# Patient Record
Sex: FEMALE | Race: BLACK OR AFRICAN AMERICAN | Employment: FULL TIME
[De-identification: names, ages, dates, MRNs, and addresses within clinical notes are randomized per-mention and may not be internally consistent; named-entity substitution may affect disease eponyms.]

---

## 2017-01-13 ENCOUNTER — OFFICE VISIT (OUTPATIENT)
Dept: FAMILY MEDICINE CLINIC | Facility: CLINIC | Age: 47
End: 2017-01-13

## 2017-01-13 VITALS
SYSTOLIC BLOOD PRESSURE: 148 MMHG | HEART RATE: 92 BPM | WEIGHT: 281.6 LBS | HEIGHT: 67 IN | TEMPERATURE: 95.4 F | DIASTOLIC BLOOD PRESSURE: 75 MMHG | BODY MASS INDEX: 44.2 KG/M2

## 2017-01-13 DIAGNOSIS — M16.12 PRIMARY OSTEOARTHRITIS OF LEFT HIP: Primary | ICD-10-CM

## 2017-01-13 DIAGNOSIS — D50.8 OTHER IRON DEFICIENCY ANEMIA: ICD-10-CM

## 2017-01-13 DIAGNOSIS — E66.01 MORBID OBESITY DUE TO EXCESS CALORIES (HCC): ICD-10-CM

## 2017-01-13 PROCEDURE — 99213 OFFICE O/P EST LOW 20 MIN: CPT | Performed by: FAMILY MEDICINE

## 2017-01-13 PROCEDURE — 90732 PPSV23 VACC 2 YRS+ SUBQ/IM: CPT | Performed by: FAMILY MEDICINE

## 2017-01-13 PROCEDURE — 90472 IMMUNIZATION ADMIN EACH ADD: CPT | Performed by: FAMILY MEDICINE

## 2017-01-13 PROCEDURE — 90715 TDAP VACCINE 7 YRS/> IM: CPT | Performed by: FAMILY MEDICINE

## 2017-01-13 PROCEDURE — 90471 IMMUNIZATION ADMIN: CPT | Performed by: FAMILY MEDICINE

## 2017-01-13 RX ORDER — LANOLIN ALCOHOL/MO/W.PET/CERES
325 CREAM (GRAM) TOPICAL 2 TIMES DAILY
Qty: 60 TABLET | Refills: 5 | Status: SHIPPED | OUTPATIENT
Start: 2017-01-13 | End: 2017-06-30 | Stop reason: SDUPTHER

## 2017-01-13 RX ORDER — NAPROXEN 500 MG/1
500 TABLET ORAL 2 TIMES DAILY PRN
Qty: 60 TABLET | Refills: 2 | Status: SHIPPED | OUTPATIENT
Start: 2017-01-13 | End: 2018-03-29

## 2017-01-13 ASSESSMENT — ENCOUNTER SYMPTOMS
CONSTIPATION: 0
SHORTNESS OF BREATH: 0
ABDOMINAL PAIN: 0

## 2017-02-09 ENCOUNTER — OFFICE VISIT (OUTPATIENT)
Dept: ORTHOPEDIC SURGERY | Facility: CLINIC | Age: 47
End: 2017-02-09

## 2017-02-09 VITALS — HEIGHT: 67 IN | BODY MASS INDEX: 44.19 KG/M2 | WEIGHT: 281.53 LBS

## 2017-02-09 DIAGNOSIS — S73.192A ACETABULAR LABRUM TEAR, LEFT, INITIAL ENCOUNTER: ICD-10-CM

## 2017-02-09 DIAGNOSIS — M25.552 PAIN OF LEFT HIP JOINT: Primary | ICD-10-CM

## 2017-02-09 PROCEDURE — 99214 OFFICE O/P EST MOD 30 MIN: CPT | Performed by: ORTHOPAEDIC SURGERY

## 2017-03-17 ENCOUNTER — HOSPITAL ENCOUNTER (OUTPATIENT)
Dept: INTERVENTIONAL RADIOLOGY/VASCULAR | Age: 47
Discharge: HOME | End: 2017-03-17
Payer: COMMERCIAL

## 2017-03-17 ENCOUNTER — HOSPITAL ENCOUNTER (OUTPATIENT)
Dept: MRI IMAGING | Age: 47
Discharge: HOME | End: 2017-03-17
Payer: COMMERCIAL

## 2017-03-17 VITALS
HEART RATE: 78 BPM | SYSTOLIC BLOOD PRESSURE: 185 MMHG | OXYGEN SATURATION: 98 % | DIASTOLIC BLOOD PRESSURE: 94 MMHG | RESPIRATION RATE: 15 BRPM

## 2017-03-17 DIAGNOSIS — S73.192A ACETABULAR LABRUM TEAR, LEFT, INITIAL ENCOUNTER: ICD-10-CM

## 2017-03-17 PROCEDURE — 6360000004 HC RX CONTRAST MEDICATION: Performed by: RADIOLOGY

## 2017-03-17 PROCEDURE — 77002 NEEDLE LOCALIZATION BY XRAY: CPT

## 2017-03-17 PROCEDURE — A9579 GAD-BASE MR CONTRAST NOS,1ML: HCPCS | Performed by: RADIOLOGY

## 2017-03-17 PROCEDURE — 73722 MRI JOINT OF LWR EXTR W/DYE: CPT

## 2017-03-17 PROCEDURE — 2500000003 HC RX 250 WO HCPCS: Performed by: RADIOLOGY

## 2017-03-17 PROCEDURE — 6360000002 HC RX W HCPCS: Performed by: RADIOLOGY

## 2017-03-17 PROCEDURE — 20610 DRAIN/INJ JOINT/BURSA W/O US: CPT

## 2017-03-17 RX ORDER — LIDOCAINE HYDROCHLORIDE 10 MG/ML
INJECTION, SOLUTION INFILTRATION; PERINEURAL
Status: COMPLETED | OUTPATIENT
Start: 2017-03-17 | End: 2017-03-17

## 2017-03-17 RX ORDER — METHYLPREDNISOLONE ACETATE 40 MG/ML
40 INJECTION, SUSPENSION INTRA-ARTICULAR; INTRALESIONAL; INTRAMUSCULAR; SOFT TISSUE ONCE
Status: DISCONTINUED | OUTPATIENT
Start: 2017-03-17 | End: 2017-03-20 | Stop reason: HOSPADM

## 2017-03-17 RX ORDER — METHYLPREDNISOLONE ACETATE 40 MG/ML
INJECTION, SUSPENSION INTRA-ARTICULAR; INTRALESIONAL; INTRAMUSCULAR; SOFT TISSUE
Status: COMPLETED | OUTPATIENT
Start: 2017-03-17 | End: 2017-03-17

## 2017-03-17 RX ADMIN — METHYLPREDNISOLONE ACETATE 40 MG: 40 INJECTION, SUSPENSION INTRA-ARTICULAR; INTRALESIONAL; INTRAMUSCULAR; SOFT TISSUE at 09:38

## 2017-03-17 RX ADMIN — GADOPENTETATE DIMEGLUMINE 0.2 ML: 469.01 INJECTION INTRAVENOUS at 09:40

## 2017-03-17 RX ADMIN — LIDOCAINE HYDROCHLORIDE 7 ML: 10 INJECTION, SOLUTION INFILTRATION; PERINEURAL at 09:39

## 2017-03-17 RX ADMIN — IOTHALAMATE MEGLUMINE 5 ML: 430 INJECTION INTRAVASCULAR at 09:40

## 2017-04-05 ENCOUNTER — OFFICE VISIT (OUTPATIENT)
Dept: FAMILY MEDICINE CLINIC | Age: 47
End: 2017-04-05
Payer: COMMERCIAL

## 2017-04-05 VITALS
TEMPERATURE: 97.5 F | WEIGHT: 272.8 LBS | HEIGHT: 67 IN | SYSTOLIC BLOOD PRESSURE: 147 MMHG | DIASTOLIC BLOOD PRESSURE: 84 MMHG | BODY MASS INDEX: 42.82 KG/M2 | HEART RATE: 74 BPM

## 2017-04-05 DIAGNOSIS — E78.2 MIXED HYPERLIPIDEMIA: Primary | ICD-10-CM

## 2017-04-05 PROCEDURE — 99213 OFFICE O/P EST LOW 20 MIN: CPT | Performed by: FAMILY MEDICINE

## 2017-04-05 ASSESSMENT — ENCOUNTER SYMPTOMS
SORE THROAT: 0
COUGH: 0
NAUSEA: 0
TROUBLE SWALLOWING: 0
CONSTIPATION: 0
ABDOMINAL PAIN: 0
SHORTNESS OF BREATH: 0

## 2017-04-11 ENCOUNTER — OFFICE VISIT (OUTPATIENT)
Dept: ORTHOPEDIC SURGERY | Age: 47
End: 2017-04-11
Payer: COMMERCIAL

## 2017-04-11 VITALS — HEIGHT: 67 IN | BODY MASS INDEX: 44.42 KG/M2 | WEIGHT: 283 LBS

## 2017-04-11 DIAGNOSIS — E66.01 MORBID OBESITY DUE TO EXCESS CALORIES (HCC): Primary | ICD-10-CM

## 2017-04-11 PROCEDURE — 99212 OFFICE O/P EST SF 10 MIN: CPT | Performed by: ORTHOPAEDIC SURGERY

## 2017-04-25 DIAGNOSIS — J32.0 CHRONIC MAXILLARY SINUSITIS: ICD-10-CM

## 2017-04-25 RX ORDER — CETIRIZINE HYDROCHLORIDE 10 MG/1
TABLET ORAL
Qty: 30 TABLET | Refills: 0 | Status: SHIPPED | OUTPATIENT
Start: 2017-04-25 | End: 2017-06-30 | Stop reason: SDUPTHER

## 2017-04-25 RX ORDER — FLUTICASONE PROPIONATE 50 MCG
SPRAY, SUSPENSION (ML) NASAL
Qty: 1 BOTTLE | Refills: 2 | Status: SHIPPED | OUTPATIENT
Start: 2017-04-25 | End: 2017-06-30 | Stop reason: SDUPTHER

## 2017-06-03 ENCOUNTER — APPOINTMENT (OUTPATIENT)
Dept: GENERAL RADIOLOGY | Age: 47
End: 2017-06-03
Payer: COMMERCIAL

## 2017-06-03 ENCOUNTER — HOSPITAL ENCOUNTER (EMERGENCY)
Age: 47
Discharge: HOME | End: 2017-06-03
Attending: EMERGENCY MEDICINE
Payer: COMMERCIAL

## 2017-06-03 VITALS
WEIGHT: 274 LBS | BODY MASS INDEX: 43 KG/M2 | TEMPERATURE: 97.3 F | SYSTOLIC BLOOD PRESSURE: 160 MMHG | RESPIRATION RATE: 20 BRPM | DIASTOLIC BLOOD PRESSURE: 81 MMHG | HEART RATE: 93 BPM | HEIGHT: 67 IN | OXYGEN SATURATION: 98 %

## 2017-06-03 DIAGNOSIS — M54.50 ACUTE BILATERAL LOW BACK PAIN WITHOUT SCIATICA: Primary | ICD-10-CM

## 2017-06-03 DIAGNOSIS — R82.71 BACTERIA IN URINE: ICD-10-CM

## 2017-06-03 LAB
-: ABNORMAL
AMORPHOUS: ABNORMAL
BACTERIA: ABNORMAL
BILIRUBIN URINE: NEGATIVE
CASTS UA: ABNORMAL /LPF
COLOR: YELLOW
COMMENT UA: ABNORMAL
CRYSTALS, UA: ABNORMAL /HPF
EPITHELIAL CELLS UA: ABNORMAL /HPF
GLUCOSE URINE: NEGATIVE
KETONES, URINE: NEGATIVE
LEUKOCYTE ESTERASE, URINE: NEGATIVE
MUCUS: ABNORMAL
NITRITE, URINE: NEGATIVE
OTHER OBSERVATIONS UA: ABNORMAL
PH UA: 6 (ref 5–8)
PROTEIN UA: NEGATIVE
RBC UA: ABNORMAL /HPF (ref 0–2)
RENAL EPITHELIAL, UA: ABNORMAL /HPF
SPECIFIC GRAVITY UA: 1.01 (ref 1–1.03)
TRICHOMONAS: ABNORMAL
TURBIDITY: CLEAR
URINE HGB: ABNORMAL
UROBILINOGEN, URINE: NORMAL
WBC UA: ABNORMAL /HPF (ref 0–5)
YEAST: ABNORMAL

## 2017-06-03 PROCEDURE — 72100 X-RAY EXAM L-S SPINE 2/3 VWS: CPT

## 2017-06-03 PROCEDURE — 6370000000 HC RX 637 (ALT 250 FOR IP): Performed by: NURSE PRACTITIONER

## 2017-06-03 PROCEDURE — 81001 URINALYSIS AUTO W/SCOPE: CPT

## 2017-06-03 PROCEDURE — 84703 CHORIONIC GONADOTROPIN ASSAY: CPT

## 2017-06-03 PROCEDURE — 81003 URINALYSIS AUTO W/O SCOPE: CPT

## 2017-06-03 PROCEDURE — 99283 EMERGENCY DEPT VISIT LOW MDM: CPT

## 2017-06-03 PROCEDURE — 87086 URINE CULTURE/COLONY COUNT: CPT

## 2017-06-03 RX ORDER — NITROFURANTOIN 25; 75 MG/1; MG/1
100 CAPSULE ORAL 2 TIMES DAILY
Qty: 10 CAPSULE | Refills: 0 | Status: SHIPPED | OUTPATIENT
Start: 2017-06-03 | End: 2017-06-08

## 2017-06-03 RX ORDER — METHOCARBAMOL 500 MG/1
500 TABLET, FILM COATED ORAL 3 TIMES DAILY PRN
Qty: 20 TABLET | Refills: 0 | Status: SHIPPED | OUTPATIENT
Start: 2017-06-03 | End: 2017-06-13

## 2017-06-03 RX ORDER — HYDROCODONE BITARTRATE AND ACETAMINOPHEN 5; 325 MG/1; MG/1
1 TABLET ORAL ONCE
Status: COMPLETED | OUTPATIENT
Start: 2017-06-03 | End: 2017-06-03

## 2017-06-03 RX ORDER — HYDROCODONE BITARTRATE AND ACETAMINOPHEN 5; 325 MG/1; MG/1
1 TABLET ORAL EVERY 6 HOURS PRN
Qty: 15 TABLET | Refills: 0 | Status: SHIPPED | OUTPATIENT
Start: 2017-06-03 | End: 2017-06-10

## 2017-06-03 RX ADMIN — HYDROCODONE BITARTRATE AND ACETAMINOPHEN 1 TABLET: 5; 325 TABLET ORAL at 16:08

## 2017-06-03 ASSESSMENT — PAIN SCALES - GENERAL
PAINLEVEL_OUTOF10: 10
PAINLEVEL_OUTOF10: 10
PAINLEVEL_OUTOF10: 6
PAINLEVEL_OUTOF10: 6

## 2017-06-03 ASSESSMENT — PAIN DESCRIPTION - LOCATION: LOCATION: BACK

## 2017-06-03 ASSESSMENT — PAIN DESCRIPTION - ORIENTATION: ORIENTATION: MID;LOWER

## 2017-06-04 LAB
CULTURE: NORMAL
CULTURE: NORMAL
Lab: NORMAL
SPECIMEN DESCRIPTION: NORMAL
SPECIMEN DESCRIPTION: NORMAL
STATUS: NORMAL

## 2017-06-05 LAB — HCG, PREGNANCY URINE (POC): NEGATIVE

## 2017-06-09 ENCOUNTER — OFFICE VISIT (OUTPATIENT)
Dept: FAMILY MEDICINE CLINIC | Age: 47
End: 2017-06-09
Payer: COMMERCIAL

## 2017-06-09 VITALS
SYSTOLIC BLOOD PRESSURE: 140 MMHG | WEIGHT: 273 LBS | DIASTOLIC BLOOD PRESSURE: 90 MMHG | RESPIRATION RATE: 16 BRPM | HEIGHT: 67 IN | TEMPERATURE: 98 F | HEART RATE: 88 BPM | BODY MASS INDEX: 42.85 KG/M2

## 2017-06-09 DIAGNOSIS — I10 HYPERTENSION, GOAL BELOW 140/90: ICD-10-CM

## 2017-06-09 DIAGNOSIS — M54.59 MECHANICAL LOW BACK PAIN: Primary | ICD-10-CM

## 2017-06-09 PROCEDURE — 99213 OFFICE O/P EST LOW 20 MIN: CPT | Performed by: FAMILY MEDICINE

## 2017-06-09 RX ORDER — TRIAMTERENE AND HYDROCHLOROTHIAZIDE 37.5; 25 MG/1; MG/1
1 CAPSULE ORAL DAILY
Qty: 30 CAPSULE | Refills: 3 | Status: SHIPPED | OUTPATIENT
Start: 2017-06-09 | End: 2017-06-30 | Stop reason: SDUPTHER

## 2017-06-09 RX ORDER — AMLODIPINE BESYLATE 10 MG/1
10 TABLET ORAL DAILY
Qty: 30 TABLET | Refills: 3 | Status: SHIPPED | OUTPATIENT
Start: 2017-06-09 | End: 2017-06-30 | Stop reason: SDUPTHER

## 2017-06-09 ASSESSMENT — ENCOUNTER SYMPTOMS
CONSTIPATION: 0
ABDOMINAL PAIN: 0
SHORTNESS OF BREATH: 0
COUGH: 0
NAUSEA: 0
BACK PAIN: 1
TROUBLE SWALLOWING: 0
SORE THROAT: 0

## 2017-06-09 ASSESSMENT — PATIENT HEALTH QUESTIONNAIRE - PHQ9
1. LITTLE INTEREST OR PLEASURE IN DOING THINGS: 0
SUM OF ALL RESPONSES TO PHQ QUESTIONS 1-9: 0
SUM OF ALL RESPONSES TO PHQ9 QUESTIONS 1 & 2: 0
2. FEELING DOWN, DEPRESSED OR HOPELESS: 0

## 2017-06-30 ENCOUNTER — OFFICE VISIT (OUTPATIENT)
Dept: FAMILY MEDICINE CLINIC | Age: 47
End: 2017-06-30
Payer: COMMERCIAL

## 2017-06-30 ENCOUNTER — HOSPITAL ENCOUNTER (OUTPATIENT)
Age: 47
Setting detail: SPECIMEN
Discharge: HOME | End: 2017-06-30
Payer: COMMERCIAL

## 2017-06-30 VITALS
TEMPERATURE: 96.5 F | WEIGHT: 269 LBS | HEIGHT: 67 IN | SYSTOLIC BLOOD PRESSURE: 146 MMHG | BODY MASS INDEX: 42.22 KG/M2 | HEART RATE: 86 BPM | DIASTOLIC BLOOD PRESSURE: 83 MMHG

## 2017-06-30 DIAGNOSIS — F17.200 SMOKER: ICD-10-CM

## 2017-06-30 DIAGNOSIS — D50.8 OTHER IRON DEFICIENCY ANEMIA: ICD-10-CM

## 2017-06-30 DIAGNOSIS — E78.5 DYSLIPIDEMIA: ICD-10-CM

## 2017-06-30 DIAGNOSIS — I10 HYPERTENSION, GOAL BELOW 140/90: ICD-10-CM

## 2017-06-30 LAB
ANION GAP SERPL CALCULATED.3IONS-SCNC: 17 MMOL/L (ref 9–17)
BUN BLDV-MCNC: 6 MG/DL (ref 6–20)
BUN/CREAT BLD: ABNORMAL (ref 9–20)
CALCIUM SERPL-MCNC: 9.2 MG/DL (ref 8.6–10.4)
CHLORIDE BLD-SCNC: 95 MMOL/L (ref 98–107)
CO2: 22 MMOL/L (ref 20–31)
CREAT SERPL-MCNC: 0.74 MG/DL (ref 0.5–0.9)
GFR AFRICAN AMERICAN: >60 ML/MIN
GFR NON-AFRICAN AMERICAN: >60 ML/MIN
GFR SERPL CREATININE-BSD FRML MDRD: ABNORMAL ML/MIN/{1.73_M2}
GFR SERPL CREATININE-BSD FRML MDRD: ABNORMAL ML/MIN/{1.73_M2}
GLUCOSE BLD-MCNC: 97 MG/DL (ref 70–99)
HCT VFR BLD CALC: 33.3 % (ref 36–46)
HEMOGLOBIN: 10.2 G/DL (ref 12–16)
MCH RBC QN AUTO: 20.9 PG (ref 26–34)
MCHC RBC AUTO-ENTMCNC: 30.6 G/DL (ref 31–37)
MCV RBC AUTO: 68.4 FL (ref 80–100)
PDW BLD-RTO: 20.1 % (ref 12.5–15.4)
PLATELET # BLD: 453 K/UL (ref 140–450)
PMV BLD AUTO: 8.2 FL (ref 6–12)
POTASSIUM SERPL-SCNC: 3.5 MMOL/L (ref 3.7–5.3)
RBC # BLD: 4.88 M/UL (ref 4–5.2)
SODIUM BLD-SCNC: 134 MMOL/L (ref 135–144)
VITAMIN B-12: 214 PG/ML (ref 211–946)
VITAMIN D 25-HYDROXY: 13.5 NG/ML (ref 30–100)
WBC # BLD: 12.8 K/UL (ref 3.5–11)

## 2017-06-30 PROCEDURE — 99213 OFFICE O/P EST LOW 20 MIN: CPT | Performed by: FAMILY MEDICINE

## 2017-06-30 RX ORDER — TRIAMTERENE AND HYDROCHLOROTHIAZIDE 37.5; 25 MG/1; MG/1
1 CAPSULE ORAL DAILY
Qty: 30 CAPSULE | Refills: 3 | Status: SHIPPED | OUTPATIENT
Start: 2017-06-30 | End: 2018-02-26 | Stop reason: DRUGHIGH

## 2017-06-30 RX ORDER — CETIRIZINE HYDROCHLORIDE 10 MG/1
TABLET ORAL
Qty: 30 TABLET | Refills: 0 | Status: SHIPPED | OUTPATIENT
Start: 2017-06-30 | End: 2020-01-09

## 2017-06-30 RX ORDER — FLUTICASONE PROPIONATE 50 MCG
SPRAY, SUSPENSION (ML) NASAL
Qty: 1 BOTTLE | Refills: 2 | Status: SHIPPED | OUTPATIENT
Start: 2017-06-30 | End: 2020-01-09

## 2017-06-30 RX ORDER — SENNOSIDES 8.6 MG
650 CAPSULE ORAL EVERY 8 HOURS PRN
Qty: 60 TABLET | Refills: 0 | Status: CANCELLED | OUTPATIENT
Start: 2017-06-30

## 2017-06-30 RX ORDER — HYDROCHLOROTHIAZIDE 25 MG/1
25 TABLET ORAL DAILY
Qty: 30 TABLET | Refills: 3 | Status: SHIPPED | OUTPATIENT
Start: 2017-06-30 | End: 2018-02-26 | Stop reason: ALTCHOICE

## 2017-06-30 RX ORDER — METHOCARBAMOL 500 MG/1
500 TABLET, FILM COATED ORAL
Qty: 20 TABLET | Refills: 0 | Status: SHIPPED | OUTPATIENT
Start: 2017-06-30 | End: 2017-06-30

## 2017-06-30 RX ORDER — NAPROXEN 375 MG/1
375 TABLET ORAL 2 TIMES DAILY WITH MEALS
Qty: 60 TABLET | Refills: 0 | Status: SHIPPED | OUTPATIENT
Start: 2017-06-30 | End: 2018-02-05 | Stop reason: ALTCHOICE

## 2017-06-30 RX ORDER — CHOLECALCIFEROL (VITAMIN D3) 125 MCG
500 CAPSULE ORAL DAILY
Qty: 30 TABLET | Status: CANCELLED | OUTPATIENT
Start: 2017-06-30

## 2017-06-30 RX ORDER — LANOLIN ALCOHOL/MO/W.PET/CERES
325 CREAM (GRAM) TOPICAL 2 TIMES DAILY
Qty: 60 TABLET | Refills: 5 | Status: SHIPPED | OUTPATIENT
Start: 2017-06-30 | End: 2020-01-09

## 2017-06-30 RX ORDER — AMLODIPINE BESYLATE 10 MG/1
10 TABLET ORAL DAILY
Qty: 30 TABLET | Refills: 3 | Status: ON HOLD | OUTPATIENT
Start: 2017-06-30 | End: 2020-02-02 | Stop reason: HOSPADM

## 2017-06-30 RX ORDER — BUPROPION HYDROCHLORIDE 150 MG/1
150 TABLET ORAL EVERY MORNING
Qty: 30 TABLET | Refills: 3 | Status: SHIPPED | OUTPATIENT
Start: 2017-06-30 | End: 2020-01-09

## 2017-06-30 RX ORDER — ATORVASTATIN CALCIUM 40 MG/1
40 TABLET, FILM COATED ORAL DAILY
Qty: 30 TABLET | Refills: 4 | Status: ON HOLD | OUTPATIENT
Start: 2017-06-30 | End: 2020-04-20

## 2017-06-30 ASSESSMENT — ENCOUNTER SYMPTOMS
COUGH: 0
CHEST TIGHTNESS: 0
SHORTNESS OF BREATH: 0
BACK PAIN: 1

## 2017-09-05 ENCOUNTER — APPOINTMENT (OUTPATIENT)
Dept: CT IMAGING | Age: 47
End: 2017-09-05

## 2017-09-05 ENCOUNTER — HOSPITAL ENCOUNTER (EMERGENCY)
Age: 47
Discharge: HOME | End: 2017-09-06
Attending: EMERGENCY MEDICINE

## 2017-09-05 VITALS
HEIGHT: 67 IN | OXYGEN SATURATION: 100 % | DIASTOLIC BLOOD PRESSURE: 82 MMHG | BODY MASS INDEX: 42.83 KG/M2 | WEIGHT: 272.9 LBS | TEMPERATURE: 98.1 F | RESPIRATION RATE: 22 BRPM | SYSTOLIC BLOOD PRESSURE: 158 MMHG | HEART RATE: 95 BPM

## 2017-09-05 DIAGNOSIS — I10 ESSENTIAL HYPERTENSION: ICD-10-CM

## 2017-09-05 DIAGNOSIS — R51.9 NONINTRACTABLE HEADACHE, UNSPECIFIED CHRONICITY PATTERN, UNSPECIFIED HEADACHE TYPE: Primary | ICD-10-CM

## 2017-09-05 PROCEDURE — 96372 THER/PROPH/DIAG INJ SC/IM: CPT

## 2017-09-05 PROCEDURE — 99283 EMERGENCY DEPT VISIT LOW MDM: CPT

## 2017-09-05 PROCEDURE — 70450 CT HEAD/BRAIN W/O DYE: CPT

## 2017-09-05 PROCEDURE — 6360000002 HC RX W HCPCS: Performed by: EMERGENCY MEDICINE

## 2017-09-05 RX ORDER — PROMETHAZINE HYDROCHLORIDE 25 MG/ML
25 INJECTION, SOLUTION INTRAMUSCULAR; INTRAVENOUS ONCE
Status: COMPLETED | OUTPATIENT
Start: 2017-09-05 | End: 2017-09-05

## 2017-09-05 RX ORDER — BUTALBITAL, ACETAMINOPHEN AND CAFFEINE 50; 325; 40 MG/1; MG/1; MG/1
1 TABLET ORAL EVERY 6 HOURS PRN
Qty: 20 TABLET | Refills: 0 | Status: SHIPPED | OUTPATIENT
Start: 2017-09-05 | End: 2020-01-09

## 2017-09-05 RX ORDER — KETOROLAC TROMETHAMINE 30 MG/ML
60 INJECTION, SOLUTION INTRAMUSCULAR; INTRAVENOUS ONCE
Status: COMPLETED | OUTPATIENT
Start: 2017-09-05 | End: 2017-09-05

## 2017-09-05 RX ADMIN — PROMETHAZINE HYDROCHLORIDE 25 MG: 25 INJECTION INTRAMUSCULAR; INTRAVENOUS at 23:43

## 2017-09-05 RX ADMIN — KETOROLAC TROMETHAMINE 60 MG: 30 INJECTION, SOLUTION INTRAMUSCULAR at 23:43

## 2017-09-05 ASSESSMENT — PAIN DESCRIPTION - LOCATION: LOCATION: BACK;HEAD

## 2017-09-05 ASSESSMENT — ENCOUNTER SYMPTOMS
RESPIRATORY NEGATIVE: 1
EYES NEGATIVE: 1
ALLERGIC/IMMUNOLOGIC NEGATIVE: 1
GASTROINTESTINAL NEGATIVE: 1

## 2017-09-05 ASSESSMENT — PAIN DESCRIPTION - FREQUENCY: FREQUENCY: CONTINUOUS

## 2017-09-05 ASSESSMENT — PAIN DESCRIPTION - PAIN TYPE: TYPE: ACUTE PAIN

## 2017-09-05 ASSESSMENT — PAIN DESCRIPTION - DESCRIPTORS: DESCRIPTORS: SHARP

## 2017-09-05 ASSESSMENT — PAIN SCALES - GENERAL: PAINLEVEL_OUTOF10: 10

## 2017-11-26 ENCOUNTER — HOSPITAL ENCOUNTER (EMERGENCY)
Age: 47
Discharge: HOME | End: 2017-11-26
Attending: EMERGENCY MEDICINE

## 2017-11-26 ENCOUNTER — APPOINTMENT (OUTPATIENT)
Dept: GENERAL RADIOLOGY | Age: 47
End: 2017-11-26

## 2017-11-26 VITALS
TEMPERATURE: 97.2 F | HEIGHT: 67 IN | DIASTOLIC BLOOD PRESSURE: 102 MMHG | HEART RATE: 79 BPM | RESPIRATION RATE: 16 BRPM | BODY MASS INDEX: 41.59 KG/M2 | WEIGHT: 265 LBS | SYSTOLIC BLOOD PRESSURE: 187 MMHG | OXYGEN SATURATION: 99 %

## 2017-11-26 DIAGNOSIS — M25.512 ACUTE PAIN OF LEFT SHOULDER: ICD-10-CM
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
## 2017-11-26 DIAGNOSIS — M25.552 LEFT HIP PAIN: ICD-10-CM

## 2017-11-26 PROCEDURE — 73502 X-RAY EXAM HIP UNI 2-3 VIEWS: CPT

## 2017-11-26 PROCEDURE — 73130 X-RAY EXAM OF HAND: CPT

## 2017-11-26 PROCEDURE — 6370000000 HC RX 637 (ALT 250 FOR IP): Performed by: EMERGENCY MEDICINE

## 2017-11-26 PROCEDURE — 71010 XR CHEST PORTABLE: CPT

## 2017-11-26 PROCEDURE — 73030 X-RAY EXAM OF SHOULDER: CPT

## 2017-11-26 PROCEDURE — 99284 EMERGENCY DEPT VISIT MOD MDM: CPT

## 2017-11-26 RX ORDER — OXYCODONE HYDROCHLORIDE AND ACETAMINOPHEN 5; 325 MG/1; MG/1
1 TABLET ORAL EVERY 8 HOURS PRN
Qty: 6 TABLET | Refills: 0 | Status: ON HOLD | OUTPATIENT
Start: 2017-11-26 | End: 2018-04-03 | Stop reason: HOSPADM

## 2017-11-26 RX ORDER — CYCLOBENZAPRINE HCL 10 MG
10 TABLET ORAL 2 TIMES DAILY PRN
Qty: 10 TABLET | Refills: 0 | Status: SHIPPED | OUTPATIENT
Start: 2017-11-26 | End: 2018-02-05 | Stop reason: ALTCHOICE

## 2017-11-26 RX ORDER — OXYCODONE HYDROCHLORIDE AND ACETAMINOPHEN 5; 325 MG/1; MG/1
1 TABLET ORAL ONCE
Status: COMPLETED | OUTPATIENT
Start: 2017-11-26 | End: 2017-11-26

## 2017-11-26 RX ORDER — CYCLOBENZAPRINE HCL 10 MG
10 TABLET ORAL ONCE
Status: COMPLETED | OUTPATIENT
Start: 2017-11-26 | End: 2017-11-26

## 2017-11-26 RX ADMIN — CYCLOBENZAPRINE HYDROCHLORIDE 10 MG: 10 TABLET, FILM COATED ORAL at 15:45

## 2017-11-26 RX ADMIN — OXYCODONE HYDROCHLORIDE AND ACETAMINOPHEN 1 TABLET: 5; 325 TABLET ORAL at 15:45

## 2017-11-26 ASSESSMENT — PAIN DESCRIPTION - PAIN TYPE: TYPE: ACUTE PAIN

## 2017-11-26 ASSESSMENT — ENCOUNTER SYMPTOMS
SHORTNESS OF BREATH: 0
BACK PAIN: 0
COUGH: 0
VOMITING: 0
NAUSEA: 0
ABDOMINAL PAIN: 0
SORE THROAT: 0

## 2017-11-26 ASSESSMENT — PAIN DESCRIPTION - LOCATION: LOCATION: HIP

## 2017-11-26 ASSESSMENT — PAIN DESCRIPTION - ORIENTATION: ORIENTATION: LEFT

## 2017-11-26 ASSESSMENT — PAIN SCALES - GENERAL: PAINLEVEL_OUTOF10: 8
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
## 2018-01-22 ENCOUNTER — HOSPITAL ENCOUNTER (EMERGENCY)
Age: 48
Discharge: HOME | End: 2018-01-22
Attending: EMERGENCY MEDICINE

## 2018-01-22 VITALS
BODY MASS INDEX: 43.94 KG/M2 | TEMPERATURE: 98.7 F | OXYGEN SATURATION: 100 % | HEART RATE: 92 BPM | SYSTOLIC BLOOD PRESSURE: 166 MMHG | DIASTOLIC BLOOD PRESSURE: 71 MMHG | HEIGHT: 67 IN | WEIGHT: 279.98 LBS | RESPIRATION RATE: 16 BRPM

## 2018-01-22 DIAGNOSIS — R04.0 EPISTAXIS: Primary | ICD-10-CM

## 2018-01-22 PROCEDURE — 6370000000 HC RX 637 (ALT 250 FOR IP): Performed by: NURSE PRACTITIONER

## 2018-01-22 PROCEDURE — 99282 EMERGENCY DEPT VISIT SF MDM: CPT

## 2018-01-22 RX ORDER — AMLODIPINE BESYLATE 5 MG/1
10 TABLET ORAL ONCE
Status: COMPLETED | OUTPATIENT
Start: 2018-01-22 | End: 2018-01-22

## 2018-01-22 RX ADMIN — AMLODIPINE BESYLATE 10 MG: 5 TABLET ORAL at 19:11

## 2018-01-22 ASSESSMENT — ENCOUNTER SYMPTOMS
COLOR CHANGE: 0
RHINORRHEA: 0
SINUS PAIN: 0
SHORTNESS OF BREATH: 0
BACK PAIN: 0
SORE THROAT: 0
COUGH: 0
FACIAL SWELLING: 0

## 2018-01-29 ENCOUNTER — HOSPITAL ENCOUNTER (EMERGENCY)
Age: 48
Discharge: HOME | End: 2018-01-29
Attending: EMERGENCY MEDICINE

## 2018-01-29 VITALS
TEMPERATURE: 97.8 F | RESPIRATION RATE: 20 BRPM | SYSTOLIC BLOOD PRESSURE: 170 MMHG | OXYGEN SATURATION: 99 % | DIASTOLIC BLOOD PRESSURE: 77 MMHG | BODY MASS INDEX: 43.13 KG/M2 | HEART RATE: 80 BPM | WEIGHT: 274.8 LBS | HEIGHT: 67 IN

## 2018-01-29 DIAGNOSIS — M54.41 ACUTE RIGHT-SIDED LOW BACK PAIN WITH RIGHT-SIDED SCIATICA: Primary | ICD-10-CM

## 2018-01-29 LAB
APPEARANCE: CLEAR
BILIRUBIN, POC: NEGATIVE
BLOOD URINE, POC: NORMAL
CHP ED QC CHECK: YES
CHP ED QC CHECK: YES
CLARITY, POC: CLEAR
COLOR, POC: NORMAL
GLUCOSE URINE, POC: NEGATIVE
KETONES, POC: NEGATIVE
LEUKOCYTE EST, POC: NEGATIVE
NITRITE, POC: NEGATIVE
PH, POC: 6.5
PREGNANCY TEST URINE, POC: NEGATIVE
PROTEIN, POC: NEGATIVE
SPECIFIC GRAVITY, POC: <1.005
UROBILINOGEN, POC: 0.2

## 2018-01-29 PROCEDURE — 6360000002 HC RX W HCPCS: Performed by: NURSE PRACTITIONER

## 2018-01-29 PROCEDURE — 99283 EMERGENCY DEPT VISIT LOW MDM: CPT

## 2018-01-29 PROCEDURE — 96372 THER/PROPH/DIAG INJ SC/IM: CPT

## 2018-01-29 PROCEDURE — 84703 CHORIONIC GONADOTROPIN ASSAY: CPT

## 2018-01-29 PROCEDURE — 81003 URINALYSIS AUTO W/O SCOPE: CPT

## 2018-01-29 RX ORDER — METHOCARBAMOL 750 MG/1
750 TABLET, FILM COATED ORAL 4 TIMES DAILY
Qty: 30 TABLET | Refills: 0 | Status: SHIPPED | OUTPATIENT
Start: 2018-01-29 | End: 2018-02-05 | Stop reason: ALTCHOICE

## 2018-01-29 RX ORDER — KETOROLAC TROMETHAMINE 30 MG/ML
60 INJECTION, SOLUTION INTRAMUSCULAR; INTRAVENOUS ONCE
Status: COMPLETED | OUTPATIENT
Start: 2018-01-29 | End: 2018-01-29

## 2018-01-29 RX ORDER — TRAMADOL HYDROCHLORIDE 50 MG/1
50 TABLET ORAL EVERY 8 HOURS PRN
Qty: 12 TABLET | Refills: 0 | Status: SHIPPED | OUTPATIENT
Start: 2018-01-29 | End: 2018-02-02

## 2018-01-29 RX ADMIN — KETOROLAC TROMETHAMINE 60 MG: 30 INJECTION, SOLUTION INTRAMUSCULAR at 16:54

## 2018-01-29 ASSESSMENT — PAIN DESCRIPTION - ORIENTATION: ORIENTATION: RIGHT;LOWER

## 2018-01-29 ASSESSMENT — PAIN DESCRIPTION - FREQUENCY: FREQUENCY: CONTINUOUS

## 2018-01-29 ASSESSMENT — PAIN SCALES - GENERAL: PAINLEVEL_OUTOF10: 10

## 2018-01-29 ASSESSMENT — PAIN DESCRIPTION - PROGRESSION: CLINICAL_PROGRESSION: NOT CHANGED

## 2018-01-29 ASSESSMENT — PAIN DESCRIPTION - ONSET: ONSET: ON-GOING

## 2018-01-29 ASSESSMENT — ENCOUNTER SYMPTOMS: BACK PAIN: 1

## 2018-01-29 ASSESSMENT — PAIN DESCRIPTION - DESCRIPTORS: DESCRIPTORS: PRESSURE

## 2018-01-29 ASSESSMENT — PAIN DESCRIPTION - LOCATION: LOCATION: BACK

## 2018-01-29 ASSESSMENT — PAIN DESCRIPTION - DIRECTION: RADIATING_TOWARDS: DOWN RIGHT LEG

## 2018-01-29 ASSESSMENT — PAIN DESCRIPTION - PAIN TYPE: TYPE: ACUTE PAIN
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
## 2018-01-30 LAB — HCG, PREGNANCY URINE (POC): NEGATIVE

## 2018-02-05 ENCOUNTER — OFFICE VISIT (OUTPATIENT)
Dept: FAMILY MEDICINE CLINIC | Age: 48
End: 2018-02-05

## 2018-02-05 VITALS
TEMPERATURE: 98.4 F | BODY MASS INDEX: 43.38 KG/M2 | WEIGHT: 276.4 LBS | DIASTOLIC BLOOD PRESSURE: 82 MMHG | HEART RATE: 91 BPM | HEIGHT: 67 IN | SYSTOLIC BLOOD PRESSURE: 166 MMHG

## 2018-02-05 DIAGNOSIS — D50.8 OTHER IRON DEFICIENCY ANEMIA: ICD-10-CM

## 2018-02-05 DIAGNOSIS — M54.50 ACUTE LOW BACK PAIN DUE TO TRAUMA: Primary | ICD-10-CM

## 2018-02-05 DIAGNOSIS — Z00.00 HEALTHCARE MAINTENANCE: ICD-10-CM

## 2018-02-05 DIAGNOSIS — G89.11 ACUTE LOW BACK PAIN DUE TO TRAUMA: Primary | ICD-10-CM

## 2018-02-05 PROCEDURE — 99213 OFFICE O/P EST LOW 20 MIN: CPT

## 2018-02-05 PROCEDURE — 99213 OFFICE O/P EST LOW 20 MIN: CPT | Performed by: FAMILY MEDICINE

## 2018-02-05 RX ORDER — NAPROXEN 500 MG/1
500 TABLET ORAL 2 TIMES DAILY WITH MEALS
Qty: 60 TABLET | Refills: 0 | Status: SHIPPED | OUTPATIENT
Start: 2018-02-05 | End: 2018-03-29

## 2018-02-05 RX ORDER — CYCLOBENZAPRINE HCL 10 MG
10 TABLET ORAL 3 TIMES DAILY PRN
Qty: 20 TABLET | Refills: 0 | Status: SHIPPED | OUTPATIENT
Start: 2018-02-05 | End: 2018-02-15

## 2018-02-05 RX ORDER — PREDNISONE 20 MG/1
20 TABLET ORAL DAILY
Qty: 55 TABLET | Refills: 0 | Status: SHIPPED | OUTPATIENT
Start: 2018-02-05 | End: 2018-02-10

## 2018-02-05 ASSESSMENT — ENCOUNTER SYMPTOMS
COUGH: 0
SHORTNESS OF BREATH: 0
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
## 2018-02-10 ENCOUNTER — HOSPITAL ENCOUNTER (EMERGENCY)
Age: 48
Discharge: HOME | End: 2018-02-10
Attending: EMERGENCY MEDICINE

## 2018-02-10 ENCOUNTER — APPOINTMENT (OUTPATIENT)
Dept: GENERAL RADIOLOGY | Age: 48
End: 2018-02-10

## 2018-02-10 VITALS
SYSTOLIC BLOOD PRESSURE: 157 MMHG | HEIGHT: 67 IN | DIASTOLIC BLOOD PRESSURE: 71 MMHG | RESPIRATION RATE: 16 BRPM | WEIGHT: 280 LBS | TEMPERATURE: 98 F | OXYGEN SATURATION: 100 % | HEART RATE: 86 BPM | BODY MASS INDEX: 43.95 KG/M2

## 2018-02-10 DIAGNOSIS — M54.41 ACUTE RIGHT-SIDED LOW BACK PAIN WITH RIGHT-SIDED SCIATICA: Primary | ICD-10-CM

## 2018-02-10 PROCEDURE — 6360000002 HC RX W HCPCS: Performed by: PHYSICIAN ASSISTANT

## 2018-02-10 PROCEDURE — 72100 X-RAY EXAM L-S SPINE 2/3 VWS: CPT

## 2018-02-10 PROCEDURE — 96372 THER/PROPH/DIAG INJ SC/IM: CPT

## 2018-02-10 PROCEDURE — 96374 THER/PROPH/DIAG INJ IV PUSH: CPT

## 2018-02-10 PROCEDURE — 99283 EMERGENCY DEPT VISIT LOW MDM: CPT

## 2018-02-10 RX ORDER — DEXAMETHASONE SODIUM PHOSPHATE 4 MG/ML
8 INJECTION, SOLUTION INTRA-ARTICULAR; INTRALESIONAL; INTRAMUSCULAR; INTRAVENOUS; SOFT TISSUE ONCE
Status: COMPLETED | OUTPATIENT
Start: 2018-02-10 | End: 2018-02-10

## 2018-02-10 RX ORDER — KETOROLAC TROMETHAMINE 30 MG/ML
60 INJECTION, SOLUTION INTRAMUSCULAR; INTRAVENOUS ONCE
Status: COMPLETED | OUTPATIENT
Start: 2018-02-10 | End: 2018-02-10

## 2018-02-10 RX ORDER — HYDROCODONE BITARTRATE AND ACETAMINOPHEN 5; 325 MG/1; MG/1
1 TABLET ORAL EVERY 6 HOURS PRN
Qty: 10 TABLET | Refills: 0 | Status: SHIPPED | OUTPATIENT
Start: 2018-02-10 | End: 2018-02-13

## 2018-02-10 RX ADMIN — KETOROLAC TROMETHAMINE 60 MG: 30 INJECTION, SOLUTION INTRAMUSCULAR at 12:30

## 2018-02-10 RX ADMIN — DEXAMETHASONE SODIUM PHOSPHATE 8 MG: 4 INJECTION, SOLUTION INTRAMUSCULAR; INTRAVENOUS at 12:30

## 2018-02-10 ASSESSMENT — PAIN DESCRIPTION - DESCRIPTORS: DESCRIPTORS: SHARP

## 2018-02-10 ASSESSMENT — PAIN DESCRIPTION - PAIN TYPE: TYPE: ACUTE PAIN

## 2018-02-10 ASSESSMENT — PAIN DESCRIPTION - PROGRESSION: CLINICAL_PROGRESSION: NOT CHANGED

## 2018-02-10 ASSESSMENT — PAIN SCALES - GENERAL
PAINLEVEL_OUTOF10: 10
PAINLEVEL_OUTOF10: 10

## 2018-02-10 ASSESSMENT — PAIN DESCRIPTION - LOCATION: LOCATION: LEG

## 2018-02-10 ASSESSMENT — PAIN DESCRIPTION - FREQUENCY: FREQUENCY: CONTINUOUS
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
## 2018-02-23 ENCOUNTER — HOSPITAL ENCOUNTER (OUTPATIENT)
Age: 48
Setting detail: SPECIMEN
Discharge: HOME | End: 2018-02-23

## 2018-02-23 DIAGNOSIS — D50.8 OTHER IRON DEFICIENCY ANEMIA: ICD-10-CM

## 2018-02-23 DIAGNOSIS — G89.11 ACUTE LOW BACK PAIN DUE TO TRAUMA: ICD-10-CM

## 2018-02-23 DIAGNOSIS — M54.50 ACUTE LOW BACK PAIN DUE TO TRAUMA: ICD-10-CM

## 2018-02-23 LAB
HCT VFR BLD CALC: 33 % (ref 36.3–47.1)
HEMOGLOBIN: 9.4 G/DL (ref 11.9–15.1)
MCH RBC QN AUTO: 21.5 PG (ref 25.2–33.5)
MCHC RBC AUTO-ENTMCNC: 28.5 G/DL (ref 28.4–34.8)
MCV RBC AUTO: 75.3 FL (ref 82.6–102.9)
NRBC AUTOMATED: 0 PER 100 WBC
PDW BLD-RTO: 18.9 % (ref 11.8–14.4)
PLATELET # BLD: 361 K/UL (ref 138–453)
PMV BLD AUTO: 11 FL (ref 8.1–13.5)
RBC # BLD: 4.38 M/UL (ref 3.95–5.11)
VITAMIN D 25-HYDROXY: 10.5 NG/ML (ref 30–100)
WBC # BLD: 10.3 K/UL (ref 3.5–11.3)

## 2018-02-26 ENCOUNTER — HOSPITAL ENCOUNTER (OUTPATIENT)
Dept: VASCULAR LAB | Age: 48
Discharge: HOME | End: 2018-02-26

## 2018-02-26 ENCOUNTER — TELEPHONE (OUTPATIENT)
Dept: ADMINISTRATIVE | Age: 48
End: 2018-02-26

## 2018-02-26 ENCOUNTER — OFFICE VISIT (OUTPATIENT)
Dept: FAMILY MEDICINE CLINIC | Age: 48
End: 2018-02-26

## 2018-02-26 VITALS
WEIGHT: 273 LBS | DIASTOLIC BLOOD PRESSURE: 100 MMHG | SYSTOLIC BLOOD PRESSURE: 174 MMHG | HEIGHT: 67 IN | HEART RATE: 90 BPM | TEMPERATURE: 97.8 F | BODY MASS INDEX: 42.85 KG/M2

## 2018-02-26 DIAGNOSIS — Z00.00 HEALTHCARE MAINTENANCE: ICD-10-CM

## 2018-02-26 DIAGNOSIS — I10 ESSENTIAL HYPERTENSION: ICD-10-CM

## 2018-02-26 DIAGNOSIS — M79.89 LEG SWELLING: ICD-10-CM

## 2018-02-26 DIAGNOSIS — D53.8 OTHER SPECIFIED NUTRITIONAL ANEMIAS: Primary | ICD-10-CM

## 2018-02-26 DIAGNOSIS — M54.40 LOW BACK PAIN WITH SCIATICA, SCIATICA LATERALITY UNSPECIFIED, UNSPECIFIED BACK PAIN LATERALITY, UNSPECIFIED CHRONICITY: Primary | ICD-10-CM

## 2018-02-26 LAB — HBA1C MFR BLD: 6.1 %

## 2018-02-26 PROCEDURE — 99213 OFFICE O/P EST LOW 20 MIN: CPT

## 2018-02-26 PROCEDURE — 93970 EXTREMITY STUDY: CPT

## 2018-02-26 PROCEDURE — 99214 OFFICE O/P EST MOD 30 MIN: CPT | Performed by: FAMILY MEDICINE

## 2018-02-26 PROCEDURE — 83036 HEMOGLOBIN GLYCOSYLATED A1C: CPT | Performed by: FAMILY MEDICINE

## 2018-02-26 RX ORDER — LIDOCAINE 50 MG/G
OINTMENT TOPICAL
Qty: 1 TUBE | Refills: 1 | Status: SHIPPED | OUTPATIENT
Start: 2018-02-26 | End: 2018-03-29

## 2018-02-26 RX ORDER — GABAPENTIN 100 MG/1
100 CAPSULE ORAL 3 TIMES DAILY
Qty: 90 CAPSULE | Refills: 3 | Status: SHIPPED | OUTPATIENT
Start: 2018-02-26 | End: 2018-03-29

## 2018-02-26 ASSESSMENT — ENCOUNTER SYMPTOMS
SHORTNESS OF BREATH: 0
BACK PAIN: 1
COUGH: 0

## 2018-02-26 NOTE — PATIENT INSTRUCTIONS
Thank you for letting us take care of you today. We hope all your questions were addressed. If a question was overlooked or something else comes to mind after you return home, please contact a member of your Care Team listed below. Please make sure you have a routine office visit set up to follow-up on [address]. Your Care Team at [place] [address] is Team #1  Shagufta Iraheta MD (Faculty)  Yuri Gilbert MD (Faculty  Ercarley Odom MD (Resident)  Vincenzo Ortega MD (Resident)  Sana Villalobos MD (Resident)  Nick Colorado MD (Resident)  Nicolle Jo MD (Resident)  Nusrat Mcclellan [facility]  Nila Linton CHANDLER KINNEY, [address] [facility], ([address] [facility])  ADRIAN Cha, ([address] [facility])  Russ Haskins, Ph.D., ([address] [facility])  Santy Steward, [address] [facility] (Clinical Pharmacist)     Office phone number: [phone]    If you need to get in right away due to illness, please be advised we have \"Same Day\" appointments available Monday-Friday. Please call us at [phone] option #1 to schedule your \"Same Day\" appointment.
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
## 2018-03-02 ENCOUNTER — TELEPHONE (OUTPATIENT)
Dept: ONCOLOGY | Age: 48
End: 2018-03-02

## 2018-03-02 DIAGNOSIS — D50.8 OTHER IRON DEFICIENCY ANEMIA: Primary | ICD-10-CM

## 2018-03-09 ENCOUNTER — TELEPHONE (OUTPATIENT)
Dept: INFUSION THERAPY | Age: 48
End: 2018-03-09

## 2018-03-13 ENCOUNTER — HOSPITAL ENCOUNTER (OUTPATIENT)
Dept: MRI IMAGING | Age: 48
Discharge: HOME | End: 2018-03-15

## 2018-03-13 DIAGNOSIS — M54.40 LOW BACK PAIN WITH SCIATICA, SCIATICA LATERALITY UNSPECIFIED, UNSPECIFIED BACK PAIN LATERALITY, UNSPECIFIED CHRONICITY: ICD-10-CM

## 2018-03-13 PROCEDURE — A9579 GAD-BASE MR CONTRAST NOS,1ML: HCPCS | Performed by: FAMILY MEDICINE

## 2018-03-13 PROCEDURE — 6360000004 HC RX CONTRAST MEDICATION: Performed by: FAMILY MEDICINE

## 2018-03-13 PROCEDURE — 72158 MRI LUMBAR SPINE W/O & W/DYE: CPT

## 2018-03-13 RX ADMIN — GADOTERIDOL 20 ML: 279.3 INJECTION, SOLUTION INTRAVENOUS at 13:17

## 2018-03-14 DIAGNOSIS — T14.8XXA NERVE COMPRESSION: Primary | ICD-10-CM

## 2018-03-15 ENCOUNTER — TELEPHONE (OUTPATIENT)
Dept: FAMILY MEDICINE CLINIC | Age: 48
End: 2018-03-15

## 2018-03-15 NOTE — TELEPHONE ENCOUNTER
----- Message from Hannah Clayton sent at 3/15/2018 11:54 AM EDT -----  Contact: pt  [phone]. Pt would like a copy of her MRI report for her job. Please call patient and advise.

## 2018-03-19 ENCOUNTER — INITIAL CONSULT (OUTPATIENT)
Dept: NEUROSURGERY | Age: 48
End: 2018-03-19
Payer: OTHER MISCELLANEOUS

## 2018-03-19 VITALS
HEIGHT: 67 IN | BODY MASS INDEX: 42.22 KG/M2 | SYSTOLIC BLOOD PRESSURE: 205 MMHG | DIASTOLIC BLOOD PRESSURE: 115 MMHG | HEART RATE: 98 BPM | WEIGHT: 269 LBS

## 2018-03-19 DIAGNOSIS — M51.16 LUMBAR DISC HERNIATION WITH RADICULOPATHY: Primary | ICD-10-CM

## 2018-03-19 PROCEDURE — 99203 OFFICE O/P NEW LOW 30 MIN: CPT | Performed by: NEUROLOGICAL SURGERY

## 2018-03-19 RX ORDER — PREDNISONE 20 MG/1
60 TABLET ORAL DAILY
Qty: 15 TABLET | Refills: 0 | Status: SHIPPED | OUTPATIENT
Start: 2018-03-19 | End: 2018-03-24
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
## 2018-03-21 ENCOUNTER — HOSPITAL ENCOUNTER (OUTPATIENT)
Facility: MEDICAL CENTER | Age: 48
End: 2018-03-21

## 2018-03-21 ENCOUNTER — OFFICE VISIT (OUTPATIENT)
Dept: FAMILY MEDICINE CLINIC | Age: 48
End: 2018-03-21

## 2018-03-21 ENCOUNTER — TELEPHONE (OUTPATIENT)
Dept: SURGERY | Age: 48
End: 2018-03-21

## 2018-03-21 VITALS
WEIGHT: 270.2 LBS | HEIGHT: 67 IN | HEART RATE: 106 BPM | SYSTOLIC BLOOD PRESSURE: 210 MMHG | TEMPERATURE: 97.8 F | BODY MASS INDEX: 42.41 KG/M2 | DIASTOLIC BLOOD PRESSURE: 92 MMHG

## 2018-03-21 DIAGNOSIS — I10 ESSENTIAL HYPERTENSION: Primary | ICD-10-CM

## 2018-03-21 PROCEDURE — 99213 OFFICE O/P EST LOW 20 MIN: CPT | Performed by: FAMILY MEDICINE

## 2018-03-21 ASSESSMENT — ENCOUNTER SYMPTOMS
BACK PAIN: 1
SHORTNESS OF BREATH: 0
CHEST TIGHTNESS: 0

## 2018-04-02 ENCOUNTER — APPOINTMENT (OUTPATIENT)
Dept: GENERAL RADIOLOGY | Age: 48
DRG: 520 | End: 2018-04-02
Attending: NEUROLOGICAL SURGERY

## 2018-04-02 ENCOUNTER — ANESTHESIA (OUTPATIENT)
Dept: OPERATING ROOM | Age: 48
DRG: 520 | End: 2018-04-02

## 2018-04-02 ENCOUNTER — ANESTHESIA EVENT (OUTPATIENT)
Dept: OPERATING ROOM | Age: 48
DRG: 520 | End: 2018-04-02

## 2018-04-02 ENCOUNTER — HOSPITAL ENCOUNTER (INPATIENT)
Age: 48
LOS: 1 days | Discharge: HOME | DRG: 520 | End: 2018-04-03
Attending: NEUROLOGICAL SURGERY | Admitting: NEUROLOGICAL SURGERY

## 2018-04-02 VITALS — DIASTOLIC BLOOD PRESSURE: 97 MMHG | SYSTOLIC BLOOD PRESSURE: 157 MMHG | TEMPERATURE: 98.3 F | OXYGEN SATURATION: 100 %

## 2018-04-02 DIAGNOSIS — M51.26 LUMBAR DISC HERNIATION: Primary | ICD-10-CM

## 2018-04-02 LAB
-: ABNORMAL
ABSOLUTE EOS #: 0.1 K/UL (ref 0–0.44)
ABSOLUTE IMMATURE GRANULOCYTE: 0.04 K/UL (ref 0–0.3)
ABSOLUTE LYMPH #: 1.9 K/UL (ref 1.1–3.7)
ABSOLUTE MONO #: 0.44 K/UL (ref 0.1–1.2)
AMORPHOUS: ABNORMAL
ANION GAP SERPL CALCULATED.3IONS-SCNC: 13 MMOL/L (ref 9–17)
BACTERIA: ABNORMAL
BASOPHILS # BLD: 0 % (ref 0–2)
BASOPHILS ABSOLUTE: 0.03 K/UL (ref 0–0.2)
BILIRUBIN URINE: NEGATIVE
BUN BLDV-MCNC: 9 MG/DL (ref 6–20)
BUN/CREAT BLD: ABNORMAL (ref 9–20)
CALCIUM SERPL-MCNC: 8.7 MG/DL (ref 8.6–10.4)
CASTS UA: ABNORMAL /LPF (ref 0–2)
CHLORIDE BLD-SCNC: 98 MMOL/L (ref 98–107)
CO2: 25 MMOL/L (ref 20–31)
COLOR: YELLOW
COMMENT UA: ABNORMAL
CREAT SERPL-MCNC: 0.79 MG/DL (ref 0.5–0.9)
CRYSTALS, UA: ABNORMAL /HPF
DIFFERENTIAL TYPE: ABNORMAL
EKG ATRIAL RATE: 70 BPM
EKG P AXIS: 62 DEGREES
EKG P-R INTERVAL: 150 MS
EKG Q-T INTERVAL: 414 MS
EKG QRS DURATION: 100 MS
EKG QTC CALCULATION (BAZETT): 447 MS
EKG R AXIS: 12 DEGREES
EKG T AXIS: -6 DEGREES
EKG VENTRICULAR RATE: 70 BPM
EOSINOPHILS RELATIVE PERCENT: 1 % (ref 1–4)
EPITHELIAL CELLS UA: ABNORMAL /HPF (ref 0–5)
GFR AFRICAN AMERICAN: >60 ML/MIN
GFR NON-AFRICAN AMERICAN: >60 ML/MIN
GFR SERPL CREATININE-BSD FRML MDRD: ABNORMAL ML/MIN/{1.73_M2}
GFR SERPL CREATININE-BSD FRML MDRD: ABNORMAL ML/MIN/{1.73_M2}
GLUCOSE BLD-MCNC: 103 MG/DL (ref 70–99)
GLUCOSE URINE: NEGATIVE
HCG, PREGNANCY URINE (POC): NEGATIVE
HCT VFR BLD CALC: 37 % (ref 36.3–47.1)
HEMOGLOBIN: 10.6 G/DL (ref 11.9–15.1)
IMMATURE GRANULOCYTES: 1 %
KETONES, URINE: NEGATIVE
LEUKOCYTE ESTERASE, URINE: ABNORMAL
LYMPHOCYTES # BLD: 22 % (ref 24–43)
MCH RBC QN AUTO: 21.1 PG (ref 25.2–33.5)
MCHC RBC AUTO-ENTMCNC: 28.6 G/DL (ref 28.4–34.8)
MCV RBC AUTO: 73.6 FL (ref 82.6–102.9)
MONOCYTES # BLD: 5 % (ref 3–12)
MUCUS: ABNORMAL
NITRITE, URINE: NEGATIVE
NRBC AUTOMATED: 0 PER 100 WBC
OTHER OBSERVATIONS UA: ABNORMAL
PARTIAL THROMBOPLASTIN TIME: 22.6 SEC (ref 20.5–30.5)
PDW BLD-RTO: 18.6 % (ref 11.8–14.4)
PH UA: 6 (ref 5–8)
PLATELET # BLD: 390 K/UL (ref 138–453)
PLATELET ESTIMATE: ABNORMAL
PMV BLD AUTO: 9.8 FL (ref 8.1–13.5)
POTASSIUM SERPL-SCNC: 4.1 MMOL/L (ref 3.7–5.3)
PROTEIN UA: NEGATIVE
RBC # BLD: 5.03 M/UL (ref 3.95–5.11)
RBC # BLD: ABNORMAL 10*6/UL
RBC UA: ABNORMAL /HPF (ref 0–2)
RENAL EPITHELIAL, UA: ABNORMAL /HPF
SEG NEUTROPHILS: 71 % (ref 36–65)
SEGMENTED NEUTROPHILS ABSOLUTE COUNT: 6.13 K/UL (ref 1.5–8.1)
SODIUM BLD-SCNC: 136 MMOL/L (ref 135–144)
SPECIFIC GRAVITY UA: 1.01 (ref 1–1.03)
TRICHOMONAS: ABNORMAL
TURBIDITY: CLEAR
URINE HGB: NEGATIVE
UROBILINOGEN, URINE: NORMAL
WBC # BLD: 8.6 K/UL (ref 3.5–11.3)
WBC # BLD: ABNORMAL 10*3/UL
WBC UA: ABNORMAL /HPF (ref 0–5)
YEAST: ABNORMAL

## 2018-04-02 PROCEDURE — 81001 URINALYSIS AUTO W/SCOPE: CPT

## 2018-04-02 PROCEDURE — 72100 X-RAY EXAM L-S SPINE 2/3 VWS: CPT

## 2018-04-02 PROCEDURE — 7100000000 HC PACU RECOVERY - FIRST 15 MIN: Performed by: NEUROLOGICAL SURGERY

## 2018-04-02 PROCEDURE — 7100000001 HC PACU RECOVERY - ADDTL 15 MIN: Performed by: NEUROLOGICAL SURGERY

## 2018-04-02 PROCEDURE — 85025 COMPLETE CBC W/AUTO DIFF WBC: CPT

## 2018-04-02 PROCEDURE — 1200000000 HC SEMI PRIVATE

## 2018-04-02 PROCEDURE — 6370000000 HC RX 637 (ALT 250 FOR IP): Performed by: EMERGENCY MEDICINE

## 2018-04-02 PROCEDURE — 85730 THROMBOPLASTIN TIME PARTIAL: CPT

## 2018-04-02 PROCEDURE — 6370000000 HC RX 637 (ALT 250 FOR IP): Performed by: NEUROLOGICAL SURGERY

## 2018-04-02 PROCEDURE — 6360000002 HC RX W HCPCS: Performed by: ANESTHESIOLOGY

## 2018-04-02 PROCEDURE — 2720000010 HC SURG SUPPLY STERILE: Performed by: NEUROLOGICAL SURGERY

## 2018-04-02 PROCEDURE — 2580000003 HC RX 258: Performed by: ANESTHESIOLOGY

## 2018-04-02 PROCEDURE — 6360000002 HC RX W HCPCS: Performed by: NEUROLOGICAL SURGERY

## 2018-04-02 PROCEDURE — 3700000001 HC ADD 15 MINUTES (ANESTHESIA): Performed by: NEUROLOGICAL SURGERY

## 2018-04-02 PROCEDURE — 6360000002 HC RX W HCPCS: Performed by: EMERGENCY MEDICINE

## 2018-04-02 PROCEDURE — 2500000003 HC RX 250 WO HCPCS: Performed by: NEUROLOGICAL SURGERY

## 2018-04-02 PROCEDURE — 2580000003 HC RX 258: Performed by: NEUROLOGICAL SURGERY

## 2018-04-02 PROCEDURE — 3700000000 HC ANESTHESIA ATTENDED CARE: Performed by: NEUROLOGICAL SURGERY

## 2018-04-02 PROCEDURE — 63057 DECOMPRESS SPINE CORD ADD-ON: CPT | Performed by: NEUROLOGICAL SURGERY

## 2018-04-02 PROCEDURE — 3600000004 HC SURGERY LEVEL 4 BASE: Performed by: NEUROLOGICAL SURGERY

## 2018-04-02 PROCEDURE — 93005 ELECTROCARDIOGRAM TRACING: CPT

## 2018-04-02 PROCEDURE — 63056 DECOMPRESS SPINAL CORD LMBR: CPT | Performed by: NEUROLOGICAL SURGERY

## 2018-04-02 PROCEDURE — 6360000002 HC RX W HCPCS: Performed by: NURSE ANESTHETIST, CERTIFIED REGISTERED

## 2018-04-02 PROCEDURE — 8E0WXBF COMPUTER ASSISTED PROCEDURE OF TRUNK REGION, WITH FLUOROSCOPY: ICD-10-PCS | Performed by: NEUROLOGICAL SURGERY

## 2018-04-02 PROCEDURE — 3600000014 HC SURGERY LEVEL 4 ADDTL 15MIN: Performed by: NEUROLOGICAL SURGERY

## 2018-04-02 PROCEDURE — 2580000003 HC RX 258: Performed by: NURSE ANESTHETIST, CERTIFIED REGISTERED

## 2018-04-02 PROCEDURE — 84703 CHORIONIC GONADOTROPIN ASSAY: CPT

## 2018-04-02 PROCEDURE — 0SB20ZZ EXCISION OF LUMBAR VERTEBRAL DISC, OPEN APPROACH: ICD-10-PCS | Performed by: NEUROLOGICAL SURGERY

## 2018-04-02 PROCEDURE — 2500000003 HC RX 250 WO HCPCS: Performed by: NURSE ANESTHETIST, CERTIFIED REGISTERED

## 2018-04-02 PROCEDURE — 80048 BASIC METABOLIC PNL TOTAL CA: CPT

## 2018-04-02 PROCEDURE — 6360000002 HC RX W HCPCS

## 2018-04-02 RX ORDER — OXYCODONE HYDROCHLORIDE AND ACETAMINOPHEN 5; 325 MG/1; MG/1
1 TABLET ORAL EVERY 6 HOURS PRN
Status: DISCONTINUED | OUTPATIENT
Start: 2018-04-02 | End: 2018-04-03 | Stop reason: HOSPADM

## 2018-04-02 RX ORDER — LABETALOL HYDROCHLORIDE 5 MG/ML
5 INJECTION, SOLUTION INTRAVENOUS EVERY 10 MIN PRN
Status: DISCONTINUED | OUTPATIENT
Start: 2018-04-02 | End: 2018-04-02

## 2018-04-02 RX ORDER — BUPIVACAINE HYDROCHLORIDE AND EPINEPHRINE 5; 5 MG/ML; UG/ML
INJECTION, SOLUTION EPIDURAL; INTRACAUDAL; PERINEURAL PRN
Status: DISCONTINUED | OUTPATIENT
Start: 2018-04-02 | End: 2018-04-02

## 2018-04-02 RX ORDER — PROPOFOL 10 MG/ML
INJECTION, EMULSION INTRAVENOUS PRN
Status: DISCONTINUED | OUTPATIENT
Start: 2018-04-02 | End: 2018-04-02 | Stop reason: SDUPTHER

## 2018-04-02 RX ORDER — MIDAZOLAM HYDROCHLORIDE 1 MG/ML
1 INJECTION INTRAMUSCULAR; INTRAVENOUS
Status: DISCONTINUED | OUTPATIENT
Start: 2018-04-02 | End: 2018-04-02

## 2018-04-02 RX ORDER — FENTANYL CITRATE 50 UG/ML
INJECTION, SOLUTION INTRAMUSCULAR; INTRAVENOUS PRN
Status: DISCONTINUED | OUTPATIENT
Start: 2018-04-02 | End: 2018-04-02 | Stop reason: SDUPTHER

## 2018-04-02 RX ORDER — DEXAMETHASONE 4 MG/1
4 TABLET ORAL EVERY 6 HOURS SCHEDULED
Status: DISCONTINUED | OUTPATIENT
Start: 2018-04-02 | End: 2018-04-03 | Stop reason: HOSPADM

## 2018-04-02 RX ORDER — ROCURONIUM BROMIDE 10 MG/ML
INJECTION, SOLUTION INTRAVENOUS PRN
Status: DISCONTINUED | OUTPATIENT
Start: 2018-04-02 | End: 2018-04-02 | Stop reason: SDUPTHER

## 2018-04-02 RX ORDER — SODIUM CHLORIDE, SODIUM LACTATE, POTASSIUM CHLORIDE, CALCIUM CHLORIDE 600; 310; 30; 20 MG/100ML; MG/100ML; MG/100ML; MG/100ML
INJECTION, SOLUTION INTRAVENOUS CONTINUOUS
Status: DISCONTINUED | OUTPATIENT
Start: 2018-04-02 | End: 2018-04-02

## 2018-04-02 RX ORDER — ONDANSETRON 2 MG/ML
4 INJECTION INTRAMUSCULAR; INTRAVENOUS
Status: DISCONTINUED | OUTPATIENT
Start: 2018-04-02 | End: 2018-04-02

## 2018-04-02 RX ORDER — ONDANSETRON 2 MG/ML
INJECTION INTRAMUSCULAR; INTRAVENOUS PRN
Status: DISCONTINUED | OUTPATIENT
Start: 2018-04-02 | End: 2018-04-02 | Stop reason: SDUPTHER

## 2018-04-02 RX ORDER — DEXAMETHASONE SODIUM PHOSPHATE 10 MG/ML
INJECTION INTRAMUSCULAR; INTRAVENOUS PRN
Status: DISCONTINUED | OUTPATIENT
Start: 2018-04-02 | End: 2018-04-02 | Stop reason: SDUPTHER

## 2018-04-02 RX ORDER — OXYCODONE HYDROCHLORIDE AND ACETAMINOPHEN 5; 325 MG/1; MG/1
2 TABLET ORAL EVERY 6 HOURS PRN
Status: DISCONTINUED | OUTPATIENT
Start: 2018-04-02 | End: 2018-04-03 | Stop reason: HOSPADM

## 2018-04-02 RX ORDER — FENTANYL CITRATE 50 UG/ML
50 INJECTION, SOLUTION INTRAMUSCULAR; INTRAVENOUS EVERY 5 MIN PRN
Status: COMPLETED | OUTPATIENT
Start: 2018-04-02 | End: 2018-04-02

## 2018-04-02 RX ORDER — CEFAZOLIN SODIUM 1 G/3ML
INJECTION, POWDER, FOR SOLUTION INTRAMUSCULAR; INTRAVENOUS PRN
Status: DISCONTINUED | OUTPATIENT
Start: 2018-04-02 | End: 2018-04-02 | Stop reason: SDUPTHER

## 2018-04-02 RX ORDER — LIDOCAINE HYDROCHLORIDE 10 MG/ML
INJECTION, SOLUTION INFILTRATION; PERINEURAL PRN
Status: DISCONTINUED | OUTPATIENT
Start: 2018-04-02 | End: 2018-04-02 | Stop reason: SDUPTHER

## 2018-04-02 RX ORDER — SODIUM CHLORIDE, SODIUM LACTATE, POTASSIUM CHLORIDE, CALCIUM CHLORIDE 600; 310; 30; 20 MG/100ML; MG/100ML; MG/100ML; MG/100ML
INJECTION, SOLUTION INTRAVENOUS CONTINUOUS PRN
Status: DISCONTINUED | OUTPATIENT
Start: 2018-04-02 | End: 2018-04-02 | Stop reason: SDUPTHER

## 2018-04-02 RX ORDER — GLYCOPYRROLATE 1 MG/5 ML
SYRINGE (ML) INTRAVENOUS PRN
Status: DISCONTINUED | OUTPATIENT
Start: 2018-04-02 | End: 2018-04-02 | Stop reason: SDUPTHER

## 2018-04-02 RX ORDER — METHYLPREDNISOLONE ACETATE 40 MG/ML
INJECTION, SUSPENSION INTRA-ARTICULAR; INTRALESIONAL; INTRAMUSCULAR; SOFT TISSUE PRN
Status: DISCONTINUED | OUTPATIENT
Start: 2018-04-02 | End: 2018-04-02

## 2018-04-02 RX ORDER — ECHINACEA PURPUREA EXTRACT 125 MG
1 TABLET ORAL PRN
Status: DISCONTINUED | OUTPATIENT
Start: 2018-04-02 | End: 2018-04-03 | Stop reason: HOSPADM

## 2018-04-02 RX ADMIN — HYDROMORPHONE HYDROCHLORIDE 0.5 MG: 1 INJECTION, SOLUTION INTRAMUSCULAR; INTRAVENOUS; SUBCUTANEOUS at 16:27

## 2018-04-02 RX ADMIN — DEXAMETHASONE 4 MG: 4 TABLET ORAL at 19:41

## 2018-04-02 RX ADMIN — OXYCODONE HYDROCHLORIDE AND ACETAMINOPHEN 2 TABLET: 5; 325 TABLET ORAL at 20:32

## 2018-04-02 RX ADMIN — FENTANYL CITRATE 50 MCG: 50 INJECTION INTRAMUSCULAR; INTRAVENOUS at 16:15

## 2018-04-02 RX ADMIN — SODIUM CHLORIDE, POTASSIUM CHLORIDE, SODIUM LACTATE AND CALCIUM CHLORIDE: 600; 310; 30; 20 INJECTION, SOLUTION INTRAVENOUS at 10:40

## 2018-04-02 RX ADMIN — PROPOFOL 150 MG: 10 INJECTION, EMULSION INTRAVENOUS at 11:56

## 2018-04-02 RX ADMIN — DEXAMETHASONE SODIUM PHOSPHATE 10 MG: 10 INJECTION INTRAMUSCULAR; INTRAVENOUS at 12:20

## 2018-04-02 RX ADMIN — ROCURONIUM BROMIDE 40 MG: 10 INJECTION INTRAVENOUS at 11:56

## 2018-04-02 RX ADMIN — ROCURONIUM BROMIDE 10 MG: 10 INJECTION INTRAVENOUS at 13:15

## 2018-04-02 RX ADMIN — CEFAZOLIN 2000 MG: 1 INJECTION, POWDER, FOR SOLUTION INTRAMUSCULAR; INTRAVENOUS at 15:05

## 2018-04-02 RX ADMIN — FENTANYL CITRATE 50 MCG: 50 INJECTION INTRAMUSCULAR; INTRAVENOUS at 13:30

## 2018-04-02 RX ADMIN — FENTANYL CITRATE 50 MCG: 50 INJECTION INTRAMUSCULAR; INTRAVENOUS at 14:50

## 2018-04-02 RX ADMIN — Medication 0.4 MG: at 15:51

## 2018-04-02 RX ADMIN — FENTANYL CITRATE 50 MCG: 50 INJECTION INTRAMUSCULAR; INTRAVENOUS at 16:22

## 2018-04-02 RX ADMIN — Medication 0.5 MG: at 16:46

## 2018-04-02 RX ADMIN — ONDANSETRON 4 MG: 2 INJECTION INTRAMUSCULAR; INTRAVENOUS at 14:36

## 2018-04-02 RX ADMIN — ROCURONIUM BROMIDE 10 MG: 10 INJECTION INTRAVENOUS at 12:20

## 2018-04-02 RX ADMIN — LIDOCAINE HYDROCHLORIDE 40 MG: 10 INJECTION, SOLUTION INFILTRATION; PERINEURAL at 11:56

## 2018-04-02 RX ADMIN — CEFAZOLIN 2000 MG: 1 INJECTION, POWDER, FOR SOLUTION INTRAMUSCULAR; INTRAVENOUS at 12:05

## 2018-04-02 RX ADMIN — SODIUM CHLORIDE, POTASSIUM CHLORIDE, SODIUM LACTATE AND CALCIUM CHLORIDE: 600; 310; 30; 20 INJECTION, SOLUTION INTRAVENOUS at 11:48

## 2018-04-02 RX ADMIN — HYDROMORPHONE HYDROCHLORIDE 0.5 MG: 1 INJECTION, SOLUTION INTRAMUSCULAR; INTRAVENOUS; SUBCUTANEOUS at 16:46

## 2018-04-02 RX ADMIN — NEOSTIGMINE METHYLSULFATE 3 MG: 1 INJECTION, SOLUTION INTRAMUSCULAR; INTRAVENOUS; SUBCUTANEOUS at 15:51

## 2018-04-02 RX ADMIN — FENTANYL CITRATE 50 MCG: 50 INJECTION INTRAMUSCULAR; INTRAVENOUS at 14:15

## 2018-04-02 RX ADMIN — ROCURONIUM BROMIDE 10 MG: 10 INJECTION INTRAVENOUS at 12:04

## 2018-04-02 RX ADMIN — Medication 0.5 MG: at 16:27

## 2018-04-02 RX ADMIN — ROCURONIUM BROMIDE 20 MG: 10 INJECTION INTRAVENOUS at 14:45

## 2018-04-02 RX ADMIN — FENTANYL CITRATE 100 MCG: 50 INJECTION INTRAMUSCULAR; INTRAVENOUS at 11:53

## 2018-04-02 ASSESSMENT — PULMONARY FUNCTION TESTS
PIF_VALUE: 28
PIF_VALUE: 28
PIF_VALUE: 27
PIF_VALUE: 29
PIF_VALUE: 27
PIF_VALUE: 0
PIF_VALUE: 0
PIF_VALUE: 28
PIF_VALUE: 28
PIF_VALUE: 29
PIF_VALUE: 29
PIF_VALUE: 28
PIF_VALUE: 28
PIF_VALUE: 35
PIF_VALUE: 28
PIF_VALUE: 27
PIF_VALUE: 29
PIF_VALUE: 28
PIF_VALUE: 28
PIF_VALUE: 29
PIF_VALUE: 29
PIF_VALUE: 28
PIF_VALUE: 28
PIF_VALUE: 30
PIF_VALUE: 27
PIF_VALUE: 28
PIF_VALUE: 29
PIF_VALUE: 28
PIF_VALUE: 33
PIF_VALUE: 28
PIF_VALUE: 29
PIF_VALUE: 28
PIF_VALUE: 28
PIF_VALUE: 27
PIF_VALUE: 29
PIF_VALUE: 30
PIF_VALUE: 29
PIF_VALUE: 29
PIF_VALUE: 28
PIF_VALUE: 30
PIF_VALUE: 28
PIF_VALUE: 29
PIF_VALUE: 30
PIF_VALUE: 25
PIF_VALUE: 28
PIF_VALUE: 26
PIF_VALUE: 28
PIF_VALUE: 28
PIF_VALUE: 29
PIF_VALUE: 27
PIF_VALUE: 28
PIF_VALUE: 29
PIF_VALUE: 28
PIF_VALUE: 29
PIF_VALUE: 35
PIF_VALUE: 28
PIF_VALUE: 25
PIF_VALUE: 29
PIF_VALUE: 30
PIF_VALUE: 29
PIF_VALUE: 28
PIF_VALUE: 29
PIF_VALUE: 28
PIF_VALUE: 29
PIF_VALUE: 28
PIF_VALUE: 29
PIF_VALUE: 28
PIF_VALUE: 29
PIF_VALUE: 28
PIF_VALUE: 27
PIF_VALUE: 30
PIF_VALUE: 28
PIF_VALUE: 29
PIF_VALUE: 30
PIF_VALUE: 29
PIF_VALUE: 29
PIF_VALUE: 28
PIF_VALUE: 27
PIF_VALUE: 29
PIF_VALUE: 28
PIF_VALUE: 29
PIF_VALUE: 29
PIF_VALUE: 28
PIF_VALUE: 28
PIF_VALUE: 29
PIF_VALUE: 27
PIF_VALUE: 29
PIF_VALUE: 28
PIF_VALUE: 29
PIF_VALUE: 28
PIF_VALUE: 29
PIF_VALUE: 28
PIF_VALUE: 1
PIF_VALUE: 0
PIF_VALUE: 28
PIF_VALUE: 27
PIF_VALUE: 30
PIF_VALUE: 14
PIF_VALUE: 29
PIF_VALUE: 28
PIF_VALUE: 28
PIF_VALUE: 0
PIF_VALUE: 29
PIF_VALUE: 28
PIF_VALUE: 29
PIF_VALUE: 28
PIF_VALUE: 29
PIF_VALUE: 28
PIF_VALUE: 28
PIF_VALUE: 29
PIF_VALUE: 28
PIF_VALUE: 29
PIF_VALUE: 29
PIF_VALUE: 27
PIF_VALUE: 29
PIF_VALUE: 0
PIF_VALUE: 27
PIF_VALUE: 28
PIF_VALUE: 4
PIF_VALUE: 29
PIF_VALUE: 18
PIF_VALUE: 30
PIF_VALUE: 29
PIF_VALUE: 27
PIF_VALUE: 27
PIF_VALUE: 28
PIF_VALUE: 29
PIF_VALUE: 29
PIF_VALUE: 28
PIF_VALUE: 29
PIF_VALUE: 28
PIF_VALUE: 28
PIF_VALUE: 1
PIF_VALUE: 29
PIF_VALUE: 27
PIF_VALUE: 31
PIF_VALUE: 27
PIF_VALUE: 28
PIF_VALUE: 27
PIF_VALUE: 28
PIF_VALUE: 30
PIF_VALUE: 28
PIF_VALUE: 28
PIF_VALUE: 26
PIF_VALUE: 28
PIF_VALUE: 27
PIF_VALUE: 28
PIF_VALUE: 29
PIF_VALUE: 28
PIF_VALUE: 27
PIF_VALUE: 28
PIF_VALUE: 28
PIF_VALUE: 29
PIF_VALUE: 28
PIF_VALUE: 27
PIF_VALUE: 28
PIF_VALUE: 29
PIF_VALUE: 28
PIF_VALUE: 30
PIF_VALUE: 28
PIF_VALUE: 29
PIF_VALUE: 30
PIF_VALUE: 28
PIF_VALUE: 28
PIF_VALUE: 29
PIF_VALUE: 28
PIF_VALUE: 4
PIF_VALUE: 28
PIF_VALUE: 29
PIF_VALUE: 29
PIF_VALUE: 13
PIF_VALUE: 29
PIF_VALUE: 28
PIF_VALUE: 29
PIF_VALUE: 28
PIF_VALUE: 29
PIF_VALUE: 28
PIF_VALUE: 29
PIF_VALUE: 29
PIF_VALUE: 28
PIF_VALUE: 29
PIF_VALUE: 28
PIF_VALUE: 28
PIF_VALUE: 29
PIF_VALUE: 28
PIF_VALUE: 29
PIF_VALUE: 29
PIF_VALUE: 28
PIF_VALUE: 29
PIF_VALUE: 29
PIF_VALUE: 28
PIF_VALUE: 29
PIF_VALUE: 27
PIF_VALUE: 30
PIF_VALUE: 28
PIF_VALUE: 28
PIF_VALUE: 3
PIF_VALUE: 24
PIF_VALUE: 33
PIF_VALUE: 27
PIF_VALUE: 27
PIF_VALUE: 28
PIF_VALUE: 28
PIF_VALUE: 29
PIF_VALUE: 28
PIF_VALUE: 29

## 2018-04-02 ASSESSMENT — PAIN SCALES - GENERAL
PAINLEVEL_OUTOF10: 8
PAINLEVEL_OUTOF10: 6
PAINLEVEL_OUTOF10: 5
PAINLEVEL_OUTOF10: 9
PAINLEVEL_OUTOF10: 7
PAINLEVEL_OUTOF10: 8
PAINLEVEL_OUTOF10: 6
PAINLEVEL_OUTOF10: 8
PAINLEVEL_OUTOF10: 7
PAINLEVEL_OUTOF10: 3

## 2018-04-02 ASSESSMENT — PAIN DESCRIPTION - FREQUENCY: FREQUENCY: CONTINUOUS

## 2018-04-02 ASSESSMENT — PAIN DESCRIPTION - DESCRIPTORS
DESCRIPTORS: RADIATING;SHARP
DESCRIPTORS: BURNING;ACHING

## 2018-04-02 ASSESSMENT — PAIN DESCRIPTION - PAIN TYPE
TYPE: SURGICAL PAIN

## 2018-04-02 ASSESSMENT — PAIN DESCRIPTION - LOCATION
LOCATION: BACK

## 2018-04-02 ASSESSMENT — PAIN DESCRIPTION - ORIENTATION
ORIENTATION: LOWER;MID
ORIENTATION: MID;LOWER

## 2018-04-02 ASSESSMENT — PAIN DESCRIPTION - ONSET: ONSET: AWAKENED FROM SLEEP

## 2018-04-02 ASSESSMENT — PAIN DESCRIPTION - PROGRESSION: CLINICAL_PROGRESSION: GRADUALLY WORSENING

## 2018-04-02 ASSESSMENT — PAIN - FUNCTIONAL ASSESSMENT: PAIN_FUNCTIONAL_ASSESSMENT: 0-10

## 2018-04-03 VITALS
HEART RATE: 90 BPM | WEIGHT: 270 LBS | RESPIRATION RATE: 17 BRPM | SYSTOLIC BLOOD PRESSURE: 145 MMHG | DIASTOLIC BLOOD PRESSURE: 63 MMHG | TEMPERATURE: 97.9 F | HEIGHT: 67 IN | BODY MASS INDEX: 42.38 KG/M2 | OXYGEN SATURATION: 98 %

## 2018-04-03 PROCEDURE — 6360000002 HC RX W HCPCS: Performed by: EMERGENCY MEDICINE

## 2018-04-03 PROCEDURE — 6370000000 HC RX 637 (ALT 250 FOR IP): Performed by: EMERGENCY MEDICINE

## 2018-04-03 PROCEDURE — 97535 SELF CARE MNGMENT TRAINING: CPT

## 2018-04-03 PROCEDURE — 6370000000 HC RX 637 (ALT 250 FOR IP): Performed by: INTERNAL MEDICINE

## 2018-04-03 PROCEDURE — 97165 OT EVAL LOW COMPLEX 30 MIN: CPT

## 2018-04-03 RX ORDER — HYDROCHLOROTHIAZIDE 25 MG/1
25 TABLET ORAL EVERY MORNING
Status: DISCONTINUED | OUTPATIENT
Start: 2018-04-03 | End: 2018-04-03 | Stop reason: HOSPADM

## 2018-04-03 RX ORDER — CYCLOBENZAPRINE HCL 10 MG
10 TABLET ORAL 3 TIMES DAILY PRN
Status: DISCONTINUED | OUTPATIENT
Start: 2018-04-03 | End: 2018-04-03

## 2018-04-03 RX ORDER — AMLODIPINE BESYLATE 10 MG/1
10 TABLET ORAL DAILY
Status: DISCONTINUED | OUTPATIENT
Start: 2018-04-03 | End: 2018-04-03 | Stop reason: HOSPADM

## 2018-04-03 RX ORDER — OXYCODONE HYDROCHLORIDE AND ACETAMINOPHEN 5; 325 MG/1; MG/1
1 TABLET ORAL EVERY 6 HOURS PRN
Qty: 30 TABLET | Refills: 0 | Status: SHIPPED | OUTPATIENT
Start: 2018-04-03 | End: 2018-04-10

## 2018-04-03 RX ORDER — ATORVASTATIN CALCIUM 40 MG/1
40 TABLET, FILM COATED ORAL DAILY
Status: DISCONTINUED | OUTPATIENT
Start: 2018-04-03 | End: 2018-04-03 | Stop reason: HOSPADM

## 2018-04-03 RX ORDER — BUPROPION HYDROCHLORIDE 150 MG/1
150 TABLET ORAL EVERY MORNING
Status: DISCONTINUED | OUTPATIENT
Start: 2018-04-03 | End: 2018-04-03 | Stop reason: HOSPADM

## 2018-04-03 RX ORDER — TRIAMTERENE CAPSULES 50 MG/1
50 CAPSULE ORAL EVERY MORNING
Status: DISCONTINUED | OUTPATIENT
Start: 2018-04-03 | End: 2018-04-03 | Stop reason: HOSPADM

## 2018-04-03 RX ADMIN — ENOXAPARIN SODIUM 40 MG: 40 INJECTION SUBCUTANEOUS at 08:14

## 2018-04-03 RX ADMIN — DEXAMETHASONE 4 MG: 4 TABLET ORAL at 00:35

## 2018-04-03 RX ADMIN — AMLODIPINE BESYLATE 10 MG: 10 TABLET ORAL at 10:43

## 2018-04-03 RX ADMIN — Medication 2 G: at 08:14

## 2018-04-03 RX ADMIN — OXYCODONE HYDROCHLORIDE AND ACETAMINOPHEN 2 TABLET: 5; 325 TABLET ORAL at 11:00

## 2018-04-03 RX ADMIN — Medication 2 G: at 00:35

## 2018-04-03 RX ADMIN — DESMOPRESSIN ACETATE 40 MG: 0.2 TABLET ORAL at 10:43

## 2018-04-03 RX ADMIN — OXYCODONE HYDROCHLORIDE AND ACETAMINOPHEN 2 TABLET: 5; 325 TABLET ORAL at 03:30

## 2018-04-03 RX ADMIN — DEXAMETHASONE 4 MG: 4 TABLET ORAL at 08:14

## 2018-04-03 RX ADMIN — BUPROPION HYDROCHLORIDE 150 MG: 150 TABLET, EXTENDED RELEASE ORAL at 10:43

## 2018-04-03 ASSESSMENT — PAIN SCALES - GENERAL: PAINLEVEL_OUTOF10: 8

## 2018-04-04 ENCOUNTER — TELEPHONE (OUTPATIENT)
Dept: NEUROSURGERY | Age: 48
End: 2018-04-04

## 2018-04-05 ENCOUNTER — TELEPHONE (OUTPATIENT)
Dept: ONCOLOGY | Age: 48
End: 2018-04-05

## 2018-04-09 ENCOUNTER — OFFICE VISIT (OUTPATIENT)
Dept: FAMILY MEDICINE CLINIC | Age: 48
End: 2018-04-09

## 2018-04-09 VITALS
SYSTOLIC BLOOD PRESSURE: 148 MMHG | WEIGHT: 272 LBS | HEIGHT: 67 IN | BODY MASS INDEX: 42.69 KG/M2 | DIASTOLIC BLOOD PRESSURE: 89 MMHG | TEMPERATURE: 98.3 F | HEART RATE: 94 BPM

## 2018-04-09 DIAGNOSIS — G89.29 CHRONIC MIDLINE LOW BACK PAIN, WITH SCIATICA PRESENCE UNSPECIFIED: ICD-10-CM

## 2018-04-09 DIAGNOSIS — M54.5 CHRONIC MIDLINE LOW BACK PAIN, WITH SCIATICA PRESENCE UNSPECIFIED: ICD-10-CM

## 2018-04-09 DIAGNOSIS — M62.838 MUSCLE SPASM: Primary | ICD-10-CM

## 2018-04-09 PROCEDURE — 99213 OFFICE O/P EST LOW 20 MIN: CPT | Performed by: FAMILY MEDICINE

## 2018-04-09 RX ORDER — METAXALONE 800 MG/1
800 TABLET ORAL 3 TIMES DAILY PRN
Qty: 21 TABLET | Refills: 0 | Status: SHIPPED | OUTPATIENT
Start: 2018-04-09 | End: 2018-04-09 | Stop reason: SDUPTHER

## 2018-04-09 RX ORDER — METAXALONE 800 MG/1
800 TABLET ORAL 3 TIMES DAILY
Qty: 30 TABLET | Refills: 0 | Status: SHIPPED | OUTPATIENT
Start: 2018-04-09 | End: 2018-04-19

## 2018-04-16 ENCOUNTER — TELEPHONE (OUTPATIENT)
Dept: NEUROSURGERY | Age: 48
End: 2018-04-16

## 2018-04-16 ENCOUNTER — NURSE ONLY (OUTPATIENT)
Dept: NEUROSURGERY | Age: 48
End: 2018-04-16

## 2018-04-16 VITALS
BODY MASS INDEX: 42.9 KG/M2 | SYSTOLIC BLOOD PRESSURE: 150 MMHG | TEMPERATURE: 98 F | HEART RATE: 86 BPM | WEIGHT: 274 LBS | DIASTOLIC BLOOD PRESSURE: 80 MMHG

## 2018-04-16 DIAGNOSIS — Z98.1 STATUS POST LUMBAR SPINAL FUSION: Primary | ICD-10-CM

## 2018-04-16 DIAGNOSIS — M54.5 CHRONIC LOW BACK PAIN, UNSPECIFIED BACK PAIN LATERALITY, WITH SCIATICA PRESENCE UNSPECIFIED: ICD-10-CM

## 2018-04-16 DIAGNOSIS — Z98.1 S/P CERVICAL SPINAL FUSION: Primary | ICD-10-CM

## 2018-04-16 DIAGNOSIS — Z98.1 S/P CERVICAL SPINAL FUSION: ICD-10-CM

## 2018-04-16 DIAGNOSIS — G89.29 CHRONIC LOW BACK PAIN, UNSPECIFIED BACK PAIN LATERALITY, WITH SCIATICA PRESENCE UNSPECIFIED: ICD-10-CM

## 2018-04-16 DIAGNOSIS — M54.5 CHRONIC LOW BACK PAIN, UNSPECIFIED BACK PAIN LATERALITY, WITH SCIATICA PRESENCE UNSPECIFIED: Primary | ICD-10-CM

## 2018-04-16 DIAGNOSIS — G89.29 CHRONIC LOW BACK PAIN, UNSPECIFIED BACK PAIN LATERALITY, WITH SCIATICA PRESENCE UNSPECIFIED: Primary | ICD-10-CM

## 2018-04-16 RX ORDER — PREDNISONE 20 MG/1
60 TABLET ORAL DAILY
Qty: 15 TABLET | Refills: 0 | Status: SHIPPED | OUTPATIENT
Start: 2018-04-16 | End: 2018-04-16 | Stop reason: CLARIF

## 2018-04-16 RX ORDER — OXYCODONE HYDROCHLORIDE AND ACETAMINOPHEN 5; 325 MG/1; MG/1
1 TABLET ORAL EVERY 6 HOURS PRN
Qty: 28 TABLET | Refills: 0 | Status: SHIPPED | OUTPATIENT
Start: 2018-04-16 | End: 2018-04-16 | Stop reason: CLARIF

## 2018-04-16 RX ORDER — OXYCODONE HYDROCHLORIDE AND ACETAMINOPHEN 5; 325 MG/1; MG/1
1 TABLET ORAL EVERY 6 HOURS PRN
Qty: 28 TABLET | Refills: 0 | Status: SHIPPED | OUTPATIENT
Start: 2018-04-16 | End: 2018-04-23

## 2018-04-16 RX ORDER — PREDNISONE 20 MG/1
60 TABLET ORAL DAILY
Qty: 15 TABLET | Refills: 0 | Status: SHIPPED | OUTPATIENT
Start: 2018-04-16 | End: 2018-04-21

## 2018-04-16 RX ORDER — OXYCODONE HYDROCHLORIDE AND ACETAMINOPHEN 5; 325 MG/1; MG/1
1 TABLET ORAL EVERY 6 HOURS PRN
COMMUNITY
End: 2018-04-16 | Stop reason: SDUPTHER

## 2018-04-27 ENCOUNTER — APPOINTMENT (OUTPATIENT)
Dept: CT IMAGING | Age: 48
End: 2018-04-27

## 2018-04-27 ENCOUNTER — HOSPITAL ENCOUNTER (EMERGENCY)
Age: 48
Discharge: HOME | End: 2018-04-27
Attending: EMERGENCY MEDICINE

## 2018-04-27 VITALS
TEMPERATURE: 99 F | HEIGHT: 67 IN | WEIGHT: 269 LBS | BODY MASS INDEX: 42.22 KG/M2 | DIASTOLIC BLOOD PRESSURE: 82 MMHG | SYSTOLIC BLOOD PRESSURE: 167 MMHG | RESPIRATION RATE: 16 BRPM | HEART RATE: 88 BPM | OXYGEN SATURATION: 100 %

## 2018-04-27 DIAGNOSIS — R20.2 PARESTHESIAS: Primary | ICD-10-CM

## 2018-04-27 LAB
ABSOLUTE EOS #: 0.15 K/UL (ref 0–0.4)
ABSOLUTE IMMATURE GRANULOCYTE: ABNORMAL K/UL (ref 0–0.3)
ABSOLUTE LYMPH #: 1.82 K/UL (ref 1–4.8)
ABSOLUTE MONO #: 0.3 K/UL (ref 0.2–0.8)
ANION GAP SERPL CALCULATED.3IONS-SCNC: 18 MMOL/L (ref 9–17)
BASOPHILS # BLD: 0 %
BASOPHILS ABSOLUTE: 0 K/UL (ref 0–0.2)
BUN BLDV-MCNC: 8 MG/DL (ref 6–20)
BUN/CREAT BLD: 10 (ref 9–20)
CALCIUM SERPL-MCNC: 9.6 MG/DL (ref 8.6–10.4)
CHLORIDE BLD-SCNC: 95 MMOL/L (ref 98–107)
CO2: 24 MMOL/L (ref 20–31)
CREAT SERPL-MCNC: 0.79 MG/DL (ref 0.5–0.9)
DIFFERENTIAL TYPE: ABNORMAL
EKG ATRIAL RATE: 79 BPM
EKG P AXIS: 57 DEGREES
EKG P-R INTERVAL: 146 MS
EKG Q-T INTERVAL: 386 MS
EKG QRS DURATION: 92 MS
EKG QTC CALCULATION (BAZETT): 442 MS
EKG R AXIS: 10 DEGREES
EKG T AXIS: 7 DEGREES
EKG VENTRICULAR RATE: 79 BPM
EOSINOPHILS RELATIVE PERCENT: 1 % (ref 1–4)
GFR AFRICAN AMERICAN: >60 ML/MIN
GFR NON-AFRICAN AMERICAN: >60 ML/MIN
GFR SERPL CREATININE-BSD FRML MDRD: ABNORMAL ML/MIN/{1.73_M2}
GFR SERPL CREATININE-BSD FRML MDRD: ABNORMAL ML/MIN/{1.73_M2}
GLUCOSE BLD-MCNC: 108 MG/DL (ref 70–99)
HCT VFR BLD CALC: 38.8 % (ref 36–46)
HEMOGLOBIN: 11.8 G/DL (ref 12–16)
IMMATURE GRANULOCYTES: ABNORMAL %
INR BLD: 1
LYMPHOCYTES # BLD: 12 % (ref 24–44)
MCH RBC QN AUTO: 22.1 PG (ref 26–34)
MCHC RBC AUTO-ENTMCNC: 30.4 G/DL (ref 31–37)
MCV RBC AUTO: 72.8 FL (ref 80–100)
MONOCYTES # BLD: 2 % (ref 1–7)
MORPHOLOGY: ABNORMAL
NRBC AUTOMATED: ABNORMAL PER 100 WBC
PARTIAL THROMBOPLASTIN TIME: 24.7 SEC (ref 23–31)
PDW BLD-RTO: 19.4 % (ref 11.5–14.5)
PLATELET # BLD: 435 K/UL (ref 130–400)
PLATELET ESTIMATE: ABNORMAL
PMV BLD AUTO: ABNORMAL FL (ref 6–12)
POTASSIUM SERPL-SCNC: 3.9 MMOL/L (ref 3.7–5.3)
PROTHROMBIN TIME: 10 SEC (ref 9.7–11.6)
RBC # BLD: 5.33 M/UL (ref 4–5.2)
RBC # BLD: ABNORMAL 10*6/UL
SEG NEUTROPHILS: 85 % (ref 36–66)
SEGMENTED NEUTROPHILS ABSOLUTE COUNT: 12.93 K/UL (ref 1.8–7.7)
SODIUM BLD-SCNC: 137 MMOL/L (ref 135–144)
WBC # BLD: 15.2 K/UL (ref 3.5–11)
WBC # BLD: ABNORMAL 10*3/UL

## 2018-04-27 PROCEDURE — 93005 ELECTROCARDIOGRAM TRACING: CPT

## 2018-04-27 PROCEDURE — 85610 PROTHROMBIN TIME: CPT

## 2018-04-27 PROCEDURE — 99284 EMERGENCY DEPT VISIT MOD MDM: CPT

## 2018-04-27 PROCEDURE — 70496 CT ANGIOGRAPHY HEAD: CPT

## 2018-04-27 PROCEDURE — 85730 THROMBOPLASTIN TIME PARTIAL: CPT

## 2018-04-27 PROCEDURE — 6360000004 HC RX CONTRAST MEDICATION: Performed by: EMERGENCY MEDICINE

## 2018-04-27 PROCEDURE — 80048 BASIC METABOLIC PNL TOTAL CA: CPT

## 2018-04-27 PROCEDURE — 85025 COMPLETE CBC W/AUTO DIFF WBC: CPT

## 2018-04-27 PROCEDURE — 2580000003 HC RX 258: Performed by: EMERGENCY MEDICINE

## 2018-04-27 PROCEDURE — 70498 CT ANGIOGRAPHY NECK: CPT

## 2018-04-27 RX ORDER — 0.9 % SODIUM CHLORIDE 0.9 %
50 INTRAVENOUS SOLUTION INTRAVENOUS ONCE
Status: COMPLETED | OUTPATIENT
Start: 2018-04-27 | End: 2018-04-27

## 2018-04-27 RX ORDER — SODIUM CHLORIDE 0.9 % (FLUSH) 0.9 %
10 SYRINGE (ML) INJECTION
Status: COMPLETED | OUTPATIENT
Start: 2018-04-27 | End: 2018-04-27

## 2018-04-27 RX ADMIN — SODIUM CHLORIDE 50 ML: 9 INJECTION, SOLUTION INTRAVENOUS at 09:35

## 2018-04-27 RX ADMIN — IOPAMIDOL 80 ML: 755 INJECTION, SOLUTION INTRAVENOUS at 09:34

## 2018-04-27 RX ADMIN — Medication 10 ML: at 09:35

## 2018-04-27 ASSESSMENT — ENCOUNTER SYMPTOMS: BACK PAIN: 1

## 2018-05-02 ENCOUNTER — TELEPHONE (OUTPATIENT)
Dept: NEUROSURGERY | Age: 48
End: 2018-05-02

## 2018-05-02 DIAGNOSIS — M51.16 LUMBAR DISC HERNIATION WITH RADICULOPATHY: Primary | ICD-10-CM

## 2018-05-03 DIAGNOSIS — M51.16 LUMBAR DISC HERNIATION WITH RADICULOPATHY: Primary | ICD-10-CM

## 2018-05-03 RX ORDER — OXYCODONE HYDROCHLORIDE AND ACETAMINOPHEN 5; 325 MG/1; MG/1
1 TABLET ORAL EVERY 12 HOURS PRN
Qty: 15 TABLET | Refills: 0 | Status: SHIPPED | OUTPATIENT
Start: 2018-05-03 | End: 2018-05-10

## 2018-05-03 RX ORDER — OXYCODONE HYDROCHLORIDE AND ACETAMINOPHEN 5; 325 MG/1; MG/1
1 TABLET ORAL EVERY 12 HOURS PRN
Qty: 15 TABLET | Refills: 0 | Status: SHIPPED | OUTPATIENT
Start: 2018-05-03 | End: 2018-05-03

## 2018-05-24 ENCOUNTER — TELEPHONE (OUTPATIENT)
Dept: ONCOLOGY | Age: 48
End: 2018-05-24

## 2018-05-29 ENCOUNTER — HOSPITAL ENCOUNTER (OUTPATIENT)
Dept: GENERAL RADIOLOGY | Age: 48
Discharge: HOME | End: 2018-05-31

## 2018-05-29 ENCOUNTER — HOSPITAL ENCOUNTER (OUTPATIENT)
Age: 48
Discharge: HOME | End: 2018-05-31

## 2018-05-29 DIAGNOSIS — Z98.890 S/P LUMBAR MICRODISCECTOMY: ICD-10-CM

## 2018-05-29 DIAGNOSIS — Z98.890 S/P LUMBAR MICRODISCECTOMY: Primary | ICD-10-CM

## 2018-05-29 PROCEDURE — 72100 X-RAY EXAM L-S SPINE 2/3 VWS: CPT

## 2018-05-30 ENCOUNTER — OFFICE VISIT (OUTPATIENT)
Dept: NEUROSURGERY | Age: 48
End: 2018-05-30

## 2018-05-30 ENCOUNTER — TELEPHONE (OUTPATIENT)
Dept: NEUROSURGERY | Age: 48
End: 2018-05-30

## 2018-05-30 VITALS
SYSTOLIC BLOOD PRESSURE: 170 MMHG | WEIGHT: 276 LBS | BODY MASS INDEX: 43.32 KG/M2 | HEIGHT: 67 IN | DIASTOLIC BLOOD PRESSURE: 100 MMHG | HEART RATE: 90 BPM

## 2018-05-30 DIAGNOSIS — M51.16 LUMBAR DISC HERNIATION WITH RADICULOPATHY: Primary | ICD-10-CM

## 2018-05-30 PROCEDURE — 99024 POSTOP FOLLOW-UP VISIT: CPT | Performed by: NEUROLOGICAL SURGERY

## 2018-05-30 RX ORDER — OXYCODONE HYDROCHLORIDE AND ACETAMINOPHEN 5; 325 MG/1; MG/1
1 TABLET ORAL EVERY 12 HOURS PRN
Qty: 28 TABLET | Refills: 0 | Status: SHIPPED | OUTPATIENT
Start: 2018-05-30 | End: 2018-06-13

## 2018-08-24 ENCOUNTER — TELEPHONE (OUTPATIENT)
Dept: ONCOLOGY | Age: 48
End: 2018-08-24

## 2019-02-09 ENCOUNTER — HOSPITAL ENCOUNTER (EMERGENCY)
Age: 49
Discharge: HOME | End: 2019-02-09
Attending: EMERGENCY MEDICINE

## 2019-02-09 ENCOUNTER — APPOINTMENT (OUTPATIENT)
Dept: GENERAL RADIOLOGY | Age: 49
End: 2019-02-09

## 2019-02-09 VITALS
TEMPERATURE: 98.5 F | BODY MASS INDEX: 42.56 KG/M2 | HEIGHT: 67 IN | WEIGHT: 271.19 LBS | SYSTOLIC BLOOD PRESSURE: 180 MMHG | OXYGEN SATURATION: 99 % | HEART RATE: 89 BPM | DIASTOLIC BLOOD PRESSURE: 84 MMHG | RESPIRATION RATE: 18 BRPM

## 2019-02-09 DIAGNOSIS — I50.9 CONGESTIVE HEART FAILURE, UNSPECIFIED HF CHRONICITY, UNSPECIFIED HEART FAILURE TYPE (HCC): Primary | ICD-10-CM

## 2019-02-09 LAB
% CKMB: 1.8 % (ref 0–3)
ABSOLUTE EOS #: 0.22 K/UL (ref 0–0.4)
ABSOLUTE IMMATURE GRANULOCYTE: ABNORMAL K/UL (ref 0–0.3)
ABSOLUTE LYMPH #: 2.05 K/UL (ref 1–4.8)
ABSOLUTE MONO #: 0.43 K/UL (ref 0.2–0.8)
ANION GAP SERPL CALCULATED.3IONS-SCNC: 14 MMOL/L (ref 9–17)
BASOPHILS # BLD: 1 %
BASOPHILS ABSOLUTE: 0.11 K/UL (ref 0–0.2)
BNP INTERPRETATION: ABNORMAL
BUN BLDV-MCNC: 8 MG/DL (ref 6–20)
BUN/CREAT BLD: 13 (ref 9–20)
CALCIUM SERPL-MCNC: 8.9 MG/DL (ref 8.6–10.4)
CHLORIDE BLD-SCNC: 102 MMOL/L (ref 98–107)
CK MB: 3.6 NG/ML
CKMB INTERPRETATION: ABNORMAL
CO2: 22 MMOL/L (ref 20–31)
CREAT SERPL-MCNC: 0.64 MG/DL (ref 0.5–0.9)
DIFFERENTIAL TYPE: ABNORMAL
EKG ATRIAL RATE: 83 BPM
EKG P AXIS: 58 DEGREES
EKG P-R INTERVAL: 150 MS
EKG Q-T INTERVAL: 408 MS
EKG QRS DURATION: 92 MS
EKG QTC CALCULATION (BAZETT): 479 MS
EKG R AXIS: 20 DEGREES
EKG T AXIS: 28 DEGREES
EKG VENTRICULAR RATE: 83 BPM
EOSINOPHILS RELATIVE PERCENT: 2 % (ref 1–4)
GFR AFRICAN AMERICAN: >60 ML/MIN
GFR NON-AFRICAN AMERICAN: >60 ML/MIN
GFR SERPL CREATININE-BSD FRML MDRD: NORMAL ML/MIN/{1.73_M2}
GFR SERPL CREATININE-BSD FRML MDRD: NORMAL ML/MIN/{1.73_M2}
GLUCOSE BLD-MCNC: 92 MG/DL (ref 70–99)
HCT VFR BLD CALC: 30.4 % (ref 36–46)
HEMOGLOBIN: 9.3 G/DL (ref 12–16)
IMMATURE GRANULOCYTES: ABNORMAL %
LYMPHOCYTES # BLD: 19 % (ref 24–44)
MCH RBC QN AUTO: 20.6 PG (ref 26–34)
MCHC RBC AUTO-ENTMCNC: 30.6 G/DL (ref 31–37)
MCV RBC AUTO: 67.4 FL (ref 80–100)
MONOCYTES # BLD: 4 % (ref 1–7)
MORPHOLOGY: ABNORMAL
MYOGLOBIN: 26 NG/ML (ref 25–58)
NRBC AUTOMATED: ABNORMAL PER 100 WBC
PDW BLD-RTO: 19.7 % (ref 11.5–14.5)
PLATELET # BLD: 391 K/UL (ref 130–400)
PLATELET ESTIMATE: ABNORMAL
PMV BLD AUTO: 7.7 FL (ref 6–12)
POTASSIUM SERPL-SCNC: 3.8 MMOL/L (ref 3.7–5.3)
PRO-BNP: 797 PG/ML
RBC # BLD: 4.52 M/UL (ref 4–5.2)
RBC # BLD: ABNORMAL 10*6/UL
SEG NEUTROPHILS: 74 % (ref 36–66)
SEGMENTED NEUTROPHILS ABSOLUTE COUNT: 7.99 K/UL (ref 1.8–7.7)
SODIUM BLD-SCNC: 138 MMOL/L (ref 135–144)
TOTAL CK: 203 U/L (ref 26–192)
TROPONIN INTERP: NORMAL
TROPONIN T: NORMAL NG/ML
TROPONIN, HIGH SENSITIVITY: 12 NG/L (ref 0–14)
WBC # BLD: 10.8 K/UL (ref 3.5–11)
WBC # BLD: ABNORMAL 10*3/UL

## 2019-02-09 PROCEDURE — 36415 COLL VENOUS BLD VENIPUNCTURE: CPT

## 2019-02-09 PROCEDURE — 83874 ASSAY OF MYOGLOBIN: CPT

## 2019-02-09 PROCEDURE — 71046 X-RAY EXAM CHEST 2 VIEWS: CPT

## 2019-02-09 PROCEDURE — 82553 CREATINE MB FRACTION: CPT

## 2019-02-09 PROCEDURE — 80048 BASIC METABOLIC PNL TOTAL CA: CPT

## 2019-02-09 PROCEDURE — 93005 ELECTROCARDIOGRAM TRACING: CPT

## 2019-02-09 PROCEDURE — 83880 ASSAY OF NATRIURETIC PEPTIDE: CPT

## 2019-02-09 PROCEDURE — 84484 ASSAY OF TROPONIN QUANT: CPT

## 2019-02-09 PROCEDURE — 6370000000 HC RX 637 (ALT 250 FOR IP): Performed by: EMERGENCY MEDICINE

## 2019-02-09 PROCEDURE — 85025 COMPLETE CBC W/AUTO DIFF WBC: CPT

## 2019-02-09 PROCEDURE — 82550 ASSAY OF CK (CPK): CPT

## 2019-02-09 PROCEDURE — 99285 EMERGENCY DEPT VISIT HI MDM: CPT

## 2019-02-09 RX ORDER — FUROSEMIDE 40 MG/1
40 TABLET ORAL ONCE
Status: COMPLETED | OUTPATIENT
Start: 2019-02-09 | End: 2019-02-09

## 2019-02-09 RX ORDER — FUROSEMIDE 20 MG/1
20 TABLET ORAL DAILY
Qty: 30 TABLET | Refills: 0 | Status: SHIPPED | OUTPATIENT
Start: 2019-02-09 | End: 2020-01-17

## 2019-02-09 RX ADMIN — FUROSEMIDE 40 MG: 40 TABLET ORAL at 16:21

## 2019-02-09 ASSESSMENT — PAIN DESCRIPTION - DESCRIPTORS: DESCRIPTORS: SHARP;ACHING

## 2019-02-09 ASSESSMENT — PAIN SCALES - GENERAL: PAINLEVEL_OUTOF10: 7

## 2019-02-09 ASSESSMENT — PAIN DESCRIPTION - PAIN TYPE: TYPE: CHRONIC PAIN

## 2019-08-15 ENCOUNTER — OFFICE VISIT (OUTPATIENT)
Dept: NEUROSURGERY | Age: 49
End: 2019-08-15

## 2019-08-15 VITALS
WEIGHT: 274.4 LBS | SYSTOLIC BLOOD PRESSURE: 151 MMHG | HEIGHT: 67 IN | BODY MASS INDEX: 43.07 KG/M2 | DIASTOLIC BLOOD PRESSURE: 93 MMHG | TEMPERATURE: 89 F | OXYGEN SATURATION: 98 %

## 2019-08-15 DIAGNOSIS — S39.012A LUMBAR STRAIN, INITIAL ENCOUNTER: Primary | ICD-10-CM

## 2019-08-15 PROCEDURE — 99212 OFFICE O/P EST SF 10 MIN: CPT | Performed by: NEUROLOGICAL SURGERY

## 2019-08-15 RX ORDER — DIAZEPAM 5 MG/1
5 TABLET ORAL 2 TIMES DAILY PRN
Qty: 14 TABLET | Refills: 0 | Status: SHIPPED | OUTPATIENT
Start: 2019-08-15 | End: 2019-08-22

## 2019-08-15 NOTE — PROGRESS NOTES
cold intolerance and heat intolerance. Genitourinary: Negative for frequency and flank pain. Musculoskeletal: Negative for myalgias and joint swelling. Skin: Negative for rash and wound. Allergic/Immunologic: Negative for environmental allergies and food allergies. Hematological: Negative for adenopathy. Does not bruise/bleed easily. Psychiatric/Behavioral: Negative for self-injury. The patient is not nervous/anxious. Physical Exam:      BP (!) 151/93 (Site: Left Upper Arm, Position: Sitting, Cuff Size: Large Adult)   Temp (!) 89 °F (31.7 °C)   Ht 5' 7\" (1.702 m)   Wt 274 lb 6.4 oz (124.5 kg)   LMP 07/29/2019 (Approximate)   SpO2 98%   BMI 42.98 kg/m²   Estimated body mass index is 42.98 kg/m² as calculated from the following:    Height as of this encounter: 5' 7\" (1.702 m). Weight as of this encounter: 274 lb 6.4 oz (124.5 kg). General:  Jitendra Valdez is a 50y.o. year old female who appears her stated age. HEENT: Normocephalic atraumatic. Neck supple. Chest: regular rate; pulses equal  Abdomen: Soft nontender nondistended. Normoactive bowel sounds.   Ext: DP and PT pulses 2+, good cap refill  Neuro    Mentation  Appropriate affect  Registration intact  Orientation intact  3 item recall intact  Judgement intact to situation    Cranial Nerves:   Pupils equal and reactive to light  Extraocular motion intact  Face and shrug symmetric  Tongue midline  No dysarthria  v1-3 sensation symmetric, masseter tone symmetric  Hearing symmetric and intact to finger rub    Sensation:   intact    Motor  L deltoid 5/5; R deltoid 5/5  L biceps 5/5; R biceps 5/5  L triceps 5/5; R triceps 5/5  L wrist extension 5/5; R wrist extension 5/5  L intrinsics 5/5; R intrinsics 5/5     L iliopsoas 5/5 , R iliopsoas 5/5  L quadriceps 5/5; R quadriceps 5/5  L Dorsiflexion 5/5; R dorsiflexion 5/5  L Plantarflexion 5/5; R plantarflexion 5/5  L EHL 5/5; R EHL 5/5    Reflexes  L Brachioradialis 2+/4; R brachioradialis 2+/4  L Biceps 2+/4; R Biceps 2+/4  L Triceps 2+/4; R Triceps 2+/4  L Patellar 2+/4: R Patellar 2+/4  L Achilles 2+/4; R Achilles 2+/4    hoffmans L: neg  hoffmans R: neg  Clonus L: neg  Clonus R: neg  Babinski L: up  Babinski R; up    Studies Review:     none    Assessment and Plan:      1. Lumbar strain, initial encounter          Plan:   Lumbar flexion ext xrays  Valium to be used prn  PMR referral    Followup: No follow-ups on file. Prescriptions Ordered:  No orders of the defined types were placed in this encounter. Orders Placed:  No orders of the defined types were placed in this encounter. Electronically signed by Vale Calhoun DO on 8/15/2019 at 11:45 AM    Please note that this chart was generated using voice recognition Dragon dictation software. Although every effort was made to ensure the accuracy of this automated transcription, some errors in transcription may have occurred.

## 2019-08-22 ENCOUNTER — HOSPITAL ENCOUNTER (OUTPATIENT)
Age: 49
Discharge: HOME | End: 2019-08-24

## 2019-08-22 ENCOUNTER — HOSPITAL ENCOUNTER (OUTPATIENT)
Dept: GENERAL RADIOLOGY | Age: 49
Discharge: HOME | End: 2019-08-24

## 2019-08-22 DIAGNOSIS — S39.012A LUMBAR STRAIN, INITIAL ENCOUNTER: ICD-10-CM

## 2019-08-22 PROCEDURE — 72110 X-RAY EXAM L-2 SPINE 4/>VWS: CPT

## 2019-11-29 ENCOUNTER — APPOINTMENT (OUTPATIENT)
Dept: GENERAL RADIOLOGY | Age: 49
End: 2019-11-29

## 2019-11-29 ENCOUNTER — HOSPITAL ENCOUNTER (EMERGENCY)
Age: 49
Discharge: HOME | End: 2019-11-30
Attending: EMERGENCY MEDICINE

## 2019-11-29 VITALS
TEMPERATURE: 97.5 F | SYSTOLIC BLOOD PRESSURE: 175 MMHG | RESPIRATION RATE: 17 BRPM | DIASTOLIC BLOOD PRESSURE: 98 MMHG | HEART RATE: 97 BPM | OXYGEN SATURATION: 98 %

## 2019-11-29 DIAGNOSIS — K59.00 CONSTIPATION, UNSPECIFIED CONSTIPATION TYPE: Primary | ICD-10-CM

## 2019-11-29 PROCEDURE — 99283 EMERGENCY DEPT VISIT LOW MDM: CPT

## 2019-11-29 PROCEDURE — 74022 RADEX COMPL AQT ABD SERIES: CPT

## 2019-11-29 ASSESSMENT — ENCOUNTER SYMPTOMS
NAUSEA: 1
VOMITING: 0
ABDOMINAL PAIN: 1
ABDOMINAL DISTENTION: 1
CONSTIPATION: 1

## 2019-11-30 PROCEDURE — 6370000000 HC RX 637 (ALT 250 FOR IP): Performed by: NURSE PRACTITIONER

## 2019-11-30 RX ORDER — POLYETHYLENE GLYCOL 3350 17 G/17G
17 POWDER, FOR SOLUTION ORAL DAILY
Qty: 5 EACH | Refills: 0 | Status: SHIPPED | OUTPATIENT
Start: 2019-11-30 | End: 2019-12-05

## 2019-11-30 RX ADMIN — MAGNESIUM CITRATE 296 ML: 1.75 LIQUID ORAL at 00:52

## 2020-01-09 ENCOUNTER — APPOINTMENT (OUTPATIENT)
Dept: GENERAL RADIOLOGY | Age: 50
End: 2020-01-09

## 2020-01-09 ENCOUNTER — HOSPITAL ENCOUNTER (EMERGENCY)
Age: 50
Discharge: HOME | End: 2020-01-09
Attending: EMERGENCY MEDICINE

## 2020-01-09 VITALS
RESPIRATION RATE: 16 BRPM | TEMPERATURE: 97.3 F | OXYGEN SATURATION: 98 % | SYSTOLIC BLOOD PRESSURE: 155 MMHG | HEART RATE: 105 BPM | HEIGHT: 67 IN | DIASTOLIC BLOOD PRESSURE: 94 MMHG | BODY MASS INDEX: 45.99 KG/M2 | WEIGHT: 293 LBS

## 2020-01-09 LAB
ABSOLUTE EOS #: 0.17 K/UL (ref 0–0.4)
ABSOLUTE IMMATURE GRANULOCYTE: 0 K/UL (ref 0–0.3)
ABSOLUTE LYMPH #: 1.38 K/UL (ref 1–4.8)
ABSOLUTE MONO #: 0.52 K/UL (ref 0.2–0.8)
ANION GAP SERPL CALCULATED.3IONS-SCNC: 11 MMOL/L (ref 9–17)
BASOPHILS # BLD: 1 %
BASOPHILS ABSOLUTE: 0.09 K/UL (ref 0–0.2)
BNP INTERPRETATION: ABNORMAL
BUN BLDV-MCNC: 9 MG/DL (ref 6–20)
BUN/CREAT BLD: 12 (ref 9–20)
CALCIUM SERPL-MCNC: 8.9 MG/DL (ref 8.6–10.4)
CHLORIDE BLD-SCNC: 102 MMOL/L (ref 98–107)
CO2: 24 MMOL/L (ref 20–31)
CREAT SERPL-MCNC: 0.73 MG/DL (ref 0.5–0.9)
DIFFERENTIAL TYPE: ABNORMAL
DIRECT EXAM: NORMAL
EKG ATRIAL RATE: 92 BPM
EKG P AXIS: 60 DEGREES
EKG P-R INTERVAL: 148 MS
EKG Q-T INTERVAL: 392 MS
EKG QRS DURATION: 92 MS
EKG QTC CALCULATION (BAZETT): 484 MS
EKG R AXIS: 12 DEGREES
EKG T AXIS: 28 DEGREES
EKG VENTRICULAR RATE: 92 BPM
EOSINOPHILS RELATIVE PERCENT: 2 % (ref 1–4)
GFR AFRICAN AMERICAN: >60 ML/MIN
GFR NON-AFRICAN AMERICAN: >60 ML/MIN
GFR SERPL CREATININE-BSD FRML MDRD: ABNORMAL ML/MIN/{1.73_M2}
GFR SERPL CREATININE-BSD FRML MDRD: ABNORMAL ML/MIN/{1.73_M2}
GLUCOSE BLD-MCNC: 116 MG/DL (ref 70–99)
HCT VFR BLD CALC: 34.2 % (ref 36.3–47.1)
HEMOGLOBIN: 9.2 G/DL (ref 11.9–15.1)
IMMATURE GRANULOCYTES: 0 %
LYMPHOCYTES # BLD: 16 % (ref 24–44)
Lab: NORMAL
MCH RBC QN AUTO: 19.2 PG (ref 25.2–33.5)
MCHC RBC AUTO-ENTMCNC: 26.9 G/DL (ref 28.4–34.8)
MCV RBC AUTO: 71.5 FL (ref 82.6–102.9)
MONOCYTES # BLD: 6 % (ref 1–7)
MORPHOLOGY: ABNORMAL
NRBC AUTOMATED: 1.2 PER 100 WBC
PDW BLD-RTO: 19.2 % (ref 11.8–14.4)
PLATELET # BLD: 281 K/UL (ref 138–453)
PLATELET ESTIMATE: ABNORMAL
PMV BLD AUTO: 9.9 FL (ref 8.1–13.5)
POTASSIUM SERPL-SCNC: 3.8 MMOL/L (ref 3.7–5.3)
PRO-BNP: 1978 PG/ML
RBC # BLD: 4.78 M/UL (ref 3.95–5.11)
RBC # BLD: ABNORMAL 10*6/UL
SEG NEUTROPHILS: 75 % (ref 36–66)
SEGMENTED NEUTROPHILS ABSOLUTE COUNT: 6.44 K/UL (ref 1.8–7.7)
SODIUM BLD-SCNC: 137 MMOL/L (ref 135–144)
SPECIMEN DESCRIPTION: NORMAL
TROPONIN INTERP: ABNORMAL
TROPONIN INTERP: ABNORMAL
TROPONIN T: ABNORMAL NG/ML
TROPONIN T: ABNORMAL NG/ML
TROPONIN, HIGH SENSITIVITY: 25 NG/L (ref 0–14)
TROPONIN, HIGH SENSITIVITY: 25 NG/L (ref 0–14)
WBC # BLD: 8.6 K/UL (ref 3.5–11.3)
WBC # BLD: ABNORMAL 10*3/UL

## 2020-01-09 PROCEDURE — 83880 ASSAY OF NATRIURETIC PEPTIDE: CPT

## 2020-01-09 PROCEDURE — 80048 BASIC METABOLIC PNL TOTAL CA: CPT

## 2020-01-09 PROCEDURE — 36415 COLL VENOUS BLD VENIPUNCTURE: CPT

## 2020-01-09 PROCEDURE — 85025 COMPLETE CBC W/AUTO DIFF WBC: CPT

## 2020-01-09 PROCEDURE — 99284 EMERGENCY DEPT VISIT MOD MDM: CPT

## 2020-01-09 PROCEDURE — 71046 X-RAY EXAM CHEST 2 VIEWS: CPT

## 2020-01-09 PROCEDURE — 96374 THER/PROPH/DIAG INJ IV PUSH: CPT

## 2020-01-09 PROCEDURE — 93005 ELECTROCARDIOGRAM TRACING: CPT | Performed by: EMERGENCY MEDICINE

## 2020-01-09 PROCEDURE — 87804 INFLUENZA ASSAY W/OPTIC: CPT

## 2020-01-09 PROCEDURE — 6360000002 HC RX W HCPCS: Performed by: EMERGENCY MEDICINE

## 2020-01-09 PROCEDURE — 84484 ASSAY OF TROPONIN QUANT: CPT

## 2020-01-09 RX ORDER — FUROSEMIDE 10 MG/ML
20 INJECTION INTRAMUSCULAR; INTRAVENOUS ONCE
Status: COMPLETED | OUTPATIENT
Start: 2020-01-09 | End: 2020-01-09

## 2020-01-09 RX ADMIN — FUROSEMIDE 20 MG: 10 INJECTION, SOLUTION INTRAMUSCULAR; INTRAVENOUS at 08:42

## 2020-01-09 ASSESSMENT — PAIN SCALES - GENERAL: PAINLEVEL_OUTOF10: 7

## 2020-01-09 NOTE — ED NOTES
Pt c/o swelling to abd and legs for past week. sts at night when trying to sleep has trouble sts gets mucus in throat  and trouble breathing. pt walks back to room and became sob. color fair skin warm et dry.      Pa Guzmán RN  01/09/20 [id number]

## 2020-01-09 NOTE — ED PROVIDER NOTES
MG TABLET    Take 1 tablet by mouth daily    FUROSEMIDE (LASIX) 20 MG TABLET    Take 1 tablet by mouth daily     ALLERGIES     has No Known Allergies. FAMILY HISTORY     She indicated that the status of her mother is unknown. She indicated that the status of her father is unknown. She indicated that two of her three brothers are alive. SOCIAL HISTORY       Social History     Tobacco Use    Smoking status: Current Every Day Smoker     Packs/day: 0.25     Years: 3.00     Pack years: 0.75     Types: Cigarettes    Smokeless tobacco: Never Used   Substance Use Topics    Alcohol use: Yes     Comment: socially    Drug use: No     PHYSICAL EXAM     INITIAL VITALS:   Vitals:    20 0459 20 0500   BP: (!) 155/94    Pulse: 105    Resp: 16    Temp: 97.3 °F (36.3 °C)    TempSrc: Oral    SpO2: 98%    Weight:  297 lb 3.2 oz (134.8 kg)   Height:  5' 7\" (1.702 m)       Physical Exam  Constitutional:       Appearance: She is well-developed. HENT:      Head: Normocephalic and atraumatic. Eyes:      Conjunctiva/sclera: Conjunctivae normal.   Neck:      Musculoskeletal: Normal range of motion and neck supple. Cardiovascular:      Rate and Rhythm: Normal rate and regular rhythm. Pulmonary:      Effort: Pulmonary effort is normal.      Breath sounds: Rales present. Abdominal:      Palpations: Abdomen is soft. There is no mass. Tenderness: There is no tenderness. Comments: Lower pannus edema   Musculoskeletal: Normal range of motion. General: No tenderness or deformity. Right lower le+ Pitting Edema present. Left lower le+ Pitting Edema present. Skin:     General: Skin is warm and dry. Neurological:      Mental Status: She is alert and oriented to person, place, and time.          MEDICAL DECISION MAKING:          CRITICAL CARE:       PROCEDURES:    Procedures    DIAGNOSTIC RESULTS   EKG:All EKG's are interpreted by the Emergency Department Physician who either signs or Co-signs this chart in the absence of a cardiologist.    Normal sinus rhythm ventricular rate of 92 normal axis unremarkable ST segment    RADIOLOGY:All plain film, CT, MRI, and formal ultrasound images (except ED bedside ultrasound) are read by the radiologist, see reports below, unless otherwisenoted in MDM or here. XR CHEST STANDARD (2 VW)    (Results Pending)     LABS: All lab results were reviewed by myself, and all abnormals are listed below. Labs Reviewed   RAPID INFLUENZA A/B ANTIGENS   CBC WITH AUTO DIFFERENTIAL   BASIC METABOLIC PANEL W/ REFLEX TO MG FOR LOW K   BRAIN NATRIURETIC PEPTIDE   TROPONIN       EMERGENCY DEPARTMENTCOURSE:         Vitals:    Vitals:    01/09/20 0459 01/09/20 0500   BP: (!) 155/94    Pulse: 105    Resp: 16    Temp: 97.3 °F (36.3 °C)    TempSrc: Oral    SpO2: 98%    Weight:  297 lb 3.2 oz (134.8 kg)   Height:  5' 7\" (1.702 m)       The patient was given the following medications while in the emergency department:  No orders of the defined types were placed in this encounter. CONSULTS:  None    FINAL IMPRESSION      1. Peripheral edema          DISPOSITION/PLAN   DISPOSITION      Patient signed out to oncoming provider    PATIENT REFERRED TO:  No follow-up provider specified.   DISCHARGE MEDICATIONS:  New Prescriptions    No medications on file     Sujatha Watt MD  Attending Emergency Physician                    Nathan Pederson MD  01/09/20 [id number]

## 2020-01-17 ENCOUNTER — OFFICE VISIT (OUTPATIENT)
Dept: FAMILY MEDICINE CLINIC | Age: 50
End: 2020-01-17

## 2020-01-17 VITALS
BODY MASS INDEX: 45.71 KG/M2 | DIASTOLIC BLOOD PRESSURE: 73 MMHG | OXYGEN SATURATION: 100 % | HEIGHT: 67 IN | SYSTOLIC BLOOD PRESSURE: 134 MMHG | WEIGHT: 291.2 LBS | TEMPERATURE: 96.1 F | HEART RATE: 88 BPM

## 2020-01-17 PROBLEM — M79.89 LEG SWELLING: Status: ACTIVE | Noted: 2020-01-17

## 2020-01-17 PROBLEM — Z13.220 LIPID SCREENING: Status: ACTIVE | Noted: 2020-01-17

## 2020-01-17 LAB — HBA1C MFR BLD: 6.3 %

## 2020-01-17 PROCEDURE — 99214 OFFICE O/P EST MOD 30 MIN: CPT | Performed by: STUDENT IN AN ORGANIZED HEALTH CARE EDUCATION/TRAINING PROGRAM

## 2020-01-17 PROCEDURE — 99211 OFF/OP EST MAY X REQ PHY/QHP: CPT | Performed by: FAMILY MEDICINE

## 2020-01-17 PROCEDURE — 83036 HEMOGLOBIN GLYCOSYLATED A1C: CPT | Performed by: STUDENT IN AN ORGANIZED HEALTH CARE EDUCATION/TRAINING PROGRAM

## 2020-01-17 RX ORDER — LISINOPRIL 20 MG/1
20 TABLET ORAL DAILY
Qty: 30 TABLET | Refills: 2 | Status: ON HOLD | OUTPATIENT
Start: 2020-01-17 | End: 2020-02-02 | Stop reason: HOSPADM

## 2020-01-17 RX ORDER — POTASSIUM CHLORIDE 750 MG/1
10 TABLET, EXTENDED RELEASE ORAL 2 TIMES DAILY
Qty: 60 TABLET | Refills: 0 | Status: SHIPPED | OUTPATIENT
Start: 2020-01-17 | End: 2021-12-22 | Stop reason: SDUPTHER

## 2020-01-17 RX ORDER — DOCUSATE SODIUM 100 MG/1
100 CAPSULE, LIQUID FILLED ORAL 2 TIMES DAILY
Qty: 60 CAPSULE | Refills: 0 | Status: SHIPPED | OUTPATIENT
Start: 2020-01-17 | End: 2020-02-07

## 2020-01-17 RX ORDER — FUROSEMIDE 40 MG/1
40 TABLET ORAL DAILY
Qty: 30 TABLET | Refills: 1 | Status: ON HOLD | OUTPATIENT
Start: 2020-01-17 | End: 2020-01-31 | Stop reason: SDUPTHER

## 2020-01-17 RX ORDER — SENNA PLUS 8.6 MG/1
1 TABLET ORAL 2 TIMES DAILY
Qty: 60 TABLET | Refills: 11 | Status: SHIPPED | OUTPATIENT
Start: 2020-01-17 | End: 2020-01-28

## 2020-01-17 RX ORDER — LOSARTAN POTASSIUM 50 MG/1
50 TABLET ORAL DAILY
COMMUNITY
End: 2020-01-28

## 2020-01-17 ASSESSMENT — PATIENT HEALTH QUESTIONNAIRE - PHQ9
1. LITTLE INTEREST OR PLEASURE IN DOING THINGS: 0
SUM OF ALL RESPONSES TO PHQ9 QUESTIONS 1 & 2: 1
2. FEELING DOWN, DEPRESSED OR HOPELESS: 1
SUM OF ALL RESPONSES TO PHQ QUESTIONS 1-9: 1
SUM OF ALL RESPONSES TO PHQ QUESTIONS 1-9: 1
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
## 2020-01-17 NOTE — PATIENT INSTRUCTIONS
Thank you for letting us take care of you today. We hope all your questions were addressed. If a question was overlooked or something else comes to mind after you return home, please contact a member of your Care Team listed below. Please make sure you have a routine office visit set up to follow-up on [address]. Your Care Team at [place] [address] is Team #1  Mackenzie Tavarez MD (Faculty)  Tawana Byrne MD (Faculty)  Kayleigh Cintron MD (Resident)  Virgil Morales MD (Resident)  Zbigniew Peng MD (Resident)  Lashon Sutton MD (Resident)  Amaya Downey., [facility]  Luis Enrique Ramirez., A  Jeff Nicolas., [facility] office)  [facility] office)  [facility] office)  ADRIAN Westbrook CHANDLER ([address] [facility])  Juarez Rai, Ph.D., (Behavioral Services)  Manuel Hartley, [facility] (Clinical Pharmacist)     Office phone number: [phone]    If you need to get in right away due to illness, please be advised we have \"Same Day\" appointments available Monday-Friday. Please call us at [phone] option #3 to schedule your \"Same Day\" appointment.

## 2020-01-27 ENCOUNTER — HOSPITAL ENCOUNTER (INPATIENT)
Age: 50
LOS: 3 days | Discharge: ANOTHER HOSPITAL | DRG: 291 | End: 2020-01-31
Attending: EMERGENCY MEDICINE | Admitting: INTERNAL MEDICINE

## 2020-01-27 PROCEDURE — 99285 EMERGENCY DEPT VISIT HI MDM: CPT

## 2020-01-27 ASSESSMENT — PAIN SCALES - GENERAL: PAINLEVEL_OUTOF10: 8

## 2020-01-28 ENCOUNTER — APPOINTMENT (OUTPATIENT)
Dept: CT IMAGING | Age: 50
DRG: 291 | End: 2020-01-28

## 2020-01-28 ENCOUNTER — APPOINTMENT (OUTPATIENT)
Dept: ULTRASOUND IMAGING | Age: 50
DRG: 291 | End: 2020-01-28

## 2020-01-28 PROBLEM — E83.51 HYPOCALCEMIA: Status: ACTIVE | Noted: 2020-01-28

## 2020-01-28 PROBLEM — R19.00 PELVIC MASS IN FEMALE: Status: ACTIVE | Noted: 2020-01-28

## 2020-01-28 PROBLEM — I31.39 PERICARDIAL EFFUSION: Status: ACTIVE | Noted: 2020-01-28

## 2020-01-28 LAB
-: ABNORMAL
ABSOLUTE EOS #: 0.14 K/UL (ref 0–0.4)
ABSOLUTE IMMATURE GRANULOCYTE: ABNORMAL K/UL (ref 0–0.3)
ABSOLUTE LYMPH #: 1.21 K/UL (ref 1–4.8)
ABSOLUTE MONO #: 0.36 K/UL (ref 0.1–1.3)
ALBUMIN SERPL-MCNC: 3.7 G/DL (ref 3.5–5.2)
ALBUMIN/GLOBULIN RATIO: ABNORMAL (ref 1–2.5)
ALP BLD-CCNC: 73 U/L (ref 35–104)
ALT SERPL-CCNC: 10 U/L (ref 5–33)
AMORPHOUS: ABNORMAL
ANION GAP SERPL CALCULATED.3IONS-SCNC: 10 MMOL/L (ref 9–17)
ANION GAP SERPL CALCULATED.3IONS-SCNC: 12 MMOL/L (ref 9–17)
AST SERPL-CCNC: 18 U/L
BACTERIA: ABNORMAL
BASOPHILS # BLD: 1 % (ref 0–2)
BASOPHILS ABSOLUTE: 0.07 K/UL (ref 0–0.2)
BILIRUB SERPL-MCNC: 0.44 MG/DL (ref 0.3–1.2)
BILIRUBIN URINE: NEGATIVE
BNP INTERPRETATION: ABNORMAL
BUN BLDV-MCNC: 7 MG/DL (ref 6–20)
BUN BLDV-MCNC: 8 MG/DL (ref 6–20)
BUN/CREAT BLD: ABNORMAL (ref 9–20)
BUN/CREAT BLD: ABNORMAL (ref 9–20)
CALCIUM IONIZED: 1.09 MMOL/L (ref 1.13–1.33)
CALCIUM SERPL-MCNC: 7.7 MG/DL (ref 8.6–10.4)
CALCIUM SERPL-MCNC: 8.1 MG/DL (ref 8.6–10.4)
CASTS UA: ABNORMAL /LPF
CHLORIDE BLD-SCNC: 100 MMOL/L (ref 98–107)
CHLORIDE BLD-SCNC: 102 MMOL/L (ref 98–107)
CO2: 23 MMOL/L (ref 20–31)
CO2: 24 MMOL/L (ref 20–31)
COLOR: YELLOW
COMMENT UA: ABNORMAL
CREAT SERPL-MCNC: 0.72 MG/DL (ref 0.5–0.9)
CREAT SERPL-MCNC: 0.84 MG/DL (ref 0.5–0.9)
CRYSTALS, UA: ABNORMAL /HPF
DIFFERENTIAL TYPE: ABNORMAL
DIRECT EXAM: ABNORMAL
EOSINOPHILS RELATIVE PERCENT: 2 % (ref 0–4)
EPITHELIAL CELLS UA: ABNORMAL /HPF
GFR AFRICAN AMERICAN: >60 ML/MIN
GFR AFRICAN AMERICAN: >60 ML/MIN
GFR NON-AFRICAN AMERICAN: >60 ML/MIN
GFR NON-AFRICAN AMERICAN: >60 ML/MIN
GFR SERPL CREATININE-BSD FRML MDRD: ABNORMAL ML/MIN/{1.73_M2}
GLUCOSE BLD-MCNC: 141 MG/DL (ref 70–99)
GLUCOSE BLD-MCNC: 151 MG/DL (ref 70–99)
GLUCOSE URINE: NEGATIVE
HCG QUALITATIVE: NEGATIVE
HCT VFR BLD CALC: 30.2 % (ref 36–46)
HCT VFR BLD CALC: 31.1 % (ref 36–46)
HEMOGLOBIN: 8.7 G/DL (ref 12–16)
HEMOGLOBIN: 8.9 G/DL (ref 12–16)
IMMATURE GRANULOCYTES: ABNORMAL %
KETONES, URINE: NEGATIVE
LEUKOCYTE ESTERASE, URINE: NEGATIVE
LIPASE: 106 U/L (ref 13–60)
LV EF: 18 %
LVEF MODALITY: NORMAL
LYMPHOCYTES # BLD: 17 % (ref 24–44)
Lab: ABNORMAL
MAGNESIUM: 2.1 MG/DL (ref 1.6–2.6)
MCH RBC QN AUTO: 19.2 PG (ref 26–34)
MCH RBC QN AUTO: 19.6 PG (ref 26–34)
MCHC RBC AUTO-ENTMCNC: 28.7 G/DL (ref 31–37)
MCHC RBC AUTO-ENTMCNC: 28.8 G/DL (ref 31–37)
MCV RBC AUTO: 66.8 FL (ref 80–100)
MCV RBC AUTO: 68.2 FL (ref 80–100)
MONOCYTES # BLD: 5 % (ref 1–7)
MORPHOLOGY: ABNORMAL
MUCUS: ABNORMAL
NITRITE, URINE: NEGATIVE
NRBC AUTOMATED: ABNORMAL PER 100 WBC
NRBC AUTOMATED: ABNORMAL PER 100 WBC
OTHER OBSERVATIONS UA: ABNORMAL
PDW BLD-RTO: 19.8 % (ref 11.5–14.9)
PDW BLD-RTO: 20.6 % (ref 11.5–14.9)
PH UA: 5.5 (ref 5–8)
PHOSPHORUS: 3.7 MG/DL (ref 2.6–4.5)
PLATELET # BLD: 216 K/UL (ref 150–450)
PLATELET # BLD: 225 K/UL (ref 150–450)
PLATELET ESTIMATE: ABNORMAL
PMV BLD AUTO: 8.5 FL (ref 6–12)
PMV BLD AUTO: 8.9 FL (ref 6–12)
POTASSIUM SERPL-SCNC: 3.9 MMOL/L (ref 3.7–5.3)
POTASSIUM SERPL-SCNC: 4 MMOL/L (ref 3.7–5.3)
PRO-BNP: 1354 PG/ML
PROTEIN UA: NEGATIVE
RBC # BLD: 4.44 M/UL (ref 4–5.2)
RBC # BLD: 4.65 M/UL (ref 4–5.2)
RBC # BLD: ABNORMAL 10*6/UL
RBC UA: ABNORMAL /HPF
RENAL EPITHELIAL, UA: ABNORMAL /HPF
SEG NEUTROPHILS: 75 % (ref 36–66)
SEGMENTED NEUTROPHILS ABSOLUTE COUNT: 5.32 K/UL (ref 1.3–9.1)
SODIUM BLD-SCNC: 135 MMOL/L (ref 135–144)
SODIUM BLD-SCNC: 136 MMOL/L (ref 135–144)
SPECIFIC GRAVITY UA: 1.02 (ref 1–1.03)
SPECIMEN DESCRIPTION: ABNORMAL
TOTAL PROTEIN: 6.6 G/DL (ref 6.4–8.3)
TRICHOMONAS: ABNORMAL
TROPONIN INTERP: ABNORMAL
TROPONIN INTERP: ABNORMAL
TROPONIN T: ABNORMAL NG/ML
TROPONIN T: ABNORMAL NG/ML
TROPONIN, HIGH SENSITIVITY: 22 NG/L (ref 0–14)
TROPONIN, HIGH SENSITIVITY: 24 NG/L (ref 0–14)
TURBIDITY: ABNORMAL
URINE HGB: NEGATIVE
UROBILINOGEN, URINE: NORMAL
WBC # BLD: 7.1 K/UL (ref 3.5–11)
WBC # BLD: 7.9 K/UL (ref 3.5–11)
WBC # BLD: ABNORMAL 10*3/UL
WBC UA: ABNORMAL /HPF
YEAST: ABNORMAL

## 2020-01-28 PROCEDURE — 87660 TRICHOMONAS VAGIN DIR PROBE: CPT

## 2020-01-28 PROCEDURE — 6360000002 HC RX W HCPCS: Performed by: NURSE PRACTITIONER

## 2020-01-28 PROCEDURE — 6360000002 HC RX W HCPCS: Performed by: EMERGENCY MEDICINE

## 2020-01-28 PROCEDURE — 87491 CHLMYD TRACH DNA AMP PROBE: CPT

## 2020-01-28 PROCEDURE — 2580000003 HC RX 258: Performed by: INTERNAL MEDICINE

## 2020-01-28 PROCEDURE — 99223 1ST HOSP IP/OBS HIGH 75: CPT | Performed by: INTERNAL MEDICINE

## 2020-01-28 PROCEDURE — 36415 COLL VENOUS BLD VENIPUNCTURE: CPT

## 2020-01-28 PROCEDURE — 6360000004 HC RX CONTRAST MEDICATION: Performed by: INTERNAL MEDICINE

## 2020-01-28 PROCEDURE — 87480 CANDIDA DNA DIR PROBE: CPT

## 2020-01-28 PROCEDURE — 83880 ASSAY OF NATRIURETIC PEPTIDE: CPT

## 2020-01-28 PROCEDURE — 84484 ASSAY OF TROPONIN QUANT: CPT

## 2020-01-28 PROCEDURE — 6370000000 HC RX 637 (ALT 250 FOR IP): Performed by: STUDENT IN AN ORGANIZED HEALTH CARE EDUCATION/TRAINING PROGRAM

## 2020-01-28 PROCEDURE — 6370000000 HC RX 637 (ALT 250 FOR IP): Performed by: NURSE PRACTITIONER

## 2020-01-28 PROCEDURE — 74177 CT ABD & PELVIS W/CONTRAST: CPT

## 2020-01-28 PROCEDURE — 80048 BASIC METABOLIC PNL TOTAL CA: CPT

## 2020-01-28 PROCEDURE — 76830 TRANSVAGINAL US NON-OB: CPT

## 2020-01-28 PROCEDURE — 87591 N.GONORRHOEAE DNA AMP PROB: CPT

## 2020-01-28 PROCEDURE — 83690 ASSAY OF LIPASE: CPT

## 2020-01-28 PROCEDURE — 6360000002 HC RX W HCPCS: Performed by: INTERNAL MEDICINE

## 2020-01-28 PROCEDURE — 80053 COMPREHEN METABOLIC PANEL: CPT

## 2020-01-28 PROCEDURE — 6360000004 HC RX CONTRAST MEDICATION: Performed by: EMERGENCY MEDICINE

## 2020-01-28 PROCEDURE — 93306 TTE W/DOPPLER COMPLETE: CPT

## 2020-01-28 PROCEDURE — 2500000003 HC RX 250 WO HCPCS: Performed by: EMERGENCY MEDICINE

## 2020-01-28 PROCEDURE — 71260 CT THORAX DX C+: CPT

## 2020-01-28 PROCEDURE — 2060000000 HC ICU INTERMEDIATE R&B

## 2020-01-28 PROCEDURE — 84100 ASSAY OF PHOSPHORUS: CPT

## 2020-01-28 PROCEDURE — 81001 URINALYSIS AUTO W/SCOPE: CPT

## 2020-01-28 PROCEDURE — 85027 COMPLETE CBC AUTOMATED: CPT

## 2020-01-28 PROCEDURE — 87510 GARDNER VAG DNA DIR PROBE: CPT

## 2020-01-28 PROCEDURE — 6370000000 HC RX 637 (ALT 250 FOR IP): Performed by: INTERNAL MEDICINE

## 2020-01-28 PROCEDURE — 83735 ASSAY OF MAGNESIUM: CPT

## 2020-01-28 PROCEDURE — 2580000003 HC RX 258: Performed by: NURSE PRACTITIONER

## 2020-01-28 PROCEDURE — 84703 CHORIONIC GONADOTROPIN ASSAY: CPT

## 2020-01-28 PROCEDURE — 82330 ASSAY OF CALCIUM: CPT

## 2020-01-28 PROCEDURE — 2580000003 HC RX 258: Performed by: EMERGENCY MEDICINE

## 2020-01-28 PROCEDURE — 85025 COMPLETE CBC W/AUTO DIFF WBC: CPT

## 2020-01-28 RX ORDER — MORPHINE SULFATE 2 MG/ML
2 INJECTION, SOLUTION INTRAMUSCULAR; INTRAVENOUS EVERY 4 HOURS PRN
Status: DISCONTINUED | OUTPATIENT
Start: 2020-01-28 | End: 2020-01-31 | Stop reason: HOSPADM

## 2020-01-28 RX ORDER — 0.9 % SODIUM CHLORIDE 0.9 %
80 INTRAVENOUS SOLUTION INTRAVENOUS ONCE
Status: COMPLETED | OUTPATIENT
Start: 2020-01-28 | End: 2020-01-28

## 2020-01-28 RX ORDER — DOCUSATE SODIUM 100 MG/1
100 CAPSULE, LIQUID FILLED ORAL 2 TIMES DAILY
Status: DISCONTINUED | OUTPATIENT
Start: 2020-01-28 | End: 2020-01-31 | Stop reason: HOSPADM

## 2020-01-28 RX ORDER — METRONIDAZOLE 500 MG/1
500 TABLET ORAL EVERY 12 HOURS
Status: DISCONTINUED | OUTPATIENT
Start: 2020-01-28 | End: 2020-01-31 | Stop reason: HOSPADM

## 2020-01-28 RX ORDER — LISINOPRIL 20 MG/1
20 TABLET ORAL DAILY
Status: DISCONTINUED | OUTPATIENT
Start: 2020-01-28 | End: 2020-01-31 | Stop reason: HOSPADM

## 2020-01-28 RX ORDER — MAGNESIUM SULFATE 1 G/100ML
1 INJECTION INTRAVENOUS PRN
Status: DISCONTINUED | OUTPATIENT
Start: 2020-01-28 | End: 2020-01-31 | Stop reason: HOSPADM

## 2020-01-28 RX ORDER — ACETAMINOPHEN 325 MG/1
650 TABLET ORAL EVERY 4 HOURS PRN
Status: DISCONTINUED | OUTPATIENT
Start: 2020-01-28 | End: 2020-01-31 | Stop reason: HOSPADM

## 2020-01-28 RX ORDER — SODIUM CHLORIDE 0.9 % (FLUSH) 0.9 %
10 SYRINGE (ML) INJECTION PRN
Status: DISCONTINUED | OUTPATIENT
Start: 2020-01-28 | End: 2020-01-31 | Stop reason: HOSPADM

## 2020-01-28 RX ORDER — SODIUM CHLORIDE 0.9 % (FLUSH) 0.9 %
10 SYRINGE (ML) INJECTION EVERY 12 HOURS SCHEDULED
Status: DISCONTINUED | OUTPATIENT
Start: 2020-01-28 | End: 2020-01-31 | Stop reason: HOSPADM

## 2020-01-28 RX ORDER — POTASSIUM CHLORIDE 750 MG/1
10 CAPSULE, EXTENDED RELEASE ORAL 2 TIMES DAILY
Status: DISCONTINUED | OUTPATIENT
Start: 2020-01-28 | End: 2020-01-31 | Stop reason: HOSPADM

## 2020-01-28 RX ORDER — ONDANSETRON 2 MG/ML
4 INJECTION INTRAMUSCULAR; INTRAVENOUS EVERY 6 HOURS PRN
Status: DISCONTINUED | OUTPATIENT
Start: 2020-01-28 | End: 2020-01-31 | Stop reason: HOSPADM

## 2020-01-28 RX ORDER — CARVEDILOL 6.25 MG/1
6.25 TABLET ORAL 2 TIMES DAILY WITH MEALS
Status: DISCONTINUED | OUTPATIENT
Start: 2020-01-28 | End: 2020-01-31 | Stop reason: HOSPADM

## 2020-01-28 RX ORDER — MORPHINE SULFATE 4 MG/ML
4 INJECTION, SOLUTION INTRAMUSCULAR; INTRAVENOUS ONCE
Status: COMPLETED | OUTPATIENT
Start: 2020-01-28 | End: 2020-01-28

## 2020-01-28 RX ORDER — FUROSEMIDE 40 MG/1
40 TABLET ORAL DAILY
Status: DISCONTINUED | OUTPATIENT
Start: 2020-01-28 | End: 2020-01-29

## 2020-01-28 RX ORDER — SODIUM CHLORIDE 0.9 % (FLUSH) 0.9 %
10 SYRINGE (ML) INJECTION PRN
Status: DISCONTINUED | OUTPATIENT
Start: 2020-01-28 | End: 2020-01-30

## 2020-01-28 RX ORDER — AMLODIPINE BESYLATE 10 MG/1
10 TABLET ORAL DAILY
Status: DISCONTINUED | OUTPATIENT
Start: 2020-01-28 | End: 2020-01-31 | Stop reason: HOSPADM

## 2020-01-28 RX ORDER — MORPHINE SULFATE 4 MG/ML
4 INJECTION, SOLUTION INTRAMUSCULAR; INTRAVENOUS EVERY 4 HOURS PRN
Status: DISCONTINUED | OUTPATIENT
Start: 2020-01-28 | End: 2020-01-31 | Stop reason: HOSPADM

## 2020-01-28 RX ORDER — POTASSIUM CHLORIDE 7.45 MG/ML
10 INJECTION INTRAVENOUS PRN
Status: DISCONTINUED | OUTPATIENT
Start: 2020-01-28 | End: 2020-01-31 | Stop reason: HOSPADM

## 2020-01-28 RX ORDER — SPIRONOLACTONE 25 MG/1
25 TABLET ORAL DAILY
Status: DISCONTINUED | OUTPATIENT
Start: 2020-01-28 | End: 2020-01-31 | Stop reason: HOSPADM

## 2020-01-28 RX ORDER — ATORVASTATIN CALCIUM 40 MG/1
40 TABLET, FILM COATED ORAL DAILY
Status: DISCONTINUED | OUTPATIENT
Start: 2020-01-28 | End: 2020-01-31 | Stop reason: HOSPADM

## 2020-01-28 RX ORDER — 0.9 % SODIUM CHLORIDE 0.9 %
1000 INTRAVENOUS SOLUTION INTRAVENOUS ONCE
Status: COMPLETED | OUTPATIENT
Start: 2020-01-28 | End: 2020-01-28

## 2020-01-28 RX ORDER — POTASSIUM CHLORIDE 20 MEQ/1
40 TABLET, EXTENDED RELEASE ORAL PRN
Status: DISCONTINUED | OUTPATIENT
Start: 2020-01-28 | End: 2020-01-31 | Stop reason: HOSPADM

## 2020-01-28 RX ADMIN — IOPAMIDOL 75 ML: 755 INJECTION, SOLUTION INTRAVENOUS at 11:44

## 2020-01-28 RX ADMIN — DOCUSATE SODIUM 100 MG: 100 CAPSULE, LIQUID FILLED ORAL at 20:51

## 2020-01-28 RX ADMIN — SODIUM CHLORIDE 80 ML: 9 INJECTION, SOLUTION INTRAVENOUS at 11:44

## 2020-01-28 RX ADMIN — AMLODIPINE BESYLATE 10 MG: 10 TABLET ORAL at 08:58

## 2020-01-28 RX ADMIN — Medication 10 ML: at 20:54

## 2020-01-28 RX ADMIN — POTASSIUM CHLORIDE 10 MEQ: 10 CAPSULE, COATED, EXTENDED RELEASE ORAL at 20:51

## 2020-01-28 RX ADMIN — MORPHINE SULFATE 2 MG: 2 INJECTION, SOLUTION INTRAMUSCULAR; INTRAVENOUS at 14:36

## 2020-01-28 RX ADMIN — MORPHINE SULFATE 2 MG: 2 INJECTION, SOLUTION INTRAMUSCULAR; INTRAVENOUS at 20:51

## 2020-01-28 RX ADMIN — MORPHINE SULFATE 4 MG: 4 INJECTION, SOLUTION INTRAMUSCULAR; INTRAVENOUS at 00:53

## 2020-01-28 RX ADMIN — DOCUSATE SODIUM 100 MG: 100 CAPSULE, LIQUID FILLED ORAL at 08:58

## 2020-01-28 RX ADMIN — ENOXAPARIN SODIUM 30 MG: 30 INJECTION SUBCUTANEOUS at 08:58

## 2020-01-28 RX ADMIN — Medication 10 ML: at 02:21

## 2020-01-28 RX ADMIN — IOVERSOL 75 ML: 741 INJECTION INTRA-ARTERIAL; INTRAVENOUS at 02:21

## 2020-01-28 RX ADMIN — ATORVASTATIN CALCIUM 40 MG: 40 TABLET, FILM COATED ORAL at 08:58

## 2020-01-28 RX ADMIN — POTASSIUM CHLORIDE 10 MEQ: 10 CAPSULE, COATED, EXTENDED RELEASE ORAL at 08:58

## 2020-01-28 RX ADMIN — CARVEDILOL 6.25 MG: 6.25 TABLET, FILM COATED ORAL at 21:27

## 2020-01-28 RX ADMIN — FUROSEMIDE 40 MG: 40 TABLET ORAL at 08:58

## 2020-01-28 RX ADMIN — SPIRONOLACTONE 25 MG: 25 TABLET, FILM COATED ORAL at 21:27

## 2020-01-28 RX ADMIN — METRONIDAZOLE 500 MG: 500 TABLET ORAL at 14:36

## 2020-01-28 RX ADMIN — SODIUM CHLORIDE 1000 ML: 9 INJECTION, SOLUTION INTRAVENOUS at 00:52

## 2020-01-28 RX ADMIN — CALCIUM GLUCONATE 1 G: 98 INJECTION, SOLUTION INTRAVENOUS at 06:16

## 2020-01-28 RX ADMIN — FAMOTIDINE 20 MG: 10 INJECTION, SOLUTION INTRAVENOUS at 00:53

## 2020-01-28 RX ADMIN — ENOXAPARIN SODIUM 30 MG: 30 INJECTION SUBCUTANEOUS at 20:52

## 2020-01-28 RX ADMIN — SODIUM CHLORIDE 80 ML: 9 INJECTION, SOLUTION INTRAVENOUS at 02:21

## 2020-01-28 RX ADMIN — LISINOPRIL 20 MG: 20 TABLET ORAL at 08:58

## 2020-01-28 RX ADMIN — Medication 10 ML: at 11:44

## 2020-01-28 RX ADMIN — IRON SUCROSE 200 MG: 20 INJECTION, SOLUTION INTRAVENOUS at 12:24

## 2020-01-28 ASSESSMENT — PAIN DESCRIPTION - LOCATION
LOCATION: ABDOMEN
LOCATION: ABDOMEN

## 2020-01-28 ASSESSMENT — ENCOUNTER SYMPTOMS
SHORTNESS OF BREATH: 1
VOMITING: 0
SORE THROAT: 0
SHORTNESS OF BREATH: 0
COUGH: 0
ABDOMINAL PAIN: 1
WHEEZING: 0
CONSTIPATION: 0
BACK PAIN: 0
NAUSEA: 0
DIARRHEA: 0

## 2020-01-28 ASSESSMENT — PAIN DESCRIPTION - DESCRIPTORS
DESCRIPTORS: CRAMPING;ACHING
DESCRIPTORS: ACHING

## 2020-01-28 ASSESSMENT — PAIN DESCRIPTION - PAIN TYPE: TYPE: ACUTE PAIN

## 2020-01-28 ASSESSMENT — PAIN SCALES - GENERAL
PAINLEVEL_OUTOF10: 0
PAINLEVEL_OUTOF10: 8
PAINLEVEL_OUTOF10: 6
PAINLEVEL_OUTOF10: 8
PAINLEVEL_OUTOF10: 6
PAINLEVEL_OUTOF10: 8

## 2020-01-28 ASSESSMENT — PAIN DESCRIPTION - ORIENTATION
ORIENTATION: LOWER
ORIENTATION: LOWER

## 2020-01-28 ASSESSMENT — PAIN SCALES - WONG BAKER: WONGBAKER_NUMERICALRESPONSE: 0

## 2020-01-28 NOTE — PROGRESS NOTES
OB/GYN Resident Interval Note    Patient's vaginitis probe positive for bacterial vaginosis. Will order Flagyl 500 mg BID.        Hayder Callejas DO   OB/GYN Resident  Pager [phone]  [address]  1/28/2020, 1:05 PM

## 2020-01-28 NOTE — ED PROVIDER NOTES
Surgical History:   Procedure Laterality Date    COLONOSCOPY  04/11/2014    normal     ENDOSCOPY, COLON, DIAGNOSTIC      KNEE ARTHROSCOPY Right 07/27/2016    LUMBAR DISC SURGERY  04/02/2018    RIGHT MICRODISCECTOMY L3-4, L4-5    AK OFFICE/OUTPT VISIT,PROCEDURE ONLY Right 4/2/2018    RIGHT MICRODISCECTOMY L3-4, L4-5, GABRIEL TABLE, PRONE, MICROSCOPE, METRX TUBE, C-ARM performed by Stacy Yu DO at [address] [place] [address]       Previous Medications    AMLODIPINE (NORVASC) 10 MG TABLET    Take 1 tablet by mouth daily    ATORVASTATIN (LIPITOR) 40 MG TABLET    Take 1 tablet by mouth daily    DOCUSATE SODIUM (COLACE) 100 MG CAPSULE    Take 1 capsule by mouth 2 times daily    FUROSEMIDE (LASIX) 40 MG TABLET    Take 1 tablet by mouth daily    LISINOPRIL (PRINIVIL;ZESTRIL) 20 MG TABLET    Take 1 tablet by mouth daily    LOSARTAN (COZAAR) 50 MG TABLET    Take 50 mg by mouth daily    POTASSIUM CHLORIDE (KLOR-CON M) 10 MEQ EXTENDED RELEASE TABLET    Take 1 tablet by mouth 2 times daily    SENNA (SENOKOT) 8.6 MG TABLET    Take 1 tablet by mouth 2 times daily     ALLERGIES     has No Known Allergies. FAMILY HISTORY     She indicated that the status of her mother is unknown. She indicated that the status of her father is unknown. She indicated that two of her three brothers are alive. SOCIAL HISTORY       Social History     Tobacco Use    Smoking status: Current Every Day Smoker     Packs/day: 0.25     Years: 3.00     Pack years: 0.75     Types: Cigarettes    Smokeless tobacco: Never Used   Substance Use Topics    Alcohol use: Yes     Comment: socially    Drug use: No     PHYSICAL EXAM     INITIAL VITALS: BP (!) 140/74   Pulse 74   Temp 97.9 °F (36.6 °C) (Oral)   Resp 18   Ht 5' 7\" (1.702 m)   Wt 280 lb (127 kg)   LMP 12/27/2019   SpO2 100%   BMI 43.85 kg/m²    Physical Exam  Vitals signs and nursing note reviewed. Constitutional:       General: She is not in acute distress.      Appearance: She

## 2020-01-28 NOTE — PLAN OF CARE
Problem: Pain:  Description  Pain management should include both nonpharmacologic and pharmacologic interventions. Goal: Patient's pain/discomfort is manageable  Description  Patient's pain/discomfort is manageable  Outcome: Ongoing  Note:   Pt c/o abdominal pain this shift. Pt receives morphine as needed. Problem: Cardiac Output - Decreased:  Goal: Hemodynamic stability will improve  Description  Hemodynamic stability will improve  Outcome: Ongoing  Note:   Pt had echo today and EF is 15-20%. Cardiology consulted for further management.

## 2020-01-28 NOTE — H&P
Physical Exam  Constitutional:       General: She is not in acute distress. Appearance: She is well-developed. She is obese. She is not diaphoretic. HENT:      Head: Normocephalic and atraumatic. Eyes:      Conjunctiva/sclera: Conjunctivae normal.      Pupils: Pupils are equal, round, and reactive to light. Neck:      Musculoskeletal: Normal range of motion and neck supple. Trachea: No tracheal deviation. Cardiovascular:      Rate and Rhythm: Normal rate and regular rhythm. Heart sounds: Normal heart sounds. No murmur. No friction rub. No gallop. Pulmonary:      Effort: Pulmonary effort is normal. No respiratory distress. Breath sounds: Normal breath sounds. No wheezing or rales. Chest:      Chest wall: No tenderness. Abdominal:      General: Bowel sounds are normal. There is no distension. Palpations: Abdomen is soft. Tenderness: There is abdominal tenderness (suprapubic tenderness with palpation. Bowel Sounds active x 4 quads. No guarding or rebound. ). There is no guarding. Musculoskeletal: Normal range of motion. General: No tenderness. Lymphadenopathy:      Cervical: No cervical adenopathy. Skin:     General: Skin is warm and dry. Coloration: Skin is not pale. Findings: No erythema or rash. Neurological:      Mental Status: She is alert and oriented to person, place, and time. Motor: No seizure activity. Coordination: Coordination normal.   Psychiatric:         Behavior: Behavior normal.         Thought Content:  Thought content normal.       DIAGNOSTICS      EKG: (as documented in ED note):    Heart rate of 77 bpm T wave inversion in V5 V6 prolonged QTC of 461 ms normal axis no acute ST or T wave changes    Labs:  CBC:   Recent Labs     01/28/20  0040   WBC 7.1   HGB 8.9*        BMP:    Recent Labs     01/28/20  0040      K 3.9      CO2 24   BUN 7   CREATININE 0.72   GLUCOSE 141*     S. Calcium:  Recent Labs with the assessment, plan and orders as documented by the resident.   49-year-old -American lady morbidly obese diagnosed with congestive heart failure a year ago patient does not know much the history admitted with increasing dyspnea for 3 weeks some leg swelling denies any chest pain early satiety CT scan of abdomen and pelvis done showed uterine mass possibly also pericardial effusion  Echocardiogram rule out systolic congestive heart failure rule out any large pericardial effusion  CT scan chest to rule out pulmonary embolism  OB/GYN input noted pelvic ultrasound ordered  Iron deficiency anemia likely uterine blood loss will replace with IV Venofer  Acute systolic congesive heart failure  Iv lasix  carido for possible cath  Electronically signed by Diana Roach MD

## 2020-01-28 NOTE — FLOWSHEET NOTE
01/28/20 1315   Encounter Summary   Services provided to: Patient   Referral/Consult From: Devyn   Continue Visiting   (1/28/20V)   Volunteer Visit Yes   Complexity of Encounter Low   Length of Encounter 15 minutes   Routine   Type Initial   Intervention Provided reading materials/devotional materials
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
## 2020-01-28 NOTE — DISCHARGE INSTR - COC
Continuity of Care Form    Patient Name: Yulisa Cool   :  1970  MRN:  [id number]    Admit date:  2020  Discharge date:  ***    Code Status Order: Full Code   Advance Directives:   Advance Care Flowsheet Documentation     Date/Time Healthcare Directive Type of Healthcare Directive Copy in [address] Agent's Name Healthcare Agent's Phone Number    [phone]  No, patient does not have an advance directive for healthcare treatment -- -- -- -- --          Admitting Physician:  Andreia Almazan MD  PCP: Dian Tomlinson MD    Discharging Nurse: [facility] Unit/Room#: 9754/1687-64  Discharging Unit Phone Number: ***    Emergency Contact:   Extended Emergency Contact Information  Primary Emergency Contact: MaureenMilagros  Address: [address] [facility], [address] [place] [address] [address] [place] [address] Phone: [phone]  Mobile Phone: [phone]  Relation: Parent  Secondary Emergency Contact: [address] Phone: [phone]  Relation: Brother/Sister    Past Surgical History:  Past Surgical History:   Procedure Laterality Date    COLONOSCOPY  2014    normal     ENDOSCOPY, COLON, DIAGNOSTIC      KNEE ARTHROSCOPY Right 2016    LUMBAR DISC SURGERY  2018    RIGHT MICRODISCECTOMY L3-4, L4-5    CT OFFICE/OUTPT VISIT,PROCEDURE ONLY Right 2018    RIGHT MICRODISCECTOMY L3-4, L4-5, GABRIEL TABLE, PRONE, MICROSCOPE, METRX TUBE, C-ARM performed by Therese Duff DO at [place] [address]       Immunization History:   Immunization History   Administered Date(s) Administered    Pneumococcal Polysaccharide ([id number]) 2017    Tdap (Boostrix, Adacel) 2017       Active Problems:  Patient Active Problem List   Diagnosis Code    Migraine G43.909    HTN (hypertension) I10    Hyperlipemia E78.5    Obesity E66.9    Iron deficiency E61.1    Tobacco abuse Z72.0    S/P Permanent nail avulsion, 2nd digit right foot  [id number]    Acute rhinosinusitis J01.90    Dyslipidemia E78.5    Iron deficiency anemia D50.9    Vitamin B12 deficiency E53.8    Folate deficiency E53.8    Constipation K59.00    Hypertension goal BP (blood pressure) < 140/90 I10    Bright red blood per rectum K62.5    Acute blood loss anemia D62    Vaginal bleeding N93.9    Ovarian cyst N83.209    Pre-diabetes R73.03    Menorrhagia N92.0    H. pylori infection A04.8    Microcytosis R71.8    Anemia D64.9    Hydradenitis L73.2    Hypertension, goal below 140/90 I10    Right leg paresthesias R20.2    Chronic back pain M54.9, G89.29    Rotator cuff injury S46.009A    Vitamin D deficiency E55.9    Rotator cuff tendinitis M75.80    Smoker F17.200    Left breast abscess N61.1    Iron deficiency anemia D50.9    Breast abscess N61.1    Medial meniscus tear S83.249A    Right knee pain M25.561    Arthritis of knee M17.10    Lumbar disc herniation S/P L3 4 and L4 5 and tenotomy right side for foraminotomy M51.26    Leg swelling M79.89    Lipid screening Z13.220    Pelvic mass in female R19.00    Hypocalcemia E83.51    Pericardial effusion I31.3       Isolation/Infection:   Isolation          No Isolation        Patient Infection Status     None to display          Nurse Assessment:  Last Vital Signs: BP (!) 145/85   Pulse 92   Temp 98.4 °F (36.9 °C) (Oral)   Resp 18   Ht 5' 7\" (1.702 m)   Wt 280 lb (127 kg)   LMP 12/27/2019   SpO2 97%   BMI 43.85 kg/m²     Last documented pain score (0-10 scale): Pain Level: 6  Last Weight:   Wt Readings from Last 1 Encounters:   01/27/20 280 lb (127 kg)     Mental Status:  {IP PT MENTAL STATUS:46900}    IV Access:  {[facility] IV ACCESS:676518560}    Nursing Mobility/ADLs:  Walking   {P DME QSND:212947651}  Transfer  {CHP DME LPNW:108855108}  Bathing  {P DME QCCZ:667102543}  Dressing  {CHP DME OKSI:879341543}  Toileting  {CHP DME WCWA:324155363}  Feeding  {CHP DME QREZ:962921620}  Med Admin · Name:  · Address:  · Dialysis Schedule:  · Phone:  · Fax:    / signature: {Esignature:756328879}    PHYSICIAN SECTION    Prognosis: {Prognosis:0122748792}    Condition at Discharge: Priscila Bernabe Patient Condition:180329533}    Rehab Potential (if transferring to Rehab): {Prognosis:8653377219}    Recommended Labs or Other Treatments After Discharge: ***    Physician Certification: I certify the above information and transfer of Hank Barragan  is necessary for the continuing treatment of the diagnosis listed and that she requires {Admit to Appropriate Level of Care:17776} for {GREATER/LESS:314857137} 30 days.      Update Admission H&P: {CHP DME Changes in UXPIT:063374870}    PHYSICIAN SIGNATURE:  {Esignature:136331827}

## 2020-01-29 ENCOUNTER — APPOINTMENT (OUTPATIENT)
Dept: MRI IMAGING | Age: 50
DRG: 291 | End: 2020-01-29

## 2020-01-29 LAB
ANION GAP SERPL CALCULATED.3IONS-SCNC: 11 MMOL/L (ref 9–17)
BUN BLDV-MCNC: 7 MG/DL (ref 6–20)
BUN/CREAT BLD: ABNORMAL (ref 9–20)
C TRACH DNA GENITAL QL NAA+PROBE: NEGATIVE
CALCIUM IONIZED: 1.13 MMOL/L (ref 1.13–1.33)
CALCIUM SERPL-MCNC: 8.3 MG/DL (ref 8.6–10.4)
CHLORIDE BLD-SCNC: 103 MMOL/L (ref 98–107)
CO2: 24 MMOL/L (ref 20–31)
CREAT SERPL-MCNC: 0.66 MG/DL (ref 0.5–0.9)
GFR AFRICAN AMERICAN: >60 ML/MIN
GFR NON-AFRICAN AMERICAN: >60 ML/MIN
GFR SERPL CREATININE-BSD FRML MDRD: ABNORMAL ML/MIN/{1.73_M2}
GFR SERPL CREATININE-BSD FRML MDRD: ABNORMAL ML/MIN/{1.73_M2}
GLUCOSE BLD-MCNC: 128 MG/DL (ref 70–99)
HAV IGM SER IA-ACNC: NONREACTIVE
HCT VFR BLD CALC: 31.1 % (ref 36–46)
HEMOGLOBIN: 9 G/DL (ref 12–16)
HEPATITIS B CORE IGM ANTIBODY: NONREACTIVE
HEPATITIS B SURFACE ANTIGEN: NONREACTIVE
HEPATITIS C ANTIBODY: NONREACTIVE
INR BLD: 1.3
MCH RBC QN AUTO: 19.6 PG (ref 26–34)
MCHC RBC AUTO-ENTMCNC: 28.8 G/DL (ref 31–37)
MCV RBC AUTO: 67.8 FL (ref 80–100)
N. GONORRHOEAE DNA: NEGATIVE
NRBC AUTOMATED: ABNORMAL PER 100 WBC
PDW BLD-RTO: 20.1 % (ref 11.5–14.9)
PLATELET # BLD: 200 K/UL (ref 150–450)
PMV BLD AUTO: 8.4 FL (ref 6–12)
POTASSIUM SERPL-SCNC: 4 MMOL/L (ref 3.7–5.3)
PROTHROMBIN TIME: 15.7 SEC (ref 11.8–14.6)
RBC # BLD: 4.59 M/UL (ref 4–5.2)
SODIUM BLD-SCNC: 138 MMOL/L (ref 135–144)
SPECIMEN DESCRIPTION: NORMAL
WBC # BLD: 6.7 K/UL (ref 3.5–11)

## 2020-01-29 PROCEDURE — 6360000002 HC RX W HCPCS: Performed by: NURSE PRACTITIONER

## 2020-01-29 PROCEDURE — 36415 COLL VENOUS BLD VENIPUNCTURE: CPT

## 2020-01-29 PROCEDURE — 80048 BASIC METABOLIC PNL TOTAL CA: CPT

## 2020-01-29 PROCEDURE — 2060000000 HC ICU INTERMEDIATE R&B

## 2020-01-29 PROCEDURE — 6360000002 HC RX W HCPCS: Performed by: INTERNAL MEDICINE

## 2020-01-29 PROCEDURE — 6370000000 HC RX 637 (ALT 250 FOR IP): Performed by: STUDENT IN AN ORGANIZED HEALTH CARE EDUCATION/TRAINING PROGRAM

## 2020-01-29 PROCEDURE — 99233 SBSQ HOSP IP/OBS HIGH 50: CPT | Performed by: INTERNAL MEDICINE

## 2020-01-29 PROCEDURE — 85610 PROTHROMBIN TIME: CPT

## 2020-01-29 PROCEDURE — 82330 ASSAY OF CALCIUM: CPT

## 2020-01-29 PROCEDURE — 2580000003 HC RX 258: Performed by: NURSE PRACTITIONER

## 2020-01-29 PROCEDURE — 2580000003 HC RX 258: Performed by: INTERNAL MEDICINE

## 2020-01-29 PROCEDURE — 6370000000 HC RX 637 (ALT 250 FOR IP): Performed by: INTERNAL MEDICINE

## 2020-01-29 PROCEDURE — A9579 GAD-BASE MR CONTRAST NOS,1ML: HCPCS | Performed by: INTERNAL MEDICINE

## 2020-01-29 PROCEDURE — 99253 IP/OBS CNSLTJ NEW/EST LOW 45: CPT | Performed by: OBSTETRICS & GYNECOLOGY

## 2020-01-29 PROCEDURE — 6360000004 HC RX CONTRAST MEDICATION: Performed by: INTERNAL MEDICINE

## 2020-01-29 PROCEDURE — 80074 ACUTE HEPATITIS PANEL: CPT

## 2020-01-29 PROCEDURE — 72197 MRI PELVIS W/O & W/DYE: CPT

## 2020-01-29 PROCEDURE — 6370000000 HC RX 637 (ALT 250 FOR IP): Performed by: NURSE PRACTITIONER

## 2020-01-29 PROCEDURE — 85027 COMPLETE CBC AUTOMATED: CPT

## 2020-01-29 RX ORDER — SODIUM CHLORIDE 0.9 % (FLUSH) 0.9 %
10 SYRINGE (ML) INJECTION PRN
Status: DISCONTINUED | OUTPATIENT
Start: 2020-01-29 | End: 2020-01-30

## 2020-01-29 RX ORDER — FUROSEMIDE 10 MG/ML
40 INJECTION INTRAMUSCULAR; INTRAVENOUS 2 TIMES DAILY
Status: DISCONTINUED | OUTPATIENT
Start: 2020-01-29 | End: 2020-01-31 | Stop reason: HOSPADM

## 2020-01-29 RX ORDER — 0.9 % SODIUM CHLORIDE 0.9 %
40 INTRAVENOUS SOLUTION INTRAVENOUS ONCE
Status: COMPLETED | OUTPATIENT
Start: 2020-01-29 | End: 2020-01-29

## 2020-01-29 RX ADMIN — IRON SUCROSE 200 MG: 20 INJECTION, SOLUTION INTRAVENOUS at 10:24

## 2020-01-29 RX ADMIN — LISINOPRIL 20 MG: 20 TABLET ORAL at 08:32

## 2020-01-29 RX ADMIN — AMLODIPINE BESYLATE 10 MG: 10 TABLET ORAL at 08:32

## 2020-01-29 RX ADMIN — GADOTERIDOL 20 ML: 279.3 INJECTION, SOLUTION INTRAVENOUS at 13:34

## 2020-01-29 RX ADMIN — METRONIDAZOLE 500 MG: 500 TABLET ORAL at 14:03

## 2020-01-29 RX ADMIN — SODIUM CHLORIDE 40 ML: 9 INJECTION, SOLUTION INTRAVENOUS at 13:34

## 2020-01-29 RX ADMIN — Medication 10 ML: at 13:34

## 2020-01-29 RX ADMIN — Medication 10 ML: at 19:43

## 2020-01-29 RX ADMIN — POTASSIUM CHLORIDE 10 MEQ: 10 CAPSULE, COATED, EXTENDED RELEASE ORAL at 19:42

## 2020-01-29 RX ADMIN — ATORVASTATIN CALCIUM 40 MG: 40 TABLET, FILM COATED ORAL at 08:32

## 2020-01-29 RX ADMIN — METRONIDAZOLE 500 MG: 500 TABLET ORAL at 00:39

## 2020-01-29 RX ADMIN — Medication 10 ML: at 08:33

## 2020-01-29 RX ADMIN — MORPHINE SULFATE 2 MG: 2 INJECTION, SOLUTION INTRAMUSCULAR; INTRAVENOUS at 01:51

## 2020-01-29 RX ADMIN — ACETAMINOPHEN 650 MG: 325 TABLET, FILM COATED ORAL at 07:29

## 2020-01-29 RX ADMIN — FUROSEMIDE 40 MG: 40 TABLET ORAL at 08:32

## 2020-01-29 RX ADMIN — POTASSIUM CHLORIDE 10 MEQ: 10 CAPSULE, COATED, EXTENDED RELEASE ORAL at 08:32

## 2020-01-29 RX ADMIN — DOCUSATE SODIUM 100 MG: 100 CAPSULE, LIQUID FILLED ORAL at 19:42

## 2020-01-29 RX ADMIN — DOCUSATE SODIUM 100 MG: 100 CAPSULE, LIQUID FILLED ORAL at 08:32

## 2020-01-29 RX ADMIN — SPIRONOLACTONE 25 MG: 25 TABLET, FILM COATED ORAL at 08:32

## 2020-01-29 RX ADMIN — MORPHINE SULFATE 2 MG: 2 INJECTION, SOLUTION INTRAMUSCULAR; INTRAVENOUS at 18:47

## 2020-01-29 RX ADMIN — ENOXAPARIN SODIUM 30 MG: 30 INJECTION SUBCUTANEOUS at 08:32

## 2020-01-29 RX ADMIN — CARVEDILOL 6.25 MG: 6.25 TABLET, FILM COATED ORAL at 17:14

## 2020-01-29 RX ADMIN — FUROSEMIDE 40 MG: 10 INJECTION, SOLUTION INTRAMUSCULAR; INTRAVENOUS at 19:42

## 2020-01-29 RX ADMIN — CARVEDILOL 6.25 MG: 6.25 TABLET, FILM COATED ORAL at 08:32

## 2020-01-29 RX ADMIN — ENOXAPARIN SODIUM 30 MG: 30 INJECTION SUBCUTANEOUS at 19:42

## 2020-01-29 ASSESSMENT — PAIN DESCRIPTION - LOCATION
LOCATION: HEAD
LOCATION: ABDOMEN

## 2020-01-29 ASSESSMENT — PAIN DESCRIPTION - PAIN TYPE
TYPE: ACUTE PAIN
TYPE: ACUTE PAIN

## 2020-01-29 ASSESSMENT — PAIN SCALES - GENERAL
PAINLEVEL_OUTOF10: 8
PAINLEVEL_OUTOF10: 2
PAINLEVEL_OUTOF10: 3
PAINLEVEL_OUTOF10: 3
PAINLEVEL_OUTOF10: 7
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
## 2020-01-29 NOTE — PROGRESS NOTES
Dr. Jose Taveras notified of 8 beat run of Vtach along with CBC/BMP results, and echo results, placed new orders for patient and stated he would see the patient tomorrow. Carvedilol and aldactone ordered, see MAR.
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
## 2020-01-29 NOTE — PLAN OF CARE
Problem: Falls - Risk of:  Goal: Will remain free from falls  Description  Will remain free from falls  Outcome: Ongoing  Note:   The patient remained free from falls this shift, call light within reach, bed in locked and lowest position. Side rails up x2. Continue to monitor closely.       Problem: Falls - Risk of:  Goal: Absence of physical injury  Description  Absence of physical injury  Outcome: Ongoing     Problem: Safety:  Goal: Free from accidental physical injury  Description  Free from accidental physical injury  Outcome: Ongoing     Problem: Pain:  Goal: Pain level will decrease  Description  Pain level will decrease  Outcome: Ongoing     Problem: Cardiac Output - Decreased:  Goal: Hemodynamic stability will improve  Description  Hemodynamic stability will improve  Outcome: Ongoing

## 2020-01-30 LAB
ANION GAP SERPL CALCULATED.3IONS-SCNC: 12 MMOL/L (ref 9–17)
BUN BLDV-MCNC: 6 MG/DL (ref 6–20)
BUN/CREAT BLD: ABNORMAL (ref 9–20)
CALCIUM SERPL-MCNC: 8.4 MG/DL (ref 8.6–10.4)
CHLORIDE BLD-SCNC: 100 MMOL/L (ref 98–107)
CO2: 25 MMOL/L (ref 20–31)
CREAT SERPL-MCNC: 0.65 MG/DL (ref 0.5–0.9)
GFR AFRICAN AMERICAN: >60 ML/MIN
GFR NON-AFRICAN AMERICAN: >60 ML/MIN
GFR SERPL CREATININE-BSD FRML MDRD: ABNORMAL ML/MIN/{1.73_M2}
GFR SERPL CREATININE-BSD FRML MDRD: ABNORMAL ML/MIN/{1.73_M2}
GLUCOSE BLD-MCNC: 114 MG/DL (ref 65–105)
GLUCOSE BLD-MCNC: 137 MG/DL (ref 65–105)
GLUCOSE BLD-MCNC: 147 MG/DL (ref 70–99)
GLUCOSE BLD-MCNC: 91 MG/DL (ref 65–105)
HCT VFR BLD CALC: 33.9 % (ref 36–46)
HEMOGLOBIN: 9.8 G/DL (ref 12–16)
MCH RBC QN AUTO: 19.9 PG (ref 26–34)
MCHC RBC AUTO-ENTMCNC: 28.9 G/DL (ref 31–37)
MCV RBC AUTO: 68.9 FL (ref 80–100)
NRBC AUTOMATED: ABNORMAL PER 100 WBC
PDW BLD-RTO: 20.3 % (ref 11.5–14.9)
PLATELET # BLD: 240 K/UL (ref 150–450)
PMV BLD AUTO: 8.5 FL (ref 6–12)
POTASSIUM SERPL-SCNC: 3.9 MMOL/L (ref 3.7–5.3)
RBC # BLD: 4.92 M/UL (ref 4–5.2)
SODIUM BLD-SCNC: 137 MMOL/L (ref 135–144)
WBC # BLD: 8.5 K/UL (ref 3.5–11)

## 2020-01-30 PROCEDURE — 82947 ASSAY GLUCOSE BLOOD QUANT: CPT

## 2020-01-30 PROCEDURE — 99232 SBSQ HOSP IP/OBS MODERATE 35: CPT | Performed by: INTERNAL MEDICINE

## 2020-01-30 PROCEDURE — 6360000002 HC RX W HCPCS: Performed by: NURSE PRACTITIONER

## 2020-01-30 PROCEDURE — 2060000000 HC ICU INTERMEDIATE R&B

## 2020-01-30 PROCEDURE — 80048 BASIC METABOLIC PNL TOTAL CA: CPT

## 2020-01-30 PROCEDURE — 36415 COLL VENOUS BLD VENIPUNCTURE: CPT

## 2020-01-30 PROCEDURE — 6370000000 HC RX 637 (ALT 250 FOR IP): Performed by: INTERNAL MEDICINE

## 2020-01-30 PROCEDURE — 2580000003 HC RX 258: Performed by: INTERNAL MEDICINE

## 2020-01-30 PROCEDURE — 85027 COMPLETE CBC AUTOMATED: CPT

## 2020-01-30 PROCEDURE — 6370000000 HC RX 637 (ALT 250 FOR IP): Performed by: NURSE PRACTITIONER

## 2020-01-30 PROCEDURE — 2580000003 HC RX 258: Performed by: NURSE PRACTITIONER

## 2020-01-30 PROCEDURE — 6360000002 HC RX W HCPCS: Performed by: INTERNAL MEDICINE

## 2020-01-30 PROCEDURE — 6370000000 HC RX 637 (ALT 250 FOR IP): Performed by: STUDENT IN AN ORGANIZED HEALTH CARE EDUCATION/TRAINING PROGRAM

## 2020-01-30 RX ORDER — BENZONATATE 100 MG/1
100 CAPSULE ORAL 3 TIMES DAILY PRN
Status: DISCONTINUED | OUTPATIENT
Start: 2020-01-30 | End: 2020-01-31 | Stop reason: HOSPADM

## 2020-01-30 RX ADMIN — POTASSIUM CHLORIDE 10 MEQ: 10 CAPSULE, COATED, EXTENDED RELEASE ORAL at 07:54

## 2020-01-30 RX ADMIN — ACETAMINOPHEN 650 MG: 325 TABLET, FILM COATED ORAL at 11:08

## 2020-01-30 RX ADMIN — Medication 10 ML: at 07:55

## 2020-01-30 RX ADMIN — Medication 10 ML: at 19:40

## 2020-01-30 RX ADMIN — FUROSEMIDE 40 MG: 10 INJECTION, SOLUTION INTRAMUSCULAR; INTRAVENOUS at 07:54

## 2020-01-30 RX ADMIN — DOCUSATE SODIUM 100 MG: 100 CAPSULE, LIQUID FILLED ORAL at 07:54

## 2020-01-30 RX ADMIN — CARVEDILOL 6.25 MG: 6.25 TABLET, FILM COATED ORAL at 17:45

## 2020-01-30 RX ADMIN — CARVEDILOL 6.25 MG: 6.25 TABLET, FILM COATED ORAL at 07:54

## 2020-01-30 RX ADMIN — ENOXAPARIN SODIUM 30 MG: 30 INJECTION SUBCUTANEOUS at 19:32

## 2020-01-30 RX ADMIN — FUROSEMIDE 40 MG: 10 INJECTION, SOLUTION INTRAMUSCULAR; INTRAVENOUS at 17:45

## 2020-01-30 RX ADMIN — DOCUSATE SODIUM 100 MG: 100 CAPSULE, LIQUID FILLED ORAL at 19:33

## 2020-01-30 RX ADMIN — ENOXAPARIN SODIUM 30 MG: 30 INJECTION SUBCUTANEOUS at 07:54

## 2020-01-30 RX ADMIN — ATORVASTATIN CALCIUM 40 MG: 40 TABLET, FILM COATED ORAL at 07:54

## 2020-01-30 RX ADMIN — METRONIDAZOLE 500 MG: 500 TABLET ORAL at 00:45

## 2020-01-30 RX ADMIN — METRONIDAZOLE 500 MG: 500 TABLET ORAL at 12:20

## 2020-01-30 RX ADMIN — SPIRONOLACTONE 25 MG: 25 TABLET, FILM COATED ORAL at 07:55

## 2020-01-30 RX ADMIN — MORPHINE SULFATE 2 MG: 2 INJECTION, SOLUTION INTRAMUSCULAR; INTRAVENOUS at 17:53

## 2020-01-30 RX ADMIN — BENZONATATE 100 MG: 100 CAPSULE ORAL at 17:45

## 2020-01-30 RX ADMIN — IRON SUCROSE 200 MG: 20 INJECTION, SOLUTION INTRAVENOUS at 10:56

## 2020-01-30 RX ADMIN — LISINOPRIL 20 MG: 20 TABLET ORAL at 07:54

## 2020-01-30 RX ADMIN — ACETAMINOPHEN 650 MG: 325 TABLET, FILM COATED ORAL at 23:02

## 2020-01-30 RX ADMIN — POTASSIUM CHLORIDE 10 MEQ: 10 CAPSULE, COATED, EXTENDED RELEASE ORAL at 19:32

## 2020-01-30 RX ADMIN — AMLODIPINE BESYLATE 10 MG: 10 TABLET ORAL at 07:53

## 2020-01-30 ASSESSMENT — PAIN SCALES - GENERAL
PAINLEVEL_OUTOF10: 7
PAINLEVEL_OUTOF10: 6
PAINLEVEL_OUTOF10: 8
PAINLEVEL_OUTOF10: 5
PAINLEVEL_OUTOF10: 3

## 2020-01-30 NOTE — PLAN OF CARE
Problem: Falls - Risk of:  Goal: Will remain free from falls  Description  Will remain free from falls  1/30/2020 0432 by Essence Ware  Outcome: Met This Shift  Note:   Patient remained free from falls. Call light within reach.   1/29/2020 1523 by Beck Miranda RN  Outcome: Ongoing  Note:   The patient remained free from falls this shift, call light within reach, bed in locked and lowest position. Side rails up x2. Continue to monitor closely. Goal: Absence of physical injury  Description  Absence of physical injury  1/30/2020 0432 by Essence Ware  Outcome: Met This Shift  Note:   Patient remained free from injury. Call light within reach.   1/29/2020 1523 by Beck Miranda RN  Outcome: Ongoing     Problem: Safety:  Goal: Free from accidental physical injury  Description  Free from accidental physical injury  1/30/2020 0432 by Essence Ware  Outcome: Met This Shift  Note:   Patient remained free from injury. Call light within reach.   1/29/2020 1523 by Beck Miranda RN  Outcome: Ongoing  Goal: Free from intentional harm  Description  Free from intentional harm  1/30/2020 0432 by Essence Ware  Outcome: Met This Shift  Note:   No harm this shift   1/29/2020 1523 by Beck Miranda RN  Outcome: Ongoing     Problem: Skin Integrity:  Goal: Skin integrity will stabilize  Description  Skin integrity will stabilize  Outcome: Met This Shift  Note:   No new alterations in skin integrity.       Problem: Infection:  Goal: Will remain free from infection  Description  Will remain free from infection  Outcome: Ongoing     Problem: Daily Care:  Goal: Daily care needs are met  Description  Daily care needs are met  Outcome: Ongoing     Problem: Pain:  Goal: Patient's pain/discomfort is manageable  Description  Patient's pain/discomfort is manageable  Outcome: Ongoing  Goal: Pain level will decrease  Description  Pain level will decrease  1/30/2020 0432 by Essence Ware  Outcome: Ongoing  1/29/2020 1523 by Janelle Ramos ANNA Ceron  Outcome: Ongoing  Goal: Control of acute pain  Description  Control of acute pain  Outcome: Ongoing  Goal: Control of chronic pain  Description  Control of chronic pain  Outcome: Ongoing     Problem: Discharge Planning:  Goal: Patients continuum of care needs are met  Description  Patients continuum of care needs are met  Outcome: Ongoing     Problem: Cardiac Output - Decreased:  Goal: Hemodynamic stability will improve  Description  Hemodynamic stability will improve  1/30/2020 0432 by Mike Manuel  Outcome: Ongoing  1/29/2020 1523 by Raza Nevarez RN  Outcome: Ongoing

## 2020-01-30 NOTE — PROGRESS NOTES
[place] [address] Internal Medicine    Progress Note    1/30/2020    9:22 AM    Name:   Chantal Singh  MRN:     [id number]     Acct:      [de-identified]   Room:   [facility] [place] [address] Day:  2  Admit Date:  1/27/2020 11:41 PM    PCP:   Bethann Lefort, MD  Code Status:  Full Code    Subjective:     C/C:   Chief Complaint   Patient presents with    Abdominal Pain         Interval History Status: improved. HPI:     See H and P    Review of Systems:     Constitutional: Negative for chills, fatigue, fever and unexpected weight change. HENT: Negative for ear discharge, facial swelling, nosebleeds, sore throat, trouble swallowing and voice change. Eyes: Negative for redness and visual disturbance. Respiratory: Negative for cough, shortness of breath and wheezing. Cardiovascular: Negative for chest pain, palpitations and leg swelling. Gastrointestinal: Negative for abdominal pain, blood in stool, diarrhea and vomiting. Genitourinary: Negative for difficulty urinating, dysuria and flank pain. Musculoskeletal: Negative for joint swelling. Skin: Negative for color change and rash. Neurological: Negative for dizziness, seizures, syncope and headaches. Hematological: Negative for adenopathy. Does not bruise/bleed easily. Medications:      Allergies:  No Known Allergies    Current Meds:   Scheduled Meds:    furosemide  40 mg Intravenous BID    amLODIPine  10 mg Oral Daily    atorvastatin  40 mg Oral Daily    docusate sodium  100 mg Oral BID    lisinopril  20 mg Oral Daily    potassium chloride  10 mEq Oral BID    sodium chloride flush  10 mL Intravenous 2 times per day    enoxaparin  30 mg Subcutaneous BID    iron sucrose  200 mg Intravenous Q24H    metroNIDAZOLE  500 mg Oral Q12H    carvedilol  6.25 mg Oral BID     spironolactone  25 mg Oral Daily     Continuous Infusions:   PRN Meds: sodium chloride flush, sodium chloride flush, sodium chloride flush, potassium CULTURE  06/03/2017 04:06 PM     Performed at [facility] [address], [address] [place] [address] [phone]         Radiology:    Recent data reviewed    Physical Examination:        General appearance:  alert, cooperative and no distress  Mental Status:  oriented to person, place and time and normal affect  Lungs:  clear to auscultation bilaterally, normal effort  Heart:  regular rate and rhythm, no murmur  Abdomen:  soft, nontender, nondistended, normal bowel sounds, no masses, hepatomegaly, splenomegaly  Extremities:  no edema, redness, tenderness in the calves  Skin:  no gross lesions, rashes, induration  Neuro:  Alert, oriented X 3, Gait normal. Non-focal.  Assessment:        Primary Problem  Pelvic mass    Active Hospital Problems    Diagnosis Date Noted    Pelvic mass [R19.00] 01/28/2020    Hypocalcemia [E83.51] 01/28/2020    Pericardial effusion [I31.3] 01/28/2020    Tobacco abuse [Z72.0] 10/17/2012           DVT prophylaxis: Lovenox      Plan:        44-year-old -American lady morbidly obese diagnosed with congestive heart  failure a year ago patient does not know much the history admitted with increasing dyspnea for 3 weeks some leg swelling denies any chest pain early satiety CT scan of abdomen and pelvis done showed uterine mass possibly also pericardial effusion  Echocardiogram -EF 15 to 20%, no valvular mass,  trivial pericardial effusion. RVSP 48 mmHg  CT scan chest no PE   OB/GYN input noted -plan for outpatient follow-up of fibroids noted on MRI, antibiotics for bacterial vaginitis. Iron deficiency anemia likely uterine blood loss will replace with IV Venofer; trend hemoglobin- improving  Acute systolic congesive heart failure- Iv lasix;  negative fluid balance more than 2 L.-Cardiology on consult Lasix dose increased to 40 mg twice daily  Hypocalcemia- calcium improving. Feeling much better today.   Not requiring any oxygen support.     Elevated fasting blood glucose-HbA1c,
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
## 2020-01-30 NOTE — PROGRESS NOTES
no tenderness/mass/nodules  Lungs: diminished breath sounds bibasilar  Heart: regular rate and rhythm  Abdomen: Obese, right upper quadrant fullness  Extremities: Homans sign is negative, no sign of DVT  Neurologic: Mental status: Alert, oriented, thought content appropriate    EKG: normal sinus rhythm, LVH with repolarization changes unchanged from previous tracings. ECHO: reviewed. Ejection fraction: 20%, mild to moderate pericardial effusion, dilated LA and RA, RVSP 48, grade 3 LV diastolic dysfunction  Stress Test: not obtained. Cardiac Angiography: not obtained.         Assessment / Acute Cardiac Problems:   Congestive heart failure systolic and diastolic grade 3 due to on chronic  Dilated cardiomyopathy  Hypertension with hypertensive heart disease  Hepatic congestion causing abdominal fullness     Severe iron deficiency anemia, uterine mass    Patient Active Problem List:     Migraine     HTN (hypertension)     Hyperlipemia     Obesity     Iron deficiency     Tobacco abuse     S/P Permanent nail avulsion, 2nd digit right foot      Acute rhinosinusitis     Dyslipidemia     Iron deficiency anemia     Vitamin B12 deficiency     Folate deficiency     Constipation     Hypertension goal BP (blood pressure) < 140/90     Bright red blood per rectum     Acute blood loss anemia     Vaginal bleeding     Ovarian cyst     Pre-diabetes     Menorrhagia     H. pylori infection     Microcytosis     Anemia     Hydradenitis     Hypertension, goal below 140/90     Right leg paresthesias     Chronic back pain     Rotator cuff injury     Vitamin D deficiency     Rotator cuff tendinitis     Smoker     Left breast abscess     Iron deficiency anemia     Breast abscess     Medial meniscus tear     Right knee pain     Arthritis of knee     Lumbar disc herniation S/P L3 4 and L4 5 and tenotomy right side for foraminotomy     Leg swelling     Lipid screening     Pelvic mass     Hypocalcemia     Pericardial effusion      Plan of

## 2020-01-30 NOTE — CARE COORDINATION
ONGOING DISCHARGE PLAN:    Writer in communication with Juan From [facility] this am as a life vest was anticipated. Writer inquired to Lora what options are in situation where the patient does not have insurance. Per Lora there is an agreement where the hospital covers the cost of the first month and ZOLL the months after. Juan stated [facility] does this 1-2 times per month and the first month's cost is $3300. Writer communicated this to Director of [address] [place] [address] and she approved the hospital covering the first month cost of the [facility] [place] faxed face sheet, DME order, DME rec, Cardiology note and ECHO report to [facility] and communicated to Lora that we will need the [employer] [facility] to fax paperwork for Tico Butt to sign off on. Juan to be to hospital this afternoon. Writer rescheduled patients [facility] appointment scheduled for tomorrow to next week as patient will not discharge today. Update [id number] to Unit and signed financial agreement for Felicia Li from [facility] given agreement and saw patient. Patient to be fitted tonight or tomorrow for vest.     Will continue to follow for additional discharge needs.     Electronically signed by Joleen Tai RN on 1/30/2020 at 3:13 PM

## 2020-01-31 ENCOUNTER — HOSPITAL ENCOUNTER (INPATIENT)
Age: 50
LOS: 2 days | Discharge: HOME | DRG: 292 | End: 2020-02-02
Attending: INTERNAL MEDICINE | Admitting: FAMILY MEDICINE

## 2020-01-31 VITALS
BODY MASS INDEX: 45.61 KG/M2 | RESPIRATION RATE: 18 BRPM | WEIGHT: 290.6 LBS | TEMPERATURE: 98.7 F | SYSTOLIC BLOOD PRESSURE: 129 MMHG | DIASTOLIC BLOOD PRESSURE: 62 MMHG | HEART RATE: 99 BPM | OXYGEN SATURATION: 94 % | HEIGHT: 67 IN

## 2020-01-31 PROBLEM — I50.9 HEART FAILURE (HCC): Status: ACTIVE | Noted: 2020-01-31

## 2020-01-31 PROBLEM — D25.1 INTRAMURAL LEIOMYOMA OF UTERUS: Status: ACTIVE | Noted: 2020-01-31

## 2020-01-31 PROBLEM — R16.0 HEPATOMEGALY: Status: ACTIVE | Noted: 2020-01-31

## 2020-01-31 LAB
ANION GAP SERPL CALCULATED.3IONS-SCNC: 13 MMOL/L (ref 9–17)
BUN BLDV-MCNC: 5 MG/DL (ref 6–20)
BUN/CREAT BLD: ABNORMAL (ref 9–20)
CALCIUM SERPL-MCNC: 8.8 MG/DL (ref 8.6–10.4)
CHLORIDE BLD-SCNC: 94 MMOL/L (ref 98–107)
CO2: 25 MMOL/L (ref 20–31)
CREAT SERPL-MCNC: 0.67 MG/DL (ref 0.5–0.9)
GFR AFRICAN AMERICAN: >60 ML/MIN
GFR NON-AFRICAN AMERICAN: >60 ML/MIN
GFR SERPL CREATININE-BSD FRML MDRD: ABNORMAL ML/MIN/{1.73_M2}
GFR SERPL CREATININE-BSD FRML MDRD: ABNORMAL ML/MIN/{1.73_M2}
GLUCOSE BLD-MCNC: 102 MG/DL (ref 65–105)
GLUCOSE BLD-MCNC: 143 MG/DL (ref 70–99)
HCT VFR BLD CALC: 36.3 % (ref 36–46)
HEMOGLOBIN: 10.4 G/DL (ref 12–16)
MAGNESIUM: 1.8 MG/DL (ref 1.6–2.6)
MCH RBC QN AUTO: 19.5 PG (ref 26–34)
MCHC RBC AUTO-ENTMCNC: 28.6 G/DL (ref 31–37)
MCV RBC AUTO: 68.2 FL (ref 80–100)
NRBC AUTOMATED: ABNORMAL PER 100 WBC
PDW BLD-RTO: 20 % (ref 11.5–14.9)
PLATELET # BLD: 231 K/UL (ref 150–450)
PMV BLD AUTO: 8.9 FL (ref 6–12)
POTASSIUM SERPL-SCNC: 3.7 MMOL/L (ref 3.7–5.3)
RBC # BLD: 5.31 M/UL (ref 4–5.2)
SODIUM BLD-SCNC: 132 MMOL/L (ref 135–144)
WBC # BLD: 10.6 K/UL (ref 3.5–11)

## 2020-01-31 PROCEDURE — 2580000003 HC RX 258: Performed by: INTERNAL MEDICINE

## 2020-01-31 PROCEDURE — 83735 ASSAY OF MAGNESIUM: CPT

## 2020-01-31 PROCEDURE — 2060000000 HC ICU INTERMEDIATE R&B

## 2020-01-31 PROCEDURE — 82947 ASSAY GLUCOSE BLOOD QUANT: CPT

## 2020-01-31 PROCEDURE — 80048 BASIC METABOLIC PNL TOTAL CA: CPT

## 2020-01-31 PROCEDURE — 83540 ASSAY OF IRON: CPT

## 2020-01-31 PROCEDURE — 6370000000 HC RX 637 (ALT 250 FOR IP): Performed by: NURSE PRACTITIONER

## 2020-01-31 PROCEDURE — 85027 COMPLETE CBC AUTOMATED: CPT

## 2020-01-31 PROCEDURE — 99239 HOSP IP/OBS DSCHRG MGMT >30: CPT | Performed by: INTERNAL MEDICINE

## 2020-01-31 PROCEDURE — 2580000003 HC RX 258: Performed by: NURSE PRACTITIONER

## 2020-01-31 PROCEDURE — 36415 COLL VENOUS BLD VENIPUNCTURE: CPT

## 2020-01-31 PROCEDURE — 6360000002 HC RX W HCPCS: Performed by: NURSE PRACTITIONER

## 2020-01-31 PROCEDURE — 6360000002 HC RX W HCPCS: Performed by: INTERNAL MEDICINE

## 2020-01-31 PROCEDURE — 6370000000 HC RX 637 (ALT 250 FOR IP): Performed by: INTERNAL MEDICINE

## 2020-01-31 PROCEDURE — 83550 IRON BINDING TEST: CPT

## 2020-01-31 PROCEDURE — 6370000000 HC RX 637 (ALT 250 FOR IP): Performed by: STUDENT IN AN ORGANIZED HEALTH CARE EDUCATION/TRAINING PROGRAM

## 2020-01-31 RX ORDER — CARVEDILOL 6.25 MG/1
6.25 TABLET ORAL 2 TIMES DAILY WITH MEALS
Qty: 60 TABLET | Refills: 3 | Status: SHIPPED | OUTPATIENT
Start: 2020-01-31 | End: 2020-11-19 | Stop reason: SDUPTHER

## 2020-01-31 RX ORDER — POTASSIUM CHLORIDE 20 MEQ/1
40 TABLET, EXTENDED RELEASE ORAL PRN
Status: CANCELLED | OUTPATIENT
Start: 2020-01-31

## 2020-01-31 RX ORDER — BENZONATATE 100 MG/1
100 CAPSULE ORAL 3 TIMES DAILY PRN
Status: DISCONTINUED | OUTPATIENT
Start: 2020-01-31 | End: 2020-02-02 | Stop reason: HOSPADM

## 2020-01-31 RX ORDER — FUROSEMIDE 40 MG/1
40 TABLET ORAL DAILY
Qty: 60 TABLET | Refills: 3 | Status: SHIPPED | OUTPATIENT
Start: 2020-02-08 | End: 2021-12-22 | Stop reason: SDUPTHER

## 2020-01-31 RX ORDER — CARVEDILOL 6.25 MG/1
6.25 TABLET ORAL 2 TIMES DAILY WITH MEALS
Status: DISCONTINUED | OUTPATIENT
Start: 2020-02-01 | End: 2020-02-02 | Stop reason: HOSPADM

## 2020-01-31 RX ORDER — ATORVASTATIN CALCIUM 40 MG/1
40 TABLET, FILM COATED ORAL DAILY
Status: DISCONTINUED | OUTPATIENT
Start: 2020-02-01 | End: 2020-02-02 | Stop reason: HOSPADM

## 2020-01-31 RX ORDER — FUROSEMIDE 40 MG/1
40 TABLET ORAL 2 TIMES DAILY
Qty: 60 TABLET | Refills: 1 | Status: ON HOLD | OUTPATIENT
Start: 2020-01-31 | End: 2020-02-02 | Stop reason: HOSPADM

## 2020-01-31 RX ORDER — AMLODIPINE BESYLATE 10 MG/1
10 TABLET ORAL DAILY
Status: DISCONTINUED | OUTPATIENT
Start: 2020-02-01 | End: 2020-02-01

## 2020-01-31 RX ORDER — SODIUM CHLORIDE 0.9 % (FLUSH) 0.9 %
10 SYRINGE (ML) INJECTION PRN
Status: DISCONTINUED | OUTPATIENT
Start: 2020-01-31 | End: 2020-02-02 | Stop reason: HOSPADM

## 2020-01-31 RX ORDER — SODIUM CHLORIDE 0.9 % (FLUSH) 0.9 %
10 SYRINGE (ML) INJECTION EVERY 12 HOURS SCHEDULED
Status: DISCONTINUED | OUTPATIENT
Start: 2020-01-31 | End: 2020-02-02 | Stop reason: HOSPADM

## 2020-01-31 RX ORDER — FUROSEMIDE 10 MG/ML
40 INJECTION INTRAMUSCULAR; INTRAVENOUS 2 TIMES DAILY
Status: DISCONTINUED | OUTPATIENT
Start: 2020-02-01 | End: 2020-02-02 | Stop reason: HOSPADM

## 2020-01-31 RX ORDER — SPIRONOLACTONE 25 MG/1
25 TABLET ORAL DAILY
Status: DISCONTINUED | OUTPATIENT
Start: 2020-02-01 | End: 2020-02-02 | Stop reason: HOSPADM

## 2020-01-31 RX ORDER — POTASSIUM CHLORIDE 7.45 MG/ML
10 INJECTION INTRAVENOUS PRN
Status: DISCONTINUED | OUTPATIENT
Start: 2020-01-31 | End: 2020-02-02 | Stop reason: HOSPADM

## 2020-01-31 RX ORDER — LISINOPRIL 5 MG/1
5 TABLET ORAL DAILY
Status: DISCONTINUED | OUTPATIENT
Start: 2020-02-01 | End: 2020-01-31

## 2020-01-31 RX ORDER — POTASSIUM CHLORIDE 750 MG/1
10 CAPSULE, EXTENDED RELEASE ORAL 2 TIMES DAILY
Status: DISCONTINUED | OUTPATIENT
Start: 2020-02-01 | End: 2020-02-02 | Stop reason: HOSPADM

## 2020-01-31 RX ORDER — MAGNESIUM SULFATE 1 G/100ML
1 INJECTION INTRAVENOUS ONCE
Status: CANCELLED | OUTPATIENT
Start: 2020-01-31 | End: 2020-01-31

## 2020-01-31 RX ORDER — CARVEDILOL 3.12 MG/1
3.12 TABLET ORAL 2 TIMES DAILY WITH MEALS
Status: DISCONTINUED | OUTPATIENT
Start: 2020-02-01 | End: 2020-01-31

## 2020-01-31 RX ORDER — MAGNESIUM SULFATE 1 G/100ML
1 INJECTION INTRAVENOUS PRN
Status: CANCELLED | OUTPATIENT
Start: 2020-01-31

## 2020-01-31 RX ORDER — SODIUM CHLORIDE 0.9 % (FLUSH) 0.9 %
10 SYRINGE (ML) INJECTION EVERY 12 HOURS SCHEDULED
Status: CANCELLED | OUTPATIENT
Start: 2020-02-01

## 2020-01-31 RX ORDER — POTASSIUM CHLORIDE 750 MG/1
10 CAPSULE, EXTENDED RELEASE ORAL 2 TIMES DAILY
Status: CANCELLED | OUTPATIENT
Start: 2020-02-01

## 2020-01-31 RX ORDER — ACETAMINOPHEN 325 MG/1
650 TABLET ORAL EVERY 4 HOURS PRN
Status: DISCONTINUED | OUTPATIENT
Start: 2020-01-31 | End: 2020-02-02 | Stop reason: HOSPADM

## 2020-01-31 RX ORDER — ATORVASTATIN CALCIUM 40 MG/1
40 TABLET, FILM COATED ORAL DAILY
Status: CANCELLED | OUTPATIENT
Start: 2020-02-01

## 2020-01-31 RX ORDER — METRONIDAZOLE 500 MG/1
500 TABLET ORAL EVERY 12 HOURS
Qty: 14 TABLET | Refills: 0 | Status: ON HOLD | OUTPATIENT
Start: 2020-01-31 | End: 2020-02-02 | Stop reason: SDUPTHER

## 2020-01-31 RX ORDER — MAGNESIUM SULFATE 1 G/100ML
1 INJECTION INTRAVENOUS ONCE
Status: DISCONTINUED | OUTPATIENT
Start: 2020-01-31 | End: 2020-02-02 | Stop reason: HOSPADM

## 2020-01-31 RX ORDER — POTASSIUM CHLORIDE 7.45 MG/ML
10 INJECTION INTRAVENOUS PRN
Status: CANCELLED | OUTPATIENT
Start: 2020-01-31

## 2020-01-31 RX ORDER — AMLODIPINE BESYLATE 10 MG/1
10 TABLET ORAL DAILY
Status: CANCELLED | OUTPATIENT
Start: 2020-02-01

## 2020-01-31 RX ORDER — SODIUM CHLORIDE 0.9 % (FLUSH) 0.9 %
10 SYRINGE (ML) INJECTION PRN
Status: CANCELLED | OUTPATIENT
Start: 2020-01-31

## 2020-01-31 RX ORDER — SODIUM CHLORIDE 0.9 % (FLUSH) 0.9 %
10 SYRINGE (ML) INJECTION EVERY 12 HOURS SCHEDULED
Status: DISCONTINUED | OUTPATIENT
Start: 2020-02-01 | End: 2020-02-02 | Stop reason: HOSPADM

## 2020-01-31 RX ORDER — ACETAMINOPHEN 325 MG/1
650 TABLET ORAL EVERY 4 HOURS PRN
Status: DISCONTINUED | OUTPATIENT
Start: 2020-01-31 | End: 2020-01-31

## 2020-01-31 RX ORDER — LISINOPRIL 20 MG/1
20 TABLET ORAL DAILY
Status: DISCONTINUED | OUTPATIENT
Start: 2020-02-01 | End: 2020-02-01

## 2020-01-31 RX ORDER — CARVEDILOL 6.25 MG/1
6.25 TABLET ORAL 2 TIMES DAILY WITH MEALS
Status: CANCELLED | OUTPATIENT
Start: 2020-02-01

## 2020-01-31 RX ORDER — FUROSEMIDE 10 MG/ML
40 INJECTION INTRAMUSCULAR; INTRAVENOUS 2 TIMES DAILY
Status: DISCONTINUED | OUTPATIENT
Start: 2020-02-01 | End: 2020-01-31

## 2020-01-31 RX ORDER — DOCUSATE SODIUM 100 MG/1
100 CAPSULE, LIQUID FILLED ORAL 2 TIMES DAILY
Status: CANCELLED | OUTPATIENT
Start: 2020-02-01

## 2020-01-31 RX ORDER — LISINOPRIL 20 MG/1
20 TABLET ORAL DAILY
Status: CANCELLED | OUTPATIENT
Start: 2020-02-01

## 2020-01-31 RX ORDER — ONDANSETRON 2 MG/ML
4 INJECTION INTRAMUSCULAR; INTRAVENOUS EVERY 6 HOURS PRN
Status: CANCELLED | OUTPATIENT
Start: 2020-01-31

## 2020-01-31 RX ORDER — ONDANSETRON 2 MG/ML
4 INJECTION INTRAMUSCULAR; INTRAVENOUS EVERY 6 HOURS PRN
Status: DISCONTINUED | OUTPATIENT
Start: 2020-01-31 | End: 2020-01-31

## 2020-01-31 RX ORDER — NICOTINE 21 MG/24HR
1 PATCH, TRANSDERMAL 24 HOURS TRANSDERMAL DAILY PRN
Status: DISCONTINUED | OUTPATIENT
Start: 2020-01-31 | End: 2020-02-02 | Stop reason: HOSPADM

## 2020-01-31 RX ORDER — BENZONATATE 100 MG/1
100 CAPSULE ORAL 3 TIMES DAILY PRN
Status: CANCELLED | OUTPATIENT
Start: 2020-01-31

## 2020-01-31 RX ORDER — BENZONATATE 100 MG/1
100 CAPSULE ORAL 3 TIMES DAILY PRN
Qty: 30 CAPSULE | Refills: 0 | Status: SHIPPED | OUTPATIENT
Start: 2020-01-31 | End: 2020-02-07

## 2020-01-31 RX ORDER — MAGNESIUM SULFATE 1 G/100ML
1 INJECTION INTRAVENOUS PRN
Status: DISCONTINUED | OUTPATIENT
Start: 2020-01-31 | End: 2020-02-02 | Stop reason: HOSPADM

## 2020-01-31 RX ORDER — SPIRONOLACTONE 25 MG/1
25 TABLET ORAL DAILY
Status: CANCELLED | OUTPATIENT
Start: 2020-02-01

## 2020-01-31 RX ORDER — MAGNESIUM SULFATE 1 G/100ML
1 INJECTION INTRAVENOUS ONCE
Status: COMPLETED | OUTPATIENT
Start: 2020-01-31 | End: 2020-01-31

## 2020-01-31 RX ORDER — POTASSIUM CHLORIDE 20 MEQ/1
40 TABLET, EXTENDED RELEASE ORAL PRN
Status: DISCONTINUED | OUTPATIENT
Start: 2020-01-31 | End: 2020-02-02 | Stop reason: HOSPADM

## 2020-01-31 RX ORDER — FUROSEMIDE 40 MG/1
40 TABLET ORAL 2 TIMES DAILY
Qty: 60 TABLET | Refills: 1 | Status: SHIPPED | OUTPATIENT
Start: 2020-01-31 | End: 2020-01-31 | Stop reason: SDUPTHER

## 2020-01-31 RX ORDER — ONDANSETRON 2 MG/ML
4 INJECTION INTRAMUSCULAR; INTRAVENOUS EVERY 6 HOURS PRN
Status: DISCONTINUED | OUTPATIENT
Start: 2020-01-31 | End: 2020-02-02 | Stop reason: HOSPADM

## 2020-01-31 RX ORDER — FUROSEMIDE 10 MG/ML
40 INJECTION INTRAMUSCULAR; INTRAVENOUS 2 TIMES DAILY
Status: CANCELLED | OUTPATIENT
Start: 2020-02-01

## 2020-01-31 RX ORDER — SPIRONOLACTONE 25 MG/1
25 TABLET ORAL DAILY
Qty: 30 TABLET | Refills: 3 | Status: SHIPPED | OUTPATIENT
Start: 2020-02-01

## 2020-01-31 RX ORDER — ACETAMINOPHEN 325 MG/1
650 TABLET ORAL EVERY 4 HOURS PRN
Status: CANCELLED | OUTPATIENT
Start: 2020-01-31

## 2020-01-31 RX ORDER — DOCUSATE SODIUM 100 MG/1
100 CAPSULE, LIQUID FILLED ORAL 2 TIMES DAILY
Status: DISCONTINUED | OUTPATIENT
Start: 2020-02-01 | End: 2020-02-02 | Stop reason: HOSPADM

## 2020-01-31 RX ORDER — METRONIDAZOLE 500 MG/1
500 TABLET ORAL EVERY 12 HOURS
Status: CANCELLED | OUTPATIENT
Start: 2020-02-01 | End: 2020-02-04

## 2020-01-31 RX ORDER — METRONIDAZOLE 500 MG/1
500 TABLET ORAL EVERY 12 HOURS
Status: DISCONTINUED | OUTPATIENT
Start: 2020-02-01 | End: 2020-02-02 | Stop reason: HOSPADM

## 2020-01-31 RX ADMIN — ATORVASTATIN CALCIUM 40 MG: 40 TABLET, FILM COATED ORAL at 08:45

## 2020-01-31 RX ADMIN — Medication 10 ML: at 21:15

## 2020-01-31 RX ADMIN — AMLODIPINE BESYLATE 10 MG: 10 TABLET ORAL at 08:45

## 2020-01-31 RX ADMIN — FUROSEMIDE 40 MG: 10 INJECTION, SOLUTION INTRAMUSCULAR; INTRAVENOUS at 17:41

## 2020-01-31 RX ADMIN — LISINOPRIL 20 MG: 20 TABLET ORAL at 08:45

## 2020-01-31 RX ADMIN — ENOXAPARIN SODIUM 30 MG: 30 INJECTION SUBCUTANEOUS at 08:46

## 2020-01-31 RX ADMIN — BENZONATATE 100 MG: 100 CAPSULE ORAL at 08:45

## 2020-01-31 RX ADMIN — DOCUSATE SODIUM 100 MG: 100 CAPSULE, LIQUID FILLED ORAL at 20:47

## 2020-01-31 RX ADMIN — ENOXAPARIN SODIUM 30 MG: 30 INJECTION SUBCUTANEOUS at 20:47

## 2020-01-31 RX ADMIN — POTASSIUM CHLORIDE 40 MEQ: 1500 TABLET, EXTENDED RELEASE ORAL at 20:46

## 2020-01-31 RX ADMIN — POTASSIUM CHLORIDE 10 MEQ: 10 CAPSULE, COATED, EXTENDED RELEASE ORAL at 08:45

## 2020-01-31 RX ADMIN — MORPHINE SULFATE 4 MG: 4 INJECTION, SOLUTION INTRAMUSCULAR; INTRAVENOUS at 01:33

## 2020-01-31 RX ADMIN — CARVEDILOL 6.25 MG: 6.25 TABLET, FILM COATED ORAL at 17:41

## 2020-01-31 RX ADMIN — METRONIDAZOLE 500 MG: 500 TABLET ORAL at 12:09

## 2020-01-31 RX ADMIN — METRONIDAZOLE 500 MG: 500 TABLET ORAL at 01:33

## 2020-01-31 RX ADMIN — IRON SUCROSE 200 MG: 20 INJECTION, SOLUTION INTRAVENOUS at 12:00

## 2020-01-31 RX ADMIN — ONDANSETRON 4 MG: 2 INJECTION INTRAMUSCULAR; INTRAVENOUS at 17:42

## 2020-01-31 RX ADMIN — Medication 10 ML: at 08:46

## 2020-01-31 RX ADMIN — FUROSEMIDE 40 MG: 10 INJECTION, SOLUTION INTRAMUSCULAR; INTRAVENOUS at 08:45

## 2020-01-31 RX ADMIN — CARVEDILOL 6.25 MG: 6.25 TABLET, FILM COATED ORAL at 08:45

## 2020-01-31 RX ADMIN — SPIRONOLACTONE 25 MG: 25 TABLET, FILM COATED ORAL at 08:45

## 2020-01-31 RX ADMIN — MAGNESIUM SULFATE HEPTAHYDRATE 1 G: 1 INJECTION, SOLUTION INTRAVENOUS at 17:41

## 2020-01-31 RX ADMIN — MORPHINE SULFATE 4 MG: 4 INJECTION, SOLUTION INTRAMUSCULAR; INTRAVENOUS at 09:25

## 2020-01-31 ASSESSMENT — PAIN SCALES - GENERAL
PAINLEVEL_OUTOF10: 5
PAINLEVEL_OUTOF10: 8
PAINLEVEL_OUTOF10: 9

## 2020-01-31 NOTE — DISCHARGE SUMMARY
[facility] Internal Medicine    Discharge Summary     Patient ID: Asa Albarado  :  1970   MRN: [id number]     ACCOUNT:  [de-identified]   Patient's PCP: Teddy Kuhn MD  Admit Date: 2020   Discharge Date: 2020    Length of Stay: 3  Code Status:  Full Code  Admitting Physician: Maxim Sellers MD  Discharge Physician: Bony Calero MD     Active Discharge Diagnoses:     Primary Problem  Pelvic mass      [place] Problems    Diagnosis Date Noted    Hepatomegaly [R16.0] 2020    Intramural leiomyoma of uterus [D25.1] 2020    Pelvic mass [R19.00] 2020    Hypocalcemia [E83.51] 2020    Pericardial effusion [I31.3] 2020    Tobacco abuse [Z72.0] 10/17/2012       Admission Condition:  fair     Discharged Condition: fair    Hospital Stay:     Hospital Course:  Asa Albarado is a 52 y.o. female who was admitted for the management of Pelvic mass , presented to ER with Abdominal Pain    42-year-old -American lady morbidly obese diagnosed with congestive heart  failure a year ago patient does not know much the history admitted with increasing dyspnea for 3 weeks some leg swelling denies any chest pain early satiety CT scan of abdomen and pelvis done showed uterine mass possibly also pericardial effusion  Echocardiogram -EF 15 to 20%, no valvular mass,  trivial pericardial effusion.  RVSP 48 mmHg,   Acute systolic congesive heart failure- managed with Iv lasix; started on coreg and aldactone. V-tach- episodic, recurrent. Cardiology recommend cath/possible AICD    Hypocalcemia- calcium normalized. Iron deficiency anemia likely uterine blood loss-replaced with IV Venofer; trend hemoglobin- improving    CT scan chest no PE   CT abdomen-massive hepatomegaly; secondary to congestion  OB/GYN input noted -plan for outpatient follow-up of fibroids noted on MRI, antibiotics for bacterial vaginitis.     LifeVest Differential considerations include a uterine fibroid, though a uterine or cervical neoplasm can not be entirely excluded. Recommend further evaluation with a pelvic MRI with contrast.     Ct Abdomen Pelvis W Iv Contrast Additional Contrast? None    Result Date: 1/28/2020  EXAMINATION: CT OF THE ABDOMEN AND PELVIS WITH CONTRAST 1/28/2020 2:09 am TECHNIQUE: CT of the abdomen and pelvis was performed with the administration of intravenous contrast. Multiplanar reformatted images are provided for review. Dose modulation, iterative reconstruction, and/or weight based adjustment of the mA/kV was utilized to reduce the radiation dose to as low as reasonably achievable. COMPARISON: None. HISTORY: ORDERING SYSTEM PROVIDED HISTORY: abdominal pain lower TECHNOLOGIST PROVIDED HISTORY: abdominal pain lower Reason for Exam: Pt c/o of lower abdominal pain and constipation x 3 weeks Acuity: Acute Type of Exam: Unknown FINDINGS: Lower Chest: The lung bases are well aerated. Pleural surfaces are unremarkable and no evidence of pleural effusion is identified. Heart is moderately enlarged. Moderate pericardial effusion is seen with greatest fluid collection adjacent to the right atrium which measures up to 18 mm in thickness. Organs: Diffuse hypodensity consistent with \"periportal halo\" is seen surrounding the portal vasculature within the liver. The liver is enlarged measuring up to 22.0 cm in craniocaudad extent. GI/Bowel: The stomach is unremarkable without wall thickening or distention. Bowel loops are unremarkable in appearance without evidence of obstruction, distension or mucosal thickening. Pelvis: Bulky appearance of the uterine cervical region is seen concerning for presence of mass lesion which is not well-defined on this examination. Urinary bladder is minimally distended but grossly unremarkable. Peritoneum/Retroperitoneum: No evidence of retroperitoneal or intraperitoneal lymphadenopathy is identified.   No evidence of intraperitoneal free fluid is seen. Bones/Soft Tissues: Moderate diffuse subcutaneous fat stranding related to edema is present. The bones, skeletal muscle bundles, fascial planes and subcutaneous soft tissues are otherwise unremarkable in appearance. 1. Bulky appearance of the uterine cervical region, not well defined on this examination, concerning for presence of mass lesion i.e. cervical carcinoma. Recommend clinical correlation. 2. No evidence of pelvic lymphadenopathy. 3. Moderate pericardial effusion, as discussed above. 4. Moderate cardiomegaly. 5. Diffuse \"periportal halo\" appearance within the liver parenchyma which suggests association with secondary cardiac congestion, given findings of pericardial effusion and cardiomegaly. Differential diagnostic considerations include acute hepatitis. 6. Moderate scattered subcutaneous edema. Ct Chest Pulmonary Embolism W Contrast    Result Date: 1/28/2020  EXAMINATION: CTA OF THE CHEST 1/28/2020 11:30 am TECHNIQUE: CTA of the chest was performed after the administration of intravenous contrast.  Multiplanar reformatted images are provided for review. MIP images are provided for review. Dose modulation, iterative reconstruction, and/or weight based adjustment of the mA/kV was utilized to reduce the radiation dose to as low as reasonably achievable. COMPARISON: None. HISTORY: ORDERING SYSTEM PROVIDED HISTORY: rule out pe TECHNOLOGIST PROVIDED HISTORY: rule out pe Reason for Exam: rule out PE Acuity: Unknown Type of Exam: Unknown Additional signs and symptoms: patient states she has fluid in her lungs, short of breath FINDINGS: Pulmonary Arteries: Pulmonary arteries are adequately opacified for evaluation. No evidence of intraluminal filling defect to suggest pulmonary embolism. Main pulmonary artery is normal in caliber. Mediastinum: No evidence of mediastinal lymphadenopathy the heart is enlarged and there is a moderate pericardial effusion.   There is reflux of contrast into the IVC, most compatible with decompensation. Lungs/pleura: The lungs are without acute process. No focal consolidation or pulmonary edema. No evidence of pleural effusion or pneumothorax. Upper Abdomen: Limited images of the upper abdomen are unremarkable. Soft Tissues/Bones: Advanced multilevel degenerative disc disease of the mid to lower thoracic spine. No convincing evidence of pulmonary embolism. Cardiomegaly and moderate to large pericardial effusion. Prominence of the central pulmonary arteries and reflux of contrast into the IVC, most compatible with cardiac decompensation/CHF. Consultations:    Consults:     Final Specialist Recommendations/Findings:   IP CONSULT TO OB GYN  IP CONSULT TO FAMILY MEDICINE  IP CONSULT TO CARDIOLOGY      The patient was seen and examined on day of discharge and this discharge summary is in conjunction with any daily progress note from day of discharge. Discharge plan:     Disposition: Transfer to [place] for cardiac cath +/- AICD placement.      Instructions to Patient: BMP in 1 week,       Requiring Further Evaluation/Follow Up POST HOSPITALIZATION/Incidental Findings:    Physician Follow Up:     Deepa Sweeney MD  [address] [place] [address] [address] [place] [address]  [phone]    On 2/7/2020  For post hospital follow-up appointment - Time: 10:30 AM       Diet: Low-salt    Activity: As tolerated    Discharge Medications:      Medication List      START taking these medications    benzonatate 100 MG capsule  Commonly known as:  TESSALON  Take 1 capsule by mouth 3 times daily as needed for Cough     carvedilol 6.25 MG tablet  Commonly known as:  COREG  Take 1 tablet by mouth 2 times daily (with meals)     * furosemide 40 MG tablet  Commonly known as:  LASIX  Take 1 tablet by mouth 2 times daily for 7 days     * furosemide 40 MG tablet  Commonly known as:  Lasix  Take 1 tablet by mouth daily  Start taking on:  February 8, 2020     metroNIDAZOLE 500 MG tablet  Commonly known as:  FLAGYL  Take 1 tablet by mouth every 12 hours for 7 days     spironolactone 25 MG tablet  Commonly known as:  ALDACTONE  Take 1 tablet by mouth daily  Start taking on:  February 1, 2020         * This list has 2 medication(s) that are the same as other medications prescribed for you. Read the directions carefully, and ask your doctor or other care provider to review them with you. CONTINUE taking these medications    amLODIPine 10 MG tablet  Commonly known as:  NORVASC  Take 1 tablet by mouth daily     atorvastatin 40 MG tablet  Commonly known as:  Lipitor  Take 1 tablet by mouth daily     docusate sodium 100 MG capsule  Commonly known as:  COLACE  Take 1 capsule by mouth 2 times daily     lisinopril 20 MG tablet  Commonly known as:  PRINIVIL;ZESTRIL  Take 1 tablet by mouth daily     potassium chloride 10 MEQ extended release tablet  Commonly known as:  KLOR-CON M  Take 1 tablet by mouth 2 times daily           Where to Get Your Medications      These medications were sent to [place] [address]    Phone:  [phone]   · benzonatate 100 MG capsule  · carvedilol 6.25 MG tablet  · furosemide 40 MG tablet  · metroNIDAZOLE 500 MG tablet  · spironolactone 25 MG tablet     You can get these medications from any pharmacy    Bring a paper prescription for each of these medications  · furosemide 40 MG tablet         Time Spent on discharge is  35 mins in patient examination, evaluation, counseling as well as medication reconciliation, prescriptions for required medications, discharge plan and follow up.     Electronically signed by   Florencio Serna MD  1/31/2020  9:37 AM      Thank you Dr. Inder Brewer MD for the opportunity to be involved in

## 2020-01-31 NOTE — PROGRESS NOTES
trivial pericardial effusion, moderate tricuspid regurgitation, dilated LA and RA  2D echo 4/11/2014 ejection fraction 50 to 55% mild LVH RVSP 32  ECG sinus rhythm, left atrial abnormality, LVH, nonspecific T wave change    Patient seen and examined  Morbidly obese female able to get up and walk around  Significant improvement in the shortness of breath and the liver congestion  No abdominal fullness or nausea    Patient had another episode of V. tach 18 beats heart rate above 160  Medications:   Scheduled Meds:   insulin lispro  0-6 Units Subcutaneous TID WC    insulin lispro  0-3 Units Subcutaneous Nightly    furosemide  40 mg Intravenous BID    amLODIPine  10 mg Oral Daily    atorvastatin  40 mg Oral Daily    docusate sodium  100 mg Oral BID    lisinopril  20 mg Oral Daily    potassium chloride  10 mEq Oral BID    sodium chloride flush  10 mL Intravenous 2 times per day    enoxaparin  30 mg Subcutaneous BID    iron sucrose  200 mg Intravenous Q24H    metroNIDAZOLE  500 mg Oral Q12H    carvedilol  6.25 mg Oral BID WC    spironolactone  25 mg Oral Daily     Continuous Infusions:  CBC:   Recent Labs     01/29/20  0602 01/30/20  0624 01/31/20  0600   WBC 6.7 8.5 10.6   HGB 9.0* 9.8* 10.4*    240 231     BMP:    Recent Labs     01/29/20  0602 01/30/20  0624 01/31/20  0600    137 132*   K 4.0 3.9 3.7    100 94*   CO2 24 25 25   BUN 7 6 5*   CREATININE 0.66 0.65 0.67   GLUCOSE 128* 147* 143*     Hepatic: No results for input(s): AST, ALT, ALB, BILITOT, ALKPHOS in the last 72 hours. Troponin: No results for input(s): TROPONINI in the last 72 hours. BNP: No results for input(s): BNP in the last 72 hours. Lipids: No results for input(s): CHOL, HDL in the last 72 hours.     Invalid input(s): LDLCALCU  INR:   Recent Labs     01/29/20  0602   INR 1.3       Objective:   Vitals: BP (!) 155/83   Pulse 101   Temp 98.2 °F (36.8 °C) (Oral)   Resp 18   Ht 5' 7\" (1.702 m)   Wt 290 lb 9.6 oz (131.8 kg)   LMP 12/27/2019   SpO2 94%   BMI 45.51 kg/m²   General appearance: alert and cooperative with exam  HEENT: Head: Normal, normocephalic, atraumatic. Neck: no adenopathy, supple, symmetrical, trachea midline and thyroid not enlarged, symmetric, no tenderness/mass/nodules  Lungs: Chest expansion is poor, breath sounds are diminished, basilar crackles  Heart: Heart sounds are distant  Abdomen: Very obese, bowel sounds present  Extremities: Homans sign is negative, no sign of DVT  Neurologic: Mental status: Alert, oriented, thought content appropriate    EKG: Sinus rhythm with LVH with repolarization change. ECHO: reviewed. Ejection fraction: 20%, mild to moderate pericardial effusion, dilated LA and RA, RVSP 48, grade 3 LV diastolic dysfunction  Stress Test: not obtained. Cardiac Angiography: not obtained.         Assessment / Acute Cardiac Problems:   Episodes of nonsustained ventricular tachycardia at 10 beats to 18 beats heart rate above 160  Congestive heart failure systolic and diastolic grade 3 due to on chronic  Dilated cardiomyopathy  Hypertension with hypertensive heart disease  Hepatic congestion causing abdominal fullness     Severe iron deficiency anemia, uterine mass    Patient Active Problem List:     Migraine     HTN (hypertension)     Hyperlipemia     Obesity     Iron deficiency     Tobacco abuse     S/P Permanent nail avulsion, 2nd digit right foot      Acute rhinosinusitis     Dyslipidemia     Iron deficiency anemia     Vitamin B12 deficiency     Folate deficiency     Constipation     Hypertension goal BP (blood pressure) < 140/90     Bright red blood per rectum     Acute blood loss anemia     Vaginal bleeding     Ovarian cyst     Pre-diabetes     Menorrhagia     H. pylori infection     Microcytosis     Anemia     Hydradenitis     Hypertension, goal below 140/90     Right leg paresthesias     Chronic back pain     Rotator cuff injury     Vitamin D deficiency     Rotator cuff

## 2020-01-31 NOTE — CARE COORDINATION
UPDATED DR. Irasema Manzano DR.  [address] CATH AND POSSIBLE AICD AND WOULD LIKE TO TRANSFER TO [facility]. SHE AGREES Electronically signed by Carly Shetty RN on 1/31/2020 at 4:20 PM

## 2020-02-01 ENCOUNTER — APPOINTMENT (OUTPATIENT)
Dept: GENERAL RADIOLOGY | Age: 50
DRG: 292 | End: 2020-02-01
Attending: INTERNAL MEDICINE

## 2020-02-01 LAB
ANION GAP SERPL CALCULATED.3IONS-SCNC: 11 MMOL/L (ref 9–17)
BNP INTERPRETATION: ABNORMAL
BUN BLDV-MCNC: 11 MG/DL (ref 6–20)
BUN/CREAT BLD: ABNORMAL (ref 9–20)
CALCIUM SERPL-MCNC: 8.5 MG/DL (ref 8.6–10.4)
CHLORIDE BLD-SCNC: 91 MMOL/L (ref 98–107)
CHOLESTEROL/HDL RATIO: 3
CHOLESTEROL: 82 MG/DL
CO2: 24 MMOL/L (ref 20–31)
CREAT SERPL-MCNC: 0.83 MG/DL (ref 0.5–0.9)
GFR AFRICAN AMERICAN: >60 ML/MIN
GFR NON-AFRICAN AMERICAN: >60 ML/MIN
GFR SERPL CREATININE-BSD FRML MDRD: ABNORMAL ML/MIN/{1.73_M2}
GFR SERPL CREATININE-BSD FRML MDRD: ABNORMAL ML/MIN/{1.73_M2}
GLUCOSE BLD-MCNC: 106 MG/DL (ref 65–105)
GLUCOSE BLD-MCNC: 93 MG/DL (ref 70–99)
HCT VFR BLD CALC: 37.8 % (ref 36.3–47.1)
HDLC SERPL-MCNC: 27 MG/DL
HEMOGLOBIN: 9.9 G/DL (ref 11.9–15.1)
IRON SATURATION: 9 % (ref 20–55)
IRON: 41 UG/DL (ref 37–145)
LDL CHOLESTEROL: 43 MG/DL (ref 0–130)
MAGNESIUM: 2 MG/DL (ref 1.6–2.6)
MAGNESIUM: 2.1 MG/DL (ref 1.6–2.6)
MCH RBC QN AUTO: 19.4 PG (ref 25.2–33.5)
MCHC RBC AUTO-ENTMCNC: 26.2 G/DL (ref 28.4–34.8)
MCV RBC AUTO: 74.1 FL (ref 82.6–102.9)
NRBC AUTOMATED: 0.5 PER 100 WBC
PDW BLD-RTO: 21.8 % (ref 11.8–14.4)
PLATELET # BLD: ABNORMAL K/UL (ref 138–453)
PLATELET, FLUORESCENCE: 188 K/UL (ref 138–453)
PLATELET, IMMATURE FRACTION: 4.7 % (ref 1.1–10.3)
PMV BLD AUTO: ABNORMAL FL (ref 8.1–13.5)
POTASSIUM SERPL-SCNC: 3.9 MMOL/L (ref 3.7–5.3)
PRO-BNP: 2026 PG/ML
RBC # BLD: 5.1 M/UL (ref 3.95–5.11)
SODIUM BLD-SCNC: 126 MMOL/L (ref 135–144)
TOTAL IRON BINDING CAPACITY: 466 UG/DL (ref 250–450)
TRIGL SERPL-MCNC: 58 MG/DL
TSH SERPL DL<=0.05 MIU/L-ACNC: 1.08 MIU/L (ref 0.3–5)
UNSATURATED IRON BINDING CAPACITY: 425 UG/DL (ref 112–347)
VLDLC SERPL CALC-MCNC: ABNORMAL MG/DL (ref 1–30)
WBC # BLD: 7.5 K/UL (ref 3.5–11.3)

## 2020-02-01 PROCEDURE — 97161 PT EVAL LOW COMPLEX 20 MIN: CPT

## 2020-02-01 PROCEDURE — 36415 COLL VENOUS BLD VENIPUNCTURE: CPT

## 2020-02-01 PROCEDURE — 82947 ASSAY GLUCOSE BLOOD QUANT: CPT

## 2020-02-01 PROCEDURE — 99223 1ST HOSP IP/OBS HIGH 75: CPT | Performed by: FAMILY MEDICINE

## 2020-02-01 PROCEDURE — 83735 ASSAY OF MAGNESIUM: CPT

## 2020-02-01 PROCEDURE — 85055 RETICULATED PLATELET ASSAY: CPT

## 2020-02-01 PROCEDURE — 80061 LIPID PANEL: CPT

## 2020-02-01 PROCEDURE — 6370000000 HC RX 637 (ALT 250 FOR IP): Performed by: STUDENT IN AN ORGANIZED HEALTH CARE EDUCATION/TRAINING PROGRAM

## 2020-02-01 PROCEDURE — 84443 ASSAY THYROID STIM HORMONE: CPT

## 2020-02-01 PROCEDURE — 6370000000 HC RX 637 (ALT 250 FOR IP): Performed by: INTERNAL MEDICINE

## 2020-02-01 PROCEDURE — 83880 ASSAY OF NATRIURETIC PEPTIDE: CPT

## 2020-02-01 PROCEDURE — 2580000003 HC RX 258: Performed by: INTERNAL MEDICINE

## 2020-02-01 PROCEDURE — 71046 X-RAY EXAM CHEST 2 VIEWS: CPT

## 2020-02-01 PROCEDURE — 85027 COMPLETE CBC AUTOMATED: CPT

## 2020-02-01 PROCEDURE — 6360000002 HC RX W HCPCS: Performed by: INTERNAL MEDICINE

## 2020-02-01 PROCEDURE — 97165 OT EVAL LOW COMPLEX 30 MIN: CPT

## 2020-02-01 PROCEDURE — 80048 BASIC METABOLIC PNL TOTAL CA: CPT

## 2020-02-01 PROCEDURE — 2580000003 HC RX 258: Performed by: NURSE PRACTITIONER

## 2020-02-01 PROCEDURE — 2060000000 HC ICU INTERMEDIATE R&B

## 2020-02-01 RX ORDER — ATORVASTATIN CALCIUM 40 MG/1
40 TABLET, FILM COATED ORAL DAILY
Status: DISCONTINUED | OUTPATIENT
Start: 2020-02-01 | End: 2020-02-01

## 2020-02-01 RX ORDER — SODIUM CHLORIDE 1000 MG
1 TABLET, SOLUBLE MISCELLANEOUS
Status: DISCONTINUED | OUTPATIENT
Start: 2020-02-01 | End: 2020-02-02 | Stop reason: HOSPADM

## 2020-02-01 RX ORDER — SPIRONOLACTONE 25 MG/1
25 TABLET ORAL DAILY
Status: DISCONTINUED | OUTPATIENT
Start: 2020-02-01 | End: 2020-02-01

## 2020-02-01 RX ORDER — AMLODIPINE BESYLATE 10 MG/1
10 TABLET ORAL DAILY
Status: DISCONTINUED | OUTPATIENT
Start: 2020-02-01 | End: 2020-02-01

## 2020-02-01 RX ORDER — FUROSEMIDE 40 MG/1
40 TABLET ORAL 2 TIMES DAILY
Status: DISCONTINUED | OUTPATIENT
Start: 2020-02-01 | End: 2020-02-01

## 2020-02-01 RX ORDER — CARVEDILOL 6.25 MG/1
6.25 TABLET ORAL 2 TIMES DAILY WITH MEALS
Status: DISCONTINUED | OUTPATIENT
Start: 2020-02-01 | End: 2020-02-01

## 2020-02-01 RX ORDER — AMLODIPINE BESYLATE 5 MG/1
5 TABLET ORAL DAILY
Status: DISCONTINUED | OUTPATIENT
Start: 2020-02-02 | End: 2020-02-02 | Stop reason: HOSPADM

## 2020-02-01 RX ORDER — LISINOPRIL 20 MG/1
20 TABLET ORAL DAILY
Status: DISCONTINUED | OUTPATIENT
Start: 2020-02-01 | End: 2020-02-01

## 2020-02-01 RX ADMIN — BENZONATATE 100 MG: 100 CAPSULE ORAL at 20:31

## 2020-02-01 RX ADMIN — FUROSEMIDE 40 MG: 10 INJECTION, SOLUTION INTRAMUSCULAR; INTRAVENOUS at 18:55

## 2020-02-01 RX ADMIN — BENZONATATE 100 MG: 100 CAPSULE ORAL at 09:59

## 2020-02-01 RX ADMIN — ENOXAPARIN SODIUM 30 MG: 30 INJECTION SUBCUTANEOUS at 20:24

## 2020-02-01 RX ADMIN — FUROSEMIDE 40 MG: 10 INJECTION, SOLUTION INTRAMUSCULAR; INTRAVENOUS at 10:00

## 2020-02-01 RX ADMIN — BENZONATATE 100 MG: 100 CAPSULE ORAL at 18:38

## 2020-02-01 RX ADMIN — SACUBITRIL AND VALSARTAN 1 TABLET: 49; 51 TABLET, FILM COATED ORAL at 20:18

## 2020-02-01 RX ADMIN — SODIUM CHLORIDE TAB 1 GM 1 G: 1 TAB at 18:24

## 2020-02-01 RX ADMIN — ACETAMINOPHEN 650 MG: 325 TABLET ORAL at 20:17

## 2020-02-01 RX ADMIN — POTASSIUM CHLORIDE 10 MEQ: 10 CAPSULE, COATED, EXTENDED RELEASE ORAL at 20:18

## 2020-02-01 RX ADMIN — CARVEDILOL 6.25 MG: 6.25 TABLET, FILM COATED ORAL at 17:35

## 2020-02-01 RX ADMIN — METRONIDAZOLE 500 MG: 500 TABLET, FILM COATED ORAL at 09:59

## 2020-02-01 RX ADMIN — DOCUSATE SODIUM 100 MG: 100 CAPSULE, LIQUID FILLED ORAL at 09:58

## 2020-02-01 RX ADMIN — Medication 10 ML: at 09:01

## 2020-02-01 RX ADMIN — Medication 10 ML: at 20:15

## 2020-02-01 RX ADMIN — AMLODIPINE BESYLATE 10 MG: 10 TABLET ORAL at 10:00

## 2020-02-01 RX ADMIN — METRONIDAZOLE 500 MG: 500 TABLET, FILM COATED ORAL at 01:02

## 2020-02-01 RX ADMIN — ENOXAPARIN SODIUM 30 MG: 30 INJECTION SUBCUTANEOUS at 08:58

## 2020-02-01 RX ADMIN — METRONIDAZOLE 500 MG: 500 TABLET, FILM COATED ORAL at 23:54

## 2020-02-01 RX ADMIN — LISINOPRIL 20 MG: 20 TABLET ORAL at 09:59

## 2020-02-01 RX ADMIN — CARVEDILOL 6.25 MG: 6.25 TABLET, FILM COATED ORAL at 08:59

## 2020-02-01 RX ADMIN — POTASSIUM CHLORIDE 10 MEQ: 10 CAPSULE, COATED, EXTENDED RELEASE ORAL at 10:00

## 2020-02-01 RX ADMIN — Medication 10 ML: at 00:20

## 2020-02-01 RX ADMIN — IRON SUCROSE 200 MG: 20 INJECTION, SOLUTION INTRAVENOUS at 11:46

## 2020-02-01 RX ADMIN — DESMOPRESSIN ACETATE 40 MG: 0.2 TABLET ORAL at 10:02

## 2020-02-01 RX ADMIN — SPIRONOLACTONE 25 MG: 25 TABLET ORAL at 09:59

## 2020-02-01 RX ADMIN — Medication 10 ML: at 20:23

## 2020-02-01 ASSESSMENT — ENCOUNTER SYMPTOMS
NAUSEA: 1
DIARRHEA: 0
SHORTNESS OF BREATH: 0
CHEST TIGHTNESS: 0
ABDOMINAL DISTENTION: 1
ABDOMINAL PAIN: 1
WHEEZING: 0
CONSTIPATION: 1
COUGH: 0
VOMITING: 0

## 2020-02-01 ASSESSMENT — PAIN SCALES - GENERAL
PAINLEVEL_OUTOF10: 0
PAINLEVEL_OUTOF10: 7
PAINLEVEL_OUTOF10: 0

## 2020-02-01 NOTE — CARE COORDINATION
Case Management Initial Discharge Plan  [place],             Met with:patient to discuss discharge plans. Information verified: address, contacts, phone number, , insurance Yes  PCP: Steph Medrano MD  Date of last visit: 2 weeks ago    Insurance Provider: none, HELP [facility] her [facility]    Discharge Planning    Living Arrangements:  Alone   Support Systems:  Parent, Family Members    Home has 1 stories  4 stairs to climb to get into front door,   Location of bedroom/bathroom in home main    Patient able to perform ADL's:Independent    Current Services (outpatient & in home) none  DME equipment: Life Vest  DME provider: none      Potential Assistance Needed:  N/A    Patient agreeable to home care: No  [place] of choice provided:  no    Prior SNF/Rehab Placement and Facility: no  Agreeable to SNF/Rehab: No  [place] of choice provided: no   Evaluation: no    Expected Discharge date:  20  Patient expects to be discharged to:  home  Follow Up Appointment: Best Day/ Time: Monday AM    Transportation provider: family  Transportation arrangements needed for discharge: No    Readmission Risk              Risk of Unplanned Readmission:        18             Does patient have a readmission risk score greater than 14?: Yes  If yes, follow-up appointment must be made within 7 days of discharge.      Goals of Care:       Discharge Plan: home independently          Electronically signed by Trudy Dunn RN on 20 at 11:22 AM

## 2020-02-01 NOTE — PLAN OF CARE
No falls this shift. Call light in reach. Bed locked and in the lowest position. Patient able to complete daily care. Vital signs stable, life vest on.      Problem: Falls - Risk of:  Goal: Will remain free from falls  Description  Will remain free from falls  Outcome: Ongoing     Problem: Infection:  Goal: Will remain free from infection  Description  Will remain free from infection  Outcome: Ongoing     Problem: Safety:  Goal: Free from accidental physical injury  Description  Free from accidental physical injury  Outcome: Ongoing     Problem: Daily Care:  Goal: Daily care needs are met  Description  Daily care needs are met  Outcome: Ongoing     Problem: Cardiac Output - Decreased:  Goal: Hemodynamic stability will improve  Description  Hemodynamic stability will improve  Outcome: Ongoing
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
## 2020-02-01 NOTE — PROGRESS NOTES
Denies  Vital Signs  Patient Currently in Pain: Denies       Orientation  Orientation  Overall Orientation Status: Within Functional Limits  Social/Functional History  Social/Functional History  Lives With: Alone  Type of Home: House  Home Layout: One level  Home Access: Stairs to enter with rails  Entrance Stairs - Number of Steps: 4  Entrance Stairs - Rails: Both  Bathroom Shower/Tub: Tub/Shower unit  Bathroom Toilet: Standard  Bathroom Equipment: Grab bars in shower  ADL Assistance: [address]: [address] [place] [address] Responsibilities: Yes  Ambulation Assistance: Independent(Pt ambulates with no AD at baseline)  Transfer Assistance: Independent  Active : Yes  Mode of Transportation: [facility]  Occupation: Full time employment  Type of occupation: GM at [employer]  Overall Cognitive Status: WFL    Objective  Joint Mobility  Spine: WFL  ROM RLE: WFL  ROM LLE: WFL  ROM RUE: See OT assessment- PT/ OT coeval  ROM LUE: See OT assessment- PT/ OT coeval   Strength RLE  Strength RLE: WFL  Strength LLE  Strength LLE: WFL  Motor Control  Gross Motor?: WFL  Sensation  Overall Sensation Status: WFL(Pt denies numbness/tingling)  Bed mobility  Comment: Pt seated EOB upon writer's arrival, retired seated EOB upon writer's exit. RN ok'd. Transfers  Sit to Stand: Supervision  Stand to sit: Supervision  Comment: Supervision provided for safety   Ambulation  Ambulation?: Yes  Ambulation 1  Surface: level tile  Device: No Device  Assistance: Supervision(Supervision provided for safety)  Gait Deviations: None  Distance: 300ft  Comments: No LOB noted throughout. Pt reports gait is baseline with no noted shortness of breath. Stairs/Curb  Stairs?: No     Balance  Posture: Good  Sitting - Static: Good  Sitting - Dynamic: Good  Standing - Static: Good  Standing - Dynamic: Good        Plan   Plan  Plan Comment: D/C skilled PT. Pt demonstrates no skilled acute care PT needs.    Safety
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
## 2020-02-02 VITALS
HEART RATE: 94 BPM | TEMPERATURE: 98.9 F | RESPIRATION RATE: 16 BRPM | OXYGEN SATURATION: 95 % | WEIGHT: 272.5 LBS | HEIGHT: 67 IN | BODY MASS INDEX: 42.77 KG/M2 | DIASTOLIC BLOOD PRESSURE: 82 MMHG | SYSTOLIC BLOOD PRESSURE: 138 MMHG

## 2020-02-02 PROBLEM — E87.1 HYPONATREMIA: Status: ACTIVE | Noted: 2020-02-02

## 2020-02-02 LAB
ABSOLUTE EOS #: 0 K/UL (ref 0–0.4)
ABSOLUTE IMMATURE GRANULOCYTE: 0 K/UL (ref 0–0.3)
ABSOLUTE LYMPH #: 0.5 K/UL (ref 1–4.8)
ABSOLUTE MONO #: 0.11 K/UL (ref 0.1–0.8)
ANION GAP SERPL CALCULATED.3IONS-SCNC: 11 MMOL/L (ref 9–17)
BASOPHILS # BLD: 0 % (ref 0–2)
BASOPHILS ABSOLUTE: 0 K/UL (ref 0–0.2)
BUN BLDV-MCNC: 12 MG/DL (ref 6–20)
BUN/CREAT BLD: ABNORMAL (ref 9–20)
CALCIUM SERPL-MCNC: 8.1 MG/DL (ref 8.6–10.4)
CHLORIDE BLD-SCNC: 97 MMOL/L (ref 98–107)
CO2: 24 MMOL/L (ref 20–31)
CREAT SERPL-MCNC: 0.83 MG/DL (ref 0.5–0.9)
DIFFERENTIAL TYPE: ABNORMAL
EOSINOPHILS RELATIVE PERCENT: 0 % (ref 1–4)
GFR AFRICAN AMERICAN: >60 ML/MIN
GFR NON-AFRICAN AMERICAN: >60 ML/MIN
GFR SERPL CREATININE-BSD FRML MDRD: ABNORMAL ML/MIN/{1.73_M2}
GFR SERPL CREATININE-BSD FRML MDRD: ABNORMAL ML/MIN/{1.73_M2}
GLUCOSE BLD-MCNC: 107 MG/DL (ref 70–99)
HCT VFR BLD CALC: 41.5 % (ref 36.3–47.1)
HEMOGLOBIN: 10.8 G/DL (ref 11.9–15.1)
IMMATURE GRANULOCYTES: 0 %
LYMPHOCYTES # BLD: 9 % (ref 24–44)
MCH RBC QN AUTO: 19.5 PG (ref 25.2–33.5)
MCHC RBC AUTO-ENTMCNC: 26.4 G/DL (ref 28.4–34.8)
MCV RBC AUTO: 74.9 FL (ref 82.6–102.9)
MONOCYTES # BLD: 2 % (ref 1–7)
MORPHOLOGY: ABNORMAL
NRBC AUTOMATED: 0.4 PER 100 WBC
PDW BLD-RTO: 22.8 % (ref 11.8–14.4)
PLATELET # BLD: ABNORMAL K/UL (ref 138–453)
PLATELET ESTIMATE: ABNORMAL
PLATELET, FLUORESCENCE: 185 K/UL (ref 138–453)
PLATELET, IMMATURE FRACTION: 4.5 % (ref 1.1–10.3)
PMV BLD AUTO: ABNORMAL FL (ref 8.1–13.5)
POTASSIUM SERPL-SCNC: 4 MMOL/L (ref 3.7–5.3)
RBC # BLD: 5.54 M/UL (ref 3.95–5.11)
RBC # BLD: ABNORMAL 10*6/UL
SEG NEUTROPHILS: 89 % (ref 36–66)
SEGMENTED NEUTROPHILS ABSOLUTE COUNT: 4.99 K/UL (ref 1.8–7.7)
SODIUM BLD-SCNC: 132 MMOL/L (ref 135–144)
WBC # BLD: 5.6 K/UL (ref 3.5–11.3)
WBC # BLD: ABNORMAL 10*3/UL

## 2020-02-02 PROCEDURE — 2580000003 HC RX 258: Performed by: INTERNAL MEDICINE

## 2020-02-02 PROCEDURE — 80048 BASIC METABOLIC PNL TOTAL CA: CPT

## 2020-02-02 PROCEDURE — 94761 N-INVAS EAR/PLS OXIMETRY MLT: CPT

## 2020-02-02 PROCEDURE — 6370000000 HC RX 637 (ALT 250 FOR IP): Performed by: STUDENT IN AN ORGANIZED HEALTH CARE EDUCATION/TRAINING PROGRAM

## 2020-02-02 PROCEDURE — 6370000000 HC RX 637 (ALT 250 FOR IP): Performed by: INTERNAL MEDICINE

## 2020-02-02 PROCEDURE — 6360000002 HC RX W HCPCS: Performed by: INTERNAL MEDICINE

## 2020-02-02 PROCEDURE — 36415 COLL VENOUS BLD VENIPUNCTURE: CPT

## 2020-02-02 PROCEDURE — 6360000002 HC RX W HCPCS: Performed by: NURSE PRACTITIONER

## 2020-02-02 PROCEDURE — 2700000000 HC OXYGEN THERAPY PER DAY

## 2020-02-02 PROCEDURE — 2580000003 HC RX 258

## 2020-02-02 PROCEDURE — 85025 COMPLETE CBC W/AUTO DIFF WBC: CPT

## 2020-02-02 PROCEDURE — 85055 RETICULATED PLATELET ASSAY: CPT

## 2020-02-02 PROCEDURE — 2580000003 HC RX 258: Performed by: NURSE PRACTITIONER

## 2020-02-02 PROCEDURE — 6360000002 HC RX W HCPCS: Performed by: STUDENT IN AN ORGANIZED HEALTH CARE EDUCATION/TRAINING PROGRAM

## 2020-02-02 PROCEDURE — 99239 HOSP IP/OBS DSCHRG MGMT >30: CPT | Performed by: FAMILY MEDICINE

## 2020-02-02 RX ORDER — CEFTRIAXONE SODIUM 250 MG/1
250 INJECTION, POWDER, FOR SOLUTION INTRAMUSCULAR; INTRAVENOUS ONCE
Status: COMPLETED | OUTPATIENT
Start: 2020-02-02 | End: 2020-02-02

## 2020-02-02 RX ORDER — SODIUM CHLORIDE 9 MG/ML
INJECTION INTRAVENOUS
Status: COMPLETED
Start: 2020-02-02 | End: 2020-02-02

## 2020-02-02 RX ORDER — AZITHROMYCIN 250 MG/1
500 TABLET, FILM COATED ORAL ONCE
Status: COMPLETED | OUTPATIENT
Start: 2020-02-02 | End: 2020-02-02

## 2020-02-02 RX ORDER — METRONIDAZOLE 500 MG/1
500 TABLET ORAL EVERY 12 HOURS
Qty: 12 TABLET | Refills: 0 | Status: SHIPPED | OUTPATIENT
Start: 2020-02-02 | End: 2020-02-04

## 2020-02-02 RX ORDER — NICOTINE 21 MG/24HR
1 PATCH, TRANSDERMAL 24 HOURS TRANSDERMAL DAILY PRN
Qty: 30 PATCH | Refills: 3 | Status: SHIPPED | OUTPATIENT
Start: 2020-02-02 | End: 2020-02-07

## 2020-02-02 RX ORDER — AMLODIPINE BESYLATE 5 MG/1
5 TABLET ORAL DAILY
Qty: 30 TABLET | Refills: 3 | Status: SHIPPED | OUTPATIENT
Start: 2020-02-03 | Stop reason: SDUPTHER

## 2020-02-02 RX ADMIN — CEFTRIAXONE SODIUM 250 MG: 250 INJECTION, POWDER, FOR SOLUTION INTRAMUSCULAR; INTRAVENOUS at 14:52

## 2020-02-02 RX ADMIN — FUROSEMIDE 40 MG: 10 INJECTION, SOLUTION INTRAMUSCULAR; INTRAVENOUS at 09:01

## 2020-02-02 RX ADMIN — POTASSIUM CHLORIDE 10 MEQ: 10 CAPSULE, COATED, EXTENDED RELEASE ORAL at 09:00

## 2020-02-02 RX ADMIN — SACUBITRIL AND VALSARTAN 1 TABLET: 49; 51 TABLET, FILM COATED ORAL at 09:01

## 2020-02-02 RX ADMIN — CARVEDILOL 6.25 MG: 6.25 TABLET, FILM COATED ORAL at 09:00

## 2020-02-02 RX ADMIN — AZITHROMYCIN 500 MG: 250 TABLET, FILM COATED ORAL at 14:52

## 2020-02-02 RX ADMIN — SODIUM CHLORIDE: 9 INJECTION, SOLUTION INTRAMUSCULAR; INTRAVENOUS; SUBCUTANEOUS at 15:45

## 2020-02-02 RX ADMIN — IRON SUCROSE 200 MG: 20 INJECTION, SOLUTION INTRAVENOUS at 09:02

## 2020-02-02 RX ADMIN — ENOXAPARIN SODIUM 30 MG: 30 INJECTION SUBCUTANEOUS at 09:01

## 2020-02-02 RX ADMIN — Medication 10 ML: at 09:02

## 2020-02-02 RX ADMIN — AMLODIPINE BESYLATE 5 MG: 5 TABLET ORAL at 09:00

## 2020-02-02 RX ADMIN — DESMOPRESSIN ACETATE 40 MG: 0.2 TABLET ORAL at 09:00

## 2020-02-02 RX ADMIN — ONDANSETRON 4 MG: 2 INJECTION INTRAMUSCULAR; INTRAVENOUS at 09:13

## 2020-02-02 RX ADMIN — SODIUM CHLORIDE TAB 1 GM 1 G: 1 TAB at 12:34

## 2020-02-02 RX ADMIN — SPIRONOLACTONE 25 MG: 25 TABLET ORAL at 09:00

## 2020-02-02 RX ADMIN — SODIUM CHLORIDE TAB 1 GM 1 G: 1 TAB at 09:00

## 2020-02-02 RX ADMIN — Medication 10 ML: at 09:03

## 2020-02-02 RX ADMIN — METRONIDAZOLE 500 MG: 500 TABLET, FILM COATED ORAL at 09:00

## 2020-02-02 ASSESSMENT — PAIN SCALES - GENERAL: PAINLEVEL_OUTOF10: 0

## 2020-02-02 NOTE — DISCHARGE INSTR - DIET

## 2020-02-02 NOTE — DISCHARGE SUMMARY
Department of [facility] [address]    Discharge Summary      NAME:  Rody Rasmussen  :  1970  MRN:  [id number]    Admit date:  2020  Discharge date:  2020    Admitting Physician:  Padmini Pineda MD    Primary Diagnosis on Admission:   Present on Admission:   Heart failure ([facility]   Tobacco abuse   Pre-diabetes   Morbid obesity ([facility]   Iron deficiency   HTN (hypertension)   Hyponatremia   STD (female)      Secondary Diagnoses:  has Acute blood loss anemia and Vaginal bleeding on their pertinent problem list.      Admission Condition:  poor     Discharged Condition: good    Hospital Course: Patient was transferred to [place] [address] from [facility] and admitted for new onset CHF and vaginitis. Echocardiogram was performed which revealed worsening ejection fraction 15 to 20% with trivial pericardial effusion and RVSP of 48.  proBNP was 1354. Patient was initially managed with IV Lasix and started on Coreg and Aldactone. It was recommended by cardiology at [facility] to transfer patient to [place] [address] for cath and possible AICD. OB/GYN was also consulted for concerns of pelvic mass, MRI indicated large intramural fibroid. Outpatient follow-up was recommended for fibroid uterus. At [facility] cardio was consulted who recommended left heart catheter to assess for evidence of coronary artery disease. ICD implantation was not indicated. Patient was started on Entresto and Aldactone 25 mg for diuresis. Lisinopril was discontinued. Cardiac LifeVest was placed on patient. [facility]. Patient was started on Flagyl for 7 days for Gardnerella vaginalis infection. Patient was also started on IV Venofer for history of longstanding anemia. Today on day of discharge pt feels better with no further complaints. It was decided by cardio to perform cardiac cath outpatient by Dr. Felicia Loera.  [place] and Labs are at pts baseline. All consultants involved during this admission are agreeable to d/c. STD- Chlamydia Positive- treated with 125 mg Rocephin and 1 gm Zithromax  Consults:  cardiology and OBGYN    Significant Diagnostic/theraputic interventions: N/A      Disposition:   home    Instructions to Patient:   - Take all medications as prescribed  - Follow up with PCP   - In case of any worsening condition please visit Emergency Room. Follow up with Kassie Amado MD in  2 weeks, Dr. Jamal Wong in 3 days for cardiac cath.     Discharge Medications:     Mannie Swain   Home Medication Instructions [facility]:[id number]    Printed on:02/02/20 9185   Medication Information                      amLODIPine (NORVASC) 5 MG tablet  Take 1 tablet by mouth daily             atorvastatin (LIPITOR) 40 MG tablet  Take 1 tablet by mouth daily             benzonatate (TESSALON) 100 MG capsule  Take 1 capsule by mouth 3 times daily as needed for Cough             carvedilol (COREG) 6.25 MG tablet  Take 1 tablet by mouth 2 times daily (with meals)             docusate sodium (COLACE) 100 MG capsule  Take 1 capsule by mouth 2 times daily             furosemide (LASIX) 40 MG tablet  Take 1 tablet by mouth daily             metroNIDAZOLE (FLAGYL) 500 MG tablet  Take 1 tablet by mouth every 12 hours for 6 days             nicotine (NICODERM CQ) 21 MG/24HR  Place 1 patch onto the skin daily as needed (as neeeded for nicotine withdrawl)             potassium chloride (KLOR-CON M) 10 MEQ extended release tablet  Take 1 tablet by mouth 2 times daily             sacubitril-valsartan (ENTRESTO) 49-51 MG per tablet  Take 1 tablet by mouth 2 times daily             spironolactone (ALDACTONE) 25 MG tablet  Take 1 tablet by mouth daily                 Send Copies to: Kassie Amado MD      Note that over 30 minutes was spent in preparing discharge papers, discussing discharge with patient and family, medication review,

## 2020-02-02 NOTE — PLAN OF CARE
Problem: Falls - Risk of:  Goal: Will remain free from falls  Description  Will remain free from falls  Outcome: Completed  Goal: Absence of physical injury  Description  Absence of physical injury  Outcome: Completed     Problem: Activity:  Goal: Ability to tolerate increased activity will improve  Description  Ability to tolerate increased activity will improve  Outcome: Completed     Problem: Pain:  Description  Pain management should include both nonpharmacologic and pharmacologic interventions. Goal: Pain level will decrease  Description  Pain level will decrease  Outcome: Completed  Goal: Control of acute pain  Description  Control of acute pain  Outcome: Completed  Goal: Control of chronic pain  Description  Control of chronic pain  Outcome: Completed   Pt.  Discharged, follow up with cardiology

## 2020-02-02 NOTE — CARE COORDINATION
Med assist voucher faxed to [facility] pharmacy.   Life Vest in place from [address]    Discharge [facility] Case Management Department  Written by: Abbie Bell RN    Patient Name: Pamela Olguin  Attending Provider: Lisandra Carlton MD  Admit Date: 2020 10:33 PM  MRN: [id number]  Account: [de-identified]                     : 1970  Discharge Date:  2020        Disposition: home    Abbie Bell RN

## 2020-02-02 NOTE — PROGRESS NOTES
CLINICAL PHARMACY NOTE: MEDS TO [address] [facility] Patient?: No  Total # of Prescriptions Filled: 3   The following medications were delivered to the patient:  · Amlodipine   · entresto   · Metronidazole   Total # of Interventions Completed: 1  Time Spent (min): 30    Additional Documentation:    Not enough in stock for entresto - gave 5 day supply. Delivering rest on Tuesday. Pt is aware and will be home that day for delivery.
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
## 2020-02-04 ENCOUNTER — TELEPHONE (OUTPATIENT)
Dept: FAMILY MEDICINE CLINIC | Age: 50
End: 2020-02-04

## 2020-02-04 ENCOUNTER — OFFICE VISIT (OUTPATIENT)
Dept: FAMILY MEDICINE CLINIC | Age: 50
End: 2020-02-04

## 2020-02-04 VITALS
SYSTOLIC BLOOD PRESSURE: 112 MMHG | DIASTOLIC BLOOD PRESSURE: 69 MMHG | HEART RATE: 76 BPM | WEIGHT: 271 LBS | BODY MASS INDEX: 42.44 KG/M2

## 2020-02-04 PROBLEM — B96.89 BACTERIAL VAGINOSIS: Status: ACTIVE | Noted: 2020-02-04

## 2020-02-04 PROBLEM — N76.0 BACTERIAL VAGINOSIS: Status: ACTIVE | Noted: 2020-02-04

## 2020-02-04 PROCEDURE — 99213 OFFICE O/P EST LOW 20 MIN: CPT | Performed by: STUDENT IN AN ORGANIZED HEALTH CARE EDUCATION/TRAINING PROGRAM

## 2020-02-04 PROCEDURE — 99211 OFF/OP EST MAY X REQ PHY/QHP: CPT | Performed by: FAMILY MEDICINE

## 2020-02-04 PROCEDURE — 1111F DSCHRG MED/CURRENT MED MERGE: CPT | Performed by: STUDENT IN AN ORGANIZED HEALTH CARE EDUCATION/TRAINING PROGRAM

## 2020-02-04 RX ORDER — CLINDAMYCIN PHOSPHATE 20 MG/G
1 CREAM VAGINAL NIGHTLY
Qty: 1 TUBE | Refills: 0 | Status: ON HOLD | OUTPATIENT
Start: 2020-02-04 | End: 2020-02-10 | Stop reason: HOSPADM

## 2020-02-04 ASSESSMENT — ENCOUNTER SYMPTOMS
EYE PAIN: 0
EYE REDNESS: 0
COUGH: 0
FACIAL SWELLING: 0
GASTROINTESTINAL NEGATIVE: 1
WHEEZING: 0
BACK PAIN: 0
PHOTOPHOBIA: 0
EYE ITCHING: 0
NAUSEA: 0
CONSTIPATION: 0
EYE DISCHARGE: 0
VOMITING: 0
SHORTNESS OF BREATH: 0
COLOR CHANGE: 0
DIARRHEA: 0

## 2020-02-04 ASSESSMENT — VISUAL ACUITY: OU: 1

## 2020-02-04 NOTE — PROGRESS NOTES
Post-Discharge Transitional Care Management Services or Hospital Follow Up      Roseline Baker   YOB: 1970    Date of Office Visit:  2/4/2020  Date of Hospital Admission: 1/31/20  Date of Hospital Discharge: 2/2/20  Readmission Risk Score(high >=14%.  Medium >=10%):Readmission Risk Score: 20       Care management risk score Rising risk (score 2-5) and Complex Care (Scores >=6): 5     Non face to face  following discharge, date last encounter closed (first attempt may have been earlier): 2/4/2020 12:29 PM 2/4/2020 12:29 PM    Call initiated 2 business days of discharge: Yes     Patient Active Problem List   Diagnosis    Migraine    HTN (hypertension)    Hyperlipemia    Morbid obesity ([address]    Iron deficiency    Tobacco abuse    S/P Permanent nail avulsion, 2nd digit right foot     Acute rhinosinusitis    Dyslipidemia    Iron deficiency anemia    Vitamin B12 deficiency    Folate deficiency    Constipation    Hypertension goal BP (blood pressure) < 140/90    Bright red blood per rectum    Acute blood loss anemia    Vaginal bleeding    Ovarian cyst    Pre-diabetes    Menorrhagia    H. pylori infection    Microcytosis    Anemia    Hydradenitis    Hypertension, goal below 140/90    Right leg paresthesias    Chronic back pain    Rotator cuff injury    Vitamin D deficiency    Rotator cuff tendinitis    Smoker    Left breast abscess    Iron deficiency anemia    Breast abscess    Medial meniscus tear    Right knee pain    Arthritis of knee    Lumbar disc herniation S/P L3 4 and L4 5 and tenotomy right side for foraminotomy    Leg swelling    Lipid screening    Pelvic mass in female    Hypocalcemia    Pericardial effusion    Hepatomegaly    Intramural leiomyoma of uterus    Heart failure (HCC)    Hyponatremia    STD (female)    Bacterial vaginosis       No Known Allergies    Medications listed as ordered at the time of discharge from hospital   Josesito Corral K   Home Medication Instructions TCT:[id number]    Printed on:02/04/20 4892   Medication Information                      amLODIPine (NORVASC) 5 MG tablet  Take 1 tablet by mouth daily             atorvastatin (LIPITOR) 40 MG tablet  Take 1 tablet by mouth daily             benzonatate (TESSALON) 100 MG capsule  Take 1 capsule by mouth 3 times daily as needed for Cough             carvedilol (COREG) 6.25 MG tablet  Take 1 tablet by mouth 2 times daily (with meals)             clindamycin (CLEOCIN) 2 % vaginal cream  Place 1 applicator vaginally nightly for 7 days Place vaginally nightly. docusate sodium (COLACE) 100 MG capsule  Take 1 capsule by mouth 2 times daily             furosemide (LASIX) 40 MG tablet  Take 1 tablet by mouth daily             metroNIDAZOLE (FLAGYL) 500 MG tablet  Take 1 tablet by mouth every 12 hours for 6 days             nicotine (NICODERM CQ) 21 MG/24HR  Place 1 patch onto the skin daily as needed (as neeeded for nicotine withdrawl)             potassium chloride (KLOR-CON M) 10 MEQ extended release tablet  Take 1 tablet by mouth 2 times daily             sacubitril-valsartan (ENTRESTO) 49-51 MG per tablet  Take 1 tablet by mouth 2 times daily             spironolactone (ALDACTONE) 25 MG tablet  Take 1 tablet by mouth daily                   Medications marked \"taking\" at this time  Outpatient Medications Marked as Taking for the 2/4/20 encounter (Office Visit) with Lisa Kumar MD   Medication Sig Dispense Refill    clindamycin (CLEOCIN) 2 % vaginal cream Place 1 applicator vaginally nightly for 7 days Place vaginally nightly.  1 Tube 0    amLODIPine (NORVASC) 5 MG tablet Take 1 tablet by mouth daily 30 tablet 3    sacubitril-valsartan (ENTRESTO) 49-51 MG per tablet Take 1 tablet by mouth 2 times daily 60 tablet 3    metroNIDAZOLE (FLAGYL) 500 MG tablet Take 1 tablet by mouth every 12 hours for 6 days 12 tablet 0    nicotine (NICODERM CQ) 21 MG/24HR Place 1 patch onto the skin daily as needed (as neeeded for nicotine withdrawl) 30 patch 3    carvedilol (COREG) 6.25 MG tablet Take 1 tablet by mouth 2 times daily (with meals) 60 tablet 3    benzonatate (TESSALON) 100 MG capsule Take 1 capsule by mouth 3 times daily as needed for Cough 30 capsule 0    spironolactone (ALDACTONE) 25 MG tablet Take 1 tablet by mouth daily 30 tablet 3    [START ON 2/8/2020] furosemide (LASIX) 40 MG tablet Take 1 tablet by mouth daily 60 tablet 3    docusate sodium (COLACE) 100 MG capsule Take 1 capsule by mouth 2 times daily 60 capsule 0    potassium chloride (KLOR-CON M) 10 MEQ extended release tablet Take 1 tablet by mouth 2 times daily 60 tablet 0    atorvastatin (LIPITOR) 40 MG tablet Take 1 tablet by mouth daily 30 tablet 4        Medications patient taking as of now reconciled against medications ordered at time of hospital discharge: Yes    Chief Complaint   Patient presents with    Medication Reaction     Patient states that since she has been on the flagyl she has been having double vision. She says she has been on medication for about a week, she took a nap and woke up seeing [facility]     refused       HPI    Inpatient course: Discharge summary reviewed- see chart. Interval history/Current status:   Patient was discharged 2 days ago from the hospital  Patient feels a lot better since discharge  Denies any abdominal pain, shortness of breath, leg swelling. She denies any fevers/chills. Patient is having regular bowel movements at this time, no longer having any constipation  Patient has a follow-up on Friday with OB-GYN, pap smear on 27th of Feb regarding the results of the mass, LMP -December (end of the month). Patient has a follow-up with a Cardiologist - and notes she will be booking a heart cath on an outpatient basis. Patient had been on Flagyl while in the hospital, for a total of 7 days.   The results on 1/28/2020 indicated they were positive for Gardnerella vaginalis. Patient has been having double vision for about 2 days after starting Flagyl  Patient denies any vaginal discharge at this time or any urinary symptoms. Right eye has blurry vision, double vision  No prior episodes of change in vision in the past.  Vision has been unchanged over the last 2 days    Review of Systems   Constitutional: Negative for chills, fatigue and fever. HENT: Negative. Negative for congestion, dental problem, drooling, ear discharge, ear pain, facial swelling and hearing loss. Eyes: Positive for visual disturbance (double vision). Negative for photophobia, pain, discharge, redness and itching. Respiratory: Negative for cough, shortness of breath and wheezing. Cardiovascular: Negative for chest pain, palpitations and leg swelling. Gastrointestinal: Negative. Negative for constipation, diarrhea, nausea and vomiting. Genitourinary: Negative. Negative for difficulty urinating, enuresis and flank pain. Musculoskeletal: Negative. Negative for arthralgias, back pain, gait problem, joint swelling, myalgias, neck pain and neck stiffness. Skin: Negative. Negative for color change, pallor, rash and wound. Allergic/Immunologic: Negative for environmental allergies, food allergies and immunocompromised state. Neurological: Negative for dizziness, tremors, seizures, syncope, facial asymmetry, speech difficulty, weakness, light-headedness, numbness and headaches. Psychiatric/Behavioral: Negative for confusion and decreased concentration. The patient is nervous/anxious. Vitals:    02/04/20 1436   BP: 112/69   Site: Left Upper Arm   Position: Sitting   Cuff Size: Large Adult   Pulse: 76   Weight: 271 lb (122.9 kg)     Body mass index is 42.44 kg/m².    Wt Readings from Last 3 Encounters:   02/04/20 271 lb (122.9 kg)   02/01/20 272 lb 8 oz (123.6 kg)   01/31/20 290 lb 9.6 oz (131.8 kg)     BP Readings from Last 3 Encounters:   02/04/20 112/69   02/02/20 Mental Status: She is alert and oriented to person, place, and time. Sensory: No sensory deficit. Motor: No weakness. Coordination: Coordination normal.      Gait: Gait normal.      Deep Tendon Reflexes: Reflexes normal.   Psychiatric:         Mood and Affect: Mood normal.       Assessment/Plan:  1. Acute on chronic systolic heart failure (HCC)  - UT DISCHARGE MEDS RECONCILED W/ CURRENT OUTPATIENT MED LIST  - Continue follow-up with Cardiology, plan to have an outpatient heart cath per discharge instructions. 2. Bacterial vaginosis  - Flagyl PO was switched to clindamycin (CLEOCIN) 2 % vaginal cream; Place 1 applicator vaginally nightly for 7 days Place vaginally nightly. Dispense: 1 Tube; Refill: 0    3. Double vision  4. CN IV nerve palsy, right eye  - CT HEAD WO CONTRAST; Future  - [facility], Neurology, [facility]  - It was discussed with the patient that she should obtain a STAT CT of the Head and have follow-up with Neurology regarding her right eye findings. Patient was instructed if there are any changes in her neurological status that the patient will contact EMS/911 and/or present to the ED immediately. The patient was also instructed that if she would like to present to the ER immediately following this appointment that she should do so, as opposed to a outpatient STAT CT of the head and neurology appointment. Risks and benefits of the options were discussed with the patient, including the different etiologies such as stroke which may result in the possibility of death. The patient would like to have outpatient work-up at this time for this issue unless there is any change in symptoms. The patient notes that her right eye blurry vision and change is all attributed to medication of Flagyl of which she had looked up the side effects for. I discontinued this medication today and started her on clindamycin gel for her BV.   Teach back method was used and the patient

## 2020-02-04 NOTE — TELEPHONE ENCOUNTER
[facility] Transitions Initial Follow Up Call    Call within 2 business days of discharge: Yes     Patient: Dominik Poster Patient : 1970 MRN: [id number]    [unfilled]    RARS: Readmission Risk Score: 20       Spoke with: First attempt to reach out to pt voicemail not set up will call again    Discharge department/facility:  [address] [place] [address] services provided:  Obtained and reviewed discharge summary and/or continuity of care documents    Follow Up  Future Appointments   Date Time Provider Nelsy Cuba   2020 10:30 AM Harjeet Christianson   2020 10:00 AM MD Katelynn Martines 157, LPN

## 2020-02-05 ENCOUNTER — HOSPITAL ENCOUNTER (OUTPATIENT)
Dept: CT IMAGING | Age: 50
Discharge: HOME | End: 2020-02-07

## 2020-02-05 PROCEDURE — 70450 CT HEAD/BRAIN W/O DYE: CPT

## 2020-02-07 ENCOUNTER — OFFICE VISIT (OUTPATIENT)
Dept: OBGYN CLINIC | Age: 50
End: 2020-02-07

## 2020-02-07 ENCOUNTER — HOSPITAL ENCOUNTER (OUTPATIENT)
Age: 50
Setting detail: SPECIMEN
Discharge: HOME | End: 2020-02-07

## 2020-02-07 VITALS
DIASTOLIC BLOOD PRESSURE: 76 MMHG | SYSTOLIC BLOOD PRESSURE: 118 MMHG | BODY MASS INDEX: 41.44 KG/M2 | WEIGHT: 264 LBS | HEART RATE: 90 BPM | HEIGHT: 67 IN

## 2020-02-07 PROCEDURE — 99204 OFFICE O/P NEW MOD 45 MIN: CPT | Performed by: OBSTETRICS & GYNECOLOGY

## 2020-02-07 PROCEDURE — G0145 SCR C/V CYTO,THINLAYER,RESCR: HCPCS

## 2020-02-07 PROCEDURE — 87624 HPV HI-RISK TYP POOLED RSLT: CPT

## 2020-02-07 ASSESSMENT — PATIENT HEALTH QUESTIONNAIRE - PHQ9
SUM OF ALL RESPONSES TO PHQ QUESTIONS 1-9: 0
SUM OF ALL RESPONSES TO PHQ9 QUESTIONS 1 & 2: 0
2. FEELING DOWN, DEPRESSED OR HOPELESS: 0
1. LITTLE INTEREST OR PLEASURE IN DOING THINGS: 0
SUM OF ALL RESPONSES TO PHQ QUESTIONS 1-9: 0
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
## 2020-02-10 ENCOUNTER — HOSPITAL ENCOUNTER (OUTPATIENT)
Dept: CARDIAC CATH/INVASIVE PROCEDURES | Age: 50
Discharge: HOME | End: 2020-02-10
Attending: INTERNAL MEDICINE | Admitting: INTERNAL MEDICINE

## 2020-02-10 VITALS
OXYGEN SATURATION: 96 % | DIASTOLIC BLOOD PRESSURE: 83 MMHG | SYSTOLIC BLOOD PRESSURE: 146 MMHG | RESPIRATION RATE: 16 BRPM | BODY MASS INDEX: 40.51 KG/M2 | HEIGHT: 67 IN | HEART RATE: 85 BPM | WEIGHT: 258.1 LBS | TEMPERATURE: 97.6 F

## 2020-02-10 PROBLEM — I42.9 CARDIOMYOPATHY (HCC): Status: ACTIVE | Noted: 2020-02-10

## 2020-02-10 LAB
-: NORMAL
ANION GAP SERPL CALCULATED.3IONS-SCNC: 10 MMOL/L (ref 9–17)
BUN BLDV-MCNC: 8 MG/DL (ref 6–20)
BUN/CREAT BLD: 10 (ref 9–20)
CALCIUM SERPL-MCNC: 8.8 MG/DL (ref 8.6–10.4)
CHLORIDE BLD-SCNC: 102 MMOL/L (ref 98–107)
CO2: 23 MMOL/L (ref 20–31)
CREAT SERPL-MCNC: 0.83 MG/DL (ref 0.5–0.9)
GFR AFRICAN AMERICAN: >60 ML/MIN
GFR NON-AFRICAN AMERICAN: >60 ML/MIN
GFR SERPL CREATININE-BSD FRML MDRD: ABNORMAL ML/MIN/{1.73_M2}
GFR SERPL CREATININE-BSD FRML MDRD: ABNORMAL ML/MIN/{1.73_M2}
GLUCOSE BLD-MCNC: 152 MG/DL (ref 70–99)
HCG, PREGNANCY URINE (POC): NEGATIVE
HCT VFR BLD CALC: 47.8 % (ref 36.3–47.1)
HEMOGLOBIN: 13.2 G/DL (ref 11.9–15.1)
MCH RBC QN AUTO: 20.6 PG (ref 25.2–33.5)
MCHC RBC AUTO-ENTMCNC: 27.6 G/DL (ref 28.4–34.8)
MCV RBC AUTO: 74.6 FL (ref 82.6–102.9)
NRBC AUTOMATED: 0 PER 100 WBC
PDW BLD-RTO: 26.9 % (ref 11.8–14.4)
PLATELET # BLD: 442 K/UL (ref 138–453)
PMV BLD AUTO: ABNORMAL FL (ref 8.1–13.5)
POTASSIUM SERPL-SCNC: 4.4 MMOL/L (ref 3.7–5.3)
RBC # BLD: 6.41 M/UL (ref 3.95–5.11)
REASON FOR REJECTION: NORMAL
SODIUM BLD-SCNC: 135 MMOL/L (ref 135–144)
WBC # BLD: 11.2 K/UL (ref 3.5–11.3)
ZZ NTE CLEAN UP: ORDERED TEST: NORMAL
ZZ NTE WITH NAME CLEAN UP: SPECIMEN SOURCE: NORMAL

## 2020-02-10 PROCEDURE — 2709999900 HC NON-CHARGEABLE SUPPLY

## 2020-02-10 PROCEDURE — 93458 L HRT ARTERY/VENTRICLE ANGIO: CPT | Performed by: INTERNAL MEDICINE

## 2020-02-10 PROCEDURE — 2500000003 HC RX 250 WO HCPCS

## 2020-02-10 PROCEDURE — 85027 COMPLETE CBC AUTOMATED: CPT

## 2020-02-10 PROCEDURE — C1760 CLOSURE DEV, VASC: HCPCS

## 2020-02-10 PROCEDURE — 6360000004 HC RX CONTRAST MEDICATION

## 2020-02-10 PROCEDURE — C1725 CATH, TRANSLUMIN NON-LASER: HCPCS

## 2020-02-10 PROCEDURE — 81025 URINE PREGNANCY TEST: CPT

## 2020-02-10 PROCEDURE — 80048 BASIC METABOLIC PNL TOTAL CA: CPT

## 2020-02-10 PROCEDURE — C1894 INTRO/SHEATH, NON-LASER: HCPCS

## 2020-02-10 PROCEDURE — 6360000002 HC RX W HCPCS

## 2020-02-10 PROCEDURE — 6370000000 HC RX 637 (ALT 250 FOR IP): Performed by: INTERNAL MEDICINE

## 2020-02-10 PROCEDURE — 2580000003 HC RX 258: Performed by: INTERNAL MEDICINE

## 2020-02-10 PROCEDURE — 36415 COLL VENOUS BLD VENIPUNCTURE: CPT

## 2020-02-10 RX ORDER — SODIUM CHLORIDE 9 MG/ML
INJECTION, SOLUTION INTRAVENOUS CONTINUOUS
Status: DISCONTINUED | OUTPATIENT
Start: 2020-02-10 | End: 2020-02-10 | Stop reason: DRUGHIGH

## 2020-02-10 RX ORDER — SODIUM CHLORIDE 9 MG/ML
INJECTION, SOLUTION INTRAVENOUS CONTINUOUS
Status: ACTIVE | OUTPATIENT
Start: 2020-02-10 | End: 2020-02-10

## 2020-02-10 RX ORDER — ASPIRIN 81 MG/1
81 TABLET ORAL DAILY
Qty: 90 TABLET | Refills: 1
Start: 2020-02-10 | End: 2022-06-02 | Stop reason: SDUPTHER

## 2020-02-10 RX ORDER — SODIUM CHLORIDE 0.9 % (FLUSH) 0.9 %
10 SYRINGE (ML) INJECTION EVERY 12 HOURS SCHEDULED
Status: DISCONTINUED | OUTPATIENT
Start: 2020-02-10 | End: 2020-02-10 | Stop reason: HOSPADM

## 2020-02-10 RX ORDER — ACETAMINOPHEN 325 MG/1
650 TABLET ORAL EVERY 4 HOURS PRN
Status: DISCONTINUED | OUTPATIENT
Start: 2020-02-10 | End: 2020-02-10 | Stop reason: HOSPADM

## 2020-02-10 RX ORDER — ASPIRIN 81 MG/1
81 TABLET, CHEWABLE ORAL ONCE
Status: COMPLETED | OUTPATIENT
Start: 2020-02-10 | End: 2020-02-10

## 2020-02-10 RX ORDER — SODIUM CHLORIDE 0.9 % (FLUSH) 0.9 %
10 SYRINGE (ML) INJECTION PRN
Status: DISCONTINUED | OUTPATIENT
Start: 2020-02-10 | End: 2020-02-10 | Stop reason: HOSPADM

## 2020-02-10 RX ADMIN — SODIUM CHLORIDE: 9 INJECTION, SOLUTION INTRAVENOUS at 08:24

## 2020-02-10 RX ADMIN — ASPIRIN 81 MG 81 MG: 81 TABLET ORAL at 08:38

## 2020-02-10 ASSESSMENT — PAIN - FUNCTIONAL ASSESSMENT: PAIN_FUNCTIONAL_ASSESSMENT: 0-10

## 2020-02-10 NOTE — DISCHARGE INSTR - DIET

## 2020-02-10 NOTE — PROGRESS NOTES
[facility]    CARDIAC CATHETERIZATION        Date:   2/10/2020  Patient name:  Cliff Love  Date of admission:  2/10/2020  7:24 AM  MRN:   [id number]  YOB: 1970    Pre Procedure Conscious Sedation Data:    H&P was reviewed and the patient was examined. No change has occurred in the patient's condition since the H&P was completed. ASA Class:    [] I [] II [x] III [] IV    Mallampati Class:  [] I [] II [x] III [] IV       Indication:  [] STEMI     [] + Stress test  [] ACS / NSTEMI    [] Cardiac arrest  [] Valvular heart disease   [] Preoperative evaluation  [] Recurrent/refractory Angina          [] Pericardial disease   [] Arrhythmia     [] Diabetes Mellitus   [x] Cardiomyopathy    [] Uncontrolled HTN. History and Risk Factors    [x] Hypertension    [] Diabetes Mellitus    [] Hyperlipidemia    [] Cerebrovascular Disease   [] Prior MI      [] Peripheral Vascular disease   [] Prior PCI             [] Currently smoker. [] Prior CABG. [] Family history of CAD     Pre-Procedure Information. Heart Failure       [x] Yes    [] No        Class  [] I      [] II  [x] III    [] IV. New Diagnosis    [x] Yes  [] No    HF Type      [x] Systolic   [x] Diastolic          [] Unknown. Stress Test Performed:   [] Yes    [x] No     Type:     [] Stress Echo   [] Exercise Stress Test (no imaging)      [] Stress Nuclear  []      Results   [] Negative   [] Positive        [] Indeterminate       If Positive/ Risk / Extent of Ischemia:       [] Low  [] Intermediate         [] High      Pre Procedure Medications: [x] Yes    [] No          [x] ASA   [x] Beta Blockers       [] Nitrate  [x] Ca Channel Blockers       [] Ranolazine  [x] Statin        [] Plavix/Effient/Brilinta    [facility] Clinical Frailty Scale (Assessment immediately prior to procedure)  []  Very Fit: People who are ROBUST, active, energetic, and motivated. These people   commonly EXERCISE REGULARLY.   They are among the fittest for their age  []  Well: People who have NO ACTIVE DISEASE SYMPTOMS but are less fit than   those in category 1. Often, they exercise or are very active occasionally,   e.g. Seasonally. []  Managing Well: People whose medical problems are well     controlled, but are not regularly active beyond routine walking. [x]  Vulnerable:  While not dependent on others for daily help, often symptoms limit   activities. A common complaint is being \"slowed up\", and/or being   tired during the day. []  Mildly Frail: These people often have more evident slowing, and need help   in high order IADLs (finances, transportation, heavy housework,    medications). Typically, mild frailty progressively impairs shopping and   walking outside alone, meal preparation and housework. []  Moderately Frail: People need help with all outside activities and with   keeping house. Inside, they often have problems with stairs and need   help with bathing and might need minimal assistance (cuing, standby)   with dressing. []  Severely Frail: Completely dependent for personal care, from whatever   cause (physical or cognitive). Even so, they seem stable and not    at high risk of dying (within ~ 6 months). []  Very Severely Frail: Completely dependent, approaching the end of life. Typically, they could not recover even from a minor illness. []  Terminally Ill: Approaching the end of life. This category applies to people with a   life expectancy <6 months, who are not otherwise evidently frail.          Electronically signed by Pham Olivares MD on 2/10/2020 at 8:47 AM

## 2020-02-10 NOTE — FLOWSHEET NOTE
Patient resting in bed following cardiac cath earlier today; family x1 at bedside. Patient and family express no spiritual/emotional needs at this time; thank writer for visiting. Writer provides presence and support. Spiritual Care will follow as needed.      02/10/20 1051   Encounter Summary   Services provided to: Patient and family together   Referral/Consult From: Rounding   Continue Visiting   (2/10/20)   Complexity of Encounter Moderate   Length of Encounter 15 minutes   Spiritual Assessment Completed Yes   Routine   Type Initial   Assessment Approachable;Passive   Intervention Active listening;Sustaining presence/ Ministry of presence   Outcome Expressed gratitude

## 2020-02-11 LAB
HPV SAMPLE: NORMAL
HPV, GENOTYPE 16: NOT DETECTED
HPV, GENOTYPE 18: NOT DETECTED
HPV, HIGH RISK OTHER: NOT DETECTED
HPV, INTERPRETATION: NORMAL
SPECIMEN DESCRIPTION: NORMAL

## 2020-02-16 PROBLEM — Z13.220 LIPID SCREENING: Status: RESOLVED | Noted: 2020-01-17 | Resolved: 2020-02-16

## 2020-02-17 ENCOUNTER — TELEPHONE (OUTPATIENT)
Dept: OTHER | Facility: CLINIC | Age: 50
End: 2020-02-17

## 2020-02-17 ENCOUNTER — HOSPITAL ENCOUNTER (EMERGENCY)
Age: 50
Discharge: HOME | End: 2020-02-17
Attending: EMERGENCY MEDICINE

## 2020-02-17 ENCOUNTER — TELEPHONE (OUTPATIENT)
Dept: FAMILY MEDICINE CLINIC | Age: 50
End: 2020-02-17

## 2020-02-17 VITALS
RESPIRATION RATE: 18 BRPM | DIASTOLIC BLOOD PRESSURE: 80 MMHG | BODY MASS INDEX: 40.18 KG/M2 | TEMPERATURE: 98 F | SYSTOLIC BLOOD PRESSURE: 118 MMHG | HEIGHT: 67 IN | OXYGEN SATURATION: 97 % | HEART RATE: 101 BPM | WEIGHT: 256 LBS

## 2020-02-17 PROCEDURE — 99282 EMERGENCY DEPT VISIT SF MDM: CPT

## 2020-02-17 PROCEDURE — 2500000003 HC RX 250 WO HCPCS: Performed by: EMERGENCY MEDICINE

## 2020-02-17 PROCEDURE — 10061 I&D ABSCESS COMP/MULTIPLE: CPT

## 2020-02-17 PROCEDURE — 6360000002 HC RX W HCPCS: Performed by: EMERGENCY MEDICINE

## 2020-02-17 PROCEDURE — 90471 IMMUNIZATION ADMIN: CPT | Performed by: EMERGENCY MEDICINE

## 2020-02-17 PROCEDURE — 6370000000 HC RX 637 (ALT 250 FOR IP): Performed by: EMERGENCY MEDICINE

## 2020-02-17 PROCEDURE — 90715 TDAP VACCINE 7 YRS/> IM: CPT | Performed by: EMERGENCY MEDICINE

## 2020-02-17 PROCEDURE — 96372 THER/PROPH/DIAG INJ SC/IM: CPT

## 2020-02-17 RX ORDER — CEPHALEXIN 250 MG/1
500 CAPSULE ORAL ONCE
Status: COMPLETED | OUTPATIENT
Start: 2020-02-17 | End: 2020-02-17

## 2020-02-17 RX ORDER — SULFAMETHOXAZOLE AND TRIMETHOPRIM 800; 160 MG/1; MG/1
1 TABLET ORAL 2 TIMES DAILY
Qty: 20 TABLET | Refills: 0 | Status: SHIPPED | OUTPATIENT
Start: 2020-02-17 | End: 2020-02-27

## 2020-02-17 RX ORDER — CEPHALEXIN 500 MG/1
500 CAPSULE ORAL 4 TIMES DAILY
Qty: 40 CAPSULE | Refills: 0 | Status: SHIPPED | OUTPATIENT
Start: 2020-02-17 | End: 2020-02-27

## 2020-02-17 RX ORDER — LIDOCAINE HYDROCHLORIDE 10 MG/ML
5 INJECTION, SOLUTION INFILTRATION; PERINEURAL ONCE
Status: COMPLETED | OUTPATIENT
Start: 2020-02-17 | End: 2020-02-17

## 2020-02-17 RX ORDER — SULFAMETHOXAZOLE AND TRIMETHOPRIM 800; 160 MG/1; MG/1
1 TABLET ORAL ONCE
Status: COMPLETED | OUTPATIENT
Start: 2020-02-17 | End: 2020-02-17

## 2020-02-17 RX ORDER — KETOROLAC TROMETHAMINE 30 MG/ML
30 INJECTION, SOLUTION INTRAMUSCULAR; INTRAVENOUS ONCE
Status: COMPLETED | OUTPATIENT
Start: 2020-02-17 | End: 2020-02-17

## 2020-02-17 RX ADMIN — TETANUS TOXOID, REDUCED DIPHTHERIA TOXOID AND ACELLULAR PERTUSSIS VACCINE, ADSORBED 0.5 ML: 5; 2.5; 8; 8; 2.5 SUSPENSION INTRAMUSCULAR at 07:54

## 2020-02-17 RX ADMIN — CEPHALEXIN 500 MG: 250 CAPSULE ORAL at 07:10

## 2020-02-17 RX ADMIN — KETOROLAC TROMETHAMINE 30 MG: 30 INJECTION, SOLUTION INTRAMUSCULAR at 07:10

## 2020-02-17 RX ADMIN — SULFAMETHOXAZOLE AND TRIMETHOPRIM 1 TABLET: 800; 160 TABLET ORAL at 07:10

## 2020-02-17 RX ADMIN — LIDOCAINE HYDROCHLORIDE 5 ML: 10 INJECTION, SOLUTION INFILTRATION; PERINEURAL at 07:14

## 2020-02-17 ASSESSMENT — ENCOUNTER SYMPTOMS
CONSTIPATION: 0
ABDOMINAL PAIN: 0
DIARRHEA: 0
NAUSEA: 0
COUGH: 0
VOMITING: 0
SHORTNESS OF BREATH: 0

## 2020-02-17 ASSESSMENT — PAIN DESCRIPTION - PAIN TYPE: TYPE: ACUTE PAIN

## 2020-02-17 ASSESSMENT — PAIN SCALES - GENERAL
PAINLEVEL_OUTOF10: 10
PAINLEVEL_OUTOF10: 10
PAINLEVEL_OUTOF10: 8
PAINLEVEL_OUTOF10: 10

## 2020-02-17 ASSESSMENT — PAIN DESCRIPTION - ORIENTATION: ORIENTATION: RIGHT
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
## 2020-02-19 LAB — CYTOLOGY REPORT: NORMAL

## 2020-02-20 ENCOUNTER — TELEPHONE (OUTPATIENT)
Dept: OBGYN CLINIC | Age: 50
End: 2020-02-20

## 2020-02-20 NOTE — TELEPHONE ENCOUNTER
----- Message from WENDY Cheng CNM sent at 2/20/2020  8:19 AM EST -----  Pt needs follow up appointment with physician  For possible endometrial bx  Please have Dr Vanessa Nunez review to be scheduled  PAP negative  HPV not detected  + endometrial cells  Age 52

## 2020-02-21 ENCOUNTER — TELEPHONE (OUTPATIENT)
Dept: OBGYN CLINIC | Age: 50
End: 2020-02-21

## 2020-02-21 NOTE — TELEPHONE ENCOUNTER
----- Message from Estefani Candelario DO sent at 2/20/2020  5:13 PM EST -----  HAVE PATIENT COMPLETE THE LABS ORDERED ON 2/7/2020, [facility] AND ESTRADIOL.      HAVE PT RTO TO DISCUSS PAP RESULTS ONLY AFTER THE LABS ARE COMPLETED

## 2020-02-25 ENCOUNTER — TELEPHONE (OUTPATIENT)
Dept: OBGYN CLINIC | Age: 50
End: 2020-02-25

## 2020-02-25 NOTE — TELEPHONE ENCOUNTER
Attempted to notify patient of recommendations in care. Patient did not answer, no voicemail on account. Letter sent to patient at listed home address, as previous phone calls were made with no response. See letters tab for further details.

## 2020-02-25 NOTE — LETTER
[facility] Gyne[facility]  [facility] [address] [address]  Phone: [phone]  Fax: [address], [place]        February 25, 2020    Becky Lopez [address] [place] [id number]      Dear Job Mims: The office has made several attempts to reach you via telephone, with no response. Once this letter is received, please call our office to discuss your women's healthcare recommendations and follow up care.      Sincerely,    [facility]

## 2020-02-27 ENCOUNTER — TELEPHONE (OUTPATIENT)
Dept: FAMILY MEDICINE CLINIC | Age: 50
End: 2020-02-27

## 2020-03-03 NOTE — RESULT ENCOUNTER NOTE
Two phone calls and letter sent to patient on 2/25/20 with no response. Awaiting response from recently mailed letter.

## 2020-03-12 ENCOUNTER — PROCEDURE VISIT (OUTPATIENT)
Dept: OBGYN CLINIC | Age: 50
End: 2020-03-12

## 2020-03-12 ENCOUNTER — HOSPITAL ENCOUNTER (OUTPATIENT)
Age: 50
Discharge: HOME | End: 2020-03-12

## 2020-03-12 VITALS
HEART RATE: 89 BPM | WEIGHT: 266 LBS | DIASTOLIC BLOOD PRESSURE: 80 MMHG | HEIGHT: 67 IN | SYSTOLIC BLOOD PRESSURE: 118 MMHG | BODY MASS INDEX: 41.75 KG/M2

## 2020-03-12 LAB
ABSOLUTE EOS #: 0.3 K/UL (ref 0–0.4)
ABSOLUTE IMMATURE GRANULOCYTE: ABNORMAL K/UL (ref 0–0.3)
ABSOLUTE LYMPH #: 1.52 K/UL (ref 1–4.8)
ABSOLUTE MONO #: 0.4 K/UL (ref 0.1–1.3)
BASOPHILS # BLD: 1 % (ref 0–2)
BASOPHILS ABSOLUTE: 0.1 K/UL (ref 0–0.2)
CONTROL: NORMAL
DIFFERENTIAL TYPE: ABNORMAL
EOSINOPHILS RELATIVE PERCENT: 3 % (ref 0–4)
ESTRADIOL LEVEL: 16 PG/ML (ref 27–314)
FOLLICLE STIMULATING HORMONE: 24.8 U/L (ref 1.7–21.5)
HCG QUANTITATIVE: <1 IU/L
HCT VFR BLD CALC: 41.6 % (ref 36–46)
HEMOGLOBIN: 12.7 G/DL (ref 12–16)
IMMATURE GRANULOCYTES: ABNORMAL %
LYMPHOCYTES # BLD: 15 % (ref 24–44)
MCH RBC QN AUTO: 23.6 PG (ref 26–34)
MCHC RBC AUTO-ENTMCNC: 30.5 G/DL (ref 31–37)
MCV RBC AUTO: 77.6 FL (ref 80–100)
MONOCYTES # BLD: 4 % (ref 1–7)
MORPHOLOGY: ABNORMAL
NRBC AUTOMATED: ABNORMAL PER 100 WBC
PDW BLD-RTO: 29.8 % (ref 11.5–14.9)
PLATELET # BLD: 297 K/UL (ref 150–450)
PLATELET ESTIMATE: ABNORMAL
PMV BLD AUTO: 8.6 FL (ref 6–12)
PREGNANCY TEST URINE, POC: NEGATIVE
RBC # BLD: 5.36 M/UL (ref 4–5.2)
RBC # BLD: ABNORMAL 10*6/UL
SEG NEUTROPHILS: 77 % (ref 36–66)
SEGMENTED NEUTROPHILS ABSOLUTE COUNT: 7.78 K/UL (ref 1.3–9.1)
TSH SERPL DL<=0.05 MIU/L-ACNC: 1.98 MIU/L (ref 0.3–5)
WBC # BLD: 10.1 K/UL (ref 3.5–11)
WBC # BLD: ABNORMAL 10*3/UL

## 2020-03-12 PROCEDURE — 82670 ASSAY OF TOTAL ESTRADIOL: CPT

## 2020-03-12 PROCEDURE — 84702 CHORIONIC GONADOTROPIN TEST: CPT

## 2020-03-12 PROCEDURE — 83001 ASSAY OF GONADOTROPIN (FSH): CPT

## 2020-03-12 PROCEDURE — 36415 COLL VENOUS BLD VENIPUNCTURE: CPT

## 2020-03-12 PROCEDURE — 58558 HYSTEROSCOPY BIOPSY: CPT | Performed by: OBSTETRICS & GYNECOLOGY

## 2020-03-12 PROCEDURE — 83036 HEMOGLOBIN GLYCOSYLATED A1C: CPT

## 2020-03-12 PROCEDURE — 81025 URINE PREGNANCY TEST: CPT | Performed by: OBSTETRICS & GYNECOLOGY

## 2020-03-12 PROCEDURE — 85025 COMPLETE CBC W/AUTO DIFF WBC: CPT

## 2020-03-12 PROCEDURE — 88305 TISSUE EXAM BY PATHOLOGIST: CPT

## 2020-03-12 PROCEDURE — 84443 ASSAY THYROID STIM HORMONE: CPT

## 2020-03-12 NOTE — PROGRESS NOTES
signal abnormality identified in the visualized osseous structures. Mild anasarca.      Large intramural fibroid in the posterior body of the uterus. No cervical mass identified.      Us Non Ob Transvaginal     Result Date: 1/28/2020  EXAMINATION: PELVIC ULTRASOUND 1/28/2020 TECHNIQUE: Transvaginal pelvic ultrasound was performed. COMPARISON: None HISTORY: ORDERING SYSTEM PROVIDED HISTORY: Possible cervical mass on CT scan TECHNOLOGIST PROVIDED HISTORY: Possible cervical mass on CT scan Acuity: Acute Type of Exam: Initial FINDINGS: Measurements: Uterus:  8.5 x 3.0 x 4.4 cm Endometrial stripe:  8.6 mm Right Ovary:  2.6 x 2.6 x 2.5 cm Left Ovary:  2.5 x 1.8 x 2.2 cm Ultrasound Findings: Uterus: The uterus is anteverted in position. Along the anterior aspect of the uterus is a mass measuring approximately 6.2 x 5.6 x 7.3 cm. Endometrial stripe: Endometrial stripe is within normal limits. Right Ovary: A cystic lesion is identified in the right ovary measuring 2.7 cm with good through transmission which appears to represent a simple cyst. Left Ovary:  Left ovary is within normal limits. Free Fluid: No evidence of free fluid.      As suggested on CT imaging, there is a focal masslike structure seen which appears off the posterior aspect of the uterine body, though difficult to determine if this is separate from the uterus or arises from the uterus sonographically. Differential considerations include a uterine fibroid, though a uterine or cervical neoplasm can not be entirely excluded. Recommend further evaluation with a pelvic MRI with contrast.      Ct Abdomen Pelvis W Iv Contrast Additional Contrast? None     Result Date: 1/28/2020  EXAMINATION: CT OF THE ABDOMEN AND PELVIS WITH CONTRAST 1/28/2020 2:09 am TECHNIQUE: CT of the abdomen and pelvis was performed with the administration of intravenous contrast. Multiplanar reformatted images are provided for review.  Dose modulation, iterative reconstruction, and/or weight cardiomegaly. 5. Diffuse \"periportal halo\" appearance within the liver parenchyma which suggests association with secondary cardiac congestion, given findings of pericardial effusion and cardiomegaly. Differential diagnostic considerations include acute hepatitis. 6. Moderate scattered subcutaneous edema. UPT: negative    Cytology Report 02/07/2020  8:36  Reyes Maldonado   [id number]   [address]   ANATOMIC PATHOLOGY   [address] [place] [address],  [address] [place] [address]   [phone]   Fax: [phone]   GYNECOLOGIC CYTOLOGY REPORT     Patient Name: Marcelle Cervantes   MR#: [id number]   Specimen #[id number]   Source:   1: Cervical material, (ThinPrep vial, Imaging-assisted review)     Clinical History   Z01.419 Routine gyn exam without abnormal findings   Z11.51 Encounter for screening for HPV   Co-Test:  ThinPrep Pap with high risk HPV testing   LMP:  12/27/2019   INTERPRETATION     Cervical material, (ThinPrep vial, Imaging-assisted review):   Specimen Adequacy:        Satisfactory for evaluation.        - Endocervical/transformation zone component present. Descriptive Diagnosis:        Negative for intraepithelial lesion or malignancy. Reactive cellular changes associated with inflammation. Endometrial cells are present in a woman 39years of age or older. Comments:        Endometrial cells in women 45 years or older may be associated   with benign endometrium, hormonal alterations, and, less commonly,   endometrial or uterine abnormalities.  Endometrial evaluation is   recommended in bleeding postmenopausal women. High Risk HPV testing was ordered. Cytotechnologist:   JING Buck M.D.   **Electronically Signed Out**         [facility]/2/19/2020          Diagnosis:    Diagnosis Orders   1. Pelvic mass in female  MO HYSTEROSCOPY,W/ENDO BX    Specimen to Pathology Outpatient   2. Intramural leiomyoma of uterus     3.  Negative pregnancy dorsal- lithotomy position. A bimanual examination was performed without pathological changes from her most recent history and physical examination. A bi-valve speculum was then placed into the vaginal vault allowing visualization of the cervix. The surgical field was then cleansed with betadine. Under direct visualization an allis was placed on the anterior cervical lip. The uterus was sounded to 7 cm. A 3 mm Flexible Hysteroscope  was then placed thru the endocervical canal and into the endometrial cavity without any complications. A total of 20 ml of normal saline was instilled to aid in visualization of the endometrial cavity anatomy, video was completed. The optics during the case  was limited and the entire cavity was not able to be visualized. During the hysteroscopic procedure an endometrial sampling was performed without incident. Pathology is pending. The patient tolerated the procedure well, the Allis was removed off the anterior cervical lip and there was noted to be adequate hemostasis. The patient was given restrictions and will follow-up in the office in 10-14 days. She will report any abdominal pain, heavy vaginal bleeding or a temperature of greater than 100.4. Hysteroscopic Findings:  No septums polyps  Or fibroids-Limited d/t pt cooperation    Assessment:   Diagnosis Orders   1. Pelvic mass in female  [place] HYSTEROSCOPY,W/ENDO BX    Specimen to Pathology Outpatient   2. Intramural leiomyoma of uterus     3.  Negative pregnancy test  POCT urine pregnancy     Patient Active Problem List    Diagnosis Date Noted    Vaginal bleeding 04/11/2014     Priority: High    Acute blood loss anemia 04/10/2014     Priority: High    Cardiomyopathy ([address] 02/10/2020    Bacterial vaginosis 02/04/2020    Hyponatremia 02/02/2020    STD (female)     Hepatomegaly 01/31/2020    Intramural leiomyoma of uterus 01/31/2020    Heart failure ([address] 01/31/2020    Pelvic mass in female 01/28/2020   

## 2020-03-13 ENCOUNTER — TELEPHONE (OUTPATIENT)
Dept: OBGYN CLINIC | Age: 50
End: 2020-03-13

## 2020-03-13 LAB
ESTIMATED AVERAGE GLUCOSE: 194 MG/DL
HBA1C MFR BLD: 8.4 % (ref 4–6)

## 2020-03-16 ENCOUNTER — TELEPHONE (OUTPATIENT)
Dept: OBGYN CLINIC | Age: 50
End: 2020-03-16

## 2020-03-16 LAB — SURGICAL PATHOLOGY REPORT: NORMAL

## 2020-03-16 NOTE — TELEPHONE ENCOUNTER
----- Message from Kalyan Barakat DO sent at 3/16/2020 10:04 AM EDT -----  Send result to pts PCP.     Notify pt of results and she is to fu with her PCP

## 2020-03-16 NOTE — TELEPHONE ENCOUNTER
Patient returned call to office. Patient made aware of recommendations to follow up with PCP, per Dr. Kenia Nolan, for elevated Hbg a1C. Patient voiced an understanding, stating she would schedule appointment as advised. Labs faxed accordingly.

## 2020-03-18 ENCOUNTER — TELEPHONE (OUTPATIENT)
Dept: FAMILY MEDICINE CLINIC | Age: 50
End: 2020-03-18

## 2020-03-27 ENCOUNTER — TELEMEDICINE (OUTPATIENT)
Dept: NEUROLOGY | Age: 50
End: 2020-03-27

## 2020-03-27 VITALS — BODY MASS INDEX: 43.32 KG/M2 | HEIGHT: 67 IN | WEIGHT: 276 LBS

## 2020-03-27 PROCEDURE — 99204 OFFICE O/P NEW MOD 45 MIN: CPT | Performed by: STUDENT IN AN ORGANIZED HEALTH CARE EDUCATION/TRAINING PROGRAM

## 2020-03-27 PROCEDURE — 3052F HG A1C>EQUAL 8.0%<EQUAL 9.0%: CPT | Performed by: STUDENT IN AN ORGANIZED HEALTH CARE EDUCATION/TRAINING PROGRAM

## 2020-03-27 RX ORDER — SACUBITRIL AND VALSARTAN 49; 51 MG/1; MG/1
1 TABLET, FILM COATED ORAL 2 TIMES DAILY
COMMUNITY
Start: 2020-03-02 | End: 2022-06-02 | Stop reason: SDUPTHER

## 2020-03-27 NOTE — PROGRESS NOTES
movements are intact. Face appears symmetric. Intact hearing   Tongue protrudes midline. Palate elevates symmetrically. Shoulder shrug is intact bilaterally. Motor exam:    Able to move both upper and lower extremities equally well against gravity. No pronator drift noted. Muscle bulk appears normal.    No atrophy noted. Deep tendon reflexes:  Unable to perform deep tendon reflexes. Sensory exam:    Unable to test    Cerebellar exam:    Reveals intact finger-nose-finger testing. No visible abnormal involuntary movements. Gait exam:   Unable to assess      ASSESSMENT AND PLAN:     Mariama Caceres 52 y.o. female with past medical history of CHF, hypertension, hyperlipidemia, morbid obesity, chronic anemia was seen on [employer] for double vision. Patient describes sudden onset double vision with right eye muscle weakness in February 2020 that lasted for around 2 to 3 weeks  CT head done at that time did not show any acute finding. On exam patient did not have any extraocular muscle weakness today, was not able to assess pupillary changes on the tele screen. Denies any double vision or blurry vision at present. Unclear etiology with possible differentials of cranial nerve palsy secondary to newly diagnosed diabetes versus less likely infarct as patient denies any other neurologic symptoms associated with double vision. HbA1c on 3/12/2020 was 8.4, patient was not aware of that, instructed patient to follow-up with primary care physician for further management of newly diagnosed diabetes. Recommend aspirin 81 mg and Lipitor 40 mg. LDL done in February 2020 was 37. Patient is a former smoker    MRI of the brain to rule out infarct  Further management of diabetes, hypertension, hyperlipidemia, CHF as per PCP  We will follow-up in the clinic  in 2 to 3 months    Personally reviewed imaging with patients and all questions answered. Pt voiced understanding.   Patient agreed

## 2020-04-09 ENCOUNTER — VIRTUAL VISIT (OUTPATIENT)
Dept: FAMILY MEDICINE CLINIC | Age: 50
End: 2020-04-09

## 2020-04-09 PROCEDURE — 99441 PR PHYS/QHP TELEPHONE EVALUATION 5-10 MIN: CPT | Performed by: STUDENT IN AN ORGANIZED HEALTH CARE EDUCATION/TRAINING PROGRAM

## 2020-04-09 NOTE — PATIENT INSTRUCTIONS
when cooking. · Don't skip meals. Your blood sugar may drop too low if you skip meals and take insulin or certain medicines for diabetes. · Check with your doctor before you drink alcohol. Alcohol can cause your blood sugar to drop too low. Alcohol can also cause a bad reaction if you take certain diabetes medicines. Follow-up care is a key part of your treatment and safety. Be sure to make and go to all appointments, and call your doctor if you are having problems. It's also a good idea to know your test results and keep a list of the medicines you take. Where can you learn more? Go to https://TopiVertpepiceweb.[employer]. org and sign in to your [employer] account. Enter C085 in the [employer] box to learn more about \"Learning About Diabetes Food Guidelines. \"     If you do not have an account, please click on the \"Sign Up Now\" link. Current as of: December 19, 2019Content Version: 12.4  © 5760-5892 [employer]wise, [employer]. Care instructions adapted under license by [facility] ([facility]). If you have questions about a medical condition or this instruction, always ask your healthcare professional. [place] [address] any warranty or liability for your use of this information.

## 2020-04-15 ENCOUNTER — OFFICE VISIT (OUTPATIENT)
Dept: PRIMARY CARE CLINIC | Age: 50
End: 2020-04-15

## 2020-04-15 VITALS
SYSTOLIC BLOOD PRESSURE: 105 MMHG | WEIGHT: 276 LBS | HEART RATE: 101 BPM | TEMPERATURE: 97.2 F | DIASTOLIC BLOOD PRESSURE: 64 MMHG | BODY MASS INDEX: 43.32 KG/M2 | HEIGHT: 67 IN | OXYGEN SATURATION: 97 %

## 2020-04-15 LAB — S PYO AG THROAT QL: NORMAL

## 2020-04-15 PROCEDURE — 99213 OFFICE O/P EST LOW 20 MIN: CPT | Performed by: INTERNAL MEDICINE

## 2020-04-15 PROCEDURE — 87880 STREP A ASSAY W/OPTIC: CPT | Performed by: INTERNAL MEDICINE

## 2020-04-15 ASSESSMENT — ENCOUNTER SYMPTOMS
ABDOMINAL PAIN: 0
VOMITING: 0
SINUS PRESSURE: 0
VISUAL CHANGE: 0
RHINORRHEA: 0
NAUSEA: 0
SINUS PAIN: 0
CHANGE IN BOWEL HABIT: 0
COUGH: 0
SWOLLEN GLANDS: 0
SORE THROAT: 1

## 2020-04-15 NOTE — PROGRESS NOTES
History:   Procedure Laterality Date    COLONOSCOPY  2014    normal     ENDOSCOPY, COLON, DIAGNOSTIC      KNEE ARTHROSCOPY Right 2016    LUMBAR DISC SURGERY  2018    RIGHT MICRODISCECTOMY L3-4, L4-5    NV OFFICE/OUTPT VISIT,PROCEDURE ONLY Right 2018    RIGHT MICRODISCECTOMY L3-4, L4-5, GABRIEL TABLE, PRONE, MICROSCOPE, METRX TUBE, C-ARM performed by Oscar Gerardo DO at [facility] History   Problem Relation Age of Onset    Other Brother     High Blood Pressure Mother     High Blood Pressure Father     Asthma Father        Social History     Tobacco Use    Smoking status: Former Smoker     Packs/day: 0.25     Years: 3.00     Pack years: 0.75     Types: Cigarettes     Last attempt to quit: 2020     Years since quittin.2    Smokeless tobacco: Never Used   Substance Use Topics    Alcohol use: Yes     Comment: socially      Current Outpatient Medications   Medication Sig Dispense Refill    metFORMIN (GLUCOPHAGE) 500 MG tablet Take 1 tablet by mouth 2 times daily (with meals) 60 tablet 5    sacubitril-valsartan (ENTRESTO) 49-51 MG per tablet Take 1 tablet by mouth 2 times daily      aspirin EC 81 MG EC tablet Take 1 tablet by mouth daily 90 tablet 1    amLODIPine (NORVASC) 5 MG tablet Take 1 tablet by mouth daily 30 tablet 3    carvedilol (COREG) 6.25 MG tablet Take 1 tablet by mouth 2 times daily (with meals) 60 tablet 3    spironolactone (ALDACTONE) 25 MG tablet Take 1 tablet by mouth daily 30 tablet 3    furosemide (LASIX) 40 MG tablet Take 1 tablet by mouth daily 60 tablet 3    potassium chloride (KLOR-CON M) 10 MEQ extended release tablet Take 1 tablet by mouth 2 times daily 60 tablet 0    atorvastatin (LIPITOR) 40 MG tablet Take 1 tablet by mouth daily (Patient not taking: Reported on 4/15/2020) 30 tablet 4     No current facility-administered medications for this visit.       No Known Allergies    Health Maintenance   Topic Date Due    Diabetic foot exam Uvula midline. Cardiovascular:      Rate and Rhythm: Normal rate and regular rhythm. Heart sounds: Normal heart sounds. No murmur. No friction rub. No gallop. Pulmonary:      Effort: Pulmonary effort is normal. No respiratory distress. Breath sounds: Normal breath sounds. No wheezing. Abdominal:      General: Bowel sounds are normal.      Palpations: Abdomen is soft. There is no mass. Tenderness: There is no abdominal tenderness. There is no guarding. Musculoskeletal: Normal range of motion. Lymphadenopathy:      Cervical: No cervical adenopathy. Neurological:      Mental Status: She is alert. Assessment/Plan:     1. Sore throat  Most likely due to post-nasal drip   Try claritin OTC  - POCT rapid strep A        No follow-ups on file. Patient given educational materials- see patient instructions. Discussed use, benefit, and side effects of prescribedmedications. All patient questions answered. Pt voiced understanding. Reviewedhealth maintenance. Instructed to continue current medications, diet and exercise. Patient agreed with treatment plan. Follow up as directed.      Electronically signedby Rylie Rhodes MD on 4/15/2020

## 2020-04-20 ENCOUNTER — HOSPITAL ENCOUNTER (INPATIENT)
Age: 50
LOS: 2 days | Discharge: HOME | DRG: 638 | End: 2020-04-22
Attending: EMERGENCY MEDICINE | Admitting: FAMILY MEDICINE

## 2020-04-20 PROBLEM — E11.65 HYPERGLYCEMIA DUE TO TYPE 2 DIABETES MELLITUS (HCC): Status: ACTIVE | Noted: 2020-04-20

## 2020-04-20 PROBLEM — N17.9 AKI (ACUTE KIDNEY INJURY) (HCC): Status: ACTIVE | Noted: 2020-04-20

## 2020-04-20 PROBLEM — E87.5 HYPERKALEMIA: Status: ACTIVE | Noted: 2020-04-20

## 2020-04-20 PROBLEM — R73.9 HYPERGLYCEMIA: Status: ACTIVE | Noted: 2020-04-20

## 2020-04-20 LAB
-: ABNORMAL
ABSOLUTE EOS #: 0 K/UL (ref 0–0.4)
ABSOLUTE IMMATURE GRANULOCYTE: 0 K/UL (ref 0–0.3)
ABSOLUTE LYMPH #: 1.22 K/UL (ref 1–4.8)
ABSOLUTE MONO #: 0.15 K/UL (ref 0.1–0.8)
ALLEN TEST: ABNORMAL
AMORPHOUS: ABNORMAL
ANION GAP SERPL CALCULATED.3IONS-SCNC: 18 MMOL/L (ref 9–17)
ANION GAP: 10 MMOL/L (ref 7–16)
BACTERIA: ABNORMAL
BASOPHILS # BLD: 0 % (ref 0–2)
BASOPHILS ABSOLUTE: 0 K/UL (ref 0–0.2)
BETA-HYDROXYBUTYRATE: 1.28 MMOL/L (ref 0.02–0.27)
BILIRUBIN URINE: NEGATIVE
BUN BLDV-MCNC: 24 MG/DL (ref 6–20)
BUN/CREAT BLD: ABNORMAL (ref 9–20)
CALCIUM SERPL-MCNC: 9.8 MG/DL (ref 8.6–10.4)
CASTS UA: ABNORMAL /LPF (ref 0–2)
CHLORIDE BLD-SCNC: 81 MMOL/L (ref 98–107)
CHP ED QC CHECK: YES
CO2: 25 MMOL/L (ref 20–31)
COLOR: YELLOW
CREAT SERPL-MCNC: 1.38 MG/DL (ref 0.5–0.9)
CRYSTALS, UA: ABNORMAL /HPF
DIFFERENTIAL TYPE: ABNORMAL
DIRECT EXAM: NORMAL
EOSINOPHILS RELATIVE PERCENT: 0 % (ref 1–4)
EPITHELIAL CELLS UA: ABNORMAL /HPF (ref 0–5)
FIO2: ABNORMAL
GFR AFRICAN AMERICAN: 49 ML/MIN
GFR NON-AFRICAN AMERICAN: 41 ML/MIN
GFR NON-AFRICAN AMERICAN: 54 ML/MIN
GFR SERPL CREATININE-BSD FRML MDRD: >60 ML/MIN
GFR SERPL CREATININE-BSD FRML MDRD: ABNORMAL ML/MIN/{1.73_M2}
GLUCOSE BLD-MCNC: 0 MG/DL
GLUCOSE BLD-MCNC: 1442 MG/DL (ref 70–99)
GLUCOSE BLD-MCNC: 568 MG/DL (ref 65–105)
GLUCOSE BLD-MCNC: 730 MG/DL (ref 70–99)
GLUCOSE BLD-MCNC: >700 MG/DL (ref 74–100)
GLUCOSE URINE: ABNORMAL
HCG(URINE) PREGNANCY TEST: NEGATIVE
HCO3 VENOUS: 31.7 MMOL/L (ref 22–29)
HCT VFR BLD CALC: 46.8 % (ref 36.3–47.1)
HEMOGLOBIN: 14.8 G/DL (ref 11.9–15.1)
IMMATURE GRANULOCYTES: 0 %
KETONES, URINE: NEGATIVE
LEUKOCYTE ESTERASE, URINE: NEGATIVE
LYMPHOCYTES # BLD: 8 % (ref 24–44)
Lab: NORMAL
MCH RBC QN AUTO: 26.9 PG (ref 25.2–33.5)
MCHC RBC AUTO-ENTMCNC: 31.6 G/DL (ref 28.4–34.8)
MCV RBC AUTO: 84.9 FL (ref 82.6–102.9)
MODE: ABNORMAL
MONOCYTES # BLD: 1 % (ref 1–7)
MORPHOLOGY: ABNORMAL
MUCUS: ABNORMAL
NEGATIVE BASE EXCESS, VEN: ABNORMAL (ref 0–2)
NITRITE, URINE: NEGATIVE
NRBC AUTOMATED: 0 PER 100 WBC
O2 DEVICE/FLOW/%: ABNORMAL
O2 SAT, VEN: 34 % (ref 60–85)
OTHER OBSERVATIONS UA: ABNORMAL
PATIENT TEMP: ABNORMAL
PCO2, VEN: 57.3 MM HG (ref 41–51)
PDW BLD-RTO: 22.2 % (ref 11.8–14.4)
PH UA: 5 (ref 5–8)
PH VENOUS: 7.35 (ref 7.32–7.43)
PLATELET # BLD: ABNORMAL K/UL (ref 138–453)
PLATELET ESTIMATE: ABNORMAL
PLATELET, FLUORESCENCE: 249 K/UL (ref 138–453)
PLATELET, IMMATURE FRACTION: 6.8 % (ref 1.1–10.3)
PMV BLD AUTO: ABNORMAL FL (ref 8.1–13.5)
PO2, VEN: 22.4 MM HG (ref 30–50)
POC CHLORIDE: 94 MMOL/L (ref 98–107)
POC CREATININE: 1.09 MG/DL (ref 0.51–1.19)
POC HEMATOCRIT: 50 % (ref 36–46)
POC HEMOGLOBIN: 17 G/DL (ref 12–16)
POC IONIZED CALCIUM: 1.19 MMOL/L (ref 1.15–1.33)
POC LACTIC ACID: 3.53 MMOL/L (ref 0.56–1.39)
POC PCO2 TEMP: ABNORMAL MM HG
POC PH TEMP: ABNORMAL
POC PO2 TEMP: ABNORMAL MM HG
POC POTASSIUM: 4.2 MMOL/L (ref 3.5–4.5)
POC SODIUM: 136 MMOL/L (ref 138–146)
POSITIVE BASE EXCESS, VEN: 4 (ref 0–3)
POTASSIUM SERPL-SCNC: 4.3 MMOL/L (ref 3.7–5.3)
POTASSIUM SERPL-SCNC: 5.9 MMOL/L (ref 3.7–5.3)
POTASSIUM SERPL-SCNC: 7 MMOL/L (ref 3.7–5.3)
PROTEIN UA: NEGATIVE
RBC # BLD: 5.51 M/UL (ref 3.95–5.11)
RBC # BLD: ABNORMAL 10*6/UL
RBC UA: ABNORMAL /HPF (ref 0–2)
RENAL EPITHELIAL, UA: ABNORMAL /HPF
SAMPLE SITE: ABNORMAL
SEG NEUTROPHILS: 91 % (ref 36–66)
SEGMENTED NEUTROPHILS ABSOLUTE COUNT: 13.93 K/UL (ref 1.8–7.7)
SERUM OSMOLALITY: 358 MOSM/KG (ref 275–295)
SODIUM BLD-SCNC: 124 MMOL/L (ref 135–144)
SPECIFIC GRAVITY UA: 1.03 (ref 1–1.03)
SPECIMEN DESCRIPTION: NORMAL
TOTAL CO2, VENOUS: 34 MMOL/L (ref 23–30)
TRICHOMONAS: ABNORMAL
TURBIDITY: CLEAR
URINE HGB: NEGATIVE
UROBILINOGEN, URINE: NORMAL
WBC # BLD: 15.3 K/UL (ref 3.5–11.3)
WBC # BLD: ABNORMAL 10*3/UL
WBC UA: ABNORMAL /HPF (ref 0–5)
YEAST: ABNORMAL

## 2020-04-20 PROCEDURE — 84295 ASSAY OF SERUM SODIUM: CPT

## 2020-04-20 PROCEDURE — 2580000003 HC RX 258: Performed by: STUDENT IN AN ORGANIZED HEALTH CARE EDUCATION/TRAINING PROGRAM

## 2020-04-20 PROCEDURE — 82947 ASSAY GLUCOSE BLOOD QUANT: CPT

## 2020-04-20 PROCEDURE — 99284 EMERGENCY DEPT VISIT MOD MDM: CPT

## 2020-04-20 PROCEDURE — 83930 ASSAY OF BLOOD OSMOLALITY: CPT

## 2020-04-20 PROCEDURE — 96374 THER/PROPH/DIAG INJ IV PUSH: CPT

## 2020-04-20 PROCEDURE — 6360000002 HC RX W HCPCS: Performed by: STUDENT IN AN ORGANIZED HEALTH CARE EDUCATION/TRAINING PROGRAM

## 2020-04-20 PROCEDURE — 2060000000 HC ICU INTERMEDIATE R&B

## 2020-04-20 PROCEDURE — 85014 HEMATOCRIT: CPT

## 2020-04-20 PROCEDURE — 81025 URINE PREGNANCY TEST: CPT

## 2020-04-20 PROCEDURE — 87510 GARDNER VAG DNA DIR PROBE: CPT

## 2020-04-20 PROCEDURE — 85025 COMPLETE CBC W/AUTO DIFF WBC: CPT

## 2020-04-20 PROCEDURE — 2580000003 HC RX 258: Performed by: EMERGENCY MEDICINE

## 2020-04-20 PROCEDURE — 87660 TRICHOMONAS VAGIN DIR PROBE: CPT

## 2020-04-20 PROCEDURE — 82330 ASSAY OF CALCIUM: CPT

## 2020-04-20 PROCEDURE — 82565 ASSAY OF CREATININE: CPT

## 2020-04-20 PROCEDURE — 84132 ASSAY OF SERUM POTASSIUM: CPT

## 2020-04-20 PROCEDURE — 80048 BASIC METABOLIC PNL TOTAL CA: CPT

## 2020-04-20 PROCEDURE — 93005 ELECTROCARDIOGRAM TRACING: CPT | Performed by: EMERGENCY MEDICINE

## 2020-04-20 PROCEDURE — 6370000000 HC RX 637 (ALT 250 FOR IP): Performed by: STUDENT IN AN ORGANIZED HEALTH CARE EDUCATION/TRAINING PROGRAM

## 2020-04-20 PROCEDURE — 82803 BLOOD GASES ANY COMBINATION: CPT

## 2020-04-20 PROCEDURE — 87591 N.GONORRHOEAE DNA AMP PROB: CPT

## 2020-04-20 PROCEDURE — 85055 RETICULATED PLATELET ASSAY: CPT

## 2020-04-20 PROCEDURE — 83605 ASSAY OF LACTIC ACID: CPT

## 2020-04-20 PROCEDURE — 81001 URINALYSIS AUTO W/SCOPE: CPT

## 2020-04-20 PROCEDURE — 87480 CANDIDA DNA DIR PROBE: CPT

## 2020-04-20 PROCEDURE — 6370000000 HC RX 637 (ALT 250 FOR IP): Performed by: EMERGENCY MEDICINE

## 2020-04-20 PROCEDURE — 82435 ASSAY OF BLOOD CHLORIDE: CPT

## 2020-04-20 PROCEDURE — 96372 THER/PROPH/DIAG INJ SC/IM: CPT

## 2020-04-20 PROCEDURE — 87491 CHLMYD TRACH DNA AMP PROBE: CPT

## 2020-04-20 PROCEDURE — 82010 KETONE BODYS QUAN: CPT

## 2020-04-20 RX ORDER — AMLODIPINE BESYLATE 5 MG/1
5 TABLET ORAL DAILY
Status: DISCONTINUED | OUTPATIENT
Start: 2020-04-21 | End: 2020-04-22 | Stop reason: HOSPADM

## 2020-04-20 RX ORDER — DEXTROSE AND SODIUM CHLORIDE 5; .45 G/100ML; G/100ML
INJECTION, SOLUTION INTRAVENOUS CONTINUOUS PRN
Status: DISCONTINUED | OUTPATIENT
Start: 2020-04-20 | End: 2020-04-22 | Stop reason: HOSPADM

## 2020-04-20 RX ORDER — DEXTROSE MONOHYDRATE 25 G/50ML
12.5 INJECTION, SOLUTION INTRAVENOUS PRN
Status: DISCONTINUED | OUTPATIENT
Start: 2020-04-20 | End: 2020-04-22 | Stop reason: HOSPADM

## 2020-04-20 RX ORDER — DEXTROSE MONOHYDRATE 50 MG/ML
100 INJECTION, SOLUTION INTRAVENOUS PRN
Status: DISCONTINUED | OUTPATIENT
Start: 2020-04-20 | End: 2020-04-22 | Stop reason: HOSPADM

## 2020-04-20 RX ORDER — SPIRONOLACTONE 25 MG/1
25 TABLET ORAL DAILY
Status: CANCELLED | OUTPATIENT
Start: 2020-04-20

## 2020-04-20 RX ORDER — ATORVASTATIN CALCIUM 40 MG/1
40 TABLET, FILM COATED ORAL DAILY
Status: DISCONTINUED | OUTPATIENT
Start: 2020-04-21 | End: 2020-04-22 | Stop reason: HOSPADM

## 2020-04-20 RX ORDER — HEPARIN SODIUM 5000 [USP'U]/ML
5000 INJECTION, SOLUTION INTRAVENOUS; SUBCUTANEOUS EVERY 8 HOURS SCHEDULED
Status: DISCONTINUED | OUTPATIENT
Start: 2020-04-21 | End: 2020-04-22 | Stop reason: HOSPADM

## 2020-04-20 RX ORDER — ACETAMINOPHEN 325 MG/1
650 TABLET ORAL EVERY 4 HOURS PRN
Status: DISCONTINUED | OUTPATIENT
Start: 2020-04-20 | End: 2020-04-22 | Stop reason: HOSPADM

## 2020-04-20 RX ORDER — 0.9 % SODIUM CHLORIDE 0.9 %
1000 INTRAVENOUS SOLUTION INTRAVENOUS ONCE
Status: COMPLETED | OUTPATIENT
Start: 2020-04-20 | End: 2020-04-20

## 2020-04-20 RX ORDER — CARVEDILOL 6.25 MG/1
6.25 TABLET ORAL 2 TIMES DAILY WITH MEALS
Status: DISCONTINUED | OUTPATIENT
Start: 2020-04-21 | End: 2020-04-22 | Stop reason: HOSPADM

## 2020-04-20 RX ORDER — ASPIRIN 81 MG/1
81 TABLET ORAL DAILY
Status: DISCONTINUED | OUTPATIENT
Start: 2020-04-21 | End: 2020-04-22 | Stop reason: HOSPADM

## 2020-04-20 RX ORDER — AZITHROMYCIN 250 MG/1
1000 TABLET, FILM COATED ORAL ONCE
Status: COMPLETED | OUTPATIENT
Start: 2020-04-20 | End: 2020-04-20

## 2020-04-20 RX ORDER — FLUCONAZOLE 50 MG/1
150 TABLET ORAL ONCE
Status: COMPLETED | OUTPATIENT
Start: 2020-04-20 | End: 2020-04-20

## 2020-04-20 RX ORDER — SODIUM CHLORIDE 0.9 % (FLUSH) 0.9 %
10 SYRINGE (ML) INJECTION PRN
Status: DISCONTINUED | OUTPATIENT
Start: 2020-04-20 | End: 2020-04-22 | Stop reason: HOSPADM

## 2020-04-20 RX ORDER — NICOTINE POLACRILEX 4 MG
15 LOZENGE BUCCAL PRN
Status: DISCONTINUED | OUTPATIENT
Start: 2020-04-20 | End: 2020-04-22 | Stop reason: HOSPADM

## 2020-04-20 RX ORDER — SODIUM CHLORIDE, SODIUM LACTATE, POTASSIUM CHLORIDE, AND CALCIUM CHLORIDE .6; .31; .03; .02 G/100ML; G/100ML; G/100ML; G/100ML
1000 INJECTION, SOLUTION INTRAVENOUS ONCE
Status: COMPLETED | OUTPATIENT
Start: 2020-04-20 | End: 2020-04-21

## 2020-04-20 RX ORDER — MAGNESIUM SULFATE 1 G/100ML
1 INJECTION INTRAVENOUS PRN
Status: DISCONTINUED | OUTPATIENT
Start: 2020-04-20 | End: 2020-04-22 | Stop reason: HOSPADM

## 2020-04-20 RX ORDER — CEFTRIAXONE SODIUM 250 MG/1
250 INJECTION, POWDER, FOR SOLUTION INTRAMUSCULAR; INTRAVENOUS ONCE
Status: COMPLETED | OUTPATIENT
Start: 2020-04-20 | End: 2020-04-20

## 2020-04-20 RX ORDER — SODIUM CHLORIDE 0.9 % (FLUSH) 0.9 %
10 SYRINGE (ML) INJECTION EVERY 12 HOURS SCHEDULED
Status: DISCONTINUED | OUTPATIENT
Start: 2020-04-21 | End: 2020-04-22 | Stop reason: HOSPADM

## 2020-04-20 RX ORDER — NYSTATIN 100000 U/G
CREAM TOPICAL 2 TIMES DAILY
Status: DISCONTINUED | OUTPATIENT
Start: 2020-04-20 | End: 2020-04-22 | Stop reason: HOSPADM

## 2020-04-20 RX ORDER — SODIUM CHLORIDE 9 MG/ML
INJECTION, SOLUTION INTRAVENOUS CONTINUOUS
Status: DISCONTINUED | OUTPATIENT
Start: 2020-04-21 | End: 2020-04-21

## 2020-04-20 RX ADMIN — FLUCONAZOLE 150 MG: 50 TABLET ORAL at 18:56

## 2020-04-20 RX ADMIN — INSULIN LISPRO 10 UNITS: 100 INJECTION, SOLUTION INTRAVENOUS; SUBCUTANEOUS at 17:52

## 2020-04-20 RX ADMIN — SODIUM CHLORIDE, POTASSIUM CHLORIDE, SODIUM LACTATE AND CALCIUM CHLORIDE 1000 ML: 600; 310; 30; 20 INJECTION, SOLUTION INTRAVENOUS at 21:10

## 2020-04-20 RX ADMIN — CEFTRIAXONE SODIUM 250 MG: 250 INJECTION, POWDER, FOR SOLUTION INTRAMUSCULAR; INTRAVENOUS at 17:24

## 2020-04-20 RX ADMIN — NYSTATIN: 100000 CREAM TOPICAL at 21:52

## 2020-04-20 RX ADMIN — INSULIN HUMAN 10 UNITS: 100 INJECTION, SOLUTION PARENTERAL at 20:38

## 2020-04-20 RX ADMIN — SODIUM CHLORIDE 0.1 UNITS/KG/HR: 9 INJECTION, SOLUTION INTRAVENOUS at 21:15

## 2020-04-20 RX ADMIN — SODIUM CHLORIDE 1000 ML: 9 INJECTION, SOLUTION INTRAVENOUS at 19:25

## 2020-04-20 RX ADMIN — AZITHROMYCIN 1000 MG: 250 TABLET, FILM COATED ORAL at 17:24

## 2020-04-20 ASSESSMENT — ENCOUNTER SYMPTOMS
SHORTNESS OF BREATH: 0
EYE PAIN: 0

## 2020-04-20 NOTE — ED PROVIDER NOTES
[facility]  ED  Emergency Department  Emergency Medicine Resident Sign-out     Care of Lewis Hirsch was assumed from Dr. Diaz Kellogg and is being seen for Vaginal Itching (pt states she has been having vaginal itching for a few days now. )  . The patient's initial evaluation and plan have been discussed with the prior provider who initially evaluated the patient. EMERGENCY DEPARTMENT COURSE / MEDICAL DECISION MAKING:       MEDICATIONS GIVEN:  Orders Placed This Encounter   Medications    cefTRIAXone (ROCEPHIN) injection 250 mg    azithromycin (ZITHROMAX) tablet 1,000 mg    0.9 % sodium chloride bolus    DISCONTD: insulin lispro (HUMALOG) injection vial 5 Units    insulin lispro (HUMALOG) injection vial 10 Units    fluconazole (DIFLUCAN) tablet 150 mg    nystatin (MYCOSTATIN) cream       LABS / RADIOLOGY:     Labs Reviewed   URINALYSIS WITH MICROSCOPIC - Abnormal; Notable for the following components:       Result Value    Glucose, Ur 3+ (*)     Specific Gravity, UA 1.035 (*)     All other components within normal limits   BASIC METABOLIC PANEL - Abnormal; Notable for the following components:    Glucose 1,442 (*)     BUN 24 (*)     CREATININE 1.38 (*)     Sodium 124 (*)     Potassium 5.9 (*)     Chloride 81 (*)     Anion Gap 18 (*)     GFR Non- 41 (*)     GFR  49 (*)     All other components within normal limits   CBC WITH AUTO DIFFERENTIAL - Abnormal; Notable for the following components:    WBC 15.3 (*)     RBC 5.51 (*)     RDW 22.2 (*)     All other components within normal limits   POCT GLUCOSE - Normal   VAGINITIS DNA PROBE   C.TRACHOMATIS N.GONORRHOEAE DNA   PREGNANCY, URINE   IMMATURE PLATELET FRACTION   PREVIOUS SPECIMEN   POTASSIUM   OSMOLALITY   BETA-HYDROXYBUTYRATE   POC BLOOD GAS AND CHEMISTRY       No results found. RECENT VITALS:     Temp: 96.6 °F (35.9 °C),  Pulse: 77, Resp: 17, BP: 104/67, SpO2: 97 %    This patient is a 52 y.o.  Female
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
## 2020-04-20 NOTE — ED PROVIDER NOTES
exam she is comfortable appearing, vital signs are normal.  Skin is warm and dry. Mouth is slightly dry. Impression is poorly controlled diabetes, suspect vaginitis likely monilial.  Plan is Accu-Chek blood sugar, pelvic exam, antimicrobials, early follow-up. Ade Bailey. Rohit Garsia MD, [facility]  Attending Emergency  Physician             CRITICAL CARE: There was a high probability of clinically significant/life threatening deterioration in this patient's condition which required my urgent intervention. Total critical care time was 20 minutes. This excludes any time for separately reportable procedures. EKG:   EKG Interpretation    Interpreted by me    Rhythm: normal sinus   Rate: normal  Axis: normal  Ectopy: none  Conduction: normal  ST Segments: no acute change  T Waves: Inversion laterally, flattening inferiorly  Q Waves: none  Poor wave progression anteriorly    Clinical Impression: Nonnspecific T wave abnormalities, consider ischemia though unlikely      Rishabh Garsia MD, [facility]  Attending Emergency  Physician                 Arlene Rodríguez MD  04/20/20 1681

## 2020-04-21 LAB
ABSOLUTE EOS #: 0.17 K/UL (ref 0–0.44)
ABSOLUTE IMMATURE GRANULOCYTE: 0.17 K/UL (ref 0–0.3)
ABSOLUTE LYMPH #: 1.74 K/UL (ref 1.1–3.7)
ABSOLUTE MONO #: 0.7 K/UL (ref 0.1–1.2)
ANION GAP SERPL CALCULATED.3IONS-SCNC: 14 MMOL/L (ref 9–17)
BASOPHILS # BLD: 0 % (ref 0–2)
BASOPHILS ABSOLUTE: 0 K/UL (ref 0–0.2)
BUN BLDV-MCNC: 27 MG/DL (ref 6–20)
BUN/CREAT BLD: ABNORMAL (ref 9–20)
C TRACH DNA GENITAL QL NAA+PROBE: NEGATIVE
CALCIUM IONIZED: 1.16 MMOL/L (ref 1.13–1.33)
CALCIUM SERPL-MCNC: 8.5 MG/DL (ref 8.6–10.4)
CHLORIDE BLD-SCNC: 90 MMOL/L (ref 98–107)
CO2: 21 MMOL/L (ref 20–31)
CREAT SERPL-MCNC: 1.46 MG/DL (ref 0.5–0.9)
DIFFERENTIAL TYPE: ABNORMAL
EKG ATRIAL RATE: 80 BPM
EKG ATRIAL RATE: 89 BPM
EKG P AXIS: -92 DEGREES
EKG P AXIS: 62 DEGREES
EKG P-R INTERVAL: 134 MS
EKG P-R INTERVAL: 172 MS
EKG Q-T INTERVAL: 390 MS
EKG Q-T INTERVAL: 402 MS
EKG QRS DURATION: 104 MS
EKG QRS DURATION: 90 MS
EKG QTC CALCULATION (BAZETT): 449 MS
EKG QTC CALCULATION (BAZETT): 489 MS
EKG R AXIS: 25 DEGREES
EKG R AXIS: 39 DEGREES
EKG T AXIS: -92 DEGREES
EKG T AXIS: 4 DEGREES
EKG VENTRICULAR RATE: 80 BPM
EKG VENTRICULAR RATE: 89 BPM
EOSINOPHILS RELATIVE PERCENT: 1 % (ref 1–4)
GFR AFRICAN AMERICAN: 46 ML/MIN
GFR NON-AFRICAN AMERICAN: 38 ML/MIN
GFR SERPL CREATININE-BSD FRML MDRD: ABNORMAL ML/MIN/{1.73_M2}
GFR SERPL CREATININE-BSD FRML MDRD: ABNORMAL ML/MIN/{1.73_M2}
GLUCOSE BLD-MCNC: 141 MG/DL (ref 65–105)
GLUCOSE BLD-MCNC: 286 MG/DL (ref 65–105)
GLUCOSE BLD-MCNC: 313 MG/DL (ref 65–105)
GLUCOSE BLD-MCNC: 396 MG/DL (ref 65–105)
GLUCOSE BLD-MCNC: 432 MG/DL (ref 65–105)
GLUCOSE BLD-MCNC: 433 MG/DL (ref 65–105)
GLUCOSE BLD-MCNC: 449 MG/DL (ref 65–105)
GLUCOSE BLD-MCNC: 486 MG/DL (ref 70–99)
GLUCOSE BLD-MCNC: >600 MG/DL (ref 65–105)
HCT VFR BLD CALC: 41.4 % (ref 36.3–47.1)
HEMOGLOBIN: 13.2 G/DL (ref 11.9–15.1)
IMMATURE GRANULOCYTES: 1 %
LACTIC ACID, WHOLE BLOOD: 2.5 MMOL/L (ref 0.7–2.1)
LYMPHOCYTES # BLD: 10 % (ref 24–43)
MAGNESIUM: 3.4 MG/DL (ref 1.6–2.6)
MCH RBC QN AUTO: 27 PG (ref 25.2–33.5)
MCHC RBC AUTO-ENTMCNC: 31.9 G/DL (ref 28.4–34.8)
MCV RBC AUTO: 84.7 FL (ref 82.6–102.9)
MONOCYTES # BLD: 4 % (ref 3–12)
MORPHOLOGY: ABNORMAL
N. GONORRHOEAE DNA: NEGATIVE
NRBC AUTOMATED: 0 PER 100 WBC
PDW BLD-RTO: 21.8 % (ref 11.8–14.4)
PLATELET # BLD: ABNORMAL K/UL (ref 138–453)
PLATELET ESTIMATE: ABNORMAL
PLATELET, FLUORESCENCE: 238 K/UL (ref 138–453)
PLATELET, IMMATURE FRACTION: 6.4 % (ref 1.1–10.3)
PMV BLD AUTO: ABNORMAL FL (ref 8.1–13.5)
POTASSIUM SERPL-SCNC: 3.8 MMOL/L (ref 3.7–5.3)
POTASSIUM SERPL-SCNC: 4.4 MMOL/L (ref 3.7–5.3)
PROCALCITONIN: 0.18 NG/ML
RBC # BLD: 4.89 M/UL (ref 3.95–5.11)
RBC # BLD: ABNORMAL 10*6/UL
SEG NEUTROPHILS: 84 % (ref 36–65)
SEGMENTED NEUTROPHILS ABSOLUTE COUNT: 14.62 K/UL (ref 1.5–8.1)
SODIUM BLD-SCNC: 125 MMOL/L (ref 135–144)
SPECIMEN DESCRIPTION: NORMAL
WBC # BLD: 17.4 K/UL (ref 3.5–11.3)
WBC # BLD: ABNORMAL 10*3/UL

## 2020-04-21 PROCEDURE — 82330 ASSAY OF CALCIUM: CPT

## 2020-04-21 PROCEDURE — 83735 ASSAY OF MAGNESIUM: CPT

## 2020-04-21 PROCEDURE — 85025 COMPLETE CBC W/AUTO DIFF WBC: CPT

## 2020-04-21 PROCEDURE — 93010 ELECTROCARDIOGRAM REPORT: CPT | Performed by: INTERNAL MEDICINE

## 2020-04-21 PROCEDURE — 84132 ASSAY OF SERUM POTASSIUM: CPT

## 2020-04-21 PROCEDURE — 6370000000 HC RX 637 (ALT 250 FOR IP): Performed by: STUDENT IN AN ORGANIZED HEALTH CARE EDUCATION/TRAINING PROGRAM

## 2020-04-21 PROCEDURE — 85055 RETICULATED PLATELET ASSAY: CPT

## 2020-04-21 PROCEDURE — G0108 DIAB MANAGE TRN  PER INDIV: HCPCS

## 2020-04-21 PROCEDURE — 80048 BASIC METABOLIC PNL TOTAL CA: CPT

## 2020-04-21 PROCEDURE — 6360000002 HC RX W HCPCS: Performed by: STUDENT IN AN ORGANIZED HEALTH CARE EDUCATION/TRAINING PROGRAM

## 2020-04-21 PROCEDURE — 97530 THERAPEUTIC ACTIVITIES: CPT

## 2020-04-21 PROCEDURE — 84145 PROCALCITONIN (PCT): CPT

## 2020-04-21 PROCEDURE — 99223 1ST HOSP IP/OBS HIGH 75: CPT | Performed by: FAMILY MEDICINE

## 2020-04-21 PROCEDURE — 97535 SELF CARE MNGMENT TRAINING: CPT

## 2020-04-21 PROCEDURE — 97166 OT EVAL MOD COMPLEX 45 MIN: CPT

## 2020-04-21 PROCEDURE — 97162 PT EVAL MOD COMPLEX 30 MIN: CPT

## 2020-04-21 PROCEDURE — 93005 ELECTROCARDIOGRAM TRACING: CPT | Performed by: FAMILY MEDICINE

## 2020-04-21 PROCEDURE — 87040 BLOOD CULTURE FOR BACTERIA: CPT

## 2020-04-21 PROCEDURE — 83605 ASSAY OF LACTIC ACID: CPT

## 2020-04-21 PROCEDURE — 36415 COLL VENOUS BLD VENIPUNCTURE: CPT

## 2020-04-21 PROCEDURE — 2580000003 HC RX 258: Performed by: STUDENT IN AN ORGANIZED HEALTH CARE EDUCATION/TRAINING PROGRAM

## 2020-04-21 PROCEDURE — 2060000000 HC ICU INTERMEDIATE R&B

## 2020-04-21 PROCEDURE — 94761 N-INVAS EAR/PLS OXIMETRY MLT: CPT

## 2020-04-21 RX ORDER — LORAZEPAM 2 MG/ML
0.5 INJECTION INTRAMUSCULAR ONCE
Status: COMPLETED | OUTPATIENT
Start: 2020-04-21 | End: 2020-04-21

## 2020-04-21 RX ORDER — SODIUM CHLORIDE 0.9 % (FLUSH) 0.9 %
10 SYRINGE (ML) INJECTION EVERY 12 HOURS SCHEDULED
Status: CANCELLED | OUTPATIENT
Start: 2020-04-21

## 2020-04-21 RX ORDER — HYDRALAZINE HYDROCHLORIDE 20 MG/ML
5 INJECTION INTRAMUSCULAR; INTRAVENOUS EVERY 6 HOURS PRN
Status: DISCONTINUED | OUTPATIENT
Start: 2020-04-21 | End: 2020-04-22 | Stop reason: HOSPADM

## 2020-04-21 RX ORDER — SODIUM CHLORIDE 9 MG/ML
INJECTION, SOLUTION INTRAVENOUS CONTINUOUS
Status: DISCONTINUED | OUTPATIENT
Start: 2020-04-21 | End: 2020-04-22 | Stop reason: HOSPADM

## 2020-04-21 RX ORDER — INSULIN GLARGINE 100 [IU]/ML
10 INJECTION, SOLUTION SUBCUTANEOUS NIGHTLY
Status: DISCONTINUED | OUTPATIENT
Start: 2020-04-21 | End: 2020-04-22 | Stop reason: HOSPADM

## 2020-04-21 RX ORDER — CLOTRIMAZOLE 1 %
CREAM (GRAM) TOPICAL 2 TIMES DAILY
Status: DISCONTINUED | OUTPATIENT
Start: 2020-04-21 | End: 2020-04-22 | Stop reason: HOSPADM

## 2020-04-21 RX ORDER — POTASSIUM CHLORIDE 20 MEQ/1
40 TABLET, EXTENDED RELEASE ORAL PRN
Status: DISCONTINUED | OUTPATIENT
Start: 2020-04-21 | End: 2020-04-22 | Stop reason: HOSPADM

## 2020-04-21 RX ORDER — POTASSIUM CHLORIDE 7.45 MG/ML
10 INJECTION INTRAVENOUS PRN
Status: DISCONTINUED | OUTPATIENT
Start: 2020-04-21 | End: 2020-04-22 | Stop reason: HOSPADM

## 2020-04-21 RX ORDER — FLUCONAZOLE 100 MG/1
100 TABLET ORAL DAILY
Status: DISCONTINUED | OUTPATIENT
Start: 2020-04-21 | End: 2020-04-22 | Stop reason: HOSPADM

## 2020-04-21 RX ORDER — INSULIN GLARGINE 100 [IU]/ML
10 INJECTION, SOLUTION SUBCUTANEOUS ONCE
Status: COMPLETED | OUTPATIENT
Start: 2020-04-21 | End: 2020-04-21

## 2020-04-21 RX ORDER — SODIUM CHLORIDE 0.9 % (FLUSH) 0.9 %
10 SYRINGE (ML) INJECTION PRN
Status: CANCELLED | OUTPATIENT
Start: 2020-04-21

## 2020-04-21 RX ADMIN — SACUBITRIL AND VALSARTAN 1 TABLET: 49; 51 TABLET, FILM COATED ORAL at 08:31

## 2020-04-21 RX ADMIN — INSULIN LISPRO 18 UNITS: 100 INJECTION, SOLUTION INTRAVENOUS; SUBCUTANEOUS at 16:46

## 2020-04-21 RX ADMIN — HEPARIN SODIUM 5000 UNITS: 5000 INJECTION INTRAVENOUS; SUBCUTANEOUS at 20:57

## 2020-04-21 RX ADMIN — HEPARIN SODIUM 5000 UNITS: 5000 INJECTION INTRAVENOUS; SUBCUTANEOUS at 05:13

## 2020-04-21 RX ADMIN — Medication 10 ML: at 21:10

## 2020-04-21 RX ADMIN — INSULIN LISPRO 8 UNITS: 100 INJECTION, SOLUTION INTRAVENOUS; SUBCUTANEOUS at 08:48

## 2020-04-21 RX ADMIN — Medication 81 MG: at 08:31

## 2020-04-21 RX ADMIN — POTASSIUM CHLORIDE 40 MEQ: 1500 TABLET, EXTENDED RELEASE ORAL at 03:51

## 2020-04-21 RX ADMIN — DESMOPRESSIN ACETATE 40 MG: 0.2 TABLET ORAL at 08:31

## 2020-04-21 RX ADMIN — INSULIN GLARGINE 10 UNITS: 100 INJECTION, SOLUTION SUBCUTANEOUS at 20:58

## 2020-04-21 RX ADMIN — INSULIN LISPRO 9 UNITS: 100 INJECTION, SOLUTION INTRAVENOUS; SUBCUTANEOUS at 20:58

## 2020-04-21 RX ADMIN — LORAZEPAM 0.5 MG: 2 INJECTION INTRAMUSCULAR; INTRAVENOUS at 03:52

## 2020-04-21 RX ADMIN — INSULIN GLARGINE 10 UNITS: 100 INJECTION, SOLUTION SUBCUTANEOUS at 10:27

## 2020-04-21 RX ADMIN — SACUBITRIL AND VALSARTAN 1 TABLET: 49; 51 TABLET, FILM COATED ORAL at 00:47

## 2020-04-21 RX ADMIN — SODIUM CHLORIDE: 9 INJECTION, SOLUTION INTRAVENOUS at 00:49

## 2020-04-21 RX ADMIN — HEPARIN SODIUM 5000 UNITS: 5000 INJECTION INTRAVENOUS; SUBCUTANEOUS at 13:01

## 2020-04-21 RX ADMIN — SODIUM CHLORIDE 15.24 UNITS/HR: 9 INJECTION, SOLUTION INTRAVENOUS at 00:00

## 2020-04-21 RX ADMIN — AMLODIPINE BESYLATE 5 MG: 5 TABLET ORAL at 08:31

## 2020-04-21 RX ADMIN — DEXTROSE AND SODIUM CHLORIDE: 5; 450 INJECTION, SOLUTION INTRAVENOUS at 03:19

## 2020-04-21 RX ADMIN — INSULIN LISPRO 12 UNITS: 100 INJECTION, SOLUTION INTRAVENOUS; SUBCUTANEOUS at 11:48

## 2020-04-21 RX ADMIN — SODIUM CHLORIDE: 9 INJECTION, SOLUTION INTRAVENOUS at 03:55

## 2020-04-21 RX ADMIN — FLUCONAZOLE 100 MG: 100 TABLET ORAL at 16:52

## 2020-04-21 RX ADMIN — Medication 10 ML: at 08:31

## 2020-04-21 RX ADMIN — NYSTATIN: 100000 CREAM TOPICAL at 20:57

## 2020-04-21 NOTE — PLAN OF CARE
Problem: Falls - Risk of:  Goal: Will remain free from falls  Description: Will remain free from falls  Outcome: Ongoing     Problem: Serum Glucose Level - Abnormal:  Goal: Ability to maintain appropriate glucose levels will improve  Description: Ability to maintain appropriate glucose levels will improve  Outcome: Ongoing     Problem: Sensory Perception - Impaired:  Goal: Ability to maintain a stable neurologic state will improve  Description: Ability to maintain a stable neurologic state will improve  Outcome: Ongoing     Electronically signed by Catherine Castillo RN on 4/21/2020 at 4:24 AM

## 2020-04-21 NOTE — PROGRESS NOTES
Congestive Heart Failure Education completed and charted. CHF booklet given. Patient was receptive to education. Discussed the  importance of medication compliance. Discussed the importance of a heart healthy diet. Discussed 2000 mg sodium-restricted daily diet. Patient instructed to limit fluid intake to  1.5 to 2 liters per day. Patient instructed to weigh self at the same time of each day each morning, reinforced teaching to monitor for 3-5 lb weight increase over 1-2 days notify physician if change noted. Signs and symptoms of CHF discussed with patient, such as feeling more tired than normal, feeling short of breath, coughing that increases when lying down, sudden weight gain, swelling of the feet, legs or belly. Patient verbalized understanding to notify physician office if these symptoms occur. Pt sees Dr. Thompson Paredes, reports having had an appt with her cardio provider scheduled for today. CHF Clinic phone number given to pt in the event she would like to discuss a referral to the  [address] [place] [address] with her provider.    Cath 2/2020 Dr Thompson Paredes, EF=30%
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
## 2020-04-21 NOTE — FLOWSHEET NOTE
SPIRITUAL CARE PROGRESS NOTE    Spiritual Assessment:  called patient as part of spiritual care rounding. When patient answered, she was welcoming and friendly on the phone but was also slightly passive. Patient, however, did not seem to be in any immediate emotional or spiritual distress and mentioned she was doing well emotionally. Patient also commented she was receiving good care at the hospital.    Intervention:  listened and validated patient's feelings and concerns.  was also an emotional and spiritual presence to patient. Outcome: Chaplains will remain available to offer spiritual and emotional support as needed. 04/21/20 1602   Encounter Summary   Services provided to: Patient   Referral/Consult From: Rounding   Support System Unknown   Continue Visiting   (4/21/2020)   Complexity of Encounter Low   Length of Encounter 15 minutes   Routine   Type Initial   Assessment Calm; Approachable;Passive   Intervention Active listening;Explored feelings, thoughts, concerns;Nurtured hope   Outcome Comfort; Acceptance;Expressed gratitude     Electronically signed by Jennifer Lawrence on 4/21/2020 at 4:07 PM.  [facility]  [phone]

## 2020-04-21 NOTE — PROGRESS NOTES
4/2/2018). SOCIAL HISTORY:      reports that she quit smoking about 2 months ago. Her smoking use included cigarettes. She has a 0.75 pack-year smoking history. She has never used smokeless tobacco. She reports current alcohol use. She reports that she does not use drugs. FAMILY HISTORY:     family history includes Asthma in her father; High Blood Pressure in her father and mother; Other in her brother. HOME MEDICATIONS:      Prior to Admission medications    Medication Sig Start Date End Date Taking? Authorizing Provider   metFORMIN (GLUCOPHAGE) 500 MG tablet Take 1 tablet by mouth 2 times daily (with meals) 4/9/20  Yes Alyson Hirsch MD   aspirin EC 81 MG EC tablet Take 1 tablet by mouth daily 2/10/20  Yes Buck Juárez MD   amLODIPine (NORVASC) 5 MG tablet Take 1 tablet by mouth daily 2/3/20  Yes Bryn Sheffield MD   carvedilol (COREG) 6.25 MG tablet Take 1 tablet by mouth 2 times daily (with meals) 1/31/20  Yes Margaret Dotson MD   sacubitril-valsartan (ENTRESTO) 49-51 MG per tablet Take 1 tablet by mouth 2 times daily 3/2/20   Historical Provider, MD   spironolactone (ALDACTONE) 25 MG tablet Take 1 tablet by mouth daily 2/1/20   Margaret Dotson MD   furosemide (LASIX) 40 MG tablet Take 1 tablet by mouth daily 2/8/20   Elijah Buckner MD   potassium chloride (KLOR-CON M) 10 MEQ extended release tablet Take 1 tablet by mouth 2 times daily 1/17/20   Hermann Coy MD       ALLERGIES:     Patient has no known allergies.       OBJECTIVE:       Vitals:    04/20/20 2150 04/20/20 2330 04/21/20 0404 04/21/20 0745   BP: 126/77 103/60 (!) 92/52 102/65   Pulse: 79 78 78 80   Resp: 18 14 16 20   Temp:  98.2 °F (36.8 °C) 97.2 °F (36.2 °C) 96.7 °F (35.9 °C)   TempSrc:  Temporal Temporal Temporal   SpO2: 97%  98% 100%   Weight:       Height: 5' 7\" (1.702 m)            Intake/Output Summary (Last 24 hours) at 4/21/2020 0919  Last data filed at 4/21/2020 0556  Gross per 24 hour   Intake 1993 ml   Output --   Net Discussed care plan with nurse after getting her input. Hyperglycemia  - S/p insulin drip, recent   - One time lantus 10 units   - will monitor and start on daily basal insulin  - hypoglycemia protocol  - MIVF NS @ 50cc/hr, switch to D5/NS when BG <250  - metformin held    WHIT likely prerenal  - gentle hydration, NS @ 50cc/hr  - f/u BMP    Compensated HFrEF  - continue home meds  - aldactone held  - strict I/Os    HTN  - stable   - Home meds    Vaginal itching  - s/p one dose of zithromax, rocephin and diflucan  - vaginitis probe neg   - f/u GC/Chlamydia  - nystatin cream BID      Above plan discussed with the patient in room, who agreed to the above plan     DVT prophylaxis: heparin (porcine) injection 5,000 TID    Plan will be discussed with the attending, Dr. Walker Fitch MD  Family Medicine Resident  4/21/2020 9:19 AM   Attending Physician Statement  I have discussed the care of Kari Reddy, including pertinent history and exam findings,  with the resident. I have seen and examined the patient and the key elements of all parts of the encounter have been performed by me. I agree with the assessment, plan and orders as documented by the resident. ([address] [place] [address])  Patient seen and examined along with the resident physicians. Admitted for Severe Hypoglycemia and Acute Kidney Injury. Also has Dilated Cardiomyopathy with reduced Ejaction fraction and Hyponatremia. and vaginal itching. Vaginal probe was negative and cultures sent for GC/Chlamydia. Slowly improving and awaiting Labs for today. Hopefully discharge planning in the next 1-2 days once issues resolved and doing better. Continue current therapy. Latisha Chino. Please note that this chart was generated using voice recognition Dragon dictation software.   Although every effort was made to ensure the accuracy of this automated transcription, some errors in transcription may have occurred

## 2020-04-21 NOTE — ED PROVIDER NOTES
Dr Jahaira Gomez sign out, glucose 1400, FP adm, admitted,       Laura Piedra, DO  04/20/20 6101 [facility], DO  04/21/20 7466

## 2020-04-21 NOTE — PROGRESS NOTES
for the following supplies at discharge:   Preferred / formulary blood glucose meter, with strips and lancets for 4 times per day blood glucose testing  Insulin pen needle tips - 4mm leandro for insulin injections 1-5 times per day      Patient verbalizes understanding  of survival skills education but would benefit from reinforcement of skills;   Allow to administer her own insulin injections in hospital          Sonia Leung RN
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
## 2020-04-22 VITALS
BODY MASS INDEX: 37.89 KG/M2 | HEART RATE: 85 BPM | WEIGHT: 241.4 LBS | SYSTOLIC BLOOD PRESSURE: 113 MMHG | OXYGEN SATURATION: 100 % | DIASTOLIC BLOOD PRESSURE: 63 MMHG | RESPIRATION RATE: 18 BRPM | TEMPERATURE: 97.6 F | HEIGHT: 67 IN

## 2020-04-22 LAB
ABSOLUTE EOS #: 0.11 K/UL (ref 0–0.44)
ABSOLUTE IMMATURE GRANULOCYTE: 0 K/UL (ref 0–0.3)
ABSOLUTE LYMPH #: 1.7 K/UL (ref 1.1–3.7)
ABSOLUTE MONO #: 0.57 K/UL (ref 0.1–1.2)
ANION GAP SERPL CALCULATED.3IONS-SCNC: 14 MMOL/L (ref 9–17)
BASOPHILS # BLD: 0 % (ref 0–2)
BASOPHILS ABSOLUTE: 0 K/UL (ref 0–0.2)
BUN BLDV-MCNC: 22 MG/DL (ref 6–20)
BUN/CREAT BLD: ABNORMAL (ref 9–20)
CALCIUM SERPL-MCNC: 8.5 MG/DL (ref 8.6–10.4)
CHLORIDE BLD-SCNC: 94 MMOL/L (ref 98–107)
CO2: 21 MMOL/L (ref 20–31)
CREAT SERPL-MCNC: 1.09 MG/DL (ref 0.5–0.9)
DIFFERENTIAL TYPE: ABNORMAL
EOSINOPHILS RELATIVE PERCENT: 1 % (ref 1–4)
GFR AFRICAN AMERICAN: >60 ML/MIN
GFR NON-AFRICAN AMERICAN: 53 ML/MIN
GFR SERPL CREATININE-BSD FRML MDRD: ABNORMAL ML/MIN/{1.73_M2}
GFR SERPL CREATININE-BSD FRML MDRD: ABNORMAL ML/MIN/{1.73_M2}
GLUCOSE BLD-MCNC: 273 MG/DL (ref 65–105)
GLUCOSE BLD-MCNC: 393 MG/DL (ref 65–105)
GLUCOSE BLD-MCNC: 415 MG/DL (ref 65–105)
GLUCOSE BLD-MCNC: 502 MG/DL (ref 65–105)
GLUCOSE BLD-MCNC: 503 MG/DL (ref 70–99)
HCT VFR BLD CALC: 43.4 % (ref 36.3–47.1)
HEMOGLOBIN: 13.5 G/DL (ref 11.9–15.1)
IMMATURE GRANULOCYTES: 0 %
LYMPHOCYTES # BLD: 15 % (ref 24–43)
MCH RBC QN AUTO: 26.7 PG (ref 25.2–33.5)
MCHC RBC AUTO-ENTMCNC: 31.1 G/DL (ref 28.4–34.8)
MCV RBC AUTO: 85.9 FL (ref 82.6–102.9)
MONOCYTES # BLD: 5 % (ref 3–12)
MORPHOLOGY: ABNORMAL
NRBC AUTOMATED: 0 PER 100 WBC
PDW BLD-RTO: 22.1 % (ref 11.8–14.4)
PLATELET # BLD: ABNORMAL K/UL (ref 138–453)
PLATELET ESTIMATE: ABNORMAL
PLATELET, FLUORESCENCE: 201 K/UL (ref 138–453)
PLATELET, IMMATURE FRACTION: 7.4 % (ref 1.1–10.3)
PMV BLD AUTO: ABNORMAL FL (ref 8.1–13.5)
POTASSIUM SERPL-SCNC: 4.6 MMOL/L (ref 3.7–5.3)
RBC # BLD: 5.05 M/UL (ref 3.95–5.11)
RBC # BLD: ABNORMAL 10*6/UL
SEG NEUTROPHILS: 79 % (ref 36–65)
SEGMENTED NEUTROPHILS ABSOLUTE COUNT: 8.92 K/UL (ref 1.5–8.1)
SODIUM BLD-SCNC: 129 MMOL/L (ref 135–144)
WBC # BLD: 11.3 K/UL (ref 3.5–11.3)
WBC # BLD: ABNORMAL 10*3/UL

## 2020-04-22 PROCEDURE — 99239 HOSP IP/OBS DSCHRG MGMT >30: CPT | Performed by: FAMILY MEDICINE

## 2020-04-22 PROCEDURE — 36415 COLL VENOUS BLD VENIPUNCTURE: CPT

## 2020-04-22 PROCEDURE — 85027 COMPLETE CBC AUTOMATED: CPT

## 2020-04-22 PROCEDURE — 82947 ASSAY GLUCOSE BLOOD QUANT: CPT

## 2020-04-22 PROCEDURE — 85055 RETICULATED PLATELET ASSAY: CPT

## 2020-04-22 PROCEDURE — 6370000000 HC RX 637 (ALT 250 FOR IP): Performed by: STUDENT IN AN ORGANIZED HEALTH CARE EDUCATION/TRAINING PROGRAM

## 2020-04-22 PROCEDURE — 94761 N-INVAS EAR/PLS OXIMETRY MLT: CPT

## 2020-04-22 PROCEDURE — 2580000003 HC RX 258: Performed by: STUDENT IN AN ORGANIZED HEALTH CARE EDUCATION/TRAINING PROGRAM

## 2020-04-22 PROCEDURE — 80048 BASIC METABOLIC PNL TOTAL CA: CPT

## 2020-04-22 PROCEDURE — 6360000002 HC RX W HCPCS: Performed by: STUDENT IN AN ORGANIZED HEALTH CARE EDUCATION/TRAINING PROGRAM

## 2020-04-22 RX ORDER — BACITRACIN, NEOMYCIN, POLYMYXIN B 400; 3.5; 5 [USP'U]/G; MG/G; [USP'U]/G
OINTMENT TOPICAL 2 TIMES DAILY
Status: DISCONTINUED | OUTPATIENT
Start: 2020-04-22 | End: 2020-04-22 | Stop reason: HOSPADM

## 2020-04-22 RX ORDER — GLUCOSAMINE HCL/CHONDROITIN SU 500-400 MG
CAPSULE ORAL
Qty: 300 STRIP | Refills: 3 | Status: SHIPPED | OUTPATIENT
Start: 2020-04-22

## 2020-04-22 RX ORDER — CEPHALEXIN 250 MG/1
250 CAPSULE ORAL EVERY 6 HOURS SCHEDULED
Status: DISCONTINUED | OUTPATIENT
Start: 2020-04-22 | End: 2020-04-22

## 2020-04-22 RX ORDER — NYSTATIN 100000 U/G
CREAM TOPICAL
Qty: 1 TUBE | Refills: 0 | Status: SHIPPED | OUTPATIENT
Start: 2020-04-22 | End: 2021-12-22

## 2020-04-22 RX ORDER — AMOXICILLIN AND CLAVULANATE POTASSIUM 875; 125 MG/1; MG/1
1 TABLET, FILM COATED ORAL EVERY 12 HOURS SCHEDULED
Status: DISCONTINUED | OUTPATIENT
Start: 2020-04-22 | End: 2020-04-22 | Stop reason: HOSPADM

## 2020-04-22 RX ORDER — INSULIN GLARGINE 100 [IU]/ML
15 INJECTION, SOLUTION SUBCUTANEOUS 2 TIMES DAILY
Qty: 5 PEN | Refills: 3 | Status: SHIPPED | OUTPATIENT
Start: 2020-04-22 | End: 2020-04-28

## 2020-04-22 RX ORDER — LANCETS 30 GAUGE
1 EACH MISCELLANEOUS DAILY
Qty: 100 EACH | Refills: 3 | Status: SHIPPED | OUTPATIENT
Start: 2020-04-22

## 2020-04-22 RX ORDER — BLOOD PRESSURE TEST KIT
1 KIT MISCELLANEOUS PRN
Qty: 1 EACH | Refills: 3 | Status: SHIPPED | OUTPATIENT
Start: 2020-04-22

## 2020-04-22 RX ORDER — INSULIN LISPRO 100 [IU]/ML
INJECTION, SOLUTION INTRAVENOUS; SUBCUTANEOUS
Qty: 1 PEN | Refills: 3 | Status: SHIPPED | OUTPATIENT
Start: 2020-04-22 | End: 2020-08-13

## 2020-04-22 RX ORDER — AMOXICILLIN AND CLAVULANATE POTASSIUM 875; 125 MG/1; MG/1
1 TABLET, FILM COATED ORAL 2 TIMES DAILY
Qty: 20 TABLET | Refills: 0 | Status: SHIPPED | OUTPATIENT
Start: 2020-04-22 | End: 2020-05-02

## 2020-04-22 RX ORDER — BACITRACIN, NEOMYCIN, POLYMYXIN B 400; 3.5; 5 [USP'U]/G; MG/G; [USP'U]/G
OINTMENT TOPICAL
Qty: 1 TUBE | Refills: 0 | Status: SHIPPED | OUTPATIENT
Start: 2020-04-22 | End: 2020-05-02

## 2020-04-22 RX ORDER — INSULIN GLARGINE 100 [IU]/ML
10 INJECTION, SOLUTION SUBCUTANEOUS EVERY MORNING
Status: DISCONTINUED | OUTPATIENT
Start: 2020-04-22 | End: 2020-04-22 | Stop reason: HOSPADM

## 2020-04-22 RX ORDER — FLUCONAZOLE 100 MG/1
100 TABLET ORAL DAILY
Qty: 7 TABLET | Refills: 0 | Status: SHIPPED | OUTPATIENT
Start: 2020-04-23 | End: 2020-04-30

## 2020-04-22 RX ORDER — DIAPER,BRIEF,INFANT-TODD,DISP
EACH MISCELLANEOUS 2 TIMES DAILY
Status: DISCONTINUED | OUTPATIENT
Start: 2020-04-22 | End: 2020-04-22 | Stop reason: HOSPADM

## 2020-04-22 RX ORDER — INSULIN GLARGINE 100 [IU]/ML
15 INJECTION, SOLUTION SUBCUTANEOUS 2 TIMES DAILY
Qty: 5 PEN | Refills: 3 | Status: SHIPPED | OUTPATIENT
Start: 2020-04-22 | End: 2020-04-28 | Stop reason: DRUGHIGH

## 2020-04-22 RX ORDER — INSULIN GLARGINE 100 [IU]/ML
10 INJECTION, SOLUTION SUBCUTANEOUS 2 TIMES DAILY
Qty: 5 PEN | Refills: 3 | Status: SHIPPED | OUTPATIENT
Start: 2020-04-22 | End: 2020-08-13

## 2020-04-22 RX ADMIN — INSULIN LISPRO 5 UNITS: 100 INJECTION, SOLUTION INTRAVENOUS; SUBCUTANEOUS at 11:53

## 2020-04-22 RX ADMIN — INSULIN GLARGINE 10 UNITS: 100 INJECTION, SOLUTION SUBCUTANEOUS at 10:11

## 2020-04-22 RX ADMIN — HYDROCORTISONE: 10 CREAM TOPICAL at 10:12

## 2020-04-22 RX ADMIN — NYSTATIN: 100000 CREAM TOPICAL at 08:01

## 2020-04-22 RX ADMIN — FLUCONAZOLE 100 MG: 100 TABLET ORAL at 08:01

## 2020-04-22 RX ADMIN — CLOTRIMAZOLE: 1 CREAM TOPICAL at 00:04

## 2020-04-22 RX ADMIN — AMOXICILLIN AND CLAVULANATE POTASSIUM 1 TABLET: 875; 125 TABLET, FILM COATED ORAL at 14:00

## 2020-04-22 RX ADMIN — HEPARIN SODIUM 5000 UNITS: 5000 INJECTION INTRAVENOUS; SUBCUTANEOUS at 05:42

## 2020-04-22 RX ADMIN — Medication 10 ML: at 08:02

## 2020-04-22 RX ADMIN — CEPHALEXIN 250 MG: 250 CAPSULE ORAL at 09:55

## 2020-04-22 RX ADMIN — Medication 81 MG: at 08:01

## 2020-04-22 RX ADMIN — DESMOPRESSIN ACETATE 40 MG: 0.2 TABLET ORAL at 08:01

## 2020-04-22 RX ADMIN — CLOTRIMAZOLE: 1 CREAM TOPICAL at 08:01

## 2020-04-22 RX ADMIN — INSULIN LISPRO 18 UNITS: 100 INJECTION, SOLUTION INTRAVENOUS; SUBCUTANEOUS at 09:55

## 2020-04-22 RX ADMIN — INSULIN LISPRO 18 UNITS: 100 INJECTION, SOLUTION INTRAVENOUS; SUBCUTANEOUS at 11:51

## 2020-04-22 NOTE — DISCHARGE SUMMARY
needed. Consults:  none    Significant Diagnostic/theraputic interventions: n/a       Disposition:   home    Instructions to Patient:   - Take all medications as prescribed  - Follow up with PCP   - In case of any worsening condition please visit Emergency Room. Follow up with Aldo Walden MD in  7 days    Discharge Medications:     Stevphen Broom   Home Medication Instructions SUZANNA:[id number]    Printed on:04/22/20 1229   Medication Information                      amLODIPine (NORVASC) 5 MG tablet  Take 1 tablet by mouth daily             aspirin EC 81 MG EC tablet  Take 1 tablet by mouth daily             carvedilol (COREG) 6.25 MG tablet  Take 1 tablet by mouth 2 times daily (with meals)             furosemide (LASIX) 40 MG tablet  Take 1 tablet by mouth daily             metFORMIN (GLUCOPHAGE) 500 MG tablet  Take 1 tablet by mouth 2 times daily (with meals)             potassium chloride (KLOR-CON M) 10 MEQ extended release tablet  Take 1 tablet by mouth 2 times daily             sacubitril-valsartan (ENTRESTO) 49-51 MG per tablet  Take 1 tablet by mouth 2 times daily             spironolactone (ALDACTONE) 25 MG tablet  Take 1 tablet by mouth daily                 Send Copies to: Aldo Walden MD      Note that over 30 minutes was spent in preparing discharge papers, discussing discharge with patient and family, medication review, etc.      Feliciano Rivera MD  Family Medicine Resident  Family Medicine Inpatient Service  Pager:[phone]  4/22/2020 12:22 PM          Please note that this chart was generated using voice recognition Dragon dictation software.   Although every effort was made to ensure the accuracy of this automated transcription, some errors in transcription may have occurred

## 2020-04-22 NOTE — PROGRESS NOTES
cigarettes. She has a 0.75 pack-year smoking history. She has never used smokeless tobacco. She reports current alcohol use. She reports that she does not use drugs. FAMILY HISTORY:     family history includes Asthma in her father; High Blood Pressure in her father and mother; Other in her brother. HOME MEDICATIONS:      Prior to Admission medications    Medication Sig Start Date End Date Taking? Authorizing Provider   metFORMIN (GLUCOPHAGE) 500 MG tablet Take 1 tablet by mouth 2 times daily (with meals) 4/9/20  Yes Mehran Burrows MD   aspirin EC 81 MG EC tablet Take 1 tablet by mouth daily 2/10/20  Yes Buck Juárez MD   amLODIPine (NORVASC) 5 MG tablet Take 1 tablet by mouth daily 2/3/20  Yes Rolf Stafford MD   carvedilol (COREG) 6.25 MG tablet Take 1 tablet by mouth 2 times daily (with meals) 1/31/20  Yes Shiva Martini MD   sacubitril-valsartan (ENTRESTO) 49-51 MG per tablet Take 1 tablet by mouth 2 times daily 3/2/20   Historical Provider, MD   spironolactone (ALDACTONE) 25 MG tablet Take 1 tablet by mouth daily 2/1/20   Shiva Martini MD   furosemide (LASIX) 40 MG tablet Take 1 tablet by mouth daily 2/8/20   Rhiannon Bean MD   potassium chloride (KLOR-CON M) 10 MEQ extended release tablet Take 1 tablet by mouth 2 times daily 1/17/20   Nickolas Vizcarra MD       ALLERGIES:     Patient has no known allergies. OBJECTIVE:       Vitals:    04/22/20 0006 04/22/20 0307 04/22/20 0836 04/22/20 0854   BP: 116/65 (!) 101/46 110/73    Pulse: 80 75 86    Resp: 19 20 20 19   Temp: 98 °F (36.7 °C) 98.3 °F (36.8 °C) 98 °F (36.7 °C)    TempSrc: Oral Oral Oral    SpO2: 96%  98% 97%   Weight:       Height:             Intake/Output Summary (Last 24 hours) at 4/22/2020 0913  Last data filed at 4/21/2020 1729  Gross per 24 hour   Intake 1278 ml   Output --   Net 1278 ml       PHYSICAL EXAM:  General Appearance  Alert , awake , not in acute distress  HEENT - Head is normocephalic, atraumatic.   Lungs - Bilateral f/u CBC today  - BC - NGTD  - lactic acid elevated 2.5, procal elevated 0.18  - afebrile, clinically well  - continue to monitor    Vaginal candidiasis and superficial skin infection in groin region  - s/p one dose of zithromax, rocephin and diflucan  - vaginitis probe neg   - GC/Chlamydia negative  - nystatin cream BID, clotrimazole cream BID  - diflucan 100mg PO daily, Keflex 250mg q6H  - afeb     Compensated HFrEF  - Echo (1/27/2020) - EF 15-20%  - continue home meds  - aldactone held  - strict I/Os     HTN  - stable   - Home meds     Above plan discussed with the patient in room, who agreed to the above plan      DVT prophylaxis: heparin (porcine) injection 5,000 TID     Plan will be discussed with the attending, Dr. Brendon Dudley MD  Family Medicine Resident  4/22/2020 9:13 AM   Attending Physician Statement  I have discussed the care of Gladis Uriarte, including pertinent history and exam findings,  with the resident. I have seen and examined the patient and the key elements of all parts of the encounter have been performed by me. I agree with the assessment, plan and orders as documented by the resident. ([address] [place] [address])  Patient seen and examined along with the resident physicians. Admitted for Hyperglycemia severe with Low Bicarbonate and Acute Kidney Injury along with Electrolyte Imbalance. Also has a Perineal infection along with cardiomyopathy with   Reduced Ejaction Fraction. Today much better and leucocytoses and Acute Kidney injury nearly resolved. Bicarbonte is better and resolved and Sodium also much improved. GC/Gonorrhea and vaginal probe were Negative. Starting on Augmentin and Lantus insulin 15 units twice daily and would be discharged Home today and advised to follow-up with her PCP in the office in the next  1-2 weeks time. Sherryle Ammons. Please note that this chart was generated using voice recognition Dragon dictation software.   Although every effort was made to ensure the

## 2020-04-23 ENCOUNTER — CARE COORDINATION (OUTPATIENT)
Dept: CASE MANAGEMENT | Age: 50
End: 2020-04-23

## 2020-04-23 NOTE — CARE COORDINATION
[facility] Transitions Initial Follow Up Call    Call within 2 business days of discharge: Yes    Patient: Alin Jordan Patient : 1970   MRN: [id number]  Reason for Admission: COVID 19 Monitoring - Hyperglycemia  Discharge Date: 20 RARS: Readmission Risk Score: 35      Last Discharge [facility]       Complaint Diagnosis Description Type Department Provider    20 Vaginal Itching Hyperglycemia . .. ED to Hosp-Admission (Discharged) (ADMITTED) [facility] 4B Dianne Matias MD; Priscilla Joseph, ... Attempted initial 24 hour transitional call to patient.   Left VM to return call directly to [phone]    Facility: [address]

## 2020-04-24 ENCOUNTER — CARE COORDINATION (OUTPATIENT)
Dept: CASE MANAGEMENT | Age: 50
End: 2020-04-24

## 2020-04-24 NOTE — CARE COORDINATION
[facility] Transitions Initial Follow Up Call    Call within 2 business days of discharge: Yes    Patient: Bandar Lord Patient : 1970   MRN: [id number]  Reason for Admission: COVID 19 monitoring, Hyperglycemia  Discharge Date: 20 RARS: Readmission Risk Score: 35      Last Discharge [facility]       Complaint Diagnosis Description Type Department Provider    20 Vaginal Itching Hyperglycemia . .. ED to [facility] (Discharged) [facility] Tere Bow[facility] MD; [facility], ... Spoke with: Claudy 20: [facility]    Non-face-to-face services provided:  Obtained and reviewed discharge summary and/or continuity of care documents     Spoke with patient who states she is feeling better today. She states she is calling her PCP today to schedule her follow up. She has scripts to get her blood work done and states this am her blood sugar was in the low 200's. She has all her medications and denies any needs or concerns. States her vaginal itching has subsided. Denies any f/c, cough or shortness of breath. Patient contacted regarding Author Acevedo. Care Transition Nurse/ Ambulatory Care Manager contacted the patient by telephone to perform post discharge assessment. Verified name and  with patient as identifiers. Provided introduction to self, and explanation of the CTN/ACM role, and reason for call due to risk factors for infection and/or exposure to COVID-19. Symptoms reviewed with patient who verbalized the following symptoms: no new symptoms. Due to no new or worsening symptoms encounter was not routed to provider for escalation. Patient has following risk factors of: diabetes. CTN/ACM reviewed discharge instructions, medical action plan and red flags such as increased shortness of breath, increasing fever and signs of decompensation with patient who verbalized understanding.    Discussed exposure protocols and quarantine with [facility][note 3] Guidelines What to do if you are sick with coronavirus disease 2019.  Patient was given an opportunity for questions and concerns. The patient agrees to contact the [employer] exposure line [phone], local [facility] department [place] Department of Health: ([phone]) and PCP office for questions related to their healthcare. CTN/ACM provided contact information for future needs. Reviewed and educated patient on any new and changed medications related to discharge diagnosis     Patient/family/caregiver given information for [employer] and agrees to enroll yes  Patient's preferred e-mail: [email or web address]    Patient's preferred phone number: [phone]  Based on Loop alert triggers, patient will be contacted by nurse care manager for worsening symptoms. Pt will be further monitored by COVID Loop Team based on severity of symptoms and risk factors.       Care Transitions 24 Hour Call    Do you have any ongoing symptoms?:  No  Do you have a copy of your discharge instructions?:  Yes  Do you have all of your prescriptions and are they filled?:  Yes  Have you been contacted by a [address]?:  No  Have you scheduled your follow up appointment?:  No (Comment: Patient states she is calling this morning to schedule)  Were you discharged with any Home Care or Post Acute Services:  No  Do you feel like you have everything you need to keep you well at home?:  Yes  Care Transitions Interventions         Follow Up  Future Appointments   Date Time Provider Nelsy Cuba   5/29/2020  2:30 PM ST[facility] MRI RM 1 [facility] MRI [facility] Radiolog       Lio Miguel RN

## 2020-04-27 LAB
CULTURE: NORMAL
CULTURE: NORMAL
Lab: NORMAL
Lab: NORMAL
SPECIMEN DESCRIPTION: NORMAL
SPECIMEN DESCRIPTION: NORMAL

## 2020-04-28 ENCOUNTER — TELEMEDICINE (OUTPATIENT)
Dept: FAMILY MEDICINE CLINIC | Age: 50
End: 2020-04-28

## 2020-04-28 VITALS — WEIGHT: 241.4 LBS | BODY MASS INDEX: 37.89 KG/M2 | HEIGHT: 67 IN

## 2020-04-28 PROCEDURE — 99213 OFFICE O/P EST LOW 20 MIN: CPT | Performed by: FAMILY MEDICINE

## 2020-04-28 PROCEDURE — 3052F HG A1C>EQUAL 8.0%<EQUAL 9.0%: CPT | Performed by: FAMILY MEDICINE

## 2020-04-28 RX ORDER — INSULIN GLARGINE 100 [IU]/ML
18 INJECTION, SOLUTION SUBCUTANEOUS 2 TIMES DAILY
Qty: 5 PEN | Refills: 3 | Status: SHIPPED
Start: 2020-04-28 | End: 2021-05-13

## 2020-04-28 ASSESSMENT — ENCOUNTER SYMPTOMS
SHORTNESS OF BREATH: 0
ABDOMINAL PAIN: 0

## 2020-04-28 NOTE — PROGRESS NOTES
in-person clinic visit. Patient and provider were located at their individual homes. --Orly Urrutia MD on 4/28/2020 at 2:53 PM    An electronic signature was used to authenticate this note.

## 2020-07-31 ENCOUNTER — NURSE TRIAGE (OUTPATIENT)
Dept: OTHER | Facility: CLINIC | Age: 50
End: 2020-07-31

## 2020-07-31 ENCOUNTER — OFFICE VISIT (OUTPATIENT)
Dept: FAMILY MEDICINE CLINIC | Age: 50
End: 2020-07-31

## 2020-07-31 VITALS
SYSTOLIC BLOOD PRESSURE: 141 MMHG | BODY MASS INDEX: 43.29 KG/M2 | HEART RATE: 81 BPM | WEIGHT: 275.8 LBS | DIASTOLIC BLOOD PRESSURE: 85 MMHG | HEIGHT: 67 IN | TEMPERATURE: 97.1 F

## 2020-07-31 PROCEDURE — 99213 OFFICE O/P EST LOW 20 MIN: CPT | Performed by: STUDENT IN AN ORGANIZED HEALTH CARE EDUCATION/TRAINING PROGRAM

## 2020-07-31 PROCEDURE — 99211 OFF/OP EST MAY X REQ PHY/QHP: CPT | Performed by: STUDENT IN AN ORGANIZED HEALTH CARE EDUCATION/TRAINING PROGRAM

## 2020-07-31 RX ORDER — TIZANIDINE 4 MG/1
4 TABLET ORAL 3 TIMES DAILY PRN
Qty: 15 TABLET | Refills: 0 | Status: SHIPPED | OUTPATIENT
Start: 2020-07-31 | End: 2020-08-05

## 2020-07-31 ASSESSMENT — ENCOUNTER SYMPTOMS
SHORTNESS OF BREATH: 0
BACK PAIN: 1

## 2020-07-31 NOTE — PATIENT INSTRUCTIONS
Thank you for letting us take care of you today. We hope all your questions were addressed. If a question was overlooked or something else comes to mind after you return home, please contact a member of your Care Team listed below. Your Care Team at [place] [address] is Team #3  Claude Galley, MD (Faculty)  Komal Valadez MD (Faculty  Charreinier Recinos MD (Resident)  Beth Brewer (Resident)   Sada Moreno MD (Resident)  ADRIAN Echevarria, ANNEL Parnell, ANNEL Posey ([address] [facility])  Sonu Buckleyyogi, [address] [facility] ([id number] [address] )    Della Brown [facility] (Clinical Pharmacist)     Office phone number: [phone]    If you need to get in right away due to illness, please be advised we have \"Same Day\" appointments available Monday-Friday. Please call us at [phone] option #3 to schedule your \"Same Day\" appointment.
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
## 2020-08-10 ENCOUNTER — TELEPHONE (OUTPATIENT)
Dept: FAMILY MEDICINE CLINIC | Age: 50
End: 2020-08-10

## 2020-08-10 NOTE — TELEPHONE ENCOUNTER
Good morning, please have the patient book an in office appointment so that we can physically examine and evaluate the patient for the skin problem, and if it looks like an infection requiring antibiotics then we could provide an antibiotic.   Thank you

## 2020-08-10 NOTE — TELEPHONE ENCOUNTER
Patient called back in asking for antibiotic to be sent over to her [address] on [place]. Patient states a boil under her left armpit that is draining for 2 weeks now.

## 2020-08-10 NOTE — TELEPHONE ENCOUNTER
Patient called in asking if she can get a antibiotic sent over to her pharmacy because she has a boil under her arm that is not healing.

## 2020-08-13 ENCOUNTER — OFFICE VISIT (OUTPATIENT)
Dept: FAMILY MEDICINE CLINIC | Age: 50
End: 2020-08-13

## 2020-08-13 VITALS
SYSTOLIC BLOOD PRESSURE: 163 MMHG | DIASTOLIC BLOOD PRESSURE: 94 MMHG | WEIGHT: 274 LBS | BODY MASS INDEX: 43 KG/M2 | HEART RATE: 93 BPM | TEMPERATURE: 97.5 F | HEIGHT: 67 IN

## 2020-08-13 PROBLEM — E11.65 TYPE 2 DIABETES MELLITUS WITH HYPERGLYCEMIA, WITHOUT LONG-TERM CURRENT USE OF INSULIN (HCC): Status: ACTIVE | Noted: 2020-08-13

## 2020-08-13 PROBLEM — L08.9 SKIN INFECTION: Status: ACTIVE | Noted: 2020-08-13

## 2020-08-13 PROBLEM — L73.2 HIDRADENITIS SUPPURATIVA: Status: ACTIVE | Noted: 2020-08-13

## 2020-08-13 PROBLEM — L60.8 TOENAIL DEFORMITY: Status: ACTIVE | Noted: 2020-08-13

## 2020-08-13 LAB — HBA1C MFR BLD: 6.5 %

## 2020-08-13 PROCEDURE — 99213 OFFICE O/P EST LOW 20 MIN: CPT | Performed by: STUDENT IN AN ORGANIZED HEALTH CARE EDUCATION/TRAINING PROGRAM

## 2020-08-13 PROCEDURE — 83036 HEMOGLOBIN GLYCOSYLATED A1C: CPT | Performed by: STUDENT IN AN ORGANIZED HEALTH CARE EDUCATION/TRAINING PROGRAM

## 2020-08-13 RX ORDER — ACETAMINOPHEN 500 MG
1000 TABLET ORAL 3 TIMES DAILY PRN
Qty: 180 TABLET | Refills: 0 | Status: SHIPPED | OUTPATIENT
Start: 2020-08-13 | End: 2020-11-17

## 2020-08-13 RX ORDER — DOXYCYCLINE HYCLATE 100 MG
100 TABLET ORAL 2 TIMES DAILY
Qty: 28 TABLET | Refills: 0 | Status: SHIPPED | OUTPATIENT
Start: 2020-08-13 | End: 2020-08-27

## 2020-08-13 ASSESSMENT — PATIENT HEALTH QUESTIONNAIRE - PHQ9
1. LITTLE INTEREST OR PLEASURE IN DOING THINGS: 0
SUM OF ALL RESPONSES TO PHQ QUESTIONS 1-9: 0
SUM OF ALL RESPONSES TO PHQ QUESTIONS 1-9: 0
SUM OF ALL RESPONSES TO PHQ9 QUESTIONS 1 & 2: 0
2. FEELING DOWN, DEPRESSED OR HOPELESS: 0

## 2020-08-13 NOTE — PROGRESS NOTES
Visit Information    Have you changed or started any medications since your last visit including any over-the-counter medicines, vitamins, or herbal medicines? no   Have you stopped taking any of your medications? Is so, why? -  no  Are you having any side effects from any of your medications? - no    Have you seen any other physician or provider since your last visit?  no   Have you had any other diagnostic tests since your last visit?  no   Have you been seen in the emergency room and/or had an admission in a hospital since we last saw you?  no   Have you had your routine dental cleaning in the past 6 months?  no     Do you have an active MyChart account? If no, what is the barrier?   Yes    Patient Care Team:  Claire Ramirez MD as PCP - General (Family Medicine)    Medical History Review  Past Medical, Family, and Social History reviewed and does not contribute to the patient presenting condition    Health Maintenance   Topic Date Due    Diabetic foot exam  12/12/1980    Diabetic retinal exam  12/12/1980    Hepatitis B vaccine (1 of 3 - Risk 3-dose series) 12/12/1989    Diabetic microalbuminuria test  01/29/2015    Flu vaccine (1) 09/01/2020    Lipid screen  02/01/2021    A1C test (Diabetic or Prediabetic)  03/12/2021    Potassium monitoring  04/22/2021    Creatinine monitoring  04/22/2021    Cervical cancer screen  02/07/2025    DTaP/Tdap/Td vaccine (3 - Td) 02/17/2030    Pneumococcal 0-64 years Vaccine  Completed    HIV screen  Completed    Hepatitis A vaccine  Aged Out    Hib vaccine  Aged Out    Meningococcal (ACWY) vaccine  Aged Out
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
## 2020-08-20 ENCOUNTER — TELEPHONE (OUTPATIENT)
Dept: FAMILY MEDICINE CLINIC | Age: 50
End: 2020-08-20

## 2020-08-20 RX ORDER — FLUCONAZOLE 150 MG/1
150 TABLET ORAL ONCE
Qty: 1 TABLET | Refills: 3 | Status: SHIPPED | OUTPATIENT
Start: 2020-08-20 | End: 2020-08-20

## 2020-08-20 NOTE — TELEPHONE ENCOUNTER
PC from pt stating that ABX has caused her to have a yeast infection. Sx started 2 days ago, has white discharge and vaginal itching.  Wants medication ordered to pharmacy     Still has 7 days left of ABX \    Please review and advise

## 2020-08-21 NOTE — TELEPHONE ENCOUNTER
I ordered Fluconazole. Please let the patient know. Patient can also book a follow-up for a Pelvic Exam and to collect swabs and to ensure resolution/diagnosis. Thank you.   Electronically signed by Yanna Flores MD on 8/20/2020 at 10:39 PM

## 2020-09-02 ENCOUNTER — OFFICE VISIT (OUTPATIENT)
Dept: FAMILY MEDICINE CLINIC | Age: 50
End: 2020-09-02

## 2020-09-02 VITALS
HEART RATE: 76 BPM | BODY MASS INDEX: 43.57 KG/M2 | DIASTOLIC BLOOD PRESSURE: 80 MMHG | HEIGHT: 67 IN | WEIGHT: 277.6 LBS | TEMPERATURE: 97.7 F | SYSTOLIC BLOOD PRESSURE: 120 MMHG

## 2020-09-02 PROCEDURE — 99213 OFFICE O/P EST LOW 20 MIN: CPT | Performed by: FAMILY MEDICINE

## 2020-09-02 PROCEDURE — 99211 OFF/OP EST MAY X REQ PHY/QHP: CPT | Performed by: FAMILY MEDICINE

## 2020-09-02 ASSESSMENT — ENCOUNTER SYMPTOMS
NAUSEA: 0
ABDOMINAL PAIN: 0
SHORTNESS OF BREATH: 0
VOMITING: 0
COUGH: 0
CONSTIPATION: 0
CHEST TIGHTNESS: 0
DIARRHEA: 0
SORE THROAT: 0

## 2020-09-02 NOTE — PROGRESS NOTES
Visit Information    Have you changed or started any medications since your last visit including any over-the-counter medicines, vitamins, or herbal medicines? no   Have you stopped taking any of your medications? Is so, why? -  no  Are you having any side effects from any of your medications? - no    Have you seen any other physician or provider since your last visit?  no   Have you had any other diagnostic tests since your last visit?  no   Have you been seen in the emergency room and/or had an admission in a hospital since we last saw you?  no   Have you had your routine dental cleaning in the past 6 months?  no     Do you have an active MyChart account? If no, what is the barrier?   Yes    Patient Care Team:  Aaron Arriaga MD as PCP - General (Family Medicine)    Medical History Review  Past Medical, Family, and Social History reviewed and does contribute to the patient presenting condition    Health Maintenance   Topic Date Due    Diabetic retinal exam  12/12/1980    Hepatitis B vaccine (1 of 3 - Risk 3-dose series) 12/12/1989    Diabetic microalbuminuria test  01/29/2015    Flu vaccine (1) 09/01/2020    Lipid screen  02/01/2021    Potassium monitoring  04/22/2021    Creatinine monitoring  04/22/2021    Diabetic foot exam  08/13/2021    A1C test (Diabetic or Prediabetic)  08/13/2021    Cervical cancer screen  02/07/2025    DTaP/Tdap/Td vaccine (3 - Td) 02/17/2030    Pneumococcal 0-64 years Vaccine  Completed    HIV screen  Completed    Hepatitis A vaccine  Aged Out    Hib vaccine  Aged Out    Meningococcal (ACWY) vaccine  Aged Out
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
## 2020-09-02 NOTE — PROGRESS NOTES
I performed a history and physical examination of the patient and discussed management with the resident. I reviewed the residents note and agree with the documented findings and plan of care. Any areas of disagreement are noted on the chart. I have personally evaluated this patient and have completed at least one if not all key elements of the E/M (history, physical exam, and MDM). Additional findings are as noted. I agree with the chief complaint, past medical history, past surgical history, allergies, medications, social and family history as documented unless otherwise noted below.      Electronically signed by Debo Hall DO on 9/2/2020 at 4:01 PM

## 2020-09-17 RX ORDER — CARVEDILOL 6.25 MG/1
TABLET ORAL
Qty: 60 TABLET | Refills: 1 | OUTPATIENT
Start: 2020-09-17

## 2020-11-17 ENCOUNTER — OFFICE VISIT (OUTPATIENT)
Dept: FAMILY MEDICINE CLINIC | Age: 50
End: 2020-11-17

## 2020-11-17 VITALS
HEART RATE: 88 BPM | DIASTOLIC BLOOD PRESSURE: 80 MMHG | WEIGHT: 285 LBS | SYSTOLIC BLOOD PRESSURE: 138 MMHG | TEMPERATURE: 96.7 F | BODY MASS INDEX: 44.64 KG/M2

## 2020-11-17 PROBLEM — M19.90 ARTHRITIS: Status: ACTIVE | Noted: 2020-11-17

## 2020-11-17 PROCEDURE — 99213 OFFICE O/P EST LOW 20 MIN: CPT | Performed by: STUDENT IN AN ORGANIZED HEALTH CARE EDUCATION/TRAINING PROGRAM

## 2020-11-17 RX ORDER — ACETAMINOPHEN 500 MG
1000 TABLET ORAL 3 TIMES DAILY PRN
Qty: 180 TABLET | Refills: 1 | Status: SHIPPED | OUTPATIENT
Start: 2020-11-17 | End: 2021-01-22

## 2020-11-17 RX ORDER — LIDOCAINE 4 G/G
1 PATCH TOPICAL DAILY
Qty: 30 PATCH | Refills: 0 | Status: SHIPPED | OUTPATIENT
Start: 2020-11-17 | End: 2020-12-17

## 2020-11-17 ASSESSMENT — ENCOUNTER SYMPTOMS
EYES NEGATIVE: 1
SORE THROAT: 0
RHINORRHEA: 0
ABDOMINAL PAIN: 0
COUGH: 0
WHEEZING: 0
VOMITING: 0
BACK PAIN: 1
SHORTNESS OF BREATH: 0
NAUSEA: 0
DIARRHEA: 0
GASTROINTESTINAL NEGATIVE: 1
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
## 2020-11-17 NOTE — PROGRESS NOTES
Visit Information    Have you changed or started any medications since your last visit including any over-the-counter medicines, vitamins, or herbal medicines? no   Have you stopped taking any of your medications? Is so, why? -  no  Are you having any side effects from any of your medications? - no    Have you seen any other physician or provider since your last visit?  no   Have you had any other diagnostic tests since your last visit?  no   Have you been seen in the emergency room and/or had an admission in a hospital since we last saw you?  no   Have you had your routine dental cleaning in the past 6 months?  no     Do you have an active MyChart account? If no, what is the barrier?   No:     Patient Care Team:  Becca Joshua MD as PCP - General (Family Medicine)    Medical History Review  Past Medical, Family, and Social History reviewed and does not contribute to the patient presenting condition    Health Maintenance   Topic Date Due    Diabetic retinal exam  12/12/1980    Hepatitis B vaccine (1 of 3 - Risk 3-dose series) 12/12/1989    Diabetic microalbuminuria test  01/29/2015    Flu vaccine (1) 09/01/2020    Lipid screen  02/01/2021    Potassium monitoring  04/22/2021    Creatinine monitoring  04/22/2021    Diabetic foot exam  08/13/2021    A1C test (Diabetic or Prediabetic)  08/13/2021    Cervical cancer screen  02/07/2025    DTaP/Tdap/Td vaccine (3 - Td) 02/17/2030    Pneumococcal 0-64 years Vaccine  Completed    HIV screen  Completed    Hepatitis A vaccine  Aged Out    Hib vaccine  Aged Out    Meningococcal (ACWY) vaccine  Aged Out

## 2020-11-17 NOTE — PATIENT INSTRUCTIONS
Thank you for letting us take care of you today. We hope all your questions were addressed. If a question was overlooked or something else comes to mind after you return home, please contact a member of your Care Team listed below. Your Care Team at [place] [address] is Team #2  Ilene Gray DO (Faculty)  Feliciano Monsalve (Faculty)  Janae Balderas MD (Resident)  Briseida Olea MD (Resident)  Rai Stevens MD (Resident)  Karolyn Dakins, MD (Resident)  Daryn Humphrey MD (Resident)  ADRIAN Sommers ,CHANDLER KINNEY [facility] ([address] [facility] office)  Jasmin Lanier, [address] (Clinical Practice Manager)  Sharee Garcia, [address] [place] [address] (Clinical Pharmacist)     Office phone number: [phone]    If you need to get in right away due to illness, please be advised we have \"Same Day\" appointments available Monday-Friday. Please call us at [phone] option #3 to schedule your \"Same Day\" appointment.

## 2020-11-19 RX ORDER — CARVEDILOL 6.25 MG/1
6.25 TABLET ORAL 2 TIMES DAILY WITH MEALS
Qty: 60 TABLET | Refills: 3 | Status: SHIPPED | OUTPATIENT
Start: 2020-11-19 | End: 2021-03-02 | Stop reason: SDUPTHER

## 2020-11-19 NOTE — TELEPHONE ENCOUNTER
Last visit:   Last Med refill:   Does patient have enough medication for 72 hours: No:     Next Visit Date:  No future appointments. Health Maintenance   Topic Date Due    Diabetic retinal exam  12/12/1980    Hepatitis B vaccine (1 of 3 - Risk 3-dose series) 12/12/1989    Diabetic microalbuminuria test  01/29/2015    Flu vaccine (1) 09/01/2020    Lipid screen  02/01/2021    Potassium monitoring  04/22/2021    Creatinine monitoring  04/22/2021    Diabetic foot exam  08/13/2021    A1C test (Diabetic or Prediabetic)  08/13/2021    Cervical cancer screen  02/07/2025    DTaP/Tdap/Td vaccine (3 - Td) 02/17/2030    Pneumococcal 0-64 years Vaccine  Completed    HIV screen  Completed    Hepatitis A vaccine  Aged Out    Hib vaccine  Aged Out    Meningococcal (ACWY) vaccine  Aged Out       Hemoglobin A1C (%)   Date Value   08/13/2020 6.5   03/12/2020 8.4 (H)   01/17/2020 6.3             ( goal A1C is < 7)   Microalb/Crt.  Ratio (mcg/mg creat)   Date Value   01/29/2014 3     LDL Cholesterol (mg/dL)   Date Value   02/01/2020 43   06/05/2016 128       (goal LDL is <100)   AST (U/L)   Date Value   01/28/2020 18     ALT (U/L)   Date Value   01/28/2020 10     BUN (mg/dL)   Date Value   04/22/2020 22 (H)     BP Readings from Last 3 Encounters:   11/17/20 138/80   09/02/20 120/80   08/13/20 (!) 163/94          (goal 120/80)    All Future Testing planned in [facility]  Lab Frequency Next Occurrence   Lipid Panel Once 37/03/7556   Basic Metabolic Panel Once 04/48/3532   ANNAMARIA DIGITAL SCREEN W CAD BILATERAL Once 04/06/2021   MRI BRAIN WO CONTRAST Once 87/91/9094   Basic Metabolic Panel Once 49/58/6001   XR LUMBAR SPINE (2-3 VIEWS) Once 07/31/2021   Microalbumin, Ur Once 08/13/2021   Comprehensive Metabolic w/Bili Profile Once 08/13/2021   CBC With Auto Differential Once 08/13/2021   XR KNEE LEFT (MIN 4 VIEWS) Once 11/24/2020               Patient Active Problem List:     Migraine     Morbid obesity ([address]     Iron deficiency     Tobacco abuse     S/P Permanent nail avulsion, 2nd digit right foot      Acute rhinosinusitis     Dyslipidemia     Vitamin B12 deficiency     Folate deficiency     Constipation     Hypertension goal BP (blood pressure) < 140/90     Bright red blood per rectum     Acute blood loss anemia     Vaginal bleeding     Ovarian cyst     Pre-diabetes     Menorrhagia     H. pylori infection     Microcytosis     Anemia     Hydradenitis     Hypertension, goal below 140/90     Right leg paresthesias     Chronic back pain     Rotator cuff injury     Vitamin D deficiency     Rotator cuff tendinitis     Smoker     Left breast abscess     Iron deficiency anemia     Breast abscess     Medial meniscus tear     Right knee pain     Arthritis of knee     Lumbar disc herniation S/P L3 4 and L4 5 and tenotomy right side for foraminotomy     Leg swelling     Pelvic mass in female     Hypocalcemia     Pericardial effusion     Hepatomegaly     Intramural leiomyoma of uterus     Heart failure (HCC)     Hyponatremia     STD (female)     Bacterial vaginosis     Cardiomyopathy ([address]     Hyperglycemia     Hyperglycemia due to type 2 diabetes mellitus (HCC)     Hyperkalemia     WHIT (acute kidney injury) ([address]     Hidradenitis suppurativa     Type 2 diabetes mellitus with hyperglycemia, without long-term current use of insulin (HCC)     Toenail deformity     Skin infection     Arthritis           Please address the medication refill and close the encounter. If I can be of assistance, please route to the applicable pool. Thank you.

## 2020-12-21 RX ORDER — AMLODIPINE BESYLATE 5 MG/1
TABLET ORAL
Qty: 30 TABLET | Refills: 5 | Status: SHIPPED | OUTPATIENT
Start: 2020-12-21 | End: 2021-03-02

## 2020-12-21 NOTE — TELEPHONE ENCOUNTER
norvasc pending for refill     Health Maintenance   Topic Date Due    Diabetic retinal exam  12/12/1980    Hepatitis B vaccine (1 of 3 - Risk 3-dose series) 12/12/1989    Diabetic microalbuminuria test  01/29/2015    Flu vaccine (1) 09/01/2020    Shingles Vaccine (1 of 2) 12/12/2020    Breast cancer screen  12/12/2020    Colon cancer screen colonoscopy  12/12/2020    Lipid screen  02/01/2021    Potassium monitoring  04/22/2021    Creatinine monitoring  04/22/2021    Diabetic foot exam  08/13/2021    A1C test (Diabetic or Prediabetic)  08/13/2021    Cervical cancer screen  02/07/2025    DTaP/Tdap/Td vaccine (3 - Td) 02/17/2030    Pneumococcal 0-64 years Vaccine  Completed    HIV screen  Completed    Hepatitis A vaccine  Aged Out    Hib vaccine  Aged Out    Meningococcal (ACWY) vaccine  Aged Out             (applicable per patient's age: Cancer Screenings, Depression Screening, Fall Risk Screening, Immunizations)    Hemoglobin A1C (%)   Date Value   08/13/2020 6.5   03/12/2020 8.4 (H)   01/17/2020 6.3     Microalb/Crt.  Ratio (mcg/mg creat)   Date Value   01/29/2014 3     LDL Cholesterol (mg/dL)   Date Value   02/01/2020 43     AST (U/L)   Date Value   01/28/2020 18     ALT (U/L)   Date Value   01/28/2020 10     BUN (mg/dL)   Date Value   04/22/2020 22 (H)      (goal A1C is < 7)   (goal LDL is <100) need 30-50% reduction from baseline     BP Readings from Last 3 Encounters:   11/17/20 138/80   09/02/20 120/80   08/13/20 (!) 163/94    (goal /80)      All Future Testing planned in [facility]:  Lab Frequency Next Occurrence   Lipid Panel Once 96/56/8431   Basic Metabolic Panel Once 81/46/8497   ANNAMARIA DIGITAL SCREEN W CAD BILATERAL Once 04/06/2021   MRI BRAIN WO CONTRAST Once 62/51/6053   Basic Metabolic Panel Once 84/42/4548   XR LUMBAR SPINE (2-3 VIEWS) Once 07/31/2021   Microalbumin, Ur Once 08/13/2021   Comprehensive Metabolic w/Bili Profile Once 08/13/2021   CBC With Auto Differential Once 08/13/2021   XR KNEE LEFT (MIN 4 VIEWS) Once 11/17/2021       Next Visit Date:  No future appointments.          Patient Active Problem List:     Migraine     Morbid obesity ([facility]     Iron deficiency     Tobacco abuse     S/P Permanent nail avulsion, 2nd digit right foot      Acute rhinosinusitis     Dyslipidemia     Vitamin B12 deficiency     Folate deficiency     Constipation     Hypertension goal BP (blood pressure) < 140/90     Bright red blood per rectum     Acute blood loss anemia     Vaginal bleeding     Ovarian cyst     Pre-diabetes     Menorrhagia     H. pylori infection     Microcytosis     Anemia     Hydradenitis     Hypertension, goal below 140/90     Right leg paresthesias     Chronic back pain     Rotator cuff injury     Vitamin D deficiency     Rotator cuff tendinitis     Smoker     Left breast abscess     Iron deficiency anemia     Breast abscess     Medial meniscus tear     Right knee pain     Arthritis of knee     Lumbar disc herniation S/P L3 4 and L4 5 and tenotomy right side for foraminotomy     Leg swelling     Pelvic mass in female     Hypocalcemia     Pericardial effusion     Hepatomegaly     Intramural leiomyoma of uterus     Heart failure (HCC)     Hyponatremia     STD (female)     Bacterial vaginosis     Cardiomyopathy ([facility] [facility]     Hyperglycemia     Hyperglycemia due to type 2 diabetes mellitus (HCC)     Hyperkalemia     WHIT (acute kidney injury) ([address]     Hidradenitis suppurativa     Type 2 diabetes mellitus with hyperglycemia, without long-term current use of insulin (HCC)     Toenail deformity     Skin infection     Arthritis

## 2020-12-21 NOTE — TELEPHONE ENCOUNTER
Metformin pending for refill     Health Maintenance   Topic Date Due    Diabetic retinal exam  12/12/1980    Hepatitis B vaccine (1 of 3 - Risk 3-dose series) 12/12/1989    Diabetic microalbuminuria test  01/29/2015    Flu vaccine (1) 09/01/2020    Shingles Vaccine (1 of 2) 12/12/2020    Breast cancer screen  12/12/2020    Colon cancer screen colonoscopy  12/12/2020    Lipid screen  02/01/2021    Potassium monitoring  04/22/2021    Creatinine monitoring  04/22/2021    Diabetic foot exam  08/13/2021    A1C test (Diabetic or Prediabetic)  08/13/2021    Cervical cancer screen  02/07/2025    DTaP/Tdap/Td vaccine (3 - Td) 02/17/2030    Pneumococcal 0-64 years Vaccine  Completed    HIV screen  Completed    Hepatitis A vaccine  Aged Out    Hib vaccine  Aged Out    Meningococcal (ACWY) vaccine  Aged Out             (applicable per patient's age: Cancer Screenings, Depression Screening, Fall Risk Screening, Immunizations)    Hemoglobin A1C (%)   Date Value   08/13/2020 6.5   03/12/2020 8.4 (H)   01/17/2020 6.3     Microalb/Crt.  Ratio (mcg/mg creat)   Date Value   01/29/2014 3     LDL Cholesterol (mg/dL)   Date Value   02/01/2020 43     AST (U/L)   Date Value   01/28/2020 18     ALT (U/L)   Date Value   01/28/2020 10     BUN (mg/dL)   Date Value   04/22/2020 22 (H)      (goal A1C is < 7)   (goal LDL is <100) need 30-50% reduction from baseline     BP Readings from Last 3 Encounters:   11/17/20 138/80   09/02/20 120/80   08/13/20 (!) 163/94    (goal /80)      All Future Testing planned in [facility]:  Lab Frequency Next Occurrence   Lipid Panel Once 15/97/9352   Basic Metabolic Panel Once 11/33/4378   ANNAMARIA DIGITAL SCREEN W CAD BILATERAL Once 04/06/2021   MRI BRAIN WO CONTRAST Once 86/60/8632   Basic Metabolic Panel Once 42/85/9292   XR LUMBAR SPINE (2-3 VIEWS) Once 07/31/2021   Microalbumin, Ur Once 08/13/2021   Comprehensive Metabolic w/Bili Profile Once 08/13/2021   CBC With Auto Differential Once 08/13/2021   XR KNEE LEFT (MIN 4 VIEWS) Once 11/17/2021       Next Visit Date:  No future appointments.          Patient Active Problem List:     Migraine     Morbid obesity ([facility] [address] [facility]     Iron deficiency     Tobacco abuse     S/P Permanent nail avulsion, 2nd digit right foot      Acute rhinosinusitis     Dyslipidemia     Vitamin B12 deficiency     Folate deficiency     Constipation     Hypertension goal BP (blood pressure) < 140/90     Bright red blood per rectum     Acute blood loss anemia     Vaginal bleeding     Ovarian cyst     Pre-diabetes     Menorrhagia     H. pylori infection     Microcytosis     Anemia     Hydradenitis     Hypertension, goal below 140/90     Right leg paresthesias     Chronic back pain     Rotator cuff injury     Vitamin D deficiency     Rotator cuff tendinitis     Smoker     Left breast abscess     Iron deficiency anemia     Breast abscess     Medial meniscus tear     Right knee pain     Arthritis of knee     Lumbar disc herniation S/P L3 4 and L4 5 and tenotomy right side for foraminotomy     Leg swelling     Pelvic mass in female     Hypocalcemia     Pericardial effusion     Hepatomegaly     Intramural leiomyoma of uterus     Heart failure (HCC)     Hyponatremia     STD (female)     Bacterial vaginosis     Cardiomyopathy ([facility] [address]     Hyperglycemia     Hyperglycemia due to type 2 diabetes mellitus (HCC)     Hyperkalemia     WHIT (acute kidney injury) ([address]     Hidradenitis suppurativa     Type 2 diabetes mellitus with hyperglycemia, without long-term current use of insulin (HCC)     Toenail deformity     Skin infection     Arthritis

## 2021-01-22 RX ORDER — ACETAMINOPHEN 500 MG
TABLET ORAL
Qty: 180 TABLET | Refills: 0 | Status: SHIPPED | OUTPATIENT
Start: 2021-01-22 | End: 2021-05-27

## 2021-01-22 NOTE — TELEPHONE ENCOUNTER
Last visit: 11/17/20  Last Med refill:   Does patient have enough medication for 72 hours:     Next Visit Date:  No future appointments. Health Maintenance   Topic Date Due    Diabetic retinal exam  12/12/1980    Hepatitis B vaccine (1 of 3 - Risk 3-dose series) 12/12/1989    Diabetic microalbuminuria test  01/29/2015    Flu vaccine (1) 09/01/2020    Breast cancer screen  12/12/2020    Shingles Vaccine (1 of 2) 12/12/2020    Colon cancer screen colonoscopy  12/12/2020    Lipid screen  02/01/2021    Potassium monitoring  04/22/2021    Creatinine monitoring  04/22/2021    Diabetic foot exam  08/13/2021    A1C test (Diabetic or Prediabetic)  08/13/2021    Cervical cancer screen  02/07/2025    DTaP/Tdap/Td vaccine (3 - Td) 02/17/2030    Pneumococcal 0-64 years Vaccine  Completed    Hepatitis C screen  Completed    HIV screen  Completed    Hepatitis A vaccine  Aged Out    Hib vaccine  Aged Out    Meningococcal (ACWY) vaccine  Aged Out       Hemoglobin A1C (%)   Date Value   08/13/2020 6.5   03/12/2020 8.4 (H)   01/17/2020 6.3             ( goal A1C is < 7)   Microalb/Crt.  Ratio (mcg/mg creat)   Date Value   01/29/2014 3     LDL Cholesterol (mg/dL)   Date Value   02/01/2020 43   06/05/2016 128       (goal LDL is <100)   AST (U/L)   Date Value   01/28/2020 18     ALT (U/L)   Date Value   01/28/2020 10     BUN (mg/dL)   Date Value   04/22/2020 22 (H)     BP Readings from Last 3 Encounters:   11/17/20 138/80   09/02/20 120/80   08/13/20 (!) 163/94          (goal 120/80)    All Future Testing planned in [facility]  Lab Frequency Next Occurrence   Lipid Panel Once 11/07/6719   Basic Metabolic Panel Once 78/59/8944   ANNAMARIA DIGITAL SCREEN W CAD BILATERAL Once 04/06/2021   MRI BRAIN WO CONTRAST Once 99/65/0682   Basic Metabolic Panel Once 10/98/4070   XR LUMBAR SPINE (2-3 VIEWS) Once 07/31/2021   Microalbumin, Ur Once 08/13/2021   Comprehensive Metabolic w/Bili Profile Once 08/13/2021   CBC With Auto Differential Once 08/13/2021   XR KNEE LEFT (MIN 4 VIEWS) Once 11/17/2021               Patient Active Problem List:     Migraine     Morbid obesity ([facility] [address]     Iron deficiency     Tobacco abuse     S/P Permanent nail avulsion, 2nd digit right foot      Acute rhinosinusitis     Dyslipidemia     Vitamin B12 deficiency     Folate deficiency     Constipation     Hypertension goal BP (blood pressure) < 140/90     Bright red blood per rectum     Acute blood loss anemia     Vaginal bleeding     Ovarian cyst     Pre-diabetes     Menorrhagia     H. pylori infection     Microcytosis     Anemia     Hydradenitis     Hypertension, goal below 140/90     Right leg paresthesias     Chronic back pain     Rotator cuff injury     Vitamin D deficiency     Rotator cuff tendinitis     Smoker     Left breast abscess     Iron deficiency anemia     Breast abscess     Medial meniscus tear     Right knee pain     Arthritis of knee     Lumbar disc herniation S/P L3 4 and L4 5 and tenotomy right side for foraminotomy     Leg swelling     Pelvic mass in female     Hypocalcemia     Pericardial effusion     Hepatomegaly     Intramural leiomyoma of uterus     Heart failure (HCC)     Hyponatremia     STD (female)     Bacterial vaginosis     Cardiomyopathy ([address]     Hyperglycemia     Hyperglycemia due to type 2 diabetes mellitus (HCC)     Hyperkalemia     WHIT (acute kidney injury) ([address]     Hidradenitis suppurativa     Type 2 diabetes mellitus with hyperglycemia, without long-term current use of insulin (HCC)     Toenail deformity     Skin infection     Arthritis

## 2021-02-01 ENCOUNTER — OFFICE VISIT (OUTPATIENT)
Dept: FAMILY MEDICINE CLINIC | Age: 51
End: 2021-02-01
Payer: COMMERCIAL

## 2021-02-01 VITALS
TEMPERATURE: 97 F | WEIGHT: 291.4 LBS | HEART RATE: 73 BPM | SYSTOLIC BLOOD PRESSURE: 145 MMHG | DIASTOLIC BLOOD PRESSURE: 80 MMHG | BODY MASS INDEX: 45.64 KG/M2

## 2021-02-01 DIAGNOSIS — I10 ESSENTIAL HYPERTENSION: ICD-10-CM

## 2021-02-01 DIAGNOSIS — Z12.31 ENCOUNTER FOR SCREENING MAMMOGRAM FOR BREAST CANCER: ICD-10-CM

## 2021-02-01 DIAGNOSIS — E11.65 TYPE 2 DIABETES MELLITUS WITH HYPERGLYCEMIA, WITHOUT LONG-TERM CURRENT USE OF INSULIN (HCC): ICD-10-CM

## 2021-02-01 DIAGNOSIS — Z12.11 COLON CANCER SCREENING: ICD-10-CM

## 2021-02-01 DIAGNOSIS — M54.16 LUMBAR BACK PAIN WITH RADICULOPATHY AFFECTING LEFT LOWER EXTREMITY: Primary | ICD-10-CM

## 2021-02-01 LAB — HBA1C MFR BLD: 7.3 %

## 2021-02-01 PROCEDURE — 99213 OFFICE O/P EST LOW 20 MIN: CPT | Performed by: STUDENT IN AN ORGANIZED HEALTH CARE EDUCATION/TRAINING PROGRAM

## 2021-02-01 PROCEDURE — 99211 OFF/OP EST MAY X REQ PHY/QHP: CPT | Performed by: STUDENT IN AN ORGANIZED HEALTH CARE EDUCATION/TRAINING PROGRAM

## 2021-02-01 PROCEDURE — 3051F HG A1C>EQUAL 7.0%<8.0%: CPT | Performed by: STUDENT IN AN ORGANIZED HEALTH CARE EDUCATION/TRAINING PROGRAM

## 2021-02-01 PROCEDURE — 83036 HEMOGLOBIN GLYCOSYLATED A1C: CPT | Performed by: STUDENT IN AN ORGANIZED HEALTH CARE EDUCATION/TRAINING PROGRAM

## 2021-02-01 RX ORDER — IBUPROFEN 600 MG/1
600 TABLET ORAL EVERY 8 HOURS PRN
Qty: 90 TABLET | Refills: 0 | Status: SHIPPED | OUTPATIENT
Start: 2021-02-01 | Stop reason: SDUPTHER

## 2021-02-01 ASSESSMENT — ENCOUNTER SYMPTOMS
DIARRHEA: 0
NAUSEA: 0
SHORTNESS OF BREATH: 0
ABDOMINAL PAIN: 0
BACK PAIN: 1
VOMITING: 0

## 2021-02-01 ASSESSMENT — PATIENT HEALTH QUESTIONNAIRE - PHQ9
SUM OF ALL RESPONSES TO PHQ9 QUESTIONS 1 & 2: 0
SUM OF ALL RESPONSES TO PHQ QUESTIONS 1-9: 0
SUM OF ALL RESPONSES TO PHQ QUESTIONS 1-9: 0
1. LITTLE INTEREST OR PLEASURE IN DOING THINGS: 0
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
## 2021-02-01 NOTE — PROGRESS NOTES
Visit Information    Have you changed or started any medications since your last visit including any over-the-counter medicines, vitamins, or herbal medicines? no   Have you stopped taking any of your medications? Is so, why? -  no  Are you having any side effects from any of your medications? - no    Have you seen any other physician or provider since your last visit?  no   Have you had any other diagnostic tests since your last visit?  no   Have you been seen in the emergency room and/or had an admission in a hospital since we last saw you?  no   Have you had your routine dental cleaning in the past 6 months?  no     Do you have an active MyChart account? If no, what is the barrier?   No:     Patient Care Team:  Rosa Jesus MD as PCP - General (Family Medicine)    Medical History Review  Past Medical, Family, and Social History reviewed and does not contribute to the patient presenting condition    Health Maintenance   Topic Date Due    Diabetic retinal exam  12/12/1980    Hepatitis B vaccine (1 of 3 - Risk 3-dose series) 12/12/1989    Diabetic microalbuminuria test  01/29/2015    Flu vaccine (1) 09/01/2020    Breast cancer screen  12/12/2020    Shingles Vaccine (1 of 2) 12/12/2020    Colon cancer screen colonoscopy  12/12/2020    Lipid screen  02/01/2021    Potassium monitoring  04/22/2021    Creatinine monitoring  04/22/2021    Diabetic foot exam  08/13/2021    A1C test (Diabetic or Prediabetic)  08/13/2021    Cervical cancer screen  02/07/2025    DTaP/Tdap/Td vaccine (3 - Td) 02/17/2030    Pneumococcal 0-64 years Vaccine  Completed    Hepatitis C screen  Completed    HIV screen  Completed    Hepatitis A vaccine  Aged Out    Hib vaccine  Aged Out    Meningococcal (ACWY) vaccine  Aged Out

## 2021-02-01 NOTE — PROGRESS NOTES
HTN - currently taking Norvasc 5 mg daily, Coreg 6.25 mg daily. BP today 145/80. States her BP is usually controlled but she is in a lot of pain and stress at this time. Review of Systems   Constitutional: Negative for fatigue and fever. Respiratory: Negative for shortness of breath. Cardiovascular: Negative for chest pain. Gastrointestinal: Negative for abdominal pain, diarrhea, nausea and vomiting. Genitourinary: Negative for dysuria. Musculoskeletal: Positive for back pain (Left hip). Skin: Negative for rash. Neurological: Positive for numbness. Negative for weakness. All other systems reviewed and are negative. Objective:    BP (!) 145/80 Comment: manually  Pulse 73   Temp 97 °F (36.1 °C) (Temporal)   Wt 291 lb 6.4 oz (132.2 kg)   LMP 01/15/2021   BMI 45.64 kg/m²    BP Readings from Last 3 Encounters:   02/01/21 (!) 145/80   11/17/20 138/80   09/02/20 120/80     Physical Exam  Vitals signs reviewed. Constitutional:       General: She is awake. She is not in acute distress. Appearance: She is obese. Cardiovascular:      Rate and Rhythm: Normal rate and regular rhythm. Heart sounds: Normal heart sounds. Pulmonary:      Effort: Pulmonary effort is normal.      Breath sounds: Normal breath sounds and air entry. Musculoskeletal:      Left hip: She exhibits tenderness (Tenderness in the left hip. ). Lumbar back: Normal. She exhibits no tenderness (No bony tenderness along spine. ) and no bony tenderness. Right lower leg: No edema. Left lower leg: No edema. Comments: Muscle strength 5/5 in right lower extremity  Muscle strength 4/5 in left lower extremity  Sensation intact  Straight leg test positive for left lower extremity  No pain on passive or active rotation of bilateral lower extremities    Neurological:      Mental Status: She is alert. Psychiatric:         Behavior: Behavior is cooperative.        Lab Results   Component Value Date    WBC 11.3 04/22/2020    HGB 13.5 04/22/2020    HCT 43.4 04/22/2020    PLT See Reflexed IPF Result 04/22/2020    CHOL 82 02/01/2020    TRIG 58 02/01/2020    HDL 27 (L) 02/01/2020    ALT 10 01/28/2020    AST 18 01/28/2020     (L) 04/22/2020    K 4.6 04/22/2020    CL 94 (L) 04/22/2020    CREATININE 1.09 (H) 04/22/2020    BUN 22 (H) 04/22/2020    CO2 21 04/22/2020    TSH 1.98 03/12/2020    INR 1.3 01/29/2020    GLUF 109 (H) 01/15/2013    LABA1C 7.3 02/01/2021    LABMICR 3 01/29/2014     Lab Results   Component Value Date    CALCIUM 8.5 (L) 04/22/2020    PHOS 3.7 01/28/2020     Lab Results   Component Value Date    LDLCHOLESTEROL 43 02/01/2020       Assessment and Plan:    1. Lumbar back pain with radiculopathy affecting left lower extremity  - XR LUMBAR SPINE (2-3 VIEWS); Future  - [facility] Physical Therapy [facility]  - ibuprofen (ADVIL;MOTRIN) 600 MG tablet; Take 1 tablet by mouth every 8 hours as needed for Pain  Dispense: 90 tablet; Refill: 0  - Advised to patient about the importance of initial imaging and physical therapy prior to obtaining MRI of the hip. Patient displeased, but did verbalize understanding  - Advised patient to alternate between Tylenol and Motrin for pain    2. Type 2 diabetes mellitus with hyperglycemia, without long-term current use of insulin ([facility])  - POCT glycosylated hemoglobin (Hb A1C) - 7.2 from 6.5  - Continue current regimen, dietary modifications, recheck at next visit and possibliy re-initiate insulin regimen     3. Essential hypertension  - Controlled on Norvasc and Coreg    4. Encounter for screening mammogram for breast cancer  - [facility] Digital Screen Bilateral [[id number]]; Future    5. Colon cancer screening  - Cologuard (For External Results Only);  Future    Requested Prescriptions     Signed Prescriptions Disp Refills    ibuprofen (ADVIL;MOTRIN) 600 MG tablet 90 tablet 0     Sig: Take 1 tablet by mouth every 8 hours as needed for Pain       There are no discontinued

## 2021-02-08 ENCOUNTER — HOSPITAL ENCOUNTER (OUTPATIENT)
Dept: PHYSICAL THERAPY | Age: 51
Setting detail: THERAPIES SERIES
Discharge: HOME | End: 2021-02-08
Payer: COMMERCIAL

## 2021-02-08 ENCOUNTER — HOSPITAL ENCOUNTER (OUTPATIENT)
Age: 51
Discharge: HOME | End: 2021-02-10
Payer: COMMERCIAL

## 2021-02-08 ENCOUNTER — HOSPITAL ENCOUNTER (OUTPATIENT)
Dept: GENERAL RADIOLOGY | Age: 51
Discharge: HOME | End: 2021-02-10
Payer: COMMERCIAL

## 2021-02-08 DIAGNOSIS — M54.16 LUMBAR BACK PAIN WITH RADICULOPATHY AFFECTING LEFT LOWER EXTREMITY: ICD-10-CM

## 2021-02-08 PROCEDURE — 72100 X-RAY EXAM L-S SPINE 2/3 VWS: CPT

## 2021-02-08 PROCEDURE — 97110 THERAPEUTIC EXERCISES: CPT

## 2021-02-08 PROCEDURE — 97161 PT EVAL LOW COMPLEX 20 MIN: CPT

## 2021-02-08 NOTE — CONSULTS
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]  Normal duty   [] Light duty   [] Off due to condition    []  Retired   [] Not employed   [] Disability  [] Other:  []  Return to work: Work activities/duties Standing, walking all day long, able to avoid most bending, lifting, carrying; work aggravates back       ADL/IADL Previous level of function Current level of function Who currently assists the patient with task   Transfer/mobility [x] Independent  [] Assist [x] Independent  [] Assist Difficulty in/out of bed   Toileting [x] Independent  [] Assist [x] Independent  [] Assist    Housekeeping [x] Independent  [] Assist [] Independent  [x] Assist    Grocery shop/meal prep [x] Independent  [] Assist [] Independent  [x] Assist Increased pain after walking to back of store     Gait Prior level of function Current level of function    [x] Independent  [] Assist [x] Independent  [] Assist   Device: [x] Independent [x] Independent    [] Straight Cane [] Quad cane [] Straight Cane [] Quad cane    [] Standard walker [] Rolling walker   [] 4 wheeled walker [] Standard walker [] Rolling walker   [] 4 wheeled walker    [] Wheelchair [] Wheelchair     Pain:  [x] Yes  [] No Location: L hip, LB Pain Rating: (0-10 scale) 8/10  Pain altered Tx:  [] Yes  [x] No  Action:    Symptoms:  [] Improving [x] Worsening [] Same    Sleep: [] OK    [x] Disturbed    Objective:      STRENGTH  STRENGTH  ROM    Left Right  Left Right Cervical    C5 Shld Abd   L1-2 Hip Flex 3-p 3+ Flexion    Shld Flexion   Hip Abd   Extension    Shld IR   L3-4 Knee Ext 4 4 Rotation L R   Shld ER   L4 Ankle DF 4+ 4 Sidebend L R   C6 Elb Flex   L5 EHL   Retraction    C7 Elb Ext   S1 Plant.  Flex   Lumbar    C8 EPL   Abdominals   Flexion 69°p   T1 Fing Abd   Erector Spinae   Extension 13°p         Rotation L  R HS 4-p 4 Sidebend L R      Hip ext 3+p 3+p UE/LE                                                            p=pain      TESTS (+/-) LEFT RIGHT Not Tested   SLR [] sit [] supine   [x]   Hamstring (SLR)   [x]   SKTC - - []   DKTC - - []   LTR - - []      []   Hema Tests ? Pain ? Pain No Change Not Tested   RFIS [] [] [] [x]   ANY [] [] [] [x]   RFIL [] [x] [] []   REIL [x] [] [] []   Rep Prot [] [] [] []   Rep Retract [] [] [] []   Hooklying: \"pressure is off\" pain down to 6/10  DKTC x10 -achy pain LB 4/10, no LE pain; 2x10 no change, 4/10  Prone-pulling LB, 8/10  FRANCISCO-pain increasing slowly 5/10, \"pulling\"  Press ups x5 increasing pain back 5/10      OBSERVATION No Deficit Deficit Not Tested Comments   Posture       Forward Head [] [x] []    Rounded Shoulders [] [x] []    Slumped Sitting [x] [] []    Palpation [] [] [x]    Sensation [] [] [x]    Edema [] [] [x]    Neurological [] [] []        Functional Test: Oswestry Score: 70% functionally impaired     Comments:    Assessment:  Patient would benefit from skilled physical therapy services in order to: decrease pain, increase hip and knee strength, increase lumbar ROM, and increase tolerance to standing and walking. Problems:    [x] ? Pain: L LB, into L LE 8/10 this date, up to \"15\"/10  [x] ? ROM: Lumbar  [x] ? Strength: B hips, knees  [x] ? Function: Oswestry 70% loss of function  [] Other:      STG: (to be met in 10 treatments)  1. ? Pain: L LB, LE 5/10 average pain, 7/10 at worst  2. ? ROM: Lumbar ext 20°  3. ? Strength: Hip flex L 3+/5, R 4-/5, B hip ext 4-/5, B knees 4+/5  4. ? Function: Oswestry 52% loss of function  5. Patient to be independent with home exercise program as demonstrated by performance with correct form without cues.     LTG: (to be met in 18 treatments)  1. ? Pain: L LB, LE 2/10 average pain, 5/10 at worst  2. ? ROM: Lumbar AROM WNL without increased pain   3. ? Strength: B hips 4/5  4. ? Function: Oswestry 38% loss of function  5. Pt able to walk long enough to do grocery shopping without increased pain      Patient goals: \"To limit the pain\"    Rehab Potential:  [x] Good  [] Fair  [] Poor   Suggested Professional Referral:  [x] No  [] Yes:  Barriers to Goal Achievement:  [x] No  [] Yes:  Domestic Concerns:  [x] No  [] Yes:    Pt. Education:  [x] Plans/Goals, Risks/Benefits discussed  [x] Home exercise program  Method of Education: [x] Verbal  [x] Demo  [x] Written  Comprehension of Education:  [x] Verbalizes understanding. [] Demonstrates understanding. [] Needs Review. [] Demonstrates/verbalizes understanding of HEP/Ed previously given. Treatment Plan:  [x] Therapeutic Exercise   44975  [] Iontophoresis: 4 mg/mL Dexamethasone Sodium Phosphate  mAmin  61420   [x] Therapeutic Activity  12446 [] Vasopneumatic cold with compression  32837    [] Gait Training   89613 [x] Ultrasound   08070   [] Neuromuscular Re-education  23320 [x] Electrical Stimulation Unattended  11504   [x] Manual Therapy  84189 [] Electrical Stimulation Attended  89385   [x] Instruction in HEP  [x] Lumbar/Cervical Traction  70680   [] Aquatic Therapy   85109 [x] Cold/hotpack    [x] Massage   64067      [] Dry Needling, 1 or 2 muscles  81033   [] Biofeedback, first 15 minutes   19430  [] Biofeedback, additional 15 minutes   49654 [] Dry Needling, 3 or more muscles  48394     []  Medication allergies reviewed for use of    Dexamethasone Sodium Phosphate 4mg/ml     with iontophoresis treatments. Pt is not allergic. Frequency:  2 x/week for 18 visits    Todays Treatment:  Modalities: HP LB (large) prone, at end of Rx  Precautions:  Exercises: LB  Exercise Reps/ Time Weight/ Level Comments   Hooklying  5 min     abdom bouchra 10x 5sec    Glut sets  10x 5sec    SKTC  5x 5sec ea    LTR 5x 5sec ea    Other: Educated Pt in hooklying every 2-3 hours for 5-10 min as able around work. Educated Pt in exercises 2-3x daily, issued handout.   Pt reports increased pain after evaluation/exercises, initiated HP to decrease pain and muscle tightness, Pt reports decreased pain some. Specific Instructions for next treatment: add pelvic tilts, Sci Fit, progress supine stab ex; monitor response to HP, ok to add ES for symptom control      Evaluation Complexity:  History (Personal factors, comorbidities) [] 0 [] 1-2 [x] 3+   Exam (limitations, restrictions) [] 1-2 [] 3 [x] 4+   Clinical presentation (progression) [] Stable [x] Evolving  [] Unstable   Decision Making [] Low [x] Moderate [] High    [] Low Complexity [x] Moderate Complexity [] High Complexity       Treatment Charges: Mins Units   [] Evaluation       []  Low       [x]  Moderate       []  High 42 1   [x]  Modalities HP 20 --   [x]  Ther Exercise 11 1   []  Manual Therapy     []  Ther Activities     []  Aquatics     []  Vasocompression     []  Other       TOTAL TREATMENT TIME: 53 min    Time in: 9427      Time out: 2215    Electronically signed by: Chastity Sparrow PT           Physician Signature:________________________________Date:__________________  By signing above or cosigning this note, I have reviewed this plan of care and certify a need for medically necessary rehabilitation services.      *PLEASE SIGN ABOVE AND FAX BACK ALL PAGES*

## 2021-02-11 ENCOUNTER — HOSPITAL ENCOUNTER (OUTPATIENT)
Dept: PHYSICAL THERAPY | Age: 51
Setting detail: THERAPIES SERIES
Discharge: HOME | End: 2021-02-11
Payer: COMMERCIAL

## 2021-02-11 NOTE — FLOWSHEET NOTE
[x] [facility] [address]  [address]    [phone]  F: [phone]     Physical Therapy Cancel/No Show note    Date: 2021  Patient: Yuri Zepeda  : 1970  MRN: [id number]    Cancels/No Shows to date:     For today's appointment patient:    [x]  Cancelled    [] Rescheduled appointment    [] No-show     Reason given by patient:    []  Patient ill    []  Conflicting appointment    [x] No transportation      [] Conflict with work    [] No reason given    [] Weather related    [] [id number]    [] Other:      Comments:        [x] Next appointment was confirmed    Electronically signed by: Benita Smith PTA

## 2021-02-17 ENCOUNTER — HOSPITAL ENCOUNTER (OUTPATIENT)
Dept: PHYSICAL THERAPY | Age: 51
Setting detail: THERAPIES SERIES
Discharge: HOME | End: 2021-02-17
Payer: COMMERCIAL

## 2021-02-17 PROCEDURE — G0283 ELEC STIM OTHER THAN WOUND: HCPCS

## 2021-02-17 PROCEDURE — 97110 THERAPEUTIC EXERCISES: CPT

## 2021-02-17 NOTE — FLOWSHEET NOTE
Total Treatment time 56 min        Assessment: [] Progressing toward goals. Initiated LAQ, post shoulder rolls, scap retractions, pelvic tilts, supine UE raises and marches to increase spinal stab strength and improve posture. Pt performs all ex very slowly. Pain after ex while hooklying at 5/10. Educated Pt in correct technique for shoveling snow, both verbal and demo, importance of making legs do the work, and avoiding bending and twisting back. Cont HP in prone at end of Rx, trial ES to decrease pain and tightness. [] No change. [] Other:  [x] Patient would continue to benefit from skilled physical therapy services in order to: decrease pain, increase hip and knee strength, increase lumbar ROM, and increase tolerance to standing and walking. STG: (to be met in 10 treatments)  1. ? Pain: L LB, LE 5/10 average pain, 7/10 at worst  2. ? ROM: Lumbar ext 20°  3. ? Strength: Hip flex L 3+/5, R 4-/5, B hip ext 4-/5, B knees 4+/5  4. ? Function: Oswestry 52% loss of function  5. Patient to be independent with home exercise program as demonstrated by performance with correct form without cues.     LTG: (to be met in 18 treatments)  1. ? Pain: L LB, LE 2/10 average pain, 5/10 at worst  2. ? ROM: Lumbar AROM WNL without increased pain   3. ? Strength: B hips 4/5  4. ? Function: Oswestry 38% loss of function  5. Pt able to walk long enough to do grocery shopping without increased pain       Pt. Education:  [x] Yes  [] No  [x] Reviewed Prior HEP/Ed  Method of Education: [x] Verbal  [x] Demo  [] Written  Comprehension of Education:  [x] Verbalizes understanding-purpose, correct technique new ex  [] Demonstrates understanding. [] Needs review. [] Demonstrates/verbalizes HEP/Ed previously given. 2/17 Correct technique shoveling snow, importance of making legs do the work, avoiding bending and twisting back. Plan: [x] Continue current frequency toward long and short term goals.     [x] Specific Instructions for subsequent treatments: add door/corner stretch, progress spinal stab ex per Pt tolerance, monitor response to ES, work up to standing ex      Time In:1400            Time Out: 8001 [address]    Electronically signed by:  Angel Luis Taylor PT

## 2021-02-22 ENCOUNTER — HOSPITAL ENCOUNTER (OUTPATIENT)
Dept: PHYSICAL THERAPY | Age: 51
Setting detail: THERAPIES SERIES
Discharge: HOME | End: 2021-02-22
Payer: COMMERCIAL

## 2021-02-22 PROCEDURE — 97110 THERAPEUTIC EXERCISES: CPT

## 2021-02-22 PROCEDURE — G0283 ELEC STIM OTHER THAN WOUND: HCPCS

## 2021-02-22 NOTE — FLOWSHEET NOTE
[x] [address]  [address]  [phone]  F: [phone]     Physical Therapy Daily Treatment Note    Date:  2021  Patient Name:  Lindsey Brumfield    :  1970  MRN: [id number]  Physician: Maria G Freedman                           Insurance: [employer] (by medical necessity)  Medical Diagnosis: Lumbar back pain with radiculopathy affecting left lower extremity M54.16             Rehab Codes: M54.5, M79.652, R26.2, R29.3  Onset Date: 2020                    Next 's appt.: 2021     Visit# / total visits: 3/18; Recheck goals by visit 10     Cancels/No Shows:     Subjective:    Pain:  [x] Yes  [] No Location: LB, L hip Pain Rating: (0-10 scale) 10/10  Pain altered Tx:  [x] No  [] Yes  Action:  Comments: Pt reported that she is hurting, stated that she had a sharp shooting pain down the L leg. Objective:  Modalities: HP (large) ES (IFC opiate) LB prone, at end of Rx  Precautions:  Exercises: LB  Exercise Reps/ Time Weight/ Level Comments   SciFit 4 min ML1.0    Door stretch             Seated       LAQ 10x  W/abdom bouchra,    Post shoulder rolls 15x     Scap Retractions  10x 5sec          Hooklying       abdom bouchra 10x 5sec    Pelvic tilts  10x 5sec fter first rep Pt performs very limited ROM    Glut sets 10x 5sec    Add sets  10x 5sec    SKTC 5x 5sec    LTR 5x 5sec Alternating    Alt UE raises    10x                 Other:      Treatment Charges: Mins Units   [x]  Modalities ES  HP 15  15 1  --   [x]  Ther Exercise 38 3   []  Manual Therapy     [x]  Ther Activities 0 --   []  Aquatics     []  Vasocompression     []  Other     Total Treatment time 53 min 3       Assessment: [] Progressing toward goals. [] No change. Pt began treatment with HP/IFC to decrease pain level and muscular tension due to increased pain level at arrival. Pt stated some relief.  Pt continues to complete activities

## 2021-02-27 DIAGNOSIS — M54.16 LUMBAR BACK PAIN WITH RADICULOPATHY AFFECTING LEFT LOWER EXTREMITY: ICD-10-CM

## 2021-02-27 RX ORDER — IBUPROFEN 600 MG/1
TABLET ORAL
Qty: 90 TABLET | Refills: 0 | Status: SHIPPED | OUTPATIENT
Start: 2021-02-27 | End: 2021-04-02 | Stop reason: ALTCHOICE

## 2021-02-27 NOTE — TELEPHONE ENCOUNTER
Prasad Request for pending medication. Last Visit Date:2/1/21  Next Visit Date:  Future Appointments   Date Time Provider Nelsy Pedrazai   3/1/2021 10:45 AM Nila Moreno PTA [facility]   3/2/2021  8:30 AM Marbella Arellano MD [facility]   3/5/2021  8:00 AM Lyn Sarabia PTA [facility]       Health Maintenance   Topic Date Due    Diabetic retinal exam  12/12/1980    Hepatitis B vaccine (1 of 3 - Risk 3-dose series) 12/12/1989    Diabetic microalbuminuria test  01/29/2015    Flu vaccine (1) 09/01/2020    Breast cancer screen  12/12/2020    Shingles Vaccine (1 of 2) 12/12/2020    Colon cancer screen colonoscopy  12/12/2020    Lipid screen  02/01/2021    Potassium monitoring  04/22/2021    Creatinine monitoring  04/22/2021    Diabetic foot exam  08/13/2021    A1C test (Diabetic or Prediabetic)  02/01/2022    Cervical cancer screen  02/07/2025    DTaP/Tdap/Td vaccine (3 - Td) 02/17/2030    Pneumococcal 0-64 years Vaccine  Completed    Hepatitis C screen  Completed    HIV screen  Completed    Hepatitis A vaccine  Aged Out    Hib vaccine  Aged Out    Meningococcal (ACWY) vaccine  Aged Out       Hemoglobin A1C (%)   Date Value   02/01/2021 7.3   08/13/2020 6.5   03/12/2020 8.4 (H)             ( goal A1C is < 7)   Microalb/Crt.  Ratio (mcg/mg creat)   Date Value   01/29/2014 3     LDL Cholesterol (mg/dL)   Date Value   02/01/2020 43       (goal LDL is <100)   AST (U/L)   Date Value   01/28/2020 18     ALT (U/L)   Date Value   01/28/2020 10     BUN (mg/dL)   Date Value   04/22/2020 22 (H)     BP Readings from Last 3 Encounters:   02/01/21 (!) 145/80   11/17/20 138/80   09/02/20 120/80          (goal 120/80)    All Future Testing planned in [facility]  Lab Frequency Next Occurrence   Lipid Panel Once 04/19/2021   MRI BRAIN WO CONTRAST Once 58/06/9955   Basic Metabolic Panel Once 70/08/0864   Microalbumin, Ur Once 08/13/2021   Comprehensive Metabolic w/Bili Profile Once 08/13/2021   CBC With Auto Differential Once 08/13/2021   XR KNEE LEFT (MIN 4 VIEWS) Once 11/17/2021   [facility] Digital Screen Bilateral [[id number]] Once 03/03/2021   Cologuard (For External Results Only) Once 02/01/2022       Next Visit Date:  Future Appointments   Date Time Provider Nelsy Rosa Elena   3/1/2021 10:45 AM Britney Alva, PTA [facility]   3/2/2021  8:30 AM MD Amita Murry  [facility]   3/5/2021  8:00 AM Royal Fitzgerald, PTA [facility]         Patient Active Problem List:     Migraine     Morbid obesity ([address]     Iron deficiency     Tobacco abuse     S/P Permanent nail avulsion, 2nd digit right foot      Acute rhinosinusitis     Dyslipidemia     Vitamin B12 deficiency     Folate deficiency     Constipation     Hypertension goal BP (blood pressure) < 140/90     Bright red blood per rectum     Acute blood loss anemia     Vaginal bleeding     Ovarian cyst     Pre-diabetes     Menorrhagia     H. pylori infection     Microcytosis     Anemia     Hydradenitis     Hypertension, goal below 140/90     Right leg paresthesias     Chronic back pain     Rotator cuff injury     Vitamin D deficiency     Rotator cuff tendinitis     Smoker     Left breast abscess     Iron deficiency anemia     Breast abscess     Medial meniscus tear     Right knee pain     Arthritis of knee     Lumbar disc herniation S/P L3 4 and L4 5 and tenotomy right side for foraminotomy     Leg swelling     Pelvic mass in female     Hypocalcemia     Pericardial effusion     Hepatomegaly     Intramural leiomyoma of uterus     Heart failure (HCC)     Hyponatremia     STD (female)     Bacterial vaginosis     Cardiomyopathy ([address]     Hyperglycemia     Hyperglycemia due to type 2 diabetes mellitus (HCC)     Hyperkalemia     WHIT (acute kidney injury) ([address]     Hidradenitis suppurativa     Type 2 diabetes mellitus with hyperglycemia, without long-term current use of insulin (HCC)     Toenail deformity     Skin infection     Arthritis

## 2021-03-01 ENCOUNTER — HOSPITAL ENCOUNTER (OUTPATIENT)
Dept: PHYSICAL THERAPY | Age: 51
Setting detail: THERAPIES SERIES
Discharge: HOME | End: 2021-03-01
Payer: COMMERCIAL

## 2021-03-01 NOTE — FLOWSHEET NOTE
[x] [facility] [address]  [address]    [phone]  F: [phone]     Physical Therapy Cancel/No Show note    Date: 3/1/2021  Patient: Genesis Cotton  : 1970  MRN: [id number]    Cancels/No Shows to date:     For today's appointment patient:    [x]  Cancelled    [] Rescheduled appointment    [] No-show     Reason given by patient:    []  Patient ill    []  Conflicting appointment    [] No transportation      [x] Conflict with work    [] No reason given    [] Weather related    [] COVID-19    [] Other:      Comments:        [x] Next appointment was confirmed-at previous appointment.     Electronically signed by: Tim Evans PTA

## 2021-03-02 ENCOUNTER — OFFICE VISIT (OUTPATIENT)
Dept: FAMILY MEDICINE CLINIC | Age: 51
End: 2021-03-02
Payer: COMMERCIAL

## 2021-03-02 VITALS
SYSTOLIC BLOOD PRESSURE: 140 MMHG | DIASTOLIC BLOOD PRESSURE: 85 MMHG | WEIGHT: 289.4 LBS | BODY MASS INDEX: 45.33 KG/M2 | HEART RATE: 80 BPM

## 2021-03-02 DIAGNOSIS — I10 ESSENTIAL HYPERTENSION: ICD-10-CM

## 2021-03-02 DIAGNOSIS — Z71.6 ENCOUNTER FOR SMOKING CESSATION COUNSELING: ICD-10-CM

## 2021-03-02 DIAGNOSIS — M54.16 LUMBAR BACK PAIN WITH RADICULOPATHY AFFECTING LEFT LOWER EXTREMITY: Primary | ICD-10-CM

## 2021-03-02 PROCEDURE — G8417 CALC BMI ABV UP PARAM F/U: HCPCS | Performed by: STUDENT IN AN ORGANIZED HEALTH CARE EDUCATION/TRAINING PROGRAM

## 2021-03-02 PROCEDURE — 99213 OFFICE O/P EST LOW 20 MIN: CPT | Performed by: STUDENT IN AN ORGANIZED HEALTH CARE EDUCATION/TRAINING PROGRAM

## 2021-03-02 PROCEDURE — G8427 DOCREV CUR MEDS BY ELIG CLIN: HCPCS | Performed by: STUDENT IN AN ORGANIZED HEALTH CARE EDUCATION/TRAINING PROGRAM

## 2021-03-02 PROCEDURE — G8484 FLU IMMUNIZE NO ADMIN: HCPCS | Performed by: STUDENT IN AN ORGANIZED HEALTH CARE EDUCATION/TRAINING PROGRAM

## 2021-03-02 PROCEDURE — 3017F COLORECTAL CA SCREEN DOC REV: CPT | Performed by: STUDENT IN AN ORGANIZED HEALTH CARE EDUCATION/TRAINING PROGRAM

## 2021-03-02 PROCEDURE — 1036F TOBACCO NON-USER: CPT | Performed by: STUDENT IN AN ORGANIZED HEALTH CARE EDUCATION/TRAINING PROGRAM

## 2021-03-02 RX ORDER — CARVEDILOL 6.25 MG/1
6.25 TABLET ORAL 2 TIMES DAILY WITH MEALS
Qty: 60 TABLET | Refills: 3 | Status: SHIPPED | OUTPATIENT
Start: 2021-03-02 | End: 2021-07-13

## 2021-03-02 RX ORDER — NICOTINE 21 MG/24HR
1 PATCH, TRANSDERMAL 24 HOURS TRANSDERMAL DAILY
Qty: 42 PATCH | Refills: 1 | Status: SHIPPED | OUTPATIENT
Start: 2021-03-02 | End: 2022-09-28

## 2021-03-02 RX ORDER — AMLODIPINE BESYLATE 10 MG/1
TABLET ORAL
Qty: 90 TABLET | Refills: 1 | Status: SHIPPED | OUTPATIENT
Start: 2021-03-02 | End: 2021-10-04

## 2021-03-02 RX ORDER — MELOXICAM 15 MG/1
15 TABLET ORAL DAILY PRN
Qty: 30 TABLET | Refills: 1 | OUTPATIENT
Start: 2021-03-02 | End: 2021-06-07

## 2021-03-02 RX ORDER — TIZANIDINE 4 MG/1
4 TABLET ORAL 3 TIMES DAILY PRN
Qty: 42 TABLET | Refills: 0 | Status: SHIPPED | OUTPATIENT
Start: 2021-03-02 | End: 2021-06-07

## 2021-03-02 RX ORDER — CARVEDILOL 6.25 MG/1
6.25 TABLET ORAL 2 TIMES DAILY WITH MEALS
Qty: 60 TABLET | Refills: 3 | Status: CANCELLED | OUTPATIENT
Start: 2021-03-02

## 2021-03-02 NOTE — PROGRESS NOTES
Visit Information    Have you changed or started any medications since your last visit including any over-the-counter medicines, vitamins, or herbal medicines? no   Have you stopped taking any of your medications? Is so, why? -  no  Are you having any side effects from any of your medications? - no    Have you seen any other physician or provider since your last visit?  no   Have you had any other diagnostic tests since your last visit?  no   Have you been seen in the emergency room and/or had an admission in a hospital since we last saw you?  no   Have you had your routine dental cleaning in the past 6 months?  no     Do you have an active MyChart account? If no, what is the barrier?   Yes    Patient Care Team:  Marbella Arellano MD as PCP - General (Family Medicine)    Medical History Review  Past Medical, Family, and Social History reviewed and does contribute to the patient presenting condition    Health Maintenance   Topic Date Due    Diabetic retinal exam  12/12/1980    Hepatitis B vaccine (1 of 3 - Risk 3-dose series) 12/12/1989    Diabetic microalbuminuria test  01/29/2015    Flu vaccine (1) 09/01/2020    Breast cancer screen  12/12/2020    Shingles Vaccine (1 of 2) 12/12/2020    Colon cancer screen colonoscopy  12/12/2020    Lipid screen  02/01/2021    Potassium monitoring  04/22/2021    Creatinine monitoring  04/22/2021    Diabetic foot exam  08/13/2021    A1C test (Diabetic or Prediabetic)  02/01/2022    Cervical cancer screen  02/07/2025    DTaP/Tdap/Td vaccine (3 - Td) 02/17/2030    Pneumococcal 0-64 years Vaccine  Completed    Hepatitis C screen  Completed    HIV screen  Completed    Hepatitis A vaccine  Aged Out    Hib vaccine  Aged Out    Meningococcal (ACWY) vaccine  Aged Out

## 2021-03-02 NOTE — PROGRESS NOTES
I have reviewed and discussed key elements of [address] [place] [address] with the resident including plan of care and follow up and agree with the care bart plan.

## 2021-03-02 NOTE — PROGRESS NOTES
dyspnea, syncope and tachypnea. Cardiovascular risk factors: diabetes mellitus, dyslipidemia, family history of premature cardiovascular disease, hypertension, obesity (BMI >= 30 kg/m2), sedentary lifestyle and smoking/ tobacco exposure. 6 cigarettes/day. Advised smoking cessation. Smoking lowers your ability to fight infections, increasing healing time and significantly increases your risk for heart disease, lung disease and cancer. 1-800-QUIT-NOW (7-135.955.5314). Use of agents associated with hypertension: NSAIDS. Patient is currently on coreg 6.25 BID, Lasix 40mg, Aldactone 25, amlodipine 5mg. Review of Systems  Constitutional: Negative for activity change, appetite change, chills, diaphoresis, fatigue, fever and unexpected weight change. HENT: Negative for sinus pressure, sinus pain, sore throat and trouble swallowing. Respiratory: Negative for cough, shortness of breath and wheezing. Cardiovascular: Negative for chest pain, palpitations and leg swelling. Gastrointestinal: Negative for abdominal pain, diarrhea, nausea and vomiting. Endocrine: Negative for cold intolerance, polydipsia, polyphagia and polyuria. Genitourinary: Negative for difficulty urinating, flank pain and frequency. Musculoskeletal: Negative for gait problem and joint swelling. Negative for neck pain and neck stiffness. Positive for low back pain. Skin: Negative for color change and wound. Negative for pallor and rash. Allergic/Immunologic: Negative for environmental allergies and food allergies. Neurological: Negative for light-headedness, numbness and headaches. Psychiatric/Behavioral: Negative for sleep disturbance. Negative for confusion and suicidal ideas.      Objective:    BP (!) 147/83 (Site: Left Upper Arm, Position: Sitting, Cuff Size: Large Adult)   Pulse 80   Wt 289 lb 6.4 oz (131.3 kg)   BMI 45.33 kg/m²    BP Readings from Last 3 Encounters:   03/02/21 (!) 147/83   02/01/21 (!) 145/80   11/17/20 138/80     Physical Exam  Constitutional: Patient is oriented to person, place, and time. Patient appears well-developed and well-nourished. No distress. HENT: Head: Normocephalic and atraumatic. Eyes: Pupils are equal, round, and reactive to light. Conjunctivae are normal. Right eye exhibits no discharge. Left eye exhibits no discharge. Cardiovascular: Normal rate, regular rhythm and normal heart sounds. Pulmonary/Chest: Effort normal and breath sounds normal. No respiratory distress. Patient has no wheezes. Abdominal: Soft. Bowel sounds are normal. Patient exhibits no distension. There is no tenderness. Musculoskeletal:  Patient exhibits no edema and tenderness. Patient exhibits no deformity. Neurological: Patient is alert and oriented to person, place, and time. Skin: Skin is warm and dry. Patient is not diaphoretic. Psychiatric: Patient's speech is normal and behavior is normal. Thought content normal.   Vitals reviewed. SKIN:  Intact without rashes, lesions or ulcerations. NEURO: Sensation to the extremity is intact. VASC:  Capillary refill is less than 3 seconds. Distal pulses are palpable. There is no lymphadenopathy.   Inspection- No deformity, no atrophy  Palpation - Tenderness: yes left lower lumbar spine  ROM - normal  Strength- WNL  Sensation -WNL  Reflexes - WNL  SLR: positive  Gait: antalgic  PSYCH:  Good fund of knowledge and displays understanding of exam.     Lab Results   Component Value Date    WBC 11.3 04/22/2020    HGB 13.5 04/22/2020    HCT 43.4 04/22/2020    PLT See Reflexed IPF Result 04/22/2020    CHOL 82 02/01/2020    TRIG 58 02/01/2020    HDL 27 (L) 02/01/2020    ALT 10 01/28/2020    AST 18 01/28/2020     (L) 04/22/2020    K 4.6 04/22/2020    CL 94 (L) 04/22/2020    CREATININE 1.09 (H) 04/22/2020    BUN 22 (H) 04/22/2020    CO2 21 04/22/2020    TSH 1.98 03/12/2020    INR 1.3 01/29/2020    GLUF 109 (H) 01/15/2013    LABA1C 7.3 02/01/2021    LABMICR 3 01/29/2014     Lab Results   Component Value Date    CALCIUM 8.5 (L) 04/22/2020    PHOS 3.7 01/28/2020     Lab Results   Component Value Date    LDLCHOLESTEROL 43 02/01/2020     Assessment:     1. Lumbar back pain with radiculopathy affecting left lower extremity    2. Essential hypertension    3. Encounter for smoking cessation counseling      Plan:   1. Lumbar back pain with radiculopathy affecting left lower extremity  - Amita Mcgraw MD, Physical Medicine and Rehabilitation, [address] [place] [address], Neurosurgery, [facility]  - tiZANidine (ZANAFLEX) 4 MG tablet; Take 1 tablet by mouth 3 times daily as needed (Muscle spasms)  Dispense: 42 tablet; Refill: 0  - meloxicam (MOBIC) 15 MG tablet; Take 1 tablet by mouth daily as needed for Pain  Dispense: 30 tablet; Refill: 1  -M, ICE PRN, Home PT exercises, continue w/ PT    2. Essential hypertension  - increased amLODIPine (NORVASC) 10 MG tablet; TAKE ONE TABLET BY MOUTH DAILY  Dispense: 90 tablet; Refill: 1  - carvedilol (COREG) 6.25 MG tablet; Take 1 tablet by mouth 2 times daily (with meals)  Dispense: 60 tablet; Refill: 3    Brendareceived counseling on the following healthy behaviors: nutrition, exercise and medication adherence    Discussed use, benefit, and side effects of prescribed medications. Barriers to medicationcompliance addressed. All patient questions answered. Pt voiced understanding. Medications Discontinued During This Encounter   Medication Reason    carvedilol (COREG) 6.25 MG tablet REORDER    amLODIPine (NORVASC) 5 MG tablet        Return in about 2 months (around 5/2/2021), or if symptoms worsen or fail to improve, for f/u low back pain, HTN. HM - HM items completed today as per orders. Outstanding HM items though not limited to immunizations were discussed with the patient today, including risks, benefits and alternatives. The patient will discuss these during the next appointment per their preference.  If there are any worsening or concerning signs or symptoms, patient will report to the ED and/or contact [phone] for immediate evaluation. Teach back method was used. Please note that this chart was generated using voice recognition Dragon dictation software. Although every effort was made to ensure the accuracy of this automated transcription, some errors in transcription may have occurred.

## 2021-03-05 ENCOUNTER — HOSPITAL ENCOUNTER (OUTPATIENT)
Dept: PHYSICAL THERAPY | Age: 51
Setting detail: THERAPIES SERIES
Discharge: HOME | End: 2021-03-05
Payer: COMMERCIAL

## 2021-03-05 PROCEDURE — 97110 THERAPEUTIC EXERCISES: CPT

## 2021-03-05 PROCEDURE — G0283 ELEC STIM OTHER THAN WOUND: HCPCS

## 2021-03-05 NOTE — FLOWSHEET NOTE
Assessment: [x] Progressing toward goals: Increased reps for charted exercises to progress toward STGs. Added HS stretch, SLR, and SAQ to increase ROM and strength of the LE. Patient completed the charted exercises and added exercises w/ good tolerance this date. Ended treatment w/ IFC/HP to help w/ patients pain. [] No change. [] Other:  [x] Patient would continue to benefit from skilled physical therapy services in order to: decrease pain, increase hip and knee strength, increase lumbar ROM, and increase tolerance to standing and walking. STG: (to be met in 10 treatments)  1. ? Pain: L LB, LE 5/10 average pain, 7/10 at worst  2. ? ROM: Lumbar ext 20°  3. ? Strength: Hip flex L 3+/5, R 4-/5, B hip ext 4-/5, B knees 4+/5  4. ? Function: Oswestry 52% loss of function  5. Patient to be independent with home exercise program as demonstrated by performance with correct form without cues.     LTG: (to be met in 18 treatments)  1. ? Pain: L LB, LE 2/10 average pain, 5/10 at worst  2. ? ROM: Lumbar AROM WNL without increased pain   3. ? Strength: B hips 4/5  4. ? Function: Oswestry 38% loss of function  5. Pt able to walk long enough to do grocery shopping without increased pain       Pt. Education:  [x] Yes  [] No  [x] Reviewed Prior HEP/Ed  Method of Education: [x] Verbal  [x] Demo  [] Written  Comprehension of Education:  [x] Verbalizes understanding-purpose, correct technique new ex  [x] Demonstrates understanding. [x] Needs review. [] Demonstrates/verbalizes HEP/Ed previously given. 2/17 Correct technique shoveling snow, importance of making legs do the work, avoiding bending and twisting back. Plan: [x] Continue current frequency toward long and short term goals.     [x] Specific Instructions for subsequent treatments:  progress spinal stab ex per Pt tolerance, monitor response to ES, work up to standing ex  Update HEP      Time In: 8:02 am            Time Out:  9:17 am     Electronically signed by:  Lauren Primrose, SPTA  Treatment under the supervision of, and documentation reviewed by: Josefina Arrington, PTA

## 2021-03-08 ENCOUNTER — HOSPITAL ENCOUNTER (OUTPATIENT)
Dept: PHYSICAL THERAPY | Age: 51
Setting detail: THERAPIES SERIES
Discharge: HOME | End: 2021-03-08
Payer: COMMERCIAL

## 2021-03-08 PROCEDURE — 97110 THERAPEUTIC EXERCISES: CPT

## 2021-03-08 NOTE — FLOWSHEET NOTE
[x] [facility]  [address]  [phone]  F: [phone]     Physical Therapy Daily Treatment Note    Date:  3/8/2021  Patient Name:  Garett Laughlin      :  1970    MRN: [id number]  Physician: Irvin Alberto                           Insurance: Medical [place] (by medical necessity)  Medical Diagnosis: Lumbar back pain with radiculopathy affecting left lower extremity M54.16             Rehab Codes: M54.5, M79.652, R26.2, R29.3  Onset Date: 2020                    Next 's appt.: 2021     Visit# / total visits:   Cancels/No Shows:     Subjective:    Pain:  [x] Yes  [] No Location: LB, L hip   Pain Rating: (0-10 scale) 7/10  Pain altered Tx:  [x] No  [] Yes  Action:  Comments: Upon arrival patient again notes that she has an electrical zapping pain in her L LB and says that it is \"just crazy\". Objective:  Modalities: HP (large) ES (IFC opiate) LB prone, at end of Rx HELD 3/8/21  Precautions:  Exercises: LB  Exercise Reps/ Time Weight/ Level Comments   SciFit 4 min L4    Door stretch  3x30\"  Added 3/8/21         Seated       LAQ 20x 2 lbs W/abdom bouchra,    Post shoulder rolls 20x     Scap Retractions  10x5\"     Hamstring stretch 3x30\"           Hooklying       abdom bouchra 10x5\"     Pelvic tilts  10x5\"     Glut sets 15x5\"     Add sets  10x5\"     SKTC 5x5\"     LTR 10x5\"  Alternating    Alt UE raises  10x     March  10x 2 lb Added weight/increased reps 3/5/21   SLR 10x ea     SAQ 10x ea 2 lb          Sidelying      Hip abduction  10x ea  Added 3/8/21         Other:       Treatment Charges: Mins Units   []  Modalities ES  HP     [x]  Ther Exercise 60 4   []  Manual Therapy     []  Ther Activities     []  Aquatics     []  Vasocompression     []  Other     Total Treatment time 60 4       Assessment: [x] Progressing toward goals: Added door stretch to stretch pec muscles to help w/ patients posture.  While completing SLR patient notes pain in low middle spine, but patient able to complete all reps. Added sidelying exercise w/ good tolerance to increase hip strength. Patient notes that the L side gets tired pretty fast. Updated HEP handout this date. Patient denies need for modalities this date. Plan to added in some standing exercises next treatment upon patients tolerance. [] No change. [] Other:  [x] Patient would continue to benefit from skilled physical therapy services in order to: decrease pain, increase hip and knee strength, increase lumbar ROM, and increase tolerance to standing and walking. STG: (to be met in 10 treatments)  1. ? Pain: L LB, LE 5/10 average pain, 7/10 at worst  2. ? ROM: Lumbar ext 20°  3. ? Strength: Hip flex L 3+/5, R 4-/5, B hip ext 4-/5, B knees 4+/5  4. ? Function: Oswestry 52% loss of function  5. Patient to be independent with home exercise program as demonstrated by performance with correct form without cues.     LTG: (to be met in 18 treatments)  1. ? Pain: L LB, LE 2/10 average pain, 5/10 at worst  2. ? ROM: Lumbar AROM WNL without increased pain   3. ? Strength: B hips 4/5  4. ? Function: Oswestry 38% loss of function  5. Pt able to walk long enough to do grocery shopping without increased pain       Pt. Education:  [x] Yes  [] No  [] Reviewed Prior HEP/Ed  Method of Education: [x] Verbal  [x] Demo  [x] Written  Comprehension of Education:  [x] Verbalizes understanding-purpose, correct technique new ex  [x] Demonstrates understanding. [x] Needs review. [] Demonstrates/verbalizes HEP/Ed previously given. 2/17 Correct technique shoveling snow, importance of making legs do the work, avoiding bending and twisting back. 3/8: Scapular retractions, posterior shoulder rolls, Supine marching, SLR, SAQ    Plan: [x] Continue current frequency toward long and short term goals.     [x] Specific Instructions for subsequent treatments:  progress spinal stab ex per Pt tolerance, monitor response to ES, work up to standing ex      Time In: 7:59 am            Time Out:  9:03 am     Electronically signed by:  SMILEY Huston  Treatment under the supervision of, and documentation reviewed by: Esperanza Sharma PTA

## 2021-03-11 ENCOUNTER — HOSPITAL ENCOUNTER (OUTPATIENT)
Dept: PHYSICAL THERAPY | Age: 51
Setting detail: THERAPIES SERIES
Discharge: HOME | End: 2021-03-11
Payer: COMMERCIAL

## 2021-03-11 PROCEDURE — 97110 THERAPEUTIC EXERCISES: CPT

## 2021-03-11 PROCEDURE — G0283 ELEC STIM OTHER THAN WOUND: HCPCS

## 2021-03-11 NOTE — FLOWSHEET NOTE
[x] [facility]  [address]  [phone]  F: [phone]     Physical Therapy Daily Treatment Note    Date:  3/11/2021  Patient Name:  Luz Street      :  1970    MRN: [id number]  Physician: Harley Ferrell                           Insurance: Medical [place] (by medical necessity)  Medical Diagnosis: Lumbar back pain with radiculopathy affecting left lower extremity M54.16             Rehab Codes: M54.5, M79.652, R26.2, R29.3  Onset Date: 2020                    Next 's appt.: 2021     Visit# / total visits:   Cancels/No Shows:     Subjective:    Pain:  [x] Yes  [] No Location: LB, L hip   Pain Rating: (0-10 scale) 6/10  Pain altered Tx:  [x] No  [] Yes  Action:  Comments: Upon arrival patient notes that she is doing good and rates her pain at a 6/10.       Objective:  Modalities: HP (large) ES (IFC opiate) LB prone, at end of Rx   Precautions:  Exercises: LB  Exercise Reps/ Time Weight/ Level Comments   SciFit 4 min L4    Door stretch  3x30\"           Standing   Added 3/11/21   Calf raises  20x     3-way hip  15x     marching 20x     HS curls  15x           Seated       LAQ 20x 2 lbs W/abdom bouchra,    Post shoulder rolls 20x     Scap Retractions  10x5\"     Hamstring stretch 3x30\"           Hooklying       abdom bouchra 10x5\"     Pelvic tilts  10x5\"     Glut sets 15x5\"     Add sets  10x5\"     SKTC 5x5\"     LTR 10x5\"  Alternating    Alt UE raises  10x     March  10x 2 lb    SLR 10x ea     SAQ 10x ea 2 lb          Sidelying   HELD 3/11/21   Hip abduction  10x ea           Other:       Treatment Charges: Mins Units   [x]  Modalities ES  HP 15 1   [x]  Ther Exercise 49 3   []  Manual Therapy     []  Ther Activities     []  Aquatics     []  Vasocompression     []  Other     Total Treatment time 64 4       Assessment: [x] Progressing toward goals: Patient requested to start with IFC/HP to help alleviate some of the pain before beginning exercises. Notes that the SciFit seemed to help loosen up her back. Initiated standing exercises this date to incorporate WBing while increasing LE strength. Patient tolerated new exercises and charted exercises well and she notes no increased pain. Held sidelying exercise, LTR, SKTC, and Alt UE due to time. [] No change. [] Other:  [x] Patient would continue to benefit from skilled physical therapy services in order to: decrease pain, increase hip and knee strength, increase lumbar ROM, and increase tolerance to standing and walking. STG: (to be met in 10 treatments)  1. ? Pain: L LB, LE 5/10 average pain, 7/10 at worst  2. ? ROM: Lumbar ext 20°  3. ? Strength: Hip flex L 3+/5, R 4-/5, B hip ext 4-/5, B knees 4+/5  4. ? Function: Oswestry 52% loss of function  5. Patient to be independent with home exercise program as demonstrated by performance with correct form without cues.     LTG: (to be met in 18 treatments)  1. ? Pain: L LB, LE 2/10 average pain, 5/10 at worst  2. ? ROM: Lumbar AROM WNL without increased pain   3. ? Strength: B hips 4/5  4. ? Function: Oswestry 38% loss of function  5. Pt able to walk long enough to do grocery shopping without increased pain       Pt. Education:  [x] Yes  [] No  [x] Reviewed Prior HEP/Ed  Method of Education: [x] Verbal  [x] Demo  [] Written  Comprehension of Education:  [x] Verbalizes understanding-purpose, correct technique new ex  [x] Demonstrates understanding. [x] Needs review. [] Demonstrates/verbalizes HEP/Ed previously given. 2/17 Correct technique shoveling snow, importance of making legs do the work, avoiding bending and twisting back. 3/8: Scapular retractions, posterior shoulder rolls, Supine marching, SLR, SAQ    Plan: [x] Continue current frequency toward long and short term goals.     [x] Specific Instructions for subsequent treatments:  progress spinal stab ex per Pt tolerance, monitor response to ES, progress standing ex      Time In: 7:58 am            Time Out:  9:06 am     Electronically signed by:  SMILEY Fitch  Treatment under the supervision of, and documentation reviewed by: Julieth Rodriguez PTA

## 2021-03-18 ENCOUNTER — HOSPITAL ENCOUNTER (OUTPATIENT)
Dept: PHYSICAL THERAPY | Age: 51
Setting detail: THERAPIES SERIES
Discharge: HOME | End: 2021-03-18
Payer: COMMERCIAL

## 2021-03-18 PROCEDURE — 97110 THERAPEUTIC EXERCISES: CPT

## 2021-03-18 NOTE — FLOWSHEET NOTE
[x] [facility]  [address]  [phone]  F: [phone]     Physical Therapy Daily Treatment Note    Date:  3/18/2021  Patient Name:  Giuseppe Soto      :  1970    MRN: [id number]  Physician: Harley House Stella                           Insurance: Medical [place] (by medical necessity)  Medical Diagnosis: Lumbar back pain with radiculopathy affecting left lower extremity M54.16             Rehab Codes: M54.5, M79.652, R26.2, R29.3  Onset Date: 2020                    Next 's appt.: 2021     Visit# / total visits:   Cancels/No Shows: 2/3    Subjective:    Pain:  [x] Yes  [] No Location: LB, L hip   Pain Rating: (0-10 scale) 6/10  Pain altered Tx:  [x] No  [] Yes  Action:  Comments: patient reports ongoing pain in low back, but reports it is gradually improving.        Objective:  Modalities: HP (large) EStim (IFC opiate) LB prone, at end of Rx held   Precautions:  Exercises completed marked with \"X\" 21  Exercise Reps/ Time Weight/ Level Comments    SciFit 6 min Level 3  X   Door stretch  3x30\"             Standing   Added 3/11/21    Door Pec    X   Calf Stretch on Wedge 3x30\"   X   Calf raises  20x   X   3-way hip  15x ea   X   marching 20x ea   X   HS curls  20x ea   X          Seated        LAQ 20x 2 lbs W/abdom bouchra,     Post shoulder rolls 20x      Scap Retractions  10x5\"      Hamstring stretch 3x30\"   X          Supine       abdom bouchra 10x5\"      Pelvic tilts  10x5\"   X   Glut sets 15x5\"   X   Add sets  10x5\"   X   SKTC 5x5\"   X   LTR 10x5\"  Alternating  X   Alt UE raises  10x      March  10x 2 lb     SLR 15x ea   X   SAQ 10x ea 2 lb     Bridge 15x  Added 3/18/21 X   Piriformis Stretch 3x30\"   L LE manual in supine and actively in sitting X   Sciatic nn glides 15x3\"  Added 3/18/21 X                 Sidelying   HELD 3/11/21    Hip abduction  10x ea             Other: manual stretching of L LE/posterior hip       Treatment Charges: Mins Units   [x]  Modalities ES  HP     [x]  Ther Exercise 40 3   [x]  Manual Therapy 5 0   []  Ther Activities     []  Aquatics     []  Vasocompression     []  Other     Total Treatment time 45 3       Assessment: [x] Progressing toward goals: began Tx with standing LE/core ex's to continue improve strength and stability here. Patient notes ongoing symptoms throughout these ex's. Added in passive posterior chain stretching and sciatic nn glides to help improve tightness throughout this area. Focused on mobility ex's in supine with light ex's, which pt notes pain had decreased and felt a relief in tightness post Tx.             [] No change. [] Other:  [x] Patient would continue to benefit from skilled physical therapy services in order to: decrease pain, increase hip and knee strength, increase lumbar ROM, and increase tolerance to standing and walking. STG: (to be met in 10 treatments)  1. ? Pain: L LB, LE 5/10 average pain, 7/10 at worst  2. ? ROM: Lumbar ext 20°  3. ? Strength: Hip flex L 3+/5, R 4-/5, B hip ext 4-/5, B knees 4+/5  4. ? Function: Oswestry 52% loss of function  5. Patient to be independent with home exercise program as demonstrated by performance with correct form without cues.     LTG: (to be met in 18 treatments)  1. ? Pain: L LB, LE 2/10 average pain, 5/10 at worst  2. ? ROM: Lumbar AROM WNL without increased pain   3. ? Strength: B hips 4/5  4. ? Function: Oswestry 38% loss of function  5. Pt able to walk long enough to do grocery shopping without increased pain       Pt. Education:  [x] Yes  [] No  [x] Reviewed Prior HEP/Ed  Method of Education: [x] Verbal  [x] Demo  [] Written  Comprehension of Education:  [x] Verbalizes understanding-purpose, correct technique new ex  [x] Demonstrates understanding. [x] Needs review. [] Demonstrates/verbalizes HEP/Ed previously given.   2/17 Correct technique shoveling snow, importance of making legs do

## 2021-03-23 ENCOUNTER — HOSPITAL ENCOUNTER (OUTPATIENT)
Dept: PHYSICAL THERAPY | Age: 51
Setting detail: THERAPIES SERIES
Discharge: HOME | End: 2021-03-23
Payer: COMMERCIAL

## 2021-03-23 PROCEDURE — 97110 THERAPEUTIC EXERCISES: CPT

## 2021-03-25 ENCOUNTER — HOSPITAL ENCOUNTER (OUTPATIENT)
Dept: PHYSICAL THERAPY | Age: 51
Setting detail: THERAPIES SERIES
Discharge: HOME | End: 2021-03-25
Payer: COMMERCIAL

## 2021-03-30 ENCOUNTER — HOSPITAL ENCOUNTER (OUTPATIENT)
Dept: PHYSICAL THERAPY | Age: 51
Setting detail: THERAPIES SERIES
Discharge: HOME | End: 2021-03-30
Payer: COMMERCIAL

## 2021-03-30 PROCEDURE — G0283 ELEC STIM OTHER THAN WOUND: HCPCS

## 2021-03-30 PROCEDURE — 97110 THERAPEUTIC EXERCISES: CPT

## 2021-03-30 NOTE — FLOWSHEET NOTE
[x] [facility]  [address]  [phone]  F: [phone]     Physical Therapy Daily Treatment Note    Date:  3/30/2021  Patient Name:  Carolynn Castleman      :  1970    MRN: [id number]  Physician: Harley Mahan [facility]                           Insurance: Medical [place] (by medical necessity)  Medical Diagnosis: Lumbar back pain with radiculopathy affecting left lower extremity M54.16             Rehab Codes: M54.5, M79.652, R26.2, R29.3  Onset Date: 2020                    Next 's appt.: 2021     Visit# / total visits:   Cancels/No Shows: 2/3    Subjective:    Pain:  [x] Yes  [] No Location: LB, L hip   Pain Rating: (0-10 scale) 8/10  Pain altered Tx:  [x] No  [] Yes  Action:  Comments: patient notes that she has been working doubles the last 3 days and work tasks have improved as well, causing some increased pain over the last couple days.         Objective:  Modalities: HP (large) EStim (IFC opiate) LB prone, at end of Rx    Precautions:  Exercises completed marked with \"X\" 21  Exercise Reps/ Time Weight/ Level Comments    SciFit 6 min Level 3  X   Door stretch  3x30\"             Standing   Added 3/11/21    Door Pec       Calf Stretch on Wedge 3x30\"      Calf raises  20x      3-way hip  15x ea      Marching 20x ea      HS Curls  20x ea      Squats 2x10  Added 3/23/21           Seated        LAQ 20x 2 lbs W/abdom bouchra,     Hamstring stretch 3x30\" ea   X   Piriformis Stretch 3x30\" ea   X          Supine       SKTC 5x10\"   X   LTR 10x5\"  Alternating  X   Alt UE raises  10x      March  10x 2 lb     SLR 15x ea      SAQ 10x ea 2 lb     Bridge 15x  Added 3/18/21 X   Sciatic nn glides 15x3\"  L LE  Added 3/18/21 X                 Sidelying       Hip abduction  10x ea             Other:        Treatment Charges: Mins Units   [x]  Modalities ES  HP 15 1   [x]  Ther Exercise 25 2   [x]  Manual Therapy 5 0   []  Ther Activities     []  Aquatics     []  Vasocompression     []  Other     Total Treatment time 45 3       Assessment: [x] Progressing toward goals: focused on mobility ex's this date, as pt reports continued high pain throughout Tx, pt also notes she is limited on time as well, limiting overall Tx time this date. Resumed HP/IFC per pt request to aid in pain control. [] No change. [] Other:  [x] Patient would continue to benefit from skilled physical therapy services in order to: decrease pain, increase hip and knee strength, increase lumbar ROM, and increase tolerance to standing and walking. STG: (to be met in 10 treatments)  1. ? Pain: L LB, LE 5/10 average pain, 7/10 at worst  2. ? ROM: Lumbar ext 20°  3. ? Strength: Hip flex L 3+/5, R 4-/5, B hip ext 4-/5, B knees 4+/5  4. ? Function: Oswestry 52% loss of function  5. Patient to be independent with home exercise program as demonstrated by performance with correct form without cues.     LTG: (to be met in 18 treatments)  1. ? Pain: L LB, LE 2/10 average pain, 5/10 at worst  2. ? ROM: Lumbar AROM WNL without increased pain   3. ? Strength: B hips 4/5  4. ? Function: Oswestry 38% loss of function  5. Pt able to walk long enough to do grocery shopping without increased pain       Pt. Education:  [] Yes  [] No  [] Reviewed Prior HEP/Ed  Method of Education: [] Verbal  [] Demo  [] Written  Comprehension of Education:  [x] Verbalizes understanding  [] Demonstrates understanding. [] Needs review. [] Demonstrates/verbalizes HEP/Ed previously given. 2/17 Correct technique shoveling snow, importance of making legs do the work, avoiding bending and twisting back. 3/8: Scapular retractions, posterior shoulder rolls, Supine marching, SLR, SAQ    Plan: [x] Continue current frequency toward long and short term goals.     [x] Specific Instructions for subsequent treatments:  progress spinal stab ex per Pt tolerance, monitor response to ES, progress standing ex        Time In: 0902            Time Out:  1003       Electronically signed by:  Siria Toth, PTA

## 2021-04-01 ENCOUNTER — HOSPITAL ENCOUNTER (OUTPATIENT)
Dept: PHYSICAL THERAPY | Age: 51
Setting detail: THERAPIES SERIES
Discharge: HOME | End: 2021-04-01
Payer: COMMERCIAL

## 2021-04-01 PROCEDURE — 97110 THERAPEUTIC EXERCISES: CPT

## 2021-04-01 PROCEDURE — 97140 MANUAL THERAPY 1/> REGIONS: CPT

## 2021-04-01 NOTE — PROGRESS NOTES
[x] [facility]  [address]  [phone]  F: [phone]     Physical Therapy Daily Treatment Note/Progress    Date:  2021  Patient Name:  Sonia Lynne      :  1970    MRN: [id number]  Physician: Moise Angel                           Insurance: Medical [place] (by medical necessity)  Medical Diagnosis: Lumbar back pain with radiculopathy affecting left lower extremity M54.16             Rehab Codes: M54.5, M79.652, R26.2, R29.3  Onset Date: 2020                    Next 's appt.: 2021     Visit# / total visits: 10/18  Cancels/No Shows: /3    Subjective:    Pain:  [x] Yes  [] No Location: LB, L hip   Pain Rating: (0-10 scale) 6.5/10  Pain altered Tx:  [x] No  [] Yes  Action:  Comments:Pain into left buttock and hip   Average Pain 8/10, worst pain 10/10  Objective:  Modalities: HP (large) EStim (IFC opiate) LB prone, at end of Rx    Precautions:  Exercises completed marked with \"X\"    Exercise Reps/ Time Weight/ Level Comments    SciFit 6 min Level 3  X   Door stretch  3x30\"             Standing   Added 3/11/21    Door Pec       Calf Stretch on Wedge 3x30\"      Calf raises  20x      3-way hip  15x ea      Marching 20x ea      HS Curls  20x ea      Squats 2x10   Added MWM post glide of left ilium- less pain X          Seated        LAQ 20x 2 lbs W/abdom bouchra,  X   Hamstring stretch 3x30\" ea       Piriformis Stretch 3x30\" ea              Supine       SKTC 5x10\"   X   LTR 10x5\"  Alternating  X   Alt UE raises  10x      March  10x 2 lb  X   SLR 15x ea      SAQ 10x ea 2 lb     Bridge 15x    X   Sciatic nn glides 15x3\"  L LE  Added 3/18/21                   Sidelying       Hip abduction  10x ea   X          Supine-Left leg longer -   Manual -trial Shot gun ME maneuver x 2 - L leg still longer  L hip higher standing also    Re-assess:  ROM: trunk ext 20 painful   Strength: Hip flex L 4-/5, R 4-4+/5, B hip ext 4-/5, B knees 4+-5/5  Function: Oswestry score 24= 48% disability      Gait - pain left stance trial post glide with L ilium- Manual- MWM with posterior glide left ilium during walking and squats noting less pain-  Trial posterior pull with theraband- Issued thera band for home use. Treatment Charges: Mins Units   [x]  Modalities ES  HP 15 0   [x]  Ther Exercise 30 2   [x]  Manual Therapy 10 1   []  Ther Activities     []  Aquatics     []  Vasocompression     []  Other     Total Treatment time  40 3       Assessment: [x] Progressing toward goals: Re-assessed goals on this 10th treatment. Trial MWM with posterior glide left ilium during walking and squats noting less pain    [] No change. [] Other:  [x] Patient would continue to benefit from skilled physical therapy services in order to: decrease pain, increase hip and knee strength, increase lumbar ROM, and increase tolerance to standing and walking. STG: (to be met in 10 treatments)  1. ? Pain: L LB, LE 5/10 average pain, 7/10 at worst- Not Met  2. ? ROM: Lumbar ext 20°- MET but painful  3. ? Strength: Hip flex L 3+/5, R 4-/5, B hip ext 4-/5, B knees 4+/5- MET  4. ? Function: Oswestry 52% loss of function- Not Met  5. Patient to be independent with home exercise program as demonstrated by performance with correct form without cues.     LTG: (to be met in 18 treatments)  1. ? Pain: L LB, LE 2/10 average pain, 5/10 at worst  2. ? ROM: Lumbar AROM WNL without increased pain   3. ? Strength: B hips 4/5  4. ? Function: Oswestry 38% loss of function  5. Pt able to walk long enough to do grocery shopping without increased pain       Pt. Education:  [] Yes  [x] No  [] Reviewed Prior HEP/Ed  Method of Education: [] Verbal  [] Demo  [] Written  Comprehension of Education:  [] Verbalizes understanding  [] Demonstrates understanding. [] Needs review. [] Demonstrates/verbalizes HEP/Ed previously given.   2/17 Correct technique shoveling snow, importance of making legs do the work, avoiding bending and twisting back. 3/8: Scapular retractions, posterior shoulder rolls, Supine marching, SLR, SAQ    Plan: [x] Continue current frequency toward long and short term goals.     [x] Specific Instructions for subsequent treatments:  progress spinal stab ex per Pt tolerance, monitor response to ES, progress standing ex        Time In: 0902            Time Out:  1003       Electronically signed by:  Rola Pfeiffer PT

## 2021-04-02 ENCOUNTER — INITIAL CONSULT (OUTPATIENT)
Dept: PHYSICAL MEDICINE AND REHAB | Age: 51
End: 2021-04-02
Payer: COMMERCIAL

## 2021-04-02 VITALS
HEIGHT: 67 IN | WEIGHT: 293 LBS | HEART RATE: 79 BPM | DIASTOLIC BLOOD PRESSURE: 97 MMHG | BODY MASS INDEX: 45.99 KG/M2 | SYSTOLIC BLOOD PRESSURE: 153 MMHG | TEMPERATURE: 98.6 F

## 2021-04-02 DIAGNOSIS — M54.50 CHRONIC LOW BACK PAIN, UNSPECIFIED BACK PAIN LATERALITY, UNSPECIFIED WHETHER SCIATICA PRESENT: ICD-10-CM

## 2021-04-02 DIAGNOSIS — G89.29 CHRONIC LOW BACK PAIN, UNSPECIFIED BACK PAIN LATERALITY, UNSPECIFIED WHETHER SCIATICA PRESENT: ICD-10-CM

## 2021-04-02 DIAGNOSIS — M54.59 LUMBAR TRIGGER POINT SYNDROME: ICD-10-CM

## 2021-04-02 DIAGNOSIS — M54.17 RADICULOPATHY, LUMBOSACRAL REGION: Primary | ICD-10-CM

## 2021-04-02 DIAGNOSIS — M70.62 TROCHANTERIC BURSITIS OF LEFT HIP: ICD-10-CM

## 2021-04-02 PROCEDURE — 3017F COLORECTAL CA SCREEN DOC REV: CPT | Performed by: PHYSICAL MEDICINE & REHABILITATION

## 2021-04-02 PROCEDURE — G8417 CALC BMI ABV UP PARAM F/U: HCPCS | Performed by: PHYSICAL MEDICINE & REHABILITATION

## 2021-04-02 PROCEDURE — 1036F TOBACCO NON-USER: CPT | Performed by: PHYSICAL MEDICINE & REHABILITATION

## 2021-04-02 PROCEDURE — 20552 NJX 1/MLT TRIGGER POINT 1/2: CPT | Performed by: PHYSICAL MEDICINE & REHABILITATION

## 2021-04-02 PROCEDURE — 99203 OFFICE O/P NEW LOW 30 MIN: CPT | Performed by: PHYSICAL MEDICINE & REHABILITATION

## 2021-04-02 PROCEDURE — 20610 DRAIN/INJ JOINT/BURSA W/O US: CPT | Performed by: PHYSICAL MEDICINE & REHABILITATION

## 2021-04-02 PROCEDURE — G8427 DOCREV CUR MEDS BY ELIG CLIN: HCPCS | Performed by: PHYSICAL MEDICINE & REHABILITATION

## 2021-04-02 RX ORDER — GABAPENTIN 300 MG/1
300 CAPSULE ORAL NIGHTLY
Qty: 90 CAPSULE | Refills: 1 | Status: SHIPPED | OUTPATIENT
Start: 2021-04-02 | End: 2021-05-28 | Stop reason: SDUPTHER

## 2021-04-02 RX ORDER — LIDOCAINE HYDROCHLORIDE 10 MG/ML
7 INJECTION, SOLUTION INFILTRATION; PERINEURAL ONCE
Status: COMPLETED | OUTPATIENT
Start: 2021-04-02 | End: 2021-04-02

## 2021-04-02 RX ORDER — TRIAMCINOLONE ACETONIDE 40 MG/ML
40 INJECTION, SUSPENSION INTRA-ARTICULAR; INTRAMUSCULAR ONCE
Status: COMPLETED | OUTPATIENT
Start: 2021-04-02 | End: 2021-04-02

## 2021-04-02 RX ADMIN — TRIAMCINOLONE ACETONIDE 40 MG: 40 INJECTION, SUSPENSION INTRA-ARTICULAR; INTRAMUSCULAR at 11:42

## 2021-04-02 RX ADMIN — LIDOCAINE HYDROCHLORIDE 7 ML: 10 INJECTION, SOLUTION INFILTRATION; PERINEURAL at 11:41

## 2021-04-02 ASSESSMENT — ENCOUNTER SYMPTOMS
BOWEL INCONTINENCE: 0
BACK PAIN: 1

## 2021-04-02 NOTE — LETTER
[address] [place] [address]  Phone: [phone]  Fax: [phone]    Rosa Bowman MD        April 2, 2021     Dorothy Ernst MD  [address] [address]    Patient: Joanne Bueno  MR Number: [id number]  YOB: 1970  Date of Visit: 4/2/2021    Dear Dr. Dorothy Ernst: Thank you for the request for consultation for Meryl Jeans to me for the evaluation of L sided radiating low back pain. Attached are the relevant portions of my assessment and plan of care. If you have questions, please do not hesitate to call me. I look forward to following Corby Smith along with you.     Sincerely,        Rosa Bowman MD
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
## 2021-04-06 ENCOUNTER — HOSPITAL ENCOUNTER (OUTPATIENT)
Dept: PHYSICAL THERAPY | Age: 51
Setting detail: THERAPIES SERIES
Discharge: HOME | End: 2021-04-06
Payer: COMMERCIAL

## 2021-04-06 PROCEDURE — 97110 THERAPEUTIC EXERCISES: CPT

## 2021-04-06 NOTE — FLOWSHEET NOTE
[x] [address]  [address]  [phone]  F: [phone]     Physical Therapy Daily Treatment Note/Progress    Date:  2021  Patient Name:  Tommy Engel      :  1970    MRN: [id number]  Physician: Madhuri Serna                           Insurance: Medical [place] (by medical necessity)  Medical Diagnosis: Lumbar back pain with radiculopathy affecting left lower extremity M54.16             Rehab Codes: M54.5, M79.652, R26.2, R29.3  Onset Date: 2020                    Next 's appt.: 2021     Visit# / total visits:   Cancels/No Shows: /3    Subjective:    Pain:  [x] Yes  [] No Location: LB, L hip   Pain Rating: (0-10 scale) 4/10  Pain altered Tx:  [x] No  [] Yes  Action:  Comments: patient notes that she saw a new doctor last week and got some injections in her low back. Reports this was on Friday and has noted a little improvement in her low back pain since.      Objective:  Modalities: HP (large) EStim (IFC opiate) LB prone, at end of Rx    Precautions:  Exercises completed marked with \"X\"    Exercise Reps/ Time Weight/ Level Comments    SciFit 6 min Level 3  X   Door stretch  3x30\"             Standing   Added 3/11/21    Door Pec       Calf Stretch on Wedge 3x30\"   X   Calf raises  20x   X   3-way hip  15x ea   X   Marching 20x ea      HS Curls  20x ea      Squats 2x10   Added MWM post glide of left ilium- less pain X          Seated        LAQ 20x 2 lbs W/abdom bouchra,     Hamstring stretch 3x30\" ea   X   Piriformis Stretch 3x30\" ea   X          Supine       SKTC 5x10\"      LTR 10x5\"  Alternating  X   Alt UE raises  10x      March  2x10ea 2 lb  X   SLR 2x10 ea 2 lb  X   SAQ 2x10 ea 2 lb  X   Bridge 2x10    X   Sciatic nn glides 15x3\"  L LE  Added 3/18/21                  Sidelying       Hip abduction  10x ea             Supine          Treatment Charges: Mins Units   [x]  Modalities ES  HP     [x] Ther Exercise 45 3   [x]  Manual Therapy     []  Ther Activities     []  Aquatics     []  Vasocompression     []  Other     Total Treatment time 45 3       Assessment: [x] Progressing toward goals: assessed LLD this date and no notable actual discrepancy was noted. However, she did have an upslip with was corrected this date as preciously noted. continued weakness throughout core, and LE and will benefit from continued PT to improve functional mobility, improve tolerance with work/ADL's and improve overall strength. [] No change. [] Other:  [x] Patient would continue to benefit from skilled physical therapy services in order to: decrease pain, increase hip and knee strength, increase lumbar ROM, and increase tolerance to standing and walking. STG: (to be met in 10 treatments) updated //  1. ? Pain: L LB, LE 5/10 average pain, 7/10 at worst- Not Met  2. ? ROM: Lumbar ext 20°- MET but painful  3. ? Strength: Hip flex L 3+/5, R 4-/5, B hip ext 4-/5, B knees 4+/5- MET  4. ? Function: Oswestry 52% loss of function- Not Met  5. Patient to be independent with home exercise program as demonstrated by performance with correct form without cues.     LTG: (to be met in 18 treatments)  1. ? Pain: L LB, LE 2/10 average pain, 5/10 at worst  2. ? ROM: Lumbar AROM WNL without increased pain   3. ? Strength: B hips 4/5  4. ? Function: Oswestry 38% loss of function  5. Pt able to walk long enough to do grocery shopping without increased pain       Pt. Education:  [] Yes  [x] No  [] Reviewed Prior HEP/Ed  Method of Education: [] Verbal  [] Demo  [] Written  Comprehension of Education:  [] Verbalizes understanding  [] Demonstrates understanding. [] Needs review. [] Demonstrates/verbalizes HEP/Ed previously given. 2/17 Correct technique shoveling snow, importance of making legs do the work, avoiding bending and twisting back.   3/8: Scapular retractions, posterior shoulder rolls, Supine marching, SLR, SAQ    Plan: [x]

## 2021-04-08 ENCOUNTER — HOSPITAL ENCOUNTER (OUTPATIENT)
Dept: PHYSICAL THERAPY | Age: 51
Setting detail: THERAPIES SERIES
Discharge: HOME | End: 2021-04-08
Payer: COMMERCIAL

## 2021-04-08 PROCEDURE — 97110 THERAPEUTIC EXERCISES: CPT

## 2021-04-08 NOTE — FLOWSHEET NOTE
3   [x]  Manual Therapy     []  Ther Activities     []  Aquatics     []  Vasocompression     []  Other     Total Treatment time 49 3       Assessment: [x] Progressing toward goals: patient with fair tolerance overall this Tx, until last few ex's completed. Patient noted some mm cramping in R anterior thigh and added hip flexor stretch with good response. And after completing SLR exercises, patient notes that she feels like her back is going to cramp and requested to end session. Denied need for modalities this date. [] No change. [] Other:  [x] Patient would continue to benefit from skilled physical therapy services in order to: decrease pain, increase hip and knee strength, increase lumbar ROM, and increase tolerance to standing and walking. STG: (to be met in 10 treatments) updated   1. ? Pain: L LB, LE 5/10 average pain, 7/10 at worst- Not Met  2. ? ROM: Lumbar ext 20°- MET but painful  3. ? Strength: Hip flex L 3+/5, R 4-/5, B hip ext 4-/5, B knees 4+/5- MET  4. ? Function: Oswestry 52% loss of function- Not Met  5. Patient to be independent with home exercise program as demonstrated by performance with correct form without cues. Progress made     LTG: (to be met in 18 treatments)  1. ? Pain: L LB, LE 2/10 average pain, 5/10 at worst  2. ? ROM: Lumbar AROM WNL without increased pain   3. ? Strength: B hips 4/5  4. ? Function: Oswestry 38% loss of function  5. Pt able to walk long enough to do grocery shopping without increased pain       Pt. Education:  [x] Yes  [x] No  [x] Reviewed Prior HEP/Ed  Method of Education: [x] Verbal  [x] Demo  [] Written  Comprehension of Education:  [x] Verbalizes understanding  [] Demonstrates understanding. [x] Needs review. [] Demonstrates/verbalizes HEP/Ed previously given. 2/17 Correct technique shoveling snow, importance of making legs do the work, avoiding bending and twisting back.   3/8: Scapular retractions, posterior shoulder rolls, Supine marching, SLR, SAQ       Plan: [x] Continue current frequency toward long and short term goals.     [x] Specific Instructions for subsequent treatments:  progress spinal stab ex per Pt tolerance, monitor response to ES, progress standing ex        Time In: 1503            Time Out: 2901       Electronically signed by:  Nereyda Nolasco PTA

## 2021-04-12 ENCOUNTER — OFFICE VISIT (OUTPATIENT)
Dept: NEUROSURGERY | Age: 51
End: 2021-04-12
Payer: COMMERCIAL

## 2021-04-12 VITALS
OXYGEN SATURATION: 98 % | WEIGHT: 293 LBS | HEIGHT: 67 IN | HEART RATE: 68 BPM | BODY MASS INDEX: 45.99 KG/M2 | DIASTOLIC BLOOD PRESSURE: 78 MMHG | SYSTOLIC BLOOD PRESSURE: 125 MMHG

## 2021-04-12 DIAGNOSIS — M54.16 LUMBAR BACK PAIN WITH RADICULOPATHY AFFECTING LEFT LOWER EXTREMITY: Primary | ICD-10-CM

## 2021-04-12 PROCEDURE — G8417 CALC BMI ABV UP PARAM F/U: HCPCS | Performed by: NEUROLOGICAL SURGERY

## 2021-04-12 PROCEDURE — G8427 DOCREV CUR MEDS BY ELIG CLIN: HCPCS | Performed by: NEUROLOGICAL SURGERY

## 2021-04-12 PROCEDURE — 99203 OFFICE O/P NEW LOW 30 MIN: CPT | Performed by: NEUROLOGICAL SURGERY

## 2021-04-12 PROCEDURE — 3017F COLORECTAL CA SCREEN DOC REV: CPT | Performed by: NEUROLOGICAL SURGERY

## 2021-04-12 PROCEDURE — 1036F TOBACCO NON-USER: CPT | Performed by: NEUROLOGICAL SURGERY
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
## 2021-04-13 ENCOUNTER — HOSPITAL ENCOUNTER (OUTPATIENT)
Dept: PHYSICAL THERAPY | Age: 51
Setting detail: THERAPIES SERIES
Discharge: HOME | End: 2021-04-13
Payer: COMMERCIAL

## 2021-04-13 PROCEDURE — 97110 THERAPEUTIC EXERCISES: CPT

## 2021-04-13 NOTE — FLOWSHEET NOTE
[x] [address]  [address]  [phone]  F: [phone]     Physical Therapy Daily Treatment Note/Progress    Date:  2021  Patient Name:  Jose Alberto Pardo      :  1970    MRN: [id number]  Physician: Jeaneth Hanks                           Insurance: Medical [place] (by medical necessity)  Medical Diagnosis: Lumbar back pain with radiculopathy affecting left lower extremity M54.16             Rehab Codes: M54.5, M79.652, R26.2, R29.3  Onset Date: 2020                    Next 's appt.: 2021     Visit# / total visits:   Cancels/No Shows: 2/3    Subjective:    Pain:  [x] Yes  [] No Location: LB, L hip   Pain Rating: (0-10 scale) 6/10  Pain altered Tx:  [x] No  [] Yes  Action:  Comments: patient notes increased pain across low back over last day into today. Does note that sciatic pain is improving.      Objective:  Modalities: HP (large) EStim (IFC opiate) LB prone, at end of Rx   Held 21  Precautions:  Exercises completed marked with \"X\"    Exercise Reps/ Time Weight/ Level Comments    SciFit 6 min Level 3     Door stretch  3x30\"             Standing   Added 3/11/21    Calf Stretch on Wedge 3x30\"   X   Calf raises  20x 2 lb  X   3-way hip  2x10 ea 2 lb  X   Marching 20x ea 2 lb     HS Curls  20x ea 2 lb     Squats 2x10   X          Seated        LAQ 20x 2 lbs W/abdom bouchra,     Hamstring stretch 3x30\" ea   X   Piriformis Stretch 3x30\" ea   X          Supine       SKTC 5x10\"      LTR 10x5\"  Alternating  X   Hip Flexor Stretch off EOM 3x30\"   X   Alt UE raises  10x      March  2x10ea 2 lb  X   SLR 2x10 ea 2 lb     SAQ 2x10 ea 2 lb     Bridge 2x10    X   Sciatic nn glides 2x10 x3\"  L LE  Added 3/18/21 X                 Sidelying       Hip abduction  10x ea              Treatment Charges: Mins Units   [x]  Modalities ES  HP     [x]  Ther Exercise 42 3   [x]  Manual Therapy     []  Ther Activities     [] CHERYL downing, SAQ       Plan: [x] Continue current frequency toward long and short term goals.     [x] Specific Instructions for subsequent treatments:  progress spinal stab ex per Pt tolerance, monitor response to ES, progress standing ex        Time In: 0910            Time Out: 4372       Electronically signed by:  Pricila Rebollar PTA

## 2021-04-20 ENCOUNTER — HOSPITAL ENCOUNTER (OUTPATIENT)
Dept: PHYSICAL THERAPY | Age: 51
Setting detail: THERAPIES SERIES
Discharge: HOME | End: 2021-04-20
Payer: COMMERCIAL

## 2021-04-20 PROCEDURE — 97110 THERAPEUTIC EXERCISES: CPT

## 2021-04-22 ENCOUNTER — HOSPITAL ENCOUNTER (OUTPATIENT)
Dept: PHYSICAL THERAPY | Age: 51
Setting detail: THERAPIES SERIES
End: 2021-04-22
Payer: COMMERCIAL

## 2021-04-23 ENCOUNTER — HOSPITAL ENCOUNTER (OUTPATIENT)
Age: 51
Discharge: HOME | End: 2021-04-25
Payer: COMMERCIAL

## 2021-04-23 ENCOUNTER — HOSPITAL ENCOUNTER (OUTPATIENT)
Dept: MRI IMAGING | Age: 51
Discharge: HOME | End: 2021-04-25
Payer: COMMERCIAL

## 2021-04-23 ENCOUNTER — HOSPITAL ENCOUNTER (OUTPATIENT)
Age: 51
Discharge: HOME | End: 2021-04-23
Payer: COMMERCIAL

## 2021-04-23 ENCOUNTER — HOSPITAL ENCOUNTER (OUTPATIENT)
Dept: GENERAL RADIOLOGY | Age: 51
Discharge: HOME | End: 2021-04-25
Payer: COMMERCIAL

## 2021-04-23 DIAGNOSIS — M54.50 CHRONIC LOW BACK PAIN, UNSPECIFIED BACK PAIN LATERALITY, UNSPECIFIED WHETHER SCIATICA PRESENT: ICD-10-CM

## 2021-04-23 DIAGNOSIS — G89.29 CHRONIC LOW BACK PAIN, UNSPECIFIED BACK PAIN LATERALITY, UNSPECIFIED WHETHER SCIATICA PRESENT: ICD-10-CM

## 2021-04-23 DIAGNOSIS — M54.16 LUMBAR BACK PAIN WITH RADICULOPATHY AFFECTING LEFT LOWER EXTREMITY: ICD-10-CM

## 2021-04-23 LAB
ALBUMIN SERPL-MCNC: 4.2 G/DL (ref 3.5–5.2)
ALBUMIN/GLOBULIN RATIO: 1.4 (ref 1–2.5)
ALP BLD-CCNC: 85 U/L (ref 35–104)
ALT SERPL-CCNC: 14 U/L (ref 5–33)
ANION GAP SERPL CALCULATED.3IONS-SCNC: 13 MMOL/L (ref 9–17)
AST SERPL-CCNC: 15 U/L
BILIRUB SERPL-MCNC: 0.33 MG/DL (ref 0.3–1.2)
BUN BLDV-MCNC: 10 MG/DL (ref 6–20)
BUN/CREAT BLD: ABNORMAL (ref 9–20)
CALCIUM SERPL-MCNC: 9.4 MG/DL (ref 8.6–10.4)
CHLORIDE BLD-SCNC: 102 MMOL/L (ref 98–107)
CO2: 26 MMOL/L (ref 20–31)
CREAT SERPL-MCNC: 0.69 MG/DL (ref 0.5–0.9)
GFR AFRICAN AMERICAN: >60 ML/MIN
GFR NON-AFRICAN AMERICAN: >60 ML/MIN
GFR SERPL CREATININE-BSD FRML MDRD: ABNORMAL ML/MIN/{1.73_M2}
GFR SERPL CREATININE-BSD FRML MDRD: ABNORMAL ML/MIN/{1.73_M2}
GLUCOSE BLD-MCNC: 111 MG/DL (ref 70–99)
POTASSIUM SERPL-SCNC: 4.1 MMOL/L (ref 3.7–5.3)
SODIUM BLD-SCNC: 141 MMOL/L (ref 135–144)
TOTAL PROTEIN: 7.2 G/DL (ref 6.4–8.3)

## 2021-04-23 PROCEDURE — 72148 MRI LUMBAR SPINE W/O DYE: CPT

## 2021-04-23 PROCEDURE — 72120 X-RAY BEND ONLY L-S SPINE: CPT

## 2021-04-23 PROCEDURE — 36415 COLL VENOUS BLD VENIPUNCTURE: CPT

## 2021-04-23 PROCEDURE — 80053 COMPREHEN METABOLIC PANEL: CPT

## 2021-04-27 ENCOUNTER — HOSPITAL ENCOUNTER (OUTPATIENT)
Dept: PHYSICAL THERAPY | Age: 51
Setting detail: THERAPIES SERIES
Discharge: HOME | End: 2021-04-27
Payer: COMMERCIAL

## 2021-04-27 PROCEDURE — 97110 THERAPEUTIC EXERCISES: CPT

## 2021-04-27 NOTE — FLOWSHEET NOTE
[x] [address]  [address]  [phone]  F: [phone]     Physical Therapy Daily Treatment Note    Date:  2021  Patient Name:  Frederic Quezada      :  1970    MRN: [id number]  Physician: Harley Calzada                           Insurance: Medical [place] (by medical necessity)  Medical Diagnosis: Lumbar back pain with radiculopathy affecting left lower extremity M54.16             Rehab Codes: M54.5, M79.652, R26.2, R29.3  Onset Date: 2020                    Next 's appt. :  pain management, / PCP     Visit# / total visits: 15/18  Cancels/No Shows: 2/3    Subjective:    Pain:  [x] Yes  [] No Location: LB, L hip   Pain Rating: (0-10 scale) 2/10  Pain altered Tx:  [x] No  [] Yes  Action:  Comments: patient reports that she generally feels a bit better after completing her PT in clinic, but finds it hard to complete at home due to busy schedule. Definitely feels back pain is exacerbated by long work hours and doing double the work due to being short staffed.      Objective:  Modalities: HP (large) EStim (IFC opiate) LB prone, at end of Rx   Held   Precautions:  Exercises completed marked with \"X\"    Exercise Reps/ Time Weight/ Level Comments    SciFit 6 min Level 3  X          Standing   Added 3/11/21    Calf Stretch on Wedge 3x30\"      Calf raises  20x 2 lb     [address]- [address] Hip  2x10 ea 2 lb     Marching 20x ea 2 lb     HS Curls  20x ea 2 lb     Squats 2x10             Seated        LAQ 20x 2 lbs W/abdom bouchra,     Hamstring stretch 3x30\" ea   X   Piriformis Stretch 3x30\" ea   X          Supine       LTR 10x5\"  Alternating  X   Hip Flexor Stretch off EOM 3x30\"   X   March  2x10ea 2 lb     SLR 2x10 ea 2 lb  X   SAQ 2x10 ea 2 lb  X   Bridge 2x10    X   Sciatic nn glides 2x10 x3\"  L LE  Added 3/18/21 X          Sidelying       Hip abduction  20x ea 2 lb  X   Clamshells 20x ea [place] 15x R X           Treatment Charges: Mins Units   [x]  Modalities ES  HP     [x]  Ther Exercise 43 3   [x]  Manual Therapy     []  Ther Activities     []  Aquatics     []  Vasocompression     []  Other     Total Treatment time 43 3       Assessment: [x] Progressing toward goals:           [] No change. [x] Other: Increased fatigue noted throughout Tx this date that limited completion of all charted ex's. No increased pain noted, per pt. Verbal review of HEP and stressed importance of completion to continue to improve mobility and strength as this still remains diminished. [x] Patient would continue to benefit from skilled physical therapy services in order to: decrease pain, increase hip and knee strength, increase lumbar ROM, and increase tolerance to standing and walking. STG: (to be met in 10 treatments) updated 4/1/21  1. ? Pain: L LB, LE 5/10 average pain, 7/10 at worst- Not Met  2. ? ROM: Lumbar ext 20°- MET but painful  3. ? Strength: Hip flex L 3+/5, R 4-/5, B hip ext 4-/5, B knees 4+/5- MET  4. ? Function: Oswestry 52% loss of function- Not Met  5. Patient to be independent with home exercise program as demonstrated by performance with correct form without cues. Progress made PROGRESS MADE     LTG: (to be met in 18 treatments)  1. ? Pain: L LB, LE 2/10 average pain, 5/10 at worst  2. ? ROM: Lumbar AROM WNL without increased pain   3. ? Strength: B hips 4/5  4. ? Function: Oswestry 38% loss of function  5. Pt able to walk long enough to do grocery shopping without increased pain       Pt. Education:  [x] Yes  [x] No  [x] Reviewed Prior HEP/Ed  Method of Education: [x] Verbal  [x] Demo  [] Written  Comprehension of Education:  [x] Verbalizes understanding  [x] Demonstrates understanding. [x] Needs review. [] Demonstrates/verbalizes HEP/Ed previously given. 2/17 Correct technique shoveling snow, importance of making legs do the work, avoiding bending and twisting back.   3/8: Scapular retractions, posterior shoulder rolls, Supine [place], CHERYL, SAQ       Plan: [x] Continue current frequency toward long and short term goals.     [x] Specific Instructions for subsequent treatments: update HEP        Time In: 0452           Time Out: 0711      Electronically signed by:  Tarik Blue PTA

## 2021-05-03 NOTE — TELEPHONE ENCOUNTER
E-scribe request for insulin lispro, [place] pen needles. Please review and e-scribe if applicable. Last Visit Date:  03/02/2021  Next Visit Date:  Visit date not found    Hemoglobin A1C (%)   Date Value   02/01/2021 7.3   08/13/2020 6.5   03/12/2020 8.4 (H)             ( goal A1C is < 7)   Microalb/Crt.  Ratio (mcg/mg creat)   Date Value   01/29/2014 3     LDL Cholesterol (mg/dL)   Date Value   02/01/2020 43       (goal LDL is <100)   AST (U/L)   Date Value   04/23/2021 15     ALT (U/L)   Date Value   04/23/2021 14     BUN (mg/dL)   Date Value   04/23/2021 10     BP Readings from Last 3 Encounters:   04/12/21 125/78   04/02/21 (!) 153/97   03/02/21 (!) 140/85          (goal 120/80)        Patient Active Problem List:     Migraine     Morbid obesity ([address]     Iron deficiency     Tobacco abuse     S/P Permanent nail avulsion, 2nd digit right foot      Acute rhinosinusitis     Dyslipidemia     Vitamin B12 deficiency     Folate deficiency     Constipation     Hypertension goal BP (blood pressure) < 140/90     Bright red blood per rectum     Acute blood loss anemia     Vaginal bleeding     Ovarian cyst     Pre-diabetes     Menorrhagia     H. pylori infection     Microcytosis     Anemia     Hydradenitis     Hypertension, goal below 140/90     Right leg paresthesias     Chronic back pain     Rotator cuff injury     Vitamin D deficiency     Rotator cuff tendinitis     Smoker     Left breast abscess     Iron deficiency anemia     Breast abscess     Medial meniscus tear     Right knee pain     Arthritis of knee     Lumbar disc herniation S/P L3 4 and L4 5 and tenotomy right side for foraminotomy     Leg swelling     Pelvic mass in female     Hypocalcemia     Pericardial effusion     Hepatomegaly     Intramural leiomyoma of uterus     Heart failure ([address]     Hyponatremia     STD (female)     Bacterial vaginosis     Cardiomyopathy ([address]     Hyperglycemia     Hyperglycemia due to type 2 diabetes mellitus ([address] Hyperkalemia     WHIT (acute kidney injury) ([facility]     Hidradenitis suppurativa     Type 2 diabetes mellitus with hyperglycemia, without long-term current use of insulin (HCC)     Toenail deformity     Skin infection     Arthritis     Essential hypertension     Lumbar back pain with radiculopathy affecting left lower extremity     Encounter for smoking cessation counseling      ----Gloria Danielle

## 2021-05-04 ENCOUNTER — HOSPITAL ENCOUNTER (OUTPATIENT)
Dept: PHYSICAL THERAPY | Age: 51
Setting detail: THERAPIES SERIES
Discharge: HOME | End: 2021-05-04
Payer: COMMERCIAL

## 2021-05-04 PROCEDURE — G0283 ELEC STIM OTHER THAN WOUND: HCPCS

## 2021-05-04 PROCEDURE — 97110 THERAPEUTIC EXERCISES: CPT

## 2021-05-04 NOTE — FLOWSHEET NOTE
ES  HP 20 1   [x]  Ther Exercise 25 2   [x]  Manual Therapy     []  Ther Activities     []  Aquatics     []  Vasocompression     []  Other     Total Treatment time 45 3       Assessment: [x] Progressing toward goals:           [] No change. [x] Other:poor tolerance to Tx this date, with reduced ability to complete charted ex's. Tx focused on light mobility/ROM exercises followed by modalities for pain control. patient notes this helped a little today, but pain is still elevated and will be going home to sleep. [x] Patient would continue to benefit from skilled physical therapy services in order to: decrease pain, increase hip and knee strength, increase lumbar ROM, and increase tolerance to standing and walking. STG: (to be met in 10 treatments) updated 4/1/21  1. ? Pain: L LB, LE 5/10 average pain, 7/10 at worst- Not Met  2. ? ROM: Lumbar ext 20°- MET but painful  3. ? Strength: Hip flex L 3+/5, R 4-/5, B hip ext 4-/5, B knees 4+/5- MET  4. ? Function: Oswestry 52% loss of function- Not Met  5. Patient to be independent with home exercise program as demonstrated by performance with correct form without cues. Progress made PROGRESS MADE     LTG: (to be met in 18 treatments)  1. ? Pain: L LB, LE 2/10 average pain, 5/10 at worst  2. ? ROM: Lumbar AROM WNL without increased pain   3. ? Strength: B hips 4/5  4. ? Function: Oswestry 38% loss of function  5. Pt able to walk long enough to do grocery shopping without increased pain       Pt. Education:  [x] Yes  [x] No  [x] Reviewed Prior HEP/Ed  Method of Education: [x] Verbal  [x] Demo  [] Written  Comprehension of Education:  [x] Verbalizes understanding  [x] Demonstrates understanding. [x] Needs review. [] Demonstrates/verbalizes HEP/Ed previously given. 2/17 Correct technique shoveling snow, importance of making legs do the work, avoiding bending and twisting back.   3/8: Scapular retractions, posterior shoulder rolls, Supine marching, SLR, SAQ Plan: [x] Continue current frequency toward long and short term goals.     [x] Specific Instructions for subsequent treatments: update HEP        Time In: 1827            Time Out: 4961      Electronically signed by:  Amada Mendoza PTA

## 2021-05-13 DIAGNOSIS — E11.65 TYPE 2 DIABETES MELLITUS WITH HYPERGLYCEMIA, WITHOUT LONG-TERM CURRENT USE OF INSULIN (HCC): ICD-10-CM

## 2021-05-13 RX ORDER — INSULIN GLARGINE 100 [IU]/ML
INJECTION, SOLUTION SUBCUTANEOUS
Qty: 5 PEN | Refills: 2 | Status: SHIPPED | OUTPATIENT
Start: 2021-05-13

## 2021-05-13 NOTE — TELEPHONE ENCOUNTER
([facility] [address]     Hidradenitis suppurativa     Type 2 diabetes mellitus with hyperglycemia, without long-term current use of insulin (HCC)     Toenail deformity     Skin infection     Arthritis     Essential hypertension     Lumbar back pain with radiculopathy affecting left lower extremity     Encounter for smoking cessation counseling

## 2021-05-24 RX ORDER — PEN NEEDLE, DIABETIC 32GX 5/32"
NEEDLE, DISPOSABLE MISCELLANEOUS
Qty: 100 EACH | Refills: 1 | Status: SHIPPED | OUTPATIENT
Start: 2021-05-24

## 2021-05-24 RX ORDER — INSULIN LISPRO 100 [IU]/ML
INJECTION, SOLUTION INTRAVENOUS; SUBCUTANEOUS
Qty: 3 ML | Refills: 2 | Status: SHIPPED | OUTPATIENT
Start: 2021-05-24 | End: 2022-03-11

## 2021-05-27 ENCOUNTER — INITIAL CONSULT (OUTPATIENT)
Dept: PAIN MANAGEMENT | Age: 51
End: 2021-05-27
Payer: COMMERCIAL

## 2021-05-27 VITALS — RESPIRATION RATE: 18 BRPM | BODY MASS INDEX: 44.73 KG/M2 | WEIGHT: 285 LBS | HEIGHT: 67 IN

## 2021-05-27 DIAGNOSIS — M51.36 DEGENERATION OF LUMBAR INTERVERTEBRAL DISC: ICD-10-CM

## 2021-05-27 DIAGNOSIS — M54.16 LUMBAR RADICULOPATHY: Primary | ICD-10-CM

## 2021-05-27 DIAGNOSIS — M47.817 LUMBOSACRAL SPONDYLOSIS WITHOUT MYELOPATHY: ICD-10-CM

## 2021-05-27 PROCEDURE — G8427 DOCREV CUR MEDS BY ELIG CLIN: HCPCS | Performed by: PAIN MEDICINE

## 2021-05-27 PROCEDURE — 99204 OFFICE O/P NEW MOD 45 MIN: CPT | Performed by: PAIN MEDICINE

## 2021-05-27 PROCEDURE — 3017F COLORECTAL CA SCREEN DOC REV: CPT | Performed by: PAIN MEDICINE

## 2021-05-27 PROCEDURE — G8417 CALC BMI ABV UP PARAM F/U: HCPCS | Performed by: PAIN MEDICINE

## 2021-05-27 PROCEDURE — 4004F PT TOBACCO SCREEN RCVD TLK: CPT | Performed by: PAIN MEDICINE

## 2021-05-27 RX ORDER — ATORVASTATIN CALCIUM 40 MG/1
TABLET, FILM COATED ORAL
COMMUNITY
Start: 2021-05-19 | End: 2021-12-22 | Stop reason: SDUPTHER

## 2021-05-27 ASSESSMENT — ENCOUNTER SYMPTOMS
POOR WOUND HEALING: 0
RESPIRATORY NEGATIVE: 1
ALLERGIC/IMMUNOLOGIC NEGATIVE: 1
ABDOMINAL PAIN: 0
BOWEL INCONTINENCE: 0
EYES NEGATIVE: 1
BACK PAIN: 1
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
## 2021-05-28 ENCOUNTER — OFFICE VISIT (OUTPATIENT)
Dept: PHYSICAL MEDICINE AND REHAB | Age: 51
End: 2021-05-28
Payer: COMMERCIAL

## 2021-05-28 VITALS
HEART RATE: 78 BPM | TEMPERATURE: 97.9 F | BODY MASS INDEX: 44.73 KG/M2 | HEIGHT: 67 IN | DIASTOLIC BLOOD PRESSURE: 82 MMHG | SYSTOLIC BLOOD PRESSURE: 127 MMHG | WEIGHT: 285 LBS

## 2021-05-28 DIAGNOSIS — M54.59 LUMBAR TRIGGER POINT SYNDROME: ICD-10-CM

## 2021-05-28 DIAGNOSIS — G89.29 CHRONIC LOW BACK PAIN, UNSPECIFIED BACK PAIN LATERALITY, UNSPECIFIED WHETHER SCIATICA PRESENT: Primary | ICD-10-CM

## 2021-05-28 DIAGNOSIS — M54.50 CHRONIC LOW BACK PAIN, UNSPECIFIED BACK PAIN LATERALITY, UNSPECIFIED WHETHER SCIATICA PRESENT: Primary | ICD-10-CM

## 2021-05-28 PROCEDURE — 99212 OFFICE O/P EST SF 10 MIN: CPT | Performed by: PHYSICAL MEDICINE & REHABILITATION

## 2021-05-28 PROCEDURE — 3017F COLORECTAL CA SCREEN DOC REV: CPT | Performed by: PHYSICAL MEDICINE & REHABILITATION

## 2021-05-28 PROCEDURE — G8427 DOCREV CUR MEDS BY ELIG CLIN: HCPCS | Performed by: PHYSICAL MEDICINE & REHABILITATION

## 2021-05-28 PROCEDURE — 4004F PT TOBACCO SCREEN RCVD TLK: CPT | Performed by: PHYSICAL MEDICINE & REHABILITATION

## 2021-05-28 PROCEDURE — G8417 CALC BMI ABV UP PARAM F/U: HCPCS | Performed by: PHYSICAL MEDICINE & REHABILITATION

## 2021-05-28 PROCEDURE — 20552 NJX 1/MLT TRIGGER POINT 1/2: CPT | Performed by: PHYSICAL MEDICINE & REHABILITATION

## 2021-05-28 RX ORDER — GABAPENTIN 300 MG/1
300 CAPSULE ORAL 3 TIMES DAILY
Qty: 90 CAPSULE | Refills: 2 | Status: SHIPPED | OUTPATIENT
Start: 2021-05-28 | End: 2021-08-23

## 2021-05-28 RX ORDER — LIDOCAINE HYDROCHLORIDE 10 MG/ML
4 INJECTION, SOLUTION INFILTRATION; PERINEURAL ONCE
Status: COMPLETED | OUTPATIENT
Start: 2021-05-28 | End: 2021-05-28

## 2021-05-28 RX ADMIN — LIDOCAINE HYDROCHLORIDE 4 ML: 10 INJECTION, SOLUTION INFILTRATION; PERINEURAL at 11:09
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
## 2021-06-03 ENCOUNTER — OFFICE VISIT (OUTPATIENT)
Dept: FAMILY MEDICINE CLINIC | Age: 51
End: 2021-06-03
Payer: COMMERCIAL

## 2021-06-03 VITALS
WEIGHT: 289 LBS | BODY MASS INDEX: 45.36 KG/M2 | DIASTOLIC BLOOD PRESSURE: 72 MMHG | HEIGHT: 67 IN | SYSTOLIC BLOOD PRESSURE: 130 MMHG | HEART RATE: 74 BPM

## 2021-06-03 DIAGNOSIS — Z79.4 TYPE 2 DIABETES MELLITUS TREATED WITH INSULIN (HCC): Primary | ICD-10-CM

## 2021-06-03 DIAGNOSIS — K13.0 ANGULAR CHEILOSIS: ICD-10-CM

## 2021-06-03 DIAGNOSIS — E78.5 DYSLIPIDEMIA: ICD-10-CM

## 2021-06-03 DIAGNOSIS — Z23 NEED FOR PROPHYLACTIC VACCINATION AND INOCULATION AGAINST VARICELLA: ICD-10-CM

## 2021-06-03 DIAGNOSIS — Z12.31 ENCOUNTER FOR SCREENING MAMMOGRAM FOR BREAST CANCER: ICD-10-CM

## 2021-06-03 DIAGNOSIS — E11.9 TYPE 2 DIABETES MELLITUS TREATED WITH INSULIN (HCC): Primary | ICD-10-CM

## 2021-06-03 DIAGNOSIS — I10 ESSENTIAL HYPERTENSION: ICD-10-CM

## 2021-06-03 LAB — HBA1C MFR BLD: 7.1 %

## 2021-06-03 PROCEDURE — 99213 OFFICE O/P EST LOW 20 MIN: CPT | Performed by: STUDENT IN AN ORGANIZED HEALTH CARE EDUCATION/TRAINING PROGRAM

## 2021-06-03 PROCEDURE — 83036 HEMOGLOBIN GLYCOSYLATED A1C: CPT | Performed by: STUDENT IN AN ORGANIZED HEALTH CARE EDUCATION/TRAINING PROGRAM

## 2021-06-03 PROCEDURE — 3051F HG A1C>EQUAL 7.0%<8.0%: CPT | Performed by: STUDENT IN AN ORGANIZED HEALTH CARE EDUCATION/TRAINING PROGRAM

## 2021-06-03 RX ORDER — KETOCONAZOLE 20 MG/G
CREAM TOPICAL
Qty: 30 G | Refills: 1 | Status: SHIPPED | OUTPATIENT
Start: 2021-06-03 | End: 2021-12-22

## 2021-06-03 SDOH — ECONOMIC STABILITY: FOOD INSECURITY: WITHIN THE PAST 12 MONTHS, YOU WORRIED THAT YOUR FOOD WOULD RUN OUT BEFORE YOU GOT MONEY TO BUY MORE.: NEVER TRUE

## 2021-06-03 SDOH — ECONOMIC STABILITY: FOOD INSECURITY: WITHIN THE PAST 12 MONTHS, THE FOOD YOU BOUGHT JUST DIDN'T LAST AND YOU DIDN'T HAVE MONEY TO GET MORE.: NEVER TRUE

## 2021-06-03 ASSESSMENT — SOCIAL DETERMINANTS OF HEALTH (SDOH): HOW HARD IS IT FOR YOU TO PAY FOR THE VERY BASICS LIKE FOOD, HOUSING, MEDICAL CARE, AND HEATING?: NOT HARD AT ALL

## 2021-06-03 ASSESSMENT — ENCOUNTER SYMPTOMS
ABDOMINAL PAIN: 0
VOMITING: 0
NAUSEA: 0
SHORTNESS OF BREATH: 0
CHEST TIGHTNESS: 0

## 2021-06-03 ASSESSMENT — VISUAL ACUITY: OU: 1
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
## 2021-06-07 ENCOUNTER — HOSPITAL ENCOUNTER (EMERGENCY)
Age: 51
Discharge: HOME | End: 2021-06-07
Attending: EMERGENCY MEDICINE
Payer: COMMERCIAL

## 2021-06-07 VITALS
HEART RATE: 90 BPM | WEIGHT: 275 LBS | OXYGEN SATURATION: 100 % | SYSTOLIC BLOOD PRESSURE: 136 MMHG | HEIGHT: 67 IN | DIASTOLIC BLOOD PRESSURE: 67 MMHG | TEMPERATURE: 97.9 F | BODY MASS INDEX: 43.16 KG/M2 | RESPIRATION RATE: 16 BRPM

## 2021-06-07 DIAGNOSIS — G89.29 ACUTE EXACERBATION OF CHRONIC LOW BACK PAIN: Primary | ICD-10-CM

## 2021-06-07 DIAGNOSIS — M54.50 ACUTE EXACERBATION OF CHRONIC LOW BACK PAIN: Primary | ICD-10-CM

## 2021-06-07 PROCEDURE — 96372 THER/PROPH/DIAG INJ SC/IM: CPT

## 2021-06-07 PROCEDURE — 6360000002 HC RX W HCPCS: Performed by: EMERGENCY MEDICINE

## 2021-06-07 PROCEDURE — 6370000000 HC RX 637 (ALT 250 FOR IP): Performed by: EMERGENCY MEDICINE

## 2021-06-07 PROCEDURE — 99282 EMERGENCY DEPT VISIT SF MDM: CPT

## 2021-06-07 RX ORDER — ORPHENADRINE CITRATE 30 MG/ML
60 INJECTION INTRAMUSCULAR; INTRAVENOUS ONCE
Status: COMPLETED | OUTPATIENT
Start: 2021-06-07 | End: 2021-06-07

## 2021-06-07 RX ORDER — IBUPROFEN 600 MG/1
600 TABLET ORAL 3 TIMES DAILY PRN
Qty: 30 TABLET | Refills: 0 | Status: SHIPPED | OUTPATIENT
Start: 2021-06-07 | End: 2021-06-07 | Stop reason: SDUPTHER

## 2021-06-07 RX ORDER — LIDOCAINE 4 G/G
1 PATCH TOPICAL DAILY
Qty: 10 PATCH | Refills: 0 | Status: SHIPPED | OUTPATIENT
Start: 2021-06-07 | End: 2021-06-07 | Stop reason: SDUPTHER

## 2021-06-07 RX ORDER — KETOROLAC TROMETHAMINE 30 MG/ML
30 INJECTION, SOLUTION INTRAMUSCULAR; INTRAVENOUS ONCE
Status: COMPLETED | OUTPATIENT
Start: 2021-06-07 | End: 2021-06-07

## 2021-06-07 RX ORDER — LIDOCAINE 4 G/G
1 PATCH TOPICAL ONCE
Status: DISCONTINUED | OUTPATIENT
Start: 2021-06-07 | End: 2021-06-07 | Stop reason: HOSPADM

## 2021-06-07 RX ORDER — ORPHENADRINE CITRATE 100 MG/1
100 TABLET, EXTENDED RELEASE ORAL 2 TIMES DAILY
Qty: 20 TABLET | Refills: 0 | Status: SHIPPED | OUTPATIENT
Start: 2021-06-07 | End: 2021-06-07 | Stop reason: SDUPTHER

## 2021-06-07 RX ORDER — ORPHENADRINE CITRATE 100 MG/1
100 TABLET, EXTENDED RELEASE ORAL 2 TIMES DAILY
Qty: 20 TABLET | Refills: 0 | Status: SHIPPED | OUTPATIENT
Start: 2021-06-07 | End: 2021-06-17

## 2021-06-07 RX ORDER — IBUPROFEN 600 MG/1
600 TABLET ORAL 3 TIMES DAILY PRN
Qty: 30 TABLET | Refills: 0 | Status: SHIPPED | OUTPATIENT
Start: 2021-06-07 | End: 2021-12-22

## 2021-06-07 RX ORDER — LIDOCAINE 4 G/G
1 PATCH TOPICAL DAILY
Qty: 10 PATCH | Refills: 0 | Status: SHIPPED | OUTPATIENT
Start: 2021-06-07 | End: 2021-06-17

## 2021-06-07 RX ADMIN — KETOROLAC TROMETHAMINE 30 MG: 30 INJECTION, SOLUTION INTRAMUSCULAR at 06:43

## 2021-06-07 RX ADMIN — ORPHENADRINE CITRATE 60 MG: 60 INJECTION INTRAMUSCULAR; INTRAVENOUS at 06:43

## 2021-06-07 ASSESSMENT — ENCOUNTER SYMPTOMS
EYE PAIN: 0
BACK PAIN: 1
COLOR CHANGE: 0
ABDOMINAL PAIN: 0
SHORTNESS OF BREATH: 0

## 2021-06-07 ASSESSMENT — PAIN DESCRIPTION - LOCATION: LOCATION: HIP;BACK

## 2021-06-07 ASSESSMENT — PAIN DESCRIPTION - PAIN TYPE: TYPE: ACUTE PAIN

## 2021-06-07 ASSESSMENT — PAIN DESCRIPTION - ORIENTATION: ORIENTATION: LEFT

## 2021-06-07 ASSESSMENT — PAIN SCALES - GENERAL
PAINLEVEL_OUTOF10: 10
PAINLEVEL_OUTOF10: 10

## 2021-06-07 ASSESSMENT — PAIN DESCRIPTION - DESCRIPTORS: DESCRIPTORS: SHOOTING;PINS AND NEEDLES
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
## 2021-06-07 NOTE — ED TRIAGE NOTES
Mode of arrival (squad #, walk in, police, etc) :car with family        Chief complaint(s):back and left leg pain       Arrival Note (brief scenario, treatment PTA, etc). : yesterday increased pain to left back and leg walking with walker         C= \"Have you ever felt that you should Cut down on your drinking? \"  No  A= \"Have people Annoyed you by criticizing your drinking? \"  No  G= \"Have you ever felt bad or Guilty about your drinking? \"  No  E= \"Have you ever had a drink as an Eye-opener first thing in the morning to steady your nerves or to help a hangover? \"  No      Deferred []      Reason for deferring: N/A    *If yes to two or more: probable alcohol abuse. *

## 2021-06-11 DIAGNOSIS — E11.9 TYPE 2 DIABETES MELLITUS WITHOUT COMPLICATION, WITHOUT LONG-TERM CURRENT USE OF INSULIN (HCC): ICD-10-CM

## 2021-06-11 NOTE — TELEPHONE ENCOUNTER
([facility]     Hidradenitis suppurativa     Type 2 diabetes mellitus with hyperglycemia, without long-term current use of insulin (HCC)     Toenail deformity     Skin infection     Arthritis     Essential hypertension     Lumbar back pain with radiculopathy affecting left lower extremity     Encounter for smoking cessation counseling      ----Tata Downey

## 2021-06-14 ENCOUNTER — HOSPITAL ENCOUNTER (OUTPATIENT)
Dept: LAB | Age: 51
Setting detail: SPECIMEN
Discharge: HOME | End: 2021-06-14
Payer: COMMERCIAL

## 2021-06-14 DIAGNOSIS — Z01.818 PREOP TESTING: Primary | ICD-10-CM

## 2021-06-14 PROCEDURE — U0003 INFECTIOUS AGENT DETECTION BY NUCLEIC ACID (DNA OR RNA); SEVERE ACUTE RESPIRATORY SYNDROME CORONAVIRUS 2 (SARS-COV-2) (CORONAVIRUS DISEASE [COVID-19]), AMPLIFIED PROBE TECHNIQUE, MAKING USE OF HIGH THROUGHPUT TECHNOLOGIES AS DESCRIBED BY CMS-2020-01-R: HCPCS

## 2021-06-14 PROCEDURE — U0005 INFEC AGEN DETEC AMPLI PROBE: HCPCS

## 2021-06-15 LAB
SARS-COV-2: NORMAL
SARS-COV-2: NOT DETECTED
SOURCE: NORMAL

## 2021-06-17 ENCOUNTER — ANESTHESIA EVENT (OUTPATIENT)
Dept: OPERATING ROOM | Age: 51
End: 2021-06-17
Payer: COMMERCIAL

## 2021-06-18 ENCOUNTER — HOSPITAL ENCOUNTER (OUTPATIENT)
Dept: MAMMOGRAPHY | Age: 51
Discharge: HOME | End: 2021-06-20
Payer: COMMERCIAL

## 2021-06-18 ENCOUNTER — APPOINTMENT (OUTPATIENT)
Dept: GENERAL RADIOLOGY | Age: 51
End: 2021-06-18
Attending: PAIN MEDICINE
Payer: COMMERCIAL

## 2021-06-18 ENCOUNTER — ANESTHESIA (OUTPATIENT)
Dept: OPERATING ROOM | Age: 51
End: 2021-06-18
Payer: COMMERCIAL

## 2021-06-18 ENCOUNTER — HOSPITAL ENCOUNTER (OUTPATIENT)
Age: 51
Setting detail: OUTPATIENT SURGERY
Discharge: HOME | End: 2021-06-18
Attending: PAIN MEDICINE | Admitting: PAIN MEDICINE
Payer: COMMERCIAL

## 2021-06-18 VITALS
BODY MASS INDEX: 44.44 KG/M2 | DIASTOLIC BLOOD PRESSURE: 72 MMHG | RESPIRATION RATE: 17 BRPM | OXYGEN SATURATION: 99 % | HEART RATE: 73 BPM | TEMPERATURE: 98.4 F | HEIGHT: 67 IN | SYSTOLIC BLOOD PRESSURE: 142 MMHG | WEIGHT: 283.13 LBS

## 2021-06-18 VITALS
OXYGEN SATURATION: 100 % | RESPIRATION RATE: 25 BRPM | DIASTOLIC BLOOD PRESSURE: 73 MMHG | SYSTOLIC BLOOD PRESSURE: 151 MMHG

## 2021-06-18 DIAGNOSIS — Z12.31 ENCOUNTER FOR SCREENING MAMMOGRAM FOR BREAST CANCER: ICD-10-CM

## 2021-06-18 LAB — GLUCOSE BLD-MCNC: 93 MG/DL (ref 65–105)

## 2021-06-18 PROCEDURE — 7100000010 HC PHASE II RECOVERY - FIRST 15 MIN: Performed by: PAIN MEDICINE

## 2021-06-18 PROCEDURE — 3700000000 HC ANESTHESIA ATTENDED CARE: Performed by: PAIN MEDICINE

## 2021-06-18 PROCEDURE — 64484 NJX AA&/STRD TFRM EPI L/S EA: CPT | Performed by: PAIN MEDICINE

## 2021-06-18 PROCEDURE — 2709999900 HC NON-CHARGEABLE SUPPLY: Performed by: PAIN MEDICINE

## 2021-06-18 PROCEDURE — 64483 NJX AA&/STRD TFRM EPI L/S 1: CPT | Performed by: PAIN MEDICINE

## 2021-06-18 PROCEDURE — 3600000002 HC SURGERY LEVEL 2 BASE: Performed by: PAIN MEDICINE

## 2021-06-18 PROCEDURE — 6360000004 HC RX CONTRAST MEDICATION: Performed by: PAIN MEDICINE

## 2021-06-18 PROCEDURE — 77067 SCR MAMMO BI INCL CAD: CPT

## 2021-06-18 PROCEDURE — 82947 ASSAY GLUCOSE BLOOD QUANT: CPT

## 2021-06-18 PROCEDURE — 6360000002 HC RX W HCPCS: Performed by: PAIN MEDICINE

## 2021-06-18 PROCEDURE — 7100000011 HC PHASE II RECOVERY - ADDTL 15 MIN: Performed by: PAIN MEDICINE

## 2021-06-18 PROCEDURE — 3209999900 FLUORO FOR SURGICAL PROCEDURES

## 2021-06-18 PROCEDURE — 6360000002 HC RX W HCPCS: Performed by: NURSE ANESTHETIST, CERTIFIED REGISTERED

## 2021-06-18 RX ORDER — DEXAMETHASONE SODIUM PHOSPHATE 10 MG/ML
INJECTION INTRAMUSCULAR; INTRAVENOUS PRN
Status: DISCONTINUED | OUTPATIENT
Start: 2021-06-18 | End: 2021-06-18 | Stop reason: ALTCHOICE

## 2021-06-18 RX ORDER — FENTANYL CITRATE 50 UG/ML
INJECTION, SOLUTION INTRAMUSCULAR; INTRAVENOUS PRN
Status: DISCONTINUED | OUTPATIENT
Start: 2021-06-18 | End: 2021-06-18 | Stop reason: SDUPTHER

## 2021-06-18 RX ORDER — MEPERIDINE HYDROCHLORIDE 50 MG/ML
12.5 INJECTION INTRAMUSCULAR; INTRAVENOUS; SUBCUTANEOUS EVERY 5 MIN PRN
Status: DISCONTINUED | OUTPATIENT
Start: 2021-06-18 | End: 2021-06-18 | Stop reason: HOSPADM

## 2021-06-18 RX ORDER — HYDROCODONE BITARTRATE AND ACETAMINOPHEN 5; 325 MG/1; MG/1
2 TABLET ORAL PRN
Status: DISCONTINUED | OUTPATIENT
Start: 2021-06-18 | End: 2021-06-18 | Stop reason: HOSPADM

## 2021-06-18 RX ORDER — SODIUM CHLORIDE 9 MG/ML
25 INJECTION, SOLUTION INTRAVENOUS PRN
Status: DISCONTINUED | OUTPATIENT
Start: 2021-06-18 | End: 2021-06-18 | Stop reason: HOSPADM

## 2021-06-18 RX ORDER — DIPHENHYDRAMINE HYDROCHLORIDE 50 MG/ML
12.5 INJECTION INTRAMUSCULAR; INTRAVENOUS
Status: DISCONTINUED | OUTPATIENT
Start: 2021-06-18 | End: 2021-06-18 | Stop reason: HOSPADM

## 2021-06-18 RX ORDER — SODIUM CHLORIDE 0.9 % (FLUSH) 0.9 %
10 SYRINGE (ML) INJECTION EVERY 12 HOURS SCHEDULED
Status: DISCONTINUED | OUTPATIENT
Start: 2021-06-18 | End: 2021-06-18 | Stop reason: HOSPADM

## 2021-06-18 RX ORDER — MORPHINE SULFATE 1 MG/ML
1 INJECTION, SOLUTION EPIDURAL; INTRATHECAL; INTRAVENOUS EVERY 5 MIN PRN
Status: DISCONTINUED | OUTPATIENT
Start: 2021-06-18 | End: 2021-06-18 | Stop reason: HOSPADM

## 2021-06-18 RX ORDER — FENTANYL CITRATE 50 UG/ML
25 INJECTION, SOLUTION INTRAMUSCULAR; INTRAVENOUS EVERY 5 MIN PRN
Status: DISCONTINUED | OUTPATIENT
Start: 2021-06-18 | End: 2021-06-18 | Stop reason: HOSPADM

## 2021-06-18 RX ORDER — SODIUM CHLORIDE 0.9 % (FLUSH) 0.9 %
10 SYRINGE (ML) INJECTION PRN
Status: DISCONTINUED | OUTPATIENT
Start: 2021-06-18 | End: 2021-06-18 | Stop reason: HOSPADM

## 2021-06-18 RX ORDER — MIDAZOLAM HYDROCHLORIDE 1 MG/ML
INJECTION INTRAMUSCULAR; INTRAVENOUS PRN
Status: DISCONTINUED | OUTPATIENT
Start: 2021-06-18 | End: 2021-06-18 | Stop reason: SDUPTHER

## 2021-06-18 RX ORDER — HYDRALAZINE HYDROCHLORIDE 20 MG/ML
5 INJECTION INTRAMUSCULAR; INTRAVENOUS EVERY 10 MIN PRN
Status: DISCONTINUED | OUTPATIENT
Start: 2021-06-18 | End: 2021-06-18 | Stop reason: HOSPADM

## 2021-06-18 RX ORDER — ONDANSETRON 2 MG/ML
4 INJECTION INTRAMUSCULAR; INTRAVENOUS
Status: DISCONTINUED | OUTPATIENT
Start: 2021-06-18 | End: 2021-06-18 | Stop reason: HOSPADM

## 2021-06-18 RX ORDER — HYDROCODONE BITARTRATE AND ACETAMINOPHEN 5; 325 MG/1; MG/1
1 TABLET ORAL PRN
Status: DISCONTINUED | OUTPATIENT
Start: 2021-06-18 | End: 2021-06-18 | Stop reason: HOSPADM

## 2021-06-18 RX ORDER — PROMETHAZINE HYDROCHLORIDE 25 MG/ML
6.25 INJECTION, SOLUTION INTRAMUSCULAR; INTRAVENOUS
Status: DISCONTINUED | OUTPATIENT
Start: 2021-06-18 | End: 2021-06-18 | Stop reason: HOSPADM

## 2021-06-18 RX ADMIN — FENTANYL CITRATE 100 MCG: 50 INJECTION, SOLUTION INTRAMUSCULAR; INTRAVENOUS at 15:33

## 2021-06-18 RX ADMIN — MIDAZOLAM HYDROCHLORIDE 2 MG: 1 INJECTION, SOLUTION INTRAMUSCULAR; INTRAVENOUS at 15:33

## 2021-06-18 ASSESSMENT — PULMONARY FUNCTION TESTS
PIF_VALUE: 1

## 2021-06-18 ASSESSMENT — PAIN DESCRIPTION - DESCRIPTORS: DESCRIPTORS: CONSTANT;DULL

## 2021-06-18 ASSESSMENT — PAIN - FUNCTIONAL ASSESSMENT: PAIN_FUNCTIONAL_ASSESSMENT: 0-10

## 2021-06-18 ASSESSMENT — LIFESTYLE VARIABLES: SMOKING_STATUS: 1

## 2021-06-18 ASSESSMENT — PAIN SCALES - GENERAL: PAINLEVEL_OUTOF10: 0

## 2021-06-18 NOTE — ANESTHESIA POSTPROCEDURE EVALUATION
POST- ANESTHESIA EVALUATION       Pt Name: Alexei Sanz  MRN: [id number]  [place]: 1970  Date of evaluation: 6/18/2021  Time:  5:14 PM      BP (!) 142/72   Pulse 73   Temp 98.4 °F (36.9 °C) (Temporal)   Resp 17   Ht 5' 7\" (1.702 m)   Wt 283 lb 2 oz (128.4 kg)   LMP 05/21/2021 (Exact Date)   SpO2 99%   BMI 44.34 kg/m²      Consciousness Level  Awake  Cardiopulmonary Status  Stable  Pain Adequately Treated YES  Nausea / Vomiting  NO  Adequate Hydration  YES  Anesthesia Related Complications NONE      Electronically signed by Lizett Martinez MD on 6/18/2021 at 5:14 PM       Department of Anesthesiology  Postprocedure Note    Patient: Alexei Sanz  MRN: [id number]  YOB: 1970  Date of evaluation: 6/18/2021  Time:  5:14 PM     Procedure Summary     Date: 06/18/21 Room / Location: [address]    Anesthesia Start: 6117 Anesthesia Stop: 5348    Procedure: LEFT L4/5 LUMBAR TRANSFORAMINAL (Left ) Diagnosis: (M54.16 LUMBAR RADICULOPATHY)    Surgeons: Vaishali Schilling MD Responsible Provider: Lizett Martinez MD    Anesthesia Type: MAC ASA Status: 3          Anesthesia Type: MAC    Nargis Phase I: Nargis Score: 10    Nargis Phase II: Nargis Score: 10    Last vitals: Reviewed and per EMR flowsheets.        Anesthesia Post Evaluation
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
## 2021-06-18 NOTE — OP NOTE
Transforaminal Epidural Steroid Injection:  SURGEON: Philipp Hernandez MD    PRE-OP DIAGNOSIS: M54.17 (lumbosacral neuritis), M54.5 (low back pain)    POST-OP DIAGNOSIS: Same. PROCEDURE PERFORMED:  Left L4 and L5 Lumbar Transforaminal PONCE selective nerve root block. Physician confirmed and marked the surgical site. EBL: minimal    CONSENT: Patient has undergone the educational process with this procedure, is aware and fully understands the risks involved: potential damage to any and all body organs including possible bleeding, infection, and nerve injury, allergic reaction and headache. Patient also understands that the procedure will be undertaken in a safe, controlled and monitored setting. Patient recognizes that the benefits may include relief from pain and reduction in the oral use of medications. Patient agreed to proceed. The patient was counseled at length about the risks of evelia Covid-19 during their perioperative period and any recovery window from their procedure. The patient was made aware that evelia Covid-19  may worsen their prognosis for recovering from their procedure  and lend to a higher morbidity and/or mortality risk. All material risks, benefits, and reasonable alternatives including postponing the procedure were discussed. The patient does wish to proceed with the procedure at this time. PREP: The patient was placed in the prone position and padded appropriately. The injection site was prepped with chloroprep and draped appropriately. 5ml of 0.5% lidocaine was used to anesthetize the skin and subcutaneous tissue. PROCEDURE NOTE: A 22 gauge 3.5 inch Pencil-Point needle was then advanced to the Left L4 and L5 neuroforamen using a wide paramedian approach under fluoroscopic guidance. Aspiration was negative for blood, CSF and producing pain.  Contrast Medium: 1 ml of (omnipaque) contrast was then injected and the following was noted: appropriate spread of contrast along the nerve root sheath and adjacent epidural space 4mg Dexamethasone mixed with 1.5 ml of 0.5 % lidocaine was then injected into the nerve root sheath of each of the indicated levels. The needle was withdrawn by the physician and the nurse applied a sterile dressing. The patient tolerated the procedure well. No complications occured. Patient transferred to the recovery room in satisfatory condition. Appropriate written discharge instructions given to the patient.     Roz Pringle MD

## 2021-07-12 ENCOUNTER — HOSPITAL ENCOUNTER (OUTPATIENT)
Dept: LAB | Age: 51
Setting detail: SPECIMEN
Discharge: HOME | End: 2021-07-12
Payer: COMMERCIAL

## 2021-07-12 DIAGNOSIS — Z01.818 PREOP TESTING: Primary | ICD-10-CM

## 2021-07-12 PROCEDURE — U0003 INFECTIOUS AGENT DETECTION BY NUCLEIC ACID (DNA OR RNA); SEVERE ACUTE RESPIRATORY SYNDROME CORONAVIRUS 2 (SARS-COV-2) (CORONAVIRUS DISEASE [COVID-19]), AMPLIFIED PROBE TECHNIQUE, MAKING USE OF HIGH THROUGHPUT TECHNOLOGIES AS DESCRIBED BY CMS-2020-01-R: HCPCS

## 2021-07-12 PROCEDURE — U0005 INFEC AGEN DETEC AMPLI PROBE: HCPCS

## 2021-07-13 DIAGNOSIS — I10 ESSENTIAL HYPERTENSION: ICD-10-CM

## 2021-07-13 LAB
SARS-COV-2: NORMAL
SARS-COV-2: NOT DETECTED
SOURCE: NORMAL

## 2021-07-13 RX ORDER — CARVEDILOL 6.25 MG/1
TABLET ORAL
Qty: 60 TABLET | Refills: 2 | Status: SHIPPED | OUTPATIENT
Start: 2021-07-13 | End: 2021-10-18

## 2021-07-13 NOTE — TELEPHONE ENCOUNTER
Coreg pending for refill     Health Maintenance   Topic Date Due    COVID-19 Vaccine (1) Never done    Hepatitis B vaccine (1 of 3 - Risk 3-dose series) Never done    Diabetic microalbuminuria test  01/29/2015    Colon cancer screen colonoscopy  Never done    Shingles Vaccine (1 of 2) Never done    Lipid screen  02/01/2021    Diabetic retinal exam  03/02/2022 (Originally [birth year]/12/1980)    Diabetic foot exam  08/13/2021    Flu vaccine (1) 09/01/2021    Potassium monitoring  04/23/2022    Creatinine monitoring  04/23/2022    A1C test (Diabetic or Prediabetic)  06/03/2022    Breast cancer screen  06/18/2023    Cervical cancer screen  02/07/2025    DTaP/Tdap/Td vaccine (3 - Td or Tdap) 02/17/2030    Pneumococcal 0-64 years Vaccine (2 of 2 - PPSV23) 12/12/2035    Hepatitis C screen  Completed    HIV screen  Completed    Hepatitis A vaccine  Aged Out    Hib vaccine  Aged Out    Meningococcal (ACWY) vaccine  Aged Out             (applicable per patient's age: Cancer Screenings, Depression Screening, Fall Risk Screening, Immunizations)    Hemoglobin A1C (%)   Date Value   06/03/2021 7.1   02/01/2021 7.3   08/13/2020 6.5     Microalb/Crt.  Ratio (mcg/mg creat)   Date Value   01/29/2014 3     LDL Cholesterol (mg/dL)   Date Value   02/01/2020 43     AST (U/L)   Date Value   04/23/2021 15     ALT (U/L)   Date Value   04/23/2021 14     BUN (mg/dL)   Date Value   04/23/2021 10      (goal A1C is < 7)   (goal LDL is <100) need 30-50% reduction from baseline     BP Readings from Last 3 Encounters:   06/18/21 (!) 151/73   06/18/21 (!) 142/72   06/07/21 136/67    (goal /80)      All Future Testing planned in [facility]:  Lab Frequency Next Occurrence   Comprehensive Metabolic w/Bili Profile Once 08/13/2021   CBC With Auto Differential Once 08/13/2021   XR KNEE LEFT (MIN 4 VIEWS) Once 11/17/2021   Cologuard (For External Results Only) Once 02/01/2022   Microalbumin, Ur Once 06/03/2022   Lipid Panel Once 06/03/2022 Next Visit Date:  Future Appointments   Date Time Provider Department Center   7/23/2021 10:30 AM Lynn Mena MD [facility]   8/2/2021  9:00 AM Lesli Hoff MD [address] [place] [address]            Patient Active Problem List:     Migraine     Morbid obesity ([facility] [address]     Iron deficiency     Tobacco abuse     S/P Permanent nail avulsion, 2nd digit right foot      Acute rhinosinusitis     Dyslipidemia     Vitamin B12 deficiency     Folate deficiency     Constipation     Hypertension goal BP (blood pressure) < 140/90     Bright red blood per rectum     Acute blood loss anemia     Vaginal bleeding     Ovarian cyst     Pre-diabetes     Menorrhagia     H. pylori infection     Microcytosis     Anemia     Hydradenitis     Hypertension, goal below 140/90     Right leg paresthesias     Chronic back pain     Rotator cuff injury     Vitamin D deficiency     Rotator cuff tendinitis     Smoker     Left breast abscess     Iron deficiency anemia     Breast abscess     Medial meniscus tear     Right knee pain     Arthritis of knee     Lumbar disc herniation S/P L3 4 and L4 5 and tenotomy right side for foraminotomy     Leg swelling     Pelvic mass in female     Hypocalcemia     Pericardial effusion     Hepatomegaly     Intramural leiomyoma of uterus     Heart failure (HCC)     Hyponatremia     STD (female)     Bacterial vaginosis     Cardiomyopathy ([address]     Hyperglycemia     Hyperglycemia due to type 2 diabetes mellitus (HCC)     Hyperkalemia     WHIT (acute kidney injury) ([address]     Hidradenitis suppurativa     Type 2 diabetes mellitus with hyperglycemia, without long-term current use of insulin (HCC)     Toenail deformity     Skin infection     Arthritis     Essential hypertension     Lumbar back pain with radiculopathy affecting left lower extremity     Encounter for smoking cessation counseling

## 2021-07-15 ENCOUNTER — ANESTHESIA EVENT (OUTPATIENT)
Dept: OPERATING ROOM | Age: 51
End: 2021-07-15
Payer: COMMERCIAL

## 2021-07-16 ENCOUNTER — HOSPITAL ENCOUNTER (OUTPATIENT)
Age: 51
Setting detail: OUTPATIENT SURGERY
Discharge: HOME | End: 2021-07-16
Attending: PAIN MEDICINE | Admitting: PAIN MEDICINE
Payer: COMMERCIAL

## 2021-07-16 ENCOUNTER — ANESTHESIA (OUTPATIENT)
Dept: OPERATING ROOM | Age: 51
End: 2021-07-16
Payer: COMMERCIAL

## 2021-07-16 ENCOUNTER — APPOINTMENT (OUTPATIENT)
Dept: GENERAL RADIOLOGY | Age: 51
End: 2021-07-16
Attending: PAIN MEDICINE
Payer: COMMERCIAL

## 2021-07-16 VITALS
OXYGEN SATURATION: 100 % | WEIGHT: 291 LBS | HEART RATE: 74 BPM | DIASTOLIC BLOOD PRESSURE: 70 MMHG | BODY MASS INDEX: 45.67 KG/M2 | HEIGHT: 67 IN | TEMPERATURE: 97 F | RESPIRATION RATE: 24 BRPM | SYSTOLIC BLOOD PRESSURE: 116 MMHG

## 2021-07-16 VITALS — OXYGEN SATURATION: 100 % | SYSTOLIC BLOOD PRESSURE: 172 MMHG | TEMPERATURE: 96.8 F | DIASTOLIC BLOOD PRESSURE: 101 MMHG

## 2021-07-16 LAB
GLUCOSE BLD-MCNC: 139 MG/DL (ref 65–105)
HCG, PREGNANCY URINE (POC): NEGATIVE

## 2021-07-16 PROCEDURE — 82947 ASSAY GLUCOSE BLOOD QUANT: CPT

## 2021-07-16 PROCEDURE — 3209999900 FLUORO FOR SURGICAL PROCEDURES

## 2021-07-16 PROCEDURE — 3700000001 HC ADD 15 MINUTES (ANESTHESIA): Performed by: PAIN MEDICINE

## 2021-07-16 PROCEDURE — 6360000004 HC RX CONTRAST MEDICATION: Performed by: PAIN MEDICINE

## 2021-07-16 PROCEDURE — 64483 NJX AA&/STRD TFRM EPI L/S 1: CPT | Performed by: PAIN MEDICINE

## 2021-07-16 PROCEDURE — 2709999900 HC NON-CHARGEABLE SUPPLY: Performed by: PAIN MEDICINE

## 2021-07-16 PROCEDURE — 64484 NJX AA&/STRD TFRM EPI L/S EA: CPT | Performed by: PAIN MEDICINE

## 2021-07-16 PROCEDURE — 81025 URINE PREGNANCY TEST: CPT

## 2021-07-16 PROCEDURE — 6360000002 HC RX W HCPCS: Performed by: PAIN MEDICINE

## 2021-07-16 PROCEDURE — 3700000000 HC ANESTHESIA ATTENDED CARE: Performed by: PAIN MEDICINE

## 2021-07-16 PROCEDURE — 3600000002 HC SURGERY LEVEL 2 BASE: Performed by: PAIN MEDICINE

## 2021-07-16 PROCEDURE — 3600000012 HC SURGERY LEVEL 2 ADDTL 15MIN: Performed by: PAIN MEDICINE

## 2021-07-16 PROCEDURE — 7100000011 HC PHASE II RECOVERY - ADDTL 15 MIN: Performed by: PAIN MEDICINE

## 2021-07-16 PROCEDURE — 6360000002 HC RX W HCPCS: Performed by: NURSE ANESTHETIST, CERTIFIED REGISTERED

## 2021-07-16 PROCEDURE — 7100000010 HC PHASE II RECOVERY - FIRST 15 MIN: Performed by: PAIN MEDICINE

## 2021-07-16 RX ORDER — MIDAZOLAM HYDROCHLORIDE 1 MG/ML
INJECTION INTRAMUSCULAR; INTRAVENOUS PRN
Status: DISCONTINUED | OUTPATIENT
Start: 2021-07-16 | End: 2021-07-16 | Stop reason: SDUPTHER

## 2021-07-16 RX ORDER — MEPERIDINE HYDROCHLORIDE 50 MG/ML
12.5 INJECTION INTRAMUSCULAR; INTRAVENOUS; SUBCUTANEOUS EVERY 5 MIN PRN
Status: DISCONTINUED | OUTPATIENT
Start: 2021-07-16 | End: 2021-07-16 | Stop reason: HOSPADM

## 2021-07-16 RX ORDER — SODIUM CHLORIDE 0.9 % (FLUSH) 0.9 %
10 SYRINGE (ML) INJECTION EVERY 12 HOURS SCHEDULED
Status: DISCONTINUED | OUTPATIENT
Start: 2021-07-16 | End: 2021-07-16 | Stop reason: HOSPADM

## 2021-07-16 RX ORDER — DEXAMETHASONE SODIUM PHOSPHATE 10 MG/ML
INJECTION INTRAMUSCULAR; INTRAVENOUS PRN
Status: DISCONTINUED | OUTPATIENT
Start: 2021-07-16 | End: 2021-07-16 | Stop reason: ALTCHOICE

## 2021-07-16 RX ORDER — SODIUM CHLORIDE 9 MG/ML
25 INJECTION, SOLUTION INTRAVENOUS PRN
Status: DISCONTINUED | OUTPATIENT
Start: 2021-07-16 | End: 2021-07-16 | Stop reason: HOSPADM

## 2021-07-16 RX ORDER — MIDAZOLAM HYDROCHLORIDE 2 MG/2ML
1 INJECTION, SOLUTION INTRAMUSCULAR; INTRAVENOUS ONCE
Status: DISCONTINUED | OUTPATIENT
Start: 2021-07-16 | End: 2021-07-16 | Stop reason: HOSPADM

## 2021-07-16 RX ORDER — HYDRALAZINE HYDROCHLORIDE 20 MG/ML
10 INJECTION INTRAMUSCULAR; INTRAVENOUS EVERY 10 MIN PRN
Status: DISCONTINUED | OUTPATIENT
Start: 2021-07-16 | End: 2021-07-16 | Stop reason: HOSPADM

## 2021-07-16 RX ORDER — SODIUM CHLORIDE 0.9 % (FLUSH) 0.9 %
10 SYRINGE (ML) INJECTION PRN
Status: DISCONTINUED | OUTPATIENT
Start: 2021-07-16 | End: 2021-07-16 | Stop reason: HOSPADM

## 2021-07-16 RX ORDER — 0.9 % SODIUM CHLORIDE 0.9 %
500 INTRAVENOUS SOLUTION INTRAVENOUS
Status: DISCONTINUED | OUTPATIENT
Start: 2021-07-16 | End: 2021-07-16 | Stop reason: HOSPADM

## 2021-07-16 RX ORDER — MORPHINE SULFATE 2 MG/ML
2 INJECTION, SOLUTION INTRAMUSCULAR; INTRAVENOUS EVERY 5 MIN PRN
Status: DISCONTINUED | OUTPATIENT
Start: 2021-07-16 | End: 2021-07-16 | Stop reason: HOSPADM

## 2021-07-16 RX ORDER — DIPHENHYDRAMINE HYDROCHLORIDE 50 MG/ML
12.5 INJECTION INTRAMUSCULAR; INTRAVENOUS
Status: DISCONTINUED | OUTPATIENT
Start: 2021-07-16 | End: 2021-07-16 | Stop reason: HOSPADM

## 2021-07-16 RX ORDER — LIDOCAINE HYDROCHLORIDE 10 MG/ML
1 INJECTION, SOLUTION EPIDURAL; INFILTRATION; INTRACAUDAL; PERINEURAL
Status: DISCONTINUED | OUTPATIENT
Start: 2021-07-16 | End: 2021-07-16 | Stop reason: HOSPADM

## 2021-07-16 RX ORDER — PROMETHAZINE HYDROCHLORIDE 25 MG/ML
6.25 INJECTION, SOLUTION INTRAMUSCULAR; INTRAVENOUS
Status: DISCONTINUED | OUTPATIENT
Start: 2021-07-16 | End: 2021-07-16 | Stop reason: HOSPADM

## 2021-07-16 RX ORDER — LABETALOL 20 MG/4 ML (5 MG/ML) INTRAVENOUS SYRINGE
10 EVERY 10 MIN PRN
Status: DISCONTINUED | OUTPATIENT
Start: 2021-07-16 | End: 2021-07-16 | Stop reason: HOSPADM

## 2021-07-16 RX ORDER — OXYCODONE HYDROCHLORIDE AND ACETAMINOPHEN 5; 325 MG/1; MG/1
1 TABLET ORAL PRN
Status: DISCONTINUED | OUTPATIENT
Start: 2021-07-16 | End: 2021-07-16 | Stop reason: HOSPADM

## 2021-07-16 RX ORDER — OXYCODONE HYDROCHLORIDE AND ACETAMINOPHEN 5; 325 MG/1; MG/1
2 TABLET ORAL PRN
Status: DISCONTINUED | OUTPATIENT
Start: 2021-07-16 | End: 2021-07-16 | Stop reason: HOSPADM

## 2021-07-16 RX ORDER — ONDANSETRON 2 MG/ML
4 INJECTION INTRAMUSCULAR; INTRAVENOUS
Status: DISCONTINUED | OUTPATIENT
Start: 2021-07-16 | End: 2021-07-16 | Stop reason: HOSPADM

## 2021-07-16 RX ORDER — SODIUM CHLORIDE, SODIUM LACTATE, POTASSIUM CHLORIDE, CALCIUM CHLORIDE 600; 310; 30; 20 MG/100ML; MG/100ML; MG/100ML; MG/100ML
INJECTION, SOLUTION INTRAVENOUS CONTINUOUS
Status: DISCONTINUED | OUTPATIENT
Start: 2021-07-16 | End: 2021-07-16 | Stop reason: HOSPADM

## 2021-07-16 RX ORDER — FENTANYL CITRATE 50 UG/ML
INJECTION, SOLUTION INTRAMUSCULAR; INTRAVENOUS PRN
Status: DISCONTINUED | OUTPATIENT
Start: 2021-07-16 | End: 2021-07-16 | Stop reason: SDUPTHER

## 2021-07-16 RX ADMIN — MIDAZOLAM HYDROCHLORIDE 2 MG: 1 INJECTION, SOLUTION INTRAMUSCULAR; INTRAVENOUS at 08:47

## 2021-07-16 RX ADMIN — FENTANYL CITRATE 100 MCG: 50 INJECTION, SOLUTION INTRAMUSCULAR; INTRAVENOUS at 08:47

## 2021-07-16 ASSESSMENT — PAIN SCALES - GENERAL
PAINLEVEL_OUTOF10: 0

## 2021-07-16 ASSESSMENT — PULMONARY FUNCTION TESTS
PIF_VALUE: 1
PIF_VALUE: 2
PIF_VALUE: 1

## 2021-07-16 ASSESSMENT — PAIN - FUNCTIONAL ASSESSMENT: PAIN_FUNCTIONAL_ASSESSMENT: 0-10

## 2021-07-16 ASSESSMENT — LIFESTYLE VARIABLES: SMOKING_STATUS: 1

## 2021-07-16 ASSESSMENT — PAIN DESCRIPTION - DESCRIPTORS: DESCRIPTORS: ACHING;SHARP

## 2021-07-16 NOTE — OP NOTE
Transforaminal Epidural Steroid Injection:  SURGEON: Terese Queen MD    PRE-OP DIAGNOSIS: M54.17 (lumbosacral neuritis), M54.5 (low back pain)    POST-OP DIAGNOSIS: Same. PROCEDURE PERFORMED:  Left L4 and L5 Lumbar Transforaminal PONCE selective nerve root block. Physician confirmed and marked the surgical site. EBL: minimal    CONSENT: Patient has undergone the educational process with this procedure, is aware and fully understands the risks involved: potential damage to any and all body organs including possible bleeding, infection, and nerve injury, allergic reaction and headache. Patient also understands that the procedure will be undertaken in a safe, controlled and monitored setting. Patient recognizes that the benefits may include relief from pain and reduction in the oral use of medications. Patient agreed to proceed. The patient was counseled at length about the risks of evelia Covid-19 during their perioperative period and any recovery window from their procedure. The patient was made aware that evelia Covid-19  may worsen their prognosis for recovering from their procedure  and lend to a higher morbidity and/or mortality risk. All material risks, benefits, and reasonable alternatives including postponing the procedure were discussed. The patient does wish to proceed with the procedure at this time. PREP: The patient was placed in the prone position and padded appropriately. The injection site was prepped with chloroprep and draped appropriately. 5ml of 0.5% lidocaine was used to anesthetize the skin and subcutaneous tissue. PROCEDURE NOTE: A 22 gauge 3.5 inch Pencil-Point needle was then advanced to the Left L4 and L5 neuroforamen using a wide paramedian approach under fluoroscopic guidance. Aspiration was negative for blood, CSF and producing pain. Contrast Medium: 1 ml of (omnipaque) contrast was then injected, there was no intravascular spread noted.  4mg Dexamethasone mixed with 1.5 ml of 0.5 % lidocaine was then injected into the nerve root sheath of each of the indicated levels. The needle was withdrawn by the physician and the nurse applied a sterile dressing. The patient tolerated the procedure well. No complications occured. Patient transferred to the recovery room in satisfatory condition. Appropriate written discharge instructions given to the patient.     Petra Jorge MD

## 2021-07-16 NOTE — ANESTHESIA POSTPROCEDURE EVALUATION
Department of Anesthesiology  Postprocedure Note    Patient: Misha Barros  MRN: [id number]  YOB: 1970  Date of evaluation: 7/16/2021  Time:  9:12 AM     Procedure Summary     Date: 07/16/21 Room / Location: [facility]    Anesthesia Start: 0845 Anesthesia Stop: 2468    Procedure: LEFT L4/5 LUMBAR TRANSFORAMINAL (Left ) Diagnosis: (M54.16 LUMBAR RADICULOPATHY)    Surgeons: Vel Tirado MD Responsible Provider: Dayna Nolasco MD    Anesthesia Type: MAC ASA Status: 3          Anesthesia Type: MAC    Nargis Phase I: Nargis Score: 10    Nargis Phase II: Nargis Score: 9    Last vitals: Reviewed and per EMR flowsheets.        Anesthesia Post Evaluation    Patient location during evaluation: PACU  Patient participation: complete - patient participated  Level of consciousness: awake and alert  Airway patency: patent  Nausea & Vomiting: no nausea and no vomiting  Complications: no  Cardiovascular status: hemodynamically stable  Respiratory status: nasal cannula and spontaneous ventilation  Hydration status: euvolemic
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
## 2021-08-02 ENCOUNTER — OFFICE VISIT (OUTPATIENT)
Dept: PAIN MANAGEMENT | Age: 51
End: 2021-08-02
Payer: COMMERCIAL

## 2021-08-02 VITALS
SYSTOLIC BLOOD PRESSURE: 155 MMHG | HEIGHT: 67 IN | RESPIRATION RATE: 15 BRPM | DIASTOLIC BLOOD PRESSURE: 84 MMHG | WEIGHT: 289 LBS | HEART RATE: 65 BPM | OXYGEN SATURATION: 96 % | BODY MASS INDEX: 45.36 KG/M2

## 2021-08-02 DIAGNOSIS — G89.29 CHRONIC BILATERAL LOW BACK PAIN, UNSPECIFIED WHETHER SCIATICA PRESENT: ICD-10-CM

## 2021-08-02 DIAGNOSIS — M54.50 CHRONIC BILATERAL LOW BACK PAIN, UNSPECIFIED WHETHER SCIATICA PRESENT: ICD-10-CM

## 2021-08-02 DIAGNOSIS — M47.817 LUMBOSACRAL SPONDYLOSIS WITHOUT MYELOPATHY: Primary | ICD-10-CM

## 2021-08-02 PROCEDURE — 3017F COLORECTAL CA SCREEN DOC REV: CPT | Performed by: PAIN MEDICINE

## 2021-08-02 PROCEDURE — G8428 CUR MEDS NOT DOCUMENT: HCPCS | Performed by: PAIN MEDICINE

## 2021-08-02 PROCEDURE — G8417 CALC BMI ABV UP PARAM F/U: HCPCS | Performed by: PAIN MEDICINE

## 2021-08-02 PROCEDURE — 99213 OFFICE O/P EST LOW 20 MIN: CPT | Performed by: PAIN MEDICINE

## 2021-08-02 PROCEDURE — 4004F PT TOBACCO SCREEN RCVD TLK: CPT | Performed by: PAIN MEDICINE

## 2021-08-02 ASSESSMENT — ENCOUNTER SYMPTOMS: BACK PAIN: 1
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
## 2021-08-19 ENCOUNTER — HOSPITAL ENCOUNTER (OUTPATIENT)
Dept: PAIN MANAGEMENT | Facility: CLINIC | Age: 51
Discharge: HOME | End: 2021-08-19
Payer: COMMERCIAL

## 2021-08-19 VITALS
SYSTOLIC BLOOD PRESSURE: 187 MMHG | DIASTOLIC BLOOD PRESSURE: 126 MMHG | HEART RATE: 74 BPM | OXYGEN SATURATION: 97 % | RESPIRATION RATE: 22 BRPM

## 2021-08-19 VITALS
OXYGEN SATURATION: 98 % | HEIGHT: 67 IN | BODY MASS INDEX: 45.36 KG/M2 | SYSTOLIC BLOOD PRESSURE: 148 MMHG | DIASTOLIC BLOOD PRESSURE: 70 MMHG | RESPIRATION RATE: 16 BRPM | HEART RATE: 77 BPM | WEIGHT: 289 LBS | TEMPERATURE: 97.1 F

## 2021-08-19 DIAGNOSIS — R52 PAIN MANAGEMENT: ICD-10-CM

## 2021-08-19 LAB
GLUCOSE BLD-MCNC: 136 MG/DL (ref 65–105)
HCG, PREGNANCY URINE (POC): NEGATIVE

## 2021-08-19 PROCEDURE — 64494 INJ PARAVERT F JNT L/S 2 LEV: CPT | Performed by: PAIN MEDICINE

## 2021-08-19 PROCEDURE — 64495 INJ PARAVERT F JNT L/S 3 LEV: CPT

## 2021-08-19 PROCEDURE — 81025 URINE PREGNANCY TEST: CPT

## 2021-08-19 PROCEDURE — 64493 INJ PARAVERT F JNT L/S 1 LEV: CPT | Performed by: PAIN MEDICINE

## 2021-08-19 PROCEDURE — 2580000003 HC RX 258: Performed by: PAIN MEDICINE

## 2021-08-19 PROCEDURE — 82947 ASSAY GLUCOSE BLOOD QUANT: CPT

## 2021-08-19 PROCEDURE — 64495 INJ PARAVERT F JNT L/S 3 LEV: CPT | Performed by: PAIN MEDICINE

## 2021-08-19 PROCEDURE — 64494 INJ PARAVERT F JNT L/S 2 LEV: CPT

## 2021-08-19 PROCEDURE — 2500000003 HC RX 250 WO HCPCS: Performed by: PAIN MEDICINE

## 2021-08-19 PROCEDURE — 64493 INJ PARAVERT F JNT L/S 1 LEV: CPT

## 2021-08-19 RX ORDER — LIDOCAINE HYDROCHLORIDE 10 MG/ML
INJECTION, SOLUTION EPIDURAL; INFILTRATION; INTRACAUDAL; PERINEURAL
Status: COMPLETED | OUTPATIENT
Start: 2021-08-19 | End: 2021-08-19

## 2021-08-19 RX ORDER — BUPIVACAINE HYDROCHLORIDE 2.5 MG/ML
INJECTION, SOLUTION EPIDURAL; INFILTRATION; INTRACAUDAL
Status: COMPLETED | OUTPATIENT
Start: 2021-08-19 | End: 2021-08-19

## 2021-08-19 RX ADMIN — BUPIVACAINE HYDROCHLORIDE 10 ML: 2.5 INJECTION, SOLUTION EPIDURAL; INFILTRATION; INTRACAUDAL; PERINEURAL at 13:44

## 2021-08-19 RX ADMIN — LIDOCAINE HYDROCHLORIDE 5 ML: 10 INJECTION, SOLUTION EPIDURAL; INFILTRATION; INTRACAUDAL at 13:40

## 2021-08-19 RX ADMIN — WATER 5 ML: 1 INJECTION INTRAMUSCULAR; INTRAVENOUS; SUBCUTANEOUS at 13:40

## 2021-08-19 ASSESSMENT — PAIN - FUNCTIONAL ASSESSMENT
PAIN_FUNCTIONAL_ASSESSMENT: 0-10
PAIN_FUNCTIONAL_ASSESSMENT: PREVENTS OR INTERFERES SOME ACTIVE ACTIVITIES AND ADLS

## 2021-08-19 ASSESSMENT — PAIN DESCRIPTION - DESCRIPTORS: DESCRIPTORS: SHARP;ACHING;OTHER (COMMENT)

## 2021-08-19 ASSESSMENT — PAIN SCALES - GENERAL: PAINLEVEL_OUTOF10: 0

## 2021-08-19 NOTE — OP NOTE
Lumbar Facet Nerve Block Injection:  Surgeon: Jojo Barton MD     PRE-OP DIAGNOSIS: M47.817 (lumbosacral spondylosis), M54.5 (low back pain)    POST-OP DIAGNOSIS: Same. PROCEDURE PERFORMED: Lumbar Facet Nerve Block Multiple Levels  Left L3 - 4, L4 - 5 and L5 - S1. Physician confirmed and marked the surgical site. EBL: minimal      CONSENT: Patient has undergone the educational process with this procedure, is aware and fully understands the risks involved: potential damage to any and all body organs including possible bleeding, infection and nerve injury, allergic reaction and headache. Patient also understands that the procedure will be undertaken in a safe, controlled, and monitored setting. Patient recognizes that the benefits include relief from pain and reduction in the oral use of medications. Patient agreed to proceed. The patient was counseled at length about the risks of evelia Covid-19 during their perioperative period and any recovery window from their procedure. The patient was made aware that evelia Covid-19  may worsen their prognosis for recovering from their procedure  and lend to a higher morbidity and/or mortality risk. All material risks, benefits, and reasonable alternatives including postponing the procedure were discussed. The patient does wish to proceed with the procedure at this time. PREP: Timeout was performed prior to starting the procedure. The patient's back was prepped with chloroprep and draped appropriately. 5ml of 0.5% lidocaine was used to anesthetize the skin and subcutaneous tissue. PROCEDURE NOTE: A 22 gauge 3.5 inch spinal needle was advanced under  fluoroscopic guidance to the appropriate anatomic location for the medial branches corresponding to the facets at the base of the appropriate superior articular process and/or sacral ala . Aspiration was negative for blood, CSF and producing pain.  1 ml of 0.25% marcaine was then injected at each site to block medial branch nerve innervating the  Left L3 - 4, L4 - 5 and L5 - S1 facet joints. Patient tolerated the procedure well, no complications occurred. At the end of the injection the physician withdrew the needle and the nurse applied a sterile bandage to the site. Patient transferred to the recovery room in satisfactory condition. Appropriate written discharge instructions were given to the patient. If good results are obtained, Patient would be a candidate for Radiofrequency Ablation.       Terese Queen MD
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
## 2021-08-20 ENCOUNTER — TELEPHONE (OUTPATIENT)
Dept: PAIN MANAGEMENT | Age: 51
End: 2021-08-20

## 2021-08-26 NOTE — TELEPHONE ENCOUNTER
Patient states pain was 7 out of 10 prior to the procedure and 7 out of 10 after the procedure with no relief.   Patient is scheduled for an OV to discuss next steps

## 2021-09-13 ENCOUNTER — OFFICE VISIT (OUTPATIENT)
Dept: PAIN MANAGEMENT | Age: 51
End: 2021-09-13
Payer: COMMERCIAL

## 2021-09-13 VITALS
DIASTOLIC BLOOD PRESSURE: 85 MMHG | SYSTOLIC BLOOD PRESSURE: 159 MMHG | HEIGHT: 67 IN | WEIGHT: 218 LBS | BODY MASS INDEX: 34.21 KG/M2

## 2021-09-13 DIAGNOSIS — M47.817 LUMBOSACRAL SPONDYLOSIS WITHOUT MYELOPATHY: Primary | ICD-10-CM

## 2021-09-13 DIAGNOSIS — M54.16 LUMBAR RADICULOPATHY: ICD-10-CM

## 2021-09-13 PROCEDURE — 99213 OFFICE O/P EST LOW 20 MIN: CPT | Performed by: PAIN MEDICINE

## 2021-09-13 PROCEDURE — 3017F COLORECTAL CA SCREEN DOC REV: CPT | Performed by: PAIN MEDICINE

## 2021-09-13 PROCEDURE — 4004F PT TOBACCO SCREEN RCVD TLK: CPT | Performed by: PAIN MEDICINE

## 2021-09-13 PROCEDURE — G8417 CALC BMI ABV UP PARAM F/U: HCPCS | Performed by: PAIN MEDICINE

## 2021-09-13 PROCEDURE — G8427 DOCREV CUR MEDS BY ELIG CLIN: HCPCS | Performed by: PAIN MEDICINE

## 2021-09-13 ASSESSMENT — ENCOUNTER SYMPTOMS
BOWEL INCONTINENCE: 0
BACK PAIN: 1
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
## 2021-09-20 ENCOUNTER — APPOINTMENT (OUTPATIENT)
Dept: MRI IMAGING | Age: 51
End: 2021-09-20
Payer: COMMERCIAL

## 2021-09-20 ENCOUNTER — HOSPITAL ENCOUNTER (EMERGENCY)
Age: 51
Discharge: HOME | End: 2021-09-20
Attending: EMERGENCY MEDICINE
Payer: COMMERCIAL

## 2021-09-20 VITALS
WEIGHT: 285 LBS | SYSTOLIC BLOOD PRESSURE: 138 MMHG | DIASTOLIC BLOOD PRESSURE: 72 MMHG | RESPIRATION RATE: 16 BRPM | HEIGHT: 67 IN | OXYGEN SATURATION: 99 % | BODY MASS INDEX: 44.73 KG/M2 | HEART RATE: 78 BPM | TEMPERATURE: 98.2 F

## 2021-09-20 DIAGNOSIS — M54.41 CHRONIC MIDLINE LOW BACK PAIN WITH BILATERAL SCIATICA: Primary | ICD-10-CM

## 2021-09-20 DIAGNOSIS — M54.42 CHRONIC MIDLINE LOW BACK PAIN WITH BILATERAL SCIATICA: Primary | ICD-10-CM

## 2021-09-20 DIAGNOSIS — G89.29 CHRONIC MIDLINE LOW BACK PAIN WITH BILATERAL SCIATICA: Primary | ICD-10-CM

## 2021-09-20 LAB
ABSOLUTE EOS #: 0.3 K/UL (ref 0–0.4)
ABSOLUTE IMMATURE GRANULOCYTE: ABNORMAL K/UL (ref 0–0.3)
ABSOLUTE LYMPH #: 1.5 K/UL (ref 1–4.8)
ABSOLUTE MONO #: 0.4 K/UL (ref 0.1–1.3)
ALBUMIN SERPL-MCNC: 4.1 G/DL (ref 3.5–5.2)
ALBUMIN/GLOBULIN RATIO: ABNORMAL (ref 1–2.5)
ALP BLD-CCNC: 93 U/L (ref 35–104)
ALT SERPL-CCNC: 15 U/L (ref 5–33)
ANION GAP SERPL CALCULATED.3IONS-SCNC: 11 MMOL/L (ref 9–17)
AST SERPL-CCNC: 15 U/L
BASOPHILS # BLD: 1 % (ref 0–2)
BASOPHILS ABSOLUTE: 0.1 K/UL (ref 0–0.2)
BILIRUB SERPL-MCNC: 0.25 MG/DL (ref 0.3–1.2)
BILIRUBIN URINE: NEGATIVE
BUN BLDV-MCNC: 6 MG/DL (ref 6–20)
BUN/CREAT BLD: ABNORMAL (ref 9–20)
C-REACTIVE PROTEIN: 13.8 MG/L (ref 0–5)
CALCIUM SERPL-MCNC: 8.8 MG/DL (ref 8.6–10.4)
CHLORIDE BLD-SCNC: 101 MMOL/L (ref 98–107)
CO2: 26 MMOL/L (ref 20–31)
COLOR: YELLOW
COMMENT UA: NORMAL
CREAT SERPL-MCNC: 0.58 MG/DL (ref 0.5–0.9)
DIFFERENTIAL TYPE: ABNORMAL
EOSINOPHILS RELATIVE PERCENT: 4 % (ref 0–4)
GFR AFRICAN AMERICAN: >60 ML/MIN
GFR NON-AFRICAN AMERICAN: >60 ML/MIN
GFR SERPL CREATININE-BSD FRML MDRD: ABNORMAL ML/MIN/{1.73_M2}
GFR SERPL CREATININE-BSD FRML MDRD: ABNORMAL ML/MIN/{1.73_M2}
GLUCOSE BLD-MCNC: 193 MG/DL (ref 70–99)
GLUCOSE URINE: NEGATIVE
HCG QUALITATIVE: NEGATIVE
HCT VFR BLD CALC: 38 % (ref 36–46)
HEMOGLOBIN: 12.4 G/DL (ref 12–16)
IMMATURE GRANULOCYTES: ABNORMAL %
KETONES, URINE: NEGATIVE
LEUKOCYTE ESTERASE, URINE: NEGATIVE
LYMPHOCYTES # BLD: 16 % (ref 24–44)
MCH RBC QN AUTO: 28.9 PG (ref 26–34)
MCHC RBC AUTO-ENTMCNC: 32.6 G/DL (ref 31–37)
MCV RBC AUTO: 88.8 FL (ref 80–100)
MONOCYTES # BLD: 5 % (ref 1–7)
NITRITE, URINE: NEGATIVE
NRBC AUTOMATED: ABNORMAL PER 100 WBC
PDW BLD-RTO: 17.1 % (ref 11.5–14.9)
PH UA: 6 (ref 5–8)
PLATELET # BLD: 250 K/UL (ref 150–450)
PLATELET ESTIMATE: ABNORMAL
PMV BLD AUTO: 7.7 FL (ref 6–12)
POTASSIUM SERPL-SCNC: 3.9 MMOL/L (ref 3.7–5.3)
PROTEIN UA: NEGATIVE
RBC # BLD: 4.28 M/UL (ref 4–5.2)
RBC # BLD: ABNORMAL 10*6/UL
SEDIMENTATION RATE, ERYTHROCYTE: 24 MM (ref 0–30)
SEG NEUTROPHILS: 74 % (ref 36–66)
SEGMENTED NEUTROPHILS ABSOLUTE COUNT: 7 K/UL (ref 1.3–9.1)
SODIUM BLD-SCNC: 138 MMOL/L (ref 135–144)
SPECIFIC GRAVITY UA: 1.01 (ref 1–1.03)
TOTAL CK: 149 U/L (ref 26–192)
TOTAL PROTEIN: 7 G/DL (ref 6.4–8.3)
TURBIDITY: CLEAR
URINE HGB: NEGATIVE
UROBILINOGEN, URINE: NORMAL
WBC # BLD: 9.3 K/UL (ref 3.5–11)
WBC # BLD: ABNORMAL 10*3/UL

## 2021-09-20 PROCEDURE — 85025 COMPLETE CBC W/AUTO DIFF WBC: CPT

## 2021-09-20 PROCEDURE — 82550 ASSAY OF CK (CPK): CPT

## 2021-09-20 PROCEDURE — 72158 MRI LUMBAR SPINE W/O & W/DYE: CPT

## 2021-09-20 PROCEDURE — 96374 THER/PROPH/DIAG INJ IV PUSH: CPT

## 2021-09-20 PROCEDURE — 99284 EMERGENCY DEPT VISIT MOD MDM: CPT

## 2021-09-20 PROCEDURE — 2580000003 HC RX 258: Performed by: EMERGENCY MEDICINE

## 2021-09-20 PROCEDURE — 96375 TX/PRO/DX INJ NEW DRUG ADDON: CPT

## 2021-09-20 PROCEDURE — 85652 RBC SED RATE AUTOMATED: CPT

## 2021-09-20 PROCEDURE — 96372 THER/PROPH/DIAG INJ SC/IM: CPT

## 2021-09-20 PROCEDURE — 72157 MRI CHEST SPINE W/O & W/DYE: CPT

## 2021-09-20 PROCEDURE — 86140 C-REACTIVE PROTEIN: CPT

## 2021-09-20 PROCEDURE — 36415 COLL VENOUS BLD VENIPUNCTURE: CPT

## 2021-09-20 PROCEDURE — 81003 URINALYSIS AUTO W/O SCOPE: CPT

## 2021-09-20 PROCEDURE — A9579 GAD-BASE MR CONTRAST NOS,1ML: HCPCS | Performed by: EMERGENCY MEDICINE

## 2021-09-20 PROCEDURE — 6360000004 HC RX CONTRAST MEDICATION: Performed by: EMERGENCY MEDICINE

## 2021-09-20 PROCEDURE — 84703 CHORIONIC GONADOTROPIN ASSAY: CPT

## 2021-09-20 PROCEDURE — 6360000002 HC RX W HCPCS: Performed by: EMERGENCY MEDICINE

## 2021-09-20 PROCEDURE — 80053 COMPREHEN METABOLIC PANEL: CPT

## 2021-09-20 RX ORDER — KETOROLAC TROMETHAMINE 30 MG/ML
15 INJECTION, SOLUTION INTRAMUSCULAR; INTRAVENOUS ONCE
Status: COMPLETED | OUTPATIENT
Start: 2021-09-20 | End: 2021-09-20

## 2021-09-20 RX ORDER — SODIUM CHLORIDE 0.9 % (FLUSH) 0.9 %
10 SYRINGE (ML) INJECTION PRN
Status: DISCONTINUED | OUTPATIENT
Start: 2021-09-20 | End: 2021-09-20 | Stop reason: HOSPADM

## 2021-09-20 RX ORDER — ORPHENADRINE CITRATE 30 MG/ML
60 INJECTION INTRAMUSCULAR; INTRAVENOUS ONCE
Status: COMPLETED | OUTPATIENT
Start: 2021-09-20 | End: 2021-09-20

## 2021-09-20 RX ADMIN — SODIUM CHLORIDE, PRESERVATIVE FREE 10 ML: 5 INJECTION INTRAVENOUS at 15:34

## 2021-09-20 RX ADMIN — KETOROLAC TROMETHAMINE 15 MG: 30 INJECTION, SOLUTION INTRAMUSCULAR; INTRAVENOUS at 17:44

## 2021-09-20 RX ADMIN — HYDROMORPHONE HYDROCHLORIDE 1 MG: 1 INJECTION, SOLUTION INTRAMUSCULAR; INTRAVENOUS; SUBCUTANEOUS at 10:19

## 2021-09-20 RX ADMIN — ORPHENADRINE CITRATE 60 MG: 60 INJECTION INTRAMUSCULAR; INTRAVENOUS at 10:20

## 2021-09-20 RX ADMIN — GADOTERIDOL 20 ML: 279.3 INJECTION, SOLUTION INTRAVENOUS at 15:34

## 2021-09-20 ASSESSMENT — PAIN DESCRIPTION - FREQUENCY: FREQUENCY: CONTINUOUS

## 2021-09-20 ASSESSMENT — PAIN SCALES - GENERAL
PAINLEVEL_OUTOF10: 10
PAINLEVEL_OUTOF10: 0
PAINLEVEL_OUTOF10: 8

## 2021-09-20 ASSESSMENT — PAIN DESCRIPTION - PAIN TYPE: TYPE: ACUTE PAIN

## 2021-09-20 ASSESSMENT — PAIN DESCRIPTION - DESCRIPTORS: DESCRIPTORS: ACHING

## 2021-09-20 ASSESSMENT — PAIN DESCRIPTION - LOCATION: LOCATION: LEG

## 2021-09-20 ASSESSMENT — PAIN DESCRIPTION - ORIENTATION: ORIENTATION: LEFT;RIGHT
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
## 2021-10-03 DIAGNOSIS — I10 ESSENTIAL HYPERTENSION: ICD-10-CM

## 2021-10-04 RX ORDER — AMLODIPINE BESYLATE 10 MG/1
TABLET ORAL
Qty: 90 TABLET | Refills: 1 | Status: SHIPPED | OUTPATIENT
Start: 2021-10-04 | End: 2021-12-22 | Stop reason: SDUPTHER

## 2021-10-17 DIAGNOSIS — I10 ESSENTIAL HYPERTENSION: ICD-10-CM

## 2021-10-18 RX ORDER — CARVEDILOL 6.25 MG/1
TABLET ORAL
Qty: 60 TABLET | Refills: 2 | Status: SHIPPED | OUTPATIENT
Start: 2021-10-18 | End: 2021-12-22 | Stop reason: SDUPTHER

## 2021-10-18 NOTE — TELEPHONE ENCOUNTER
Coreg pending for refill     Health Maintenance   Topic Date Due    COVID-19 Vaccine (1) Never done    Hepatitis B vaccine (1 of 3 - Risk 3-dose series) Never done    Diabetic microalbuminuria test  01/29/2015    Colon cancer screen colonoscopy  Never done    Shingles Vaccine (1 of 2) Never done    Lipid screen  02/01/2021    Diabetic foot exam  08/13/2021    Flu vaccine (1) Never done    Diabetic retinal exam  03/02/2022 (Originally 12/12/1980)    A1C test (Diabetic or Prediabetic)  06/03/2022    Potassium monitoring  09/20/2022    Creatinine monitoring  09/20/2022    Breast cancer screen  06/18/2023    Cervical cancer screen  02/07/2025    DTaP/Tdap/Td vaccine (3 - Td or Tdap) 02/17/2030    Pneumococcal 0-64 years Vaccine (2 of 2 - PPSV23) 12/12/2035    Hepatitis C screen  Completed    HIV screen  Completed    Hepatitis A vaccine  Aged Out    Hib vaccine  Aged Out    Meningococcal (ACWY) vaccine  Aged Out             (applicable per patient's age: Cancer Screenings, Depression Screening, Fall Risk Screening, Immunizations)    Hemoglobin A1C (%)   Date Value   06/03/2021 7.1   02/01/2021 7.3   08/13/2020 6.5     Microalb/Crt.  Ratio (mcg/mg creat)   Date Value   01/29/2014 3     LDL Cholesterol (mg/dL)   Date Value   02/01/2020 43     AST (U/L)   Date Value   09/20/2021 15     ALT (U/L)   Date Value   09/20/2021 15     BUN (mg/dL)   Date Value   09/20/2021 6      (goal A1C is < 7)   (goal LDL is <100) need 30-50% reduction from baseline     BP Readings from Last 3 Encounters:   09/20/21 138/72   09/13/21 (!) 159/85   08/19/21 (!) 187/126    (goal /80)      All Future Testing planned in [facility]:  Lab Frequency Next Occurrence   XR KNEE LEFT (MIN 4 VIEWS) Once 11/17/2021   Cologuard (For External Results Only) Once 02/01/2022   Microalbumin, Ur Once 06/03/2022   Lipid Panel Once 06/03/2022       Next Visit Date:  Future Appointments   Date Time Provider Nelsy [place]   10/25/2021 11:10 JOCELYNN Mejia, DO [facility]            Patient Active Problem List:     Migraine     Morbid obesity ([address]     Iron deficiency     Tobacco abuse     S/P Permanent nail avulsion, 2nd digit right foot      Acute rhinosinusitis     Dyslipidemia     Vitamin B12 deficiency     Folate deficiency     Constipation     Hypertension goal BP (blood pressure) < 140/90     Bright red blood per rectum     Acute blood loss anemia     Vaginal bleeding     Ovarian cyst     Pre-diabetes     Menorrhagia     H. pylori infection     Microcytosis     Anemia     Hydradenitis     Hypertension, goal below 140/90     Right leg paresthesias     Chronic back pain     Rotator cuff injury     Vitamin D deficiency     Rotator cuff tendinitis     Smoker     Left breast abscess     Iron deficiency anemia     Breast abscess     Medial meniscus tear     Right knee pain     Arthritis of knee     Lumbar disc herniation S/P L3 4 and L4 5 and tenotomy right side for foraminotomy     Leg swelling     Pelvic mass in female     Hypocalcemia     Pericardial effusion     Hepatomegaly     Intramural leiomyoma of uterus     Heart failure (HCC)     Hyponatremia     STD (female)     Bacterial vaginosis     Cardiomyopathy ([address]     Hyperglycemia     Hyperglycemia due to type 2 diabetes mellitus (HCC)     Hyperkalemia     WHIT (acute kidney injury) ([address]     Hidradenitis suppurativa     Type 2 diabetes mellitus with hyperglycemia, without long-term current use of insulin (HCC)     Toenail deformity     Skin infection     Arthritis     Essential hypertension     Lumbar back pain with radiculopathy affecting left lower extremity     Encounter for smoking cessation counseling

## 2021-10-25 ENCOUNTER — OFFICE VISIT (OUTPATIENT)
Dept: NEUROSURGERY | Age: 51
End: 2021-10-25
Payer: COMMERCIAL

## 2021-10-25 VITALS
HEART RATE: 62 BPM | OXYGEN SATURATION: 98 % | TEMPERATURE: 98.1 F | WEIGHT: 292 LBS | BODY MASS INDEX: 45.83 KG/M2 | SYSTOLIC BLOOD PRESSURE: 127 MMHG | DIASTOLIC BLOOD PRESSURE: 80 MMHG | HEIGHT: 67 IN

## 2021-10-25 DIAGNOSIS — E66.01 MORBID OBESITY WITH BMI OF 45.0-49.9, ADULT (HCC): ICD-10-CM

## 2021-10-25 DIAGNOSIS — M54.16 LUMBAR BACK PAIN WITH RADICULOPATHY AFFECTING LEFT LOWER EXTREMITY: Primary | ICD-10-CM

## 2021-10-25 PROCEDURE — G8417 CALC BMI ABV UP PARAM F/U: HCPCS | Performed by: NEUROLOGICAL SURGERY

## 2021-10-25 PROCEDURE — G8484 FLU IMMUNIZE NO ADMIN: HCPCS | Performed by: NEUROLOGICAL SURGERY

## 2021-10-25 PROCEDURE — 4004F PT TOBACCO SCREEN RCVD TLK: CPT | Performed by: NEUROLOGICAL SURGERY

## 2021-10-25 PROCEDURE — 3017F COLORECTAL CA SCREEN DOC REV: CPT | Performed by: NEUROLOGICAL SURGERY

## 2021-10-25 PROCEDURE — G8427 DOCREV CUR MEDS BY ELIG CLIN: HCPCS | Performed by: NEUROLOGICAL SURGERY

## 2021-10-25 PROCEDURE — 99213 OFFICE O/P EST LOW 20 MIN: CPT | Performed by: NEUROLOGICAL SURGERY

## 2021-10-25 RX ORDER — PREGABALIN 75 MG/1
75 CAPSULE ORAL 2 TIMES DAILY
Qty: 60 CAPSULE | Refills: 0 | Status: SHIPPED | OUTPATIENT
Start: 2021-10-25 | End: 2021-11-22
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
## 2021-11-01 ENCOUNTER — TELEPHONE (OUTPATIENT)
Dept: PAIN MANAGEMENT | Age: 51
End: 2021-11-01

## 2021-11-01 NOTE — TELEPHONE ENCOUNTER
----- Message from Nicholas Zendejas MD sent at 11/1/2021  8:13 AM EDT -----  Please set up OV  ----- Message -----  From: Sylvia Wan DO  Sent: 10/25/2021  12:00 PM EDT  To: Nicholas Zendejas MD    BMI just too high for even just decompression  Switched her to lyrica from neurontin to see if works better  Would move forward with caudal PONCE and ultimately SCS if needed.

## 2021-11-01 NOTE — TELEPHONE ENCOUNTER
Tried calling pt to schedule consult appt per Dr. Ash Downs. Pt has vm that's not set up, could not leave vm msg for c/b.

## 2021-11-03 ENCOUNTER — TELEPHONE (OUTPATIENT)
Dept: PAIN MANAGEMENT | Age: 51
End: 2021-11-03

## 2021-11-03 NOTE — TELEPHONE ENCOUNTER
Per Dr. Newell Shone, called pt to schedule appt. No answer X3, and vm not set up yet, could not leave msg for c/b.

## 2021-11-03 NOTE — TELEPHONE ENCOUNTER
----- Message from Gwendloyn Castleman, MD sent at 11/1/2021  8:13 AM EDT -----  Please set up OV  ----- Message -----  From: David Truong DO  Sent: 10/25/2021  12:00 PM EDT  To: Gwendloyn Castleman, MD    BMI just too high for even just decompression  Switched her to lyrica from neurontin to see if works better  Would move forward with caudal PONCE and ultimately SCS if needed.

## 2021-11-16 DIAGNOSIS — E11.9 TYPE 2 DIABETES MELLITUS WITHOUT COMPLICATION, WITHOUT LONG-TERM CURRENT USE OF INSULIN (HCC): ICD-10-CM

## 2021-11-16 NOTE — TELEPHONE ENCOUNTER
Escribe Request for pending medication. Last Visit Date: 6/3/2021  Next Visit Date:  No future appointments. Health Maintenance   Topic Date Due    COVID-19 Vaccine (1) Never done    Hepatitis B vaccine (1 of 3 - Risk 3-dose series) Never done    Diabetic microalbuminuria test  01/29/2015    Colon cancer screen colonoscopy  Never done    Shingles Vaccine (1 of 2) Never done    Lipid screen  02/01/2021    Diabetic foot exam  08/13/2021    Flu vaccine (1) Never done    Diabetic retinal exam  03/02/2022 (Originally [birth year]/12/1980)    A1C test (Diabetic or Prediabetic)  06/03/2022    Potassium monitoring  09/20/2022    Creatinine monitoring  09/20/2022    Breast cancer screen  06/18/2023    Cervical cancer screen  02/07/2025    DTaP/Tdap/Td vaccine (3 - Td or Tdap) 02/17/2030    Pneumococcal 0-64 years Vaccine (2 of 2 - PPSV23) 12/12/2035    Hepatitis C screen  Completed    HIV screen  Completed    Hepatitis A vaccine  Aged Out    Hib vaccine  Aged Out    Meningococcal (ACWY) vaccine  Aged Out       Hemoglobin A1C (%)   Date Value   06/03/2021 7.1   02/01/2021 7.3   08/13/2020 6.5             ( goal A1C is < 7)   Microalb/Crt. Ratio (mcg/mg creat)   Date Value   01/29/2014 3     LDL Cholesterol (mg/dL)   Date Value   02/01/2020 43       (goal LDL is <100)   AST (U/L)   Date Value   09/20/2021 15     ALT (U/L)   Date Value   09/20/2021 15     BUN (mg/dL)   Date Value   09/20/2021 6     BP Readings from Last 3 Encounters:   10/25/21 127/80   09/20/21 138/72   09/13/21 (!) 159/85          (goal 120/80)    All Future Testing planned in [facility]  Lab Frequency Next Occurrence   XR KNEE LEFT (MIN 4 VIEWS) Once 11/17/2021   Cologuard (For External Results Only) Once 02/01/2022   Microalbumin, Ur Once 06/03/2022   Lipid Panel Once 06/03/2022       Next Visit Date:  No future appointments.       Patient Active Problem List:     Migraine     Morbid obesity ([address]     Iron deficiency     Tobacco abuse     S/P Permanent nail avulsion, 2nd digit right foot      Acute rhinosinusitis     Dyslipidemia     Vitamin B12 deficiency     Folate deficiency     Constipation     Hypertension goal BP (blood pressure) < 140/90     Bright red blood per rectum     Acute blood loss anemia     Vaginal bleeding     Ovarian cyst     Pre-diabetes     Menorrhagia     H. pylori infection     Microcytosis     Anemia     Hydradenitis     Hypertension, goal below 140/90     Right leg paresthesias     Chronic back pain     Rotator cuff injury     Vitamin D deficiency     Rotator cuff tendinitis     Smoker     Left breast abscess     Iron deficiency anemia     Breast abscess     Medial meniscus tear     Right knee pain     Arthritis of knee     Lumbar disc herniation S/P L3 4 and L4 5 and tenotomy right side for foraminotomy     Leg swelling     Pelvic mass in female     Hypocalcemia     Pericardial effusion     Hepatomegaly     Intramural leiomyoma of uterus     Heart failure (HCC)     Hyponatremia     STD (female)     Bacterial vaginosis     Cardiomyopathy ([address]     Hyperglycemia     Hyperglycemia due to type 2 diabetes mellitus (HCC)     Hyperkalemia     WHIT (acute kidney injury) ([address]     Hidradenitis suppurativa     Type 2 diabetes mellitus with hyperglycemia, without long-term current use of insulin (HCC)     Toenail deformity     Skin infection     Arthritis     Essential hypertension     Lumbar back pain with radiculopathy affecting left lower extremity     Encounter for smoking cessation counseling

## 2021-11-17 NOTE — TELEPHONE ENCOUNTER
Called pt's phone, vm not set up. Called alternate contact and left kailey msg to have Hayder Michaels call our office.  Did not leave details, just office ph#.

## 2021-11-20 DIAGNOSIS — M54.16 LUMBAR BACK PAIN WITH RADICULOPATHY AFFECTING LEFT LOWER EXTREMITY: ICD-10-CM

## 2021-11-22 RX ORDER — PREGABALIN 75 MG/1
75 CAPSULE ORAL 2 TIMES DAILY
Qty: 60 CAPSULE | Refills: 0 | Status: SHIPPED | OUTPATIENT
Start: 2021-11-22 | End: 2022-09-28

## 2021-11-22 NOTE — TELEPHONE ENCOUNTER
Pharmacy requesting refill of lyrica.     Date of last fill: 10.25.2021  Date of next follow up appointment: na    Pt notified response time for refills is 24-48 hours

## 2021-12-22 ENCOUNTER — OFFICE VISIT (OUTPATIENT)
Dept: FAMILY MEDICINE CLINIC | Age: 51
End: 2021-12-22
Payer: COMMERCIAL

## 2021-12-22 VITALS
HEART RATE: 78 BPM | BODY MASS INDEX: 45.58 KG/M2 | WEIGHT: 290.4 LBS | SYSTOLIC BLOOD PRESSURE: 174 MMHG | HEIGHT: 67 IN | DIASTOLIC BLOOD PRESSURE: 93 MMHG

## 2021-12-22 DIAGNOSIS — M54.16 LUMBAR BACK PAIN WITH RADICULOPATHY AFFECTING LEFT LOWER EXTREMITY: ICD-10-CM

## 2021-12-22 DIAGNOSIS — E11.9 TYPE 2 DIABETES MELLITUS WITHOUT COMPLICATION, WITHOUT LONG-TERM CURRENT USE OF INSULIN (HCC): ICD-10-CM

## 2021-12-22 DIAGNOSIS — E78.5 DYSLIPIDEMIA: ICD-10-CM

## 2021-12-22 DIAGNOSIS — E87.6 HYPOKALEMIA: ICD-10-CM

## 2021-12-22 DIAGNOSIS — I10 ESSENTIAL HYPERTENSION: Primary | ICD-10-CM

## 2021-12-22 DIAGNOSIS — I50.42 CHRONIC COMBINED SYSTOLIC (CONGESTIVE) AND DIASTOLIC (CONGESTIVE) HEART FAILURE (HCC): ICD-10-CM

## 2021-12-22 PROCEDURE — G8417 CALC BMI ABV UP PARAM F/U: HCPCS | Performed by: STUDENT IN AN ORGANIZED HEALTH CARE EDUCATION/TRAINING PROGRAM

## 2021-12-22 PROCEDURE — 99213 OFFICE O/P EST LOW 20 MIN: CPT | Performed by: STUDENT IN AN ORGANIZED HEALTH CARE EDUCATION/TRAINING PROGRAM

## 2021-12-22 PROCEDURE — 2022F DILAT RTA XM EVC RTNOPTHY: CPT | Performed by: STUDENT IN AN ORGANIZED HEALTH CARE EDUCATION/TRAINING PROGRAM

## 2021-12-22 PROCEDURE — 3051F HG A1C>EQUAL 7.0%<8.0%: CPT | Performed by: STUDENT IN AN ORGANIZED HEALTH CARE EDUCATION/TRAINING PROGRAM

## 2021-12-22 PROCEDURE — 3017F COLORECTAL CA SCREEN DOC REV: CPT | Performed by: STUDENT IN AN ORGANIZED HEALTH CARE EDUCATION/TRAINING PROGRAM

## 2021-12-22 PROCEDURE — G8484 FLU IMMUNIZE NO ADMIN: HCPCS | Performed by: STUDENT IN AN ORGANIZED HEALTH CARE EDUCATION/TRAINING PROGRAM

## 2021-12-22 PROCEDURE — 4004F PT TOBACCO SCREEN RCVD TLK: CPT | Performed by: STUDENT IN AN ORGANIZED HEALTH CARE EDUCATION/TRAINING PROGRAM

## 2021-12-22 PROCEDURE — G8427 DOCREV CUR MEDS BY ELIG CLIN: HCPCS | Performed by: STUDENT IN AN ORGANIZED HEALTH CARE EDUCATION/TRAINING PROGRAM

## 2021-12-22 RX ORDER — POTASSIUM CHLORIDE 750 MG/1
10 TABLET, EXTENDED RELEASE ORAL 2 TIMES DAILY
Qty: 60 TABLET | Refills: 3 | Status: SHIPPED | OUTPATIENT
Start: 2021-12-22 | End: 2022-06-02 | Stop reason: SDUPTHER

## 2021-12-22 RX ORDER — CARVEDILOL 6.25 MG/1
TABLET ORAL
Qty: 60 TABLET | Refills: 3 | Status: SHIPPED | OUTPATIENT
Start: 2021-12-22 | End: 2022-06-02 | Stop reason: SDUPTHER

## 2021-12-22 RX ORDER — FUROSEMIDE 40 MG/1
40 TABLET ORAL DAILY
Qty: 90 TABLET | Refills: 1 | Status: SHIPPED | OUTPATIENT
Start: 2021-12-22 | End: 2022-06-02 | Stop reason: SDUPTHER

## 2021-12-22 RX ORDER — CYCLOBENZAPRINE HCL 5 MG
5 TABLET ORAL 2 TIMES DAILY PRN
Qty: 10 TABLET | Refills: 0 | Status: SHIPPED | OUTPATIENT
Start: 2021-12-22 | End: 2022-01-01

## 2021-12-22 RX ORDER — AMLODIPINE BESYLATE 10 MG/1
TABLET ORAL
Qty: 90 TABLET | Refills: 1 | Status: SHIPPED | OUTPATIENT
Start: 2021-12-22 | End: 2022-06-02 | Stop reason: SDUPTHER

## 2021-12-22 RX ORDER — ATORVASTATIN CALCIUM 40 MG/1
40 TABLET, FILM COATED ORAL DAILY
Qty: 90 TABLET | Refills: 1 | Status: SHIPPED | OUTPATIENT
Start: 2021-12-22 | End: 2022-07-05

## 2021-12-22 ASSESSMENT — ENCOUNTER SYMPTOMS
ABDOMINAL PAIN: 0
SHORTNESS OF BREATH: 0

## 2021-12-22 NOTE — PATIENT INSTRUCTIONS
Thank you for letting us take care of you today. We hope all your questions were addressed. If a question was overlooked or something else comes to mind after you return home, please contact a member of your Care Team listed below. Your Care Team at [place] [address] is Team #3  Khushboo Garsia MD (Faculty)  Pete Cole MD (Faculty  Nabil Hall MD (Resident)  Syeda Cook (Resident)   Ruddy Ya MD (Resident)  Danni Morel MD (Resident)  Jana Grant., ANNEL Jean., ANNEL Wray., Sandy Vance., Radha Lange ([address] [place] [address])  Daylene Hodgkins, [place] (Clinical Practice Manager)  Dian Ya, [address] [place] [address] (Clinical Pharmacist)     Office phone number: [phone]    If you need to get in right away due to illness, please be advised we have \"Same Day\" appointments available Monday-Friday. Please call us at [phone] option #3 to schedule your \"Same Day\" appointment.

## 2021-12-22 NOTE — PROGRESS NOTES
Pulse 78   Ht 5' 7.01\" (1.702 m)   Wt 290 lb 6.4 oz (131.7 kg)   BMI 45.47 kg/m²    BP Readings from Last 3 Encounters:   12/22/21 (!) 174/93   10/25/21 127/80   09/20/21 138/72     Physical Exam  Vitals reviewed. Cardiovascular:      Rate and Rhythm: Normal rate and regular rhythm. Heart sounds: Normal heart sounds. Pulmonary:      Effort: Pulmonary effort is normal.      Breath sounds: Normal breath sounds and air entry. Musculoskeletal:      Right lower leg: No edema. Left lower leg: No edema. Psychiatric:         Behavior: Behavior is cooperative. Lab Results   Component Value Date    WBC 9.3 09/20/2021    HGB 12.4 09/20/2021    HCT 38.0 09/20/2021     09/20/2021    CHOL 82 02/01/2020    TRIG 58 02/01/2020    HDL 27 (L) 02/01/2020    ALT 15 09/20/2021    AST 15 09/20/2021     09/20/2021    K 3.9 09/20/2021     09/20/2021    CREATININE 0.58 09/20/2021    BUN 6 09/20/2021    CO2 26 09/20/2021    TSH 1.98 03/12/2020    INR 1.3 01/29/2020    GLUF 109 (H) 01/15/2013    LABA1C 7.1 06/03/2021    LABMICR 3 01/29/2014     Lab Results   Component Value Date    CALCIUM 8.8 09/20/2021    PHOS 3.7 01/28/2020     Lab Results   Component Value Date    LDLCHOLESTEROL 43 02/01/2020       Assessment and Plan:    1. Essential hypertension  - amLODIPine (NORVASC) 10 MG tablet; TAKE ONE TABLET BY MOUTH DAILY  Dispense: 90 tablet; Refill: 1  - carvedilol (COREG) 6.25 MG tablet; TAKE ONE TABLET BY MOUTH TWICE A DAY WITH MEALS  Dispense: 60 tablet; Refill: 3  - Lipid Panel; Future  - BP high today, ran out of medications  - Will refill medications    2. Chronic combined systolic (congestive) and diastolic (congestive) heart failure (HCC)  - furosemide (LASIX) 40 MG tablet; Take 1 tablet by mouth daily  Dispense: 90 tablet; Refill: 1  - ECHO Complete 2D W Doppler W Color;  Future  - Repeat Echo to evaluate for any improvement in cardiac function  - Last echo in 01/2020: EF 15-20%, severely reduced systolic function, Grade III diastolic dysfunction    3. Hypokalemia  - potassium chloride (KLOR-CON M) 10 MEQ extended release tablet; Take 1 tablet by mouth 2 times daily  Dispense: 60 tablet; Refill: 3    4. Dyslipidemia  - atorvastatin (LIPITOR) 40 MG tablet; Take 1 tablet by mouth daily  Dispense: 90 tablet; Refill: 1    5. Lumbar back pain with radiculopathy affecting left lower extremity  - cyclobenzaprine (FLEXERIL) 5 MG tablet; Take 1 tablet by mouth 2 times daily as needed for Muscle spasms  Dispense: 10 tablet; Refill: 0    6. Type 2 diabetes mellitus without complication, without long-term current use of insulin ([facility])  - Lipid Panel;  Future  - Hemoglobin A1C; Future  - Microalbumin, Ur; Future  - On Metformin 500 mg twice daily  - On Lantus 15 units twice daily and a slidign scale  - Will call patient with HbA1c result and adjust medications if needed    Requested Prescriptions     Signed Prescriptions Disp Refills    amLODIPine (NORVASC) 10 MG tablet 90 tablet 1     Sig: TAKE ONE TABLET BY MOUTH DAILY    potassium chloride (KLOR-CON M) 10 MEQ extended release tablet 60 tablet 3     Sig: Take 1 tablet by mouth 2 times daily    furosemide (LASIX) 40 MG tablet 90 tablet 1     Sig: Take 1 tablet by mouth daily    atorvastatin (LIPITOR) 40 MG tablet 90 tablet 1     Sig: Take 1 tablet by mouth daily    carvedilol (COREG) 6.25 MG tablet 60 tablet 3     Sig: TAKE ONE TABLET BY MOUTH TWICE A DAY WITH MEALS    cyclobenzaprine (FLEXERIL) 5 MG tablet 10 tablet 0     Sig: Take 1 tablet by mouth 2 times daily as needed for Muscle spasms       Medications Discontinued During This Encounter   Medication Reason    ketoconazole (NIZORAL) 2 % cream LIST CLEANUP    ibuprofen (ADVIL;MOTRIN) 600 MG tablet LIST CLEANUP    benzocaine-benzethonium (DERMOPLAST ANTIBACTERIAL) 20-0.2 % AERO spray LIST CLEANUP    nystatin (MYCOSTATIN) 062415 UNIT/GM cream LIST CLEANUP    potassium chloride (KLOR-CON M) 10 MEQ extended release tablet REORDER    furosemide (LASIX) 40 MG tablet REORDER    atorvastatin (LIPITOR) 40 MG tablet REORDER    amLODIPine (NORVASC) 10 MG tablet REORDER    carvedilol (COREG) 6.25 MG tablet REORDER       Return in about 3 months (around 3/22/2022) for FU HTN, DM.
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
## 2021-12-22 NOTE — PROGRESS NOTES
Visit Information    Have you changed or started any medications since your last visit including any over-the-counter medicines, vitamins, or herbal medicines? no   Have you stopped taking any of your medications? Is so, why? -  no  Are you having any side effects from any of your medications? - no    Have you seen any other physician or provider since your last visit? yes - Neurosurgery, Pain Management   Have you had any other diagnostic tests since your last visit? yes - MRI, Labs, FL   Have you been seen in the emergency room and/or had an admission in a hospital since we last saw you?  yes - [facility]   Have you had your routine dental cleaning in the past 6 months?  no     Do you have an active MyChart account? If no, what is the barrier?   Yes    Patient Care Team:  Babar Don MD as PCP - General (Emergency Medicine)    Medical History Review  Past Medical, Family, and Social History reviewed and does not contribute to the patient presenting condition    Health Maintenance   Topic Date Due    COVID-19 Vaccine (1) Never done    Hepatitis B vaccine (1 of 3 - Risk 3-dose series) Never done    Diabetic microalbuminuria test  01/29/2015    Colon cancer screen colonoscopy  Never done    Shingles Vaccine (1 of 2) Never done    Lipid screen  02/01/2021    Diabetic foot exam  08/13/2021    Flu vaccine (1) Never done    Diabetic retinal exam  03/02/2022 (Originally 12/12/1988)    A1C test (Diabetic or Prediabetic)  06/03/2022    Potassium monitoring  09/20/2022    Creatinine monitoring  09/20/2022    Breast cancer screen  06/18/2023    Cervical cancer screen  02/07/2025    DTaP/Tdap/Td vaccine (3 - Td or Tdap) 02/17/2030    Pneumococcal 0-64 years Vaccine (2 of 2 - PPSV23) 12/12/2035    Hepatitis C screen  Completed    HIV screen  Completed    Hepatitis A vaccine  Aged Out    Hib vaccine  Aged Out    Meningococcal (ACWY) vaccine  Aged Out

## 2022-01-10 ENCOUNTER — TELEPHONE (OUTPATIENT)
Dept: FAMILY MEDICINE CLINIC | Age: 52
End: 2022-01-10

## 2022-01-10 NOTE — TELEPHONE ENCOUNTER
----- Message from [place] sent at 1/10/2022  3:22 PM EST -----  Subject: Message to Provider    QUESTIONS  Information for Provider? patient wants a referral for an orthopedic foot   doctor wants a call back when the referral is ready   ---------------------------------------------------------------------------  --------------  CALL BACK INFO  What is the best way for the office to contact you? OK to leave message on   voicemail  Preferred Call Back Phone Number? [phone]  ---------------------------------------------------------------------------  --------------  SCRIPT ANSWERS  Relationship to Patient?  Self

## 2022-01-14 NOTE — TELEPHONE ENCOUNTER
Orthopedic foot doctor?  Or Podiatrist? I dont know why she is asking for this, need to be more specific, thanks

## 2022-01-14 NOTE — TELEPHONE ENCOUNTER
A podiatrist. She said she is Diabetic and need her toenails clipped and some other things going on.

## 2022-02-07 DIAGNOSIS — E11.9 TYPE 2 DIABETES MELLITUS WITHOUT COMPLICATION, WITHOUT LONG-TERM CURRENT USE OF INSULIN (HCC): Primary | ICD-10-CM

## 2022-02-16 ENCOUNTER — OFFICE VISIT (OUTPATIENT)
Dept: PODIATRY | Age: 52
End: 2022-02-16
Payer: COMMERCIAL

## 2022-02-16 VITALS
SYSTOLIC BLOOD PRESSURE: 159 MMHG | WEIGHT: 291 LBS | BODY MASS INDEX: 45.67 KG/M2 | HEART RATE: 88 BPM | HEIGHT: 67 IN | DIASTOLIC BLOOD PRESSURE: 92 MMHG

## 2022-02-16 DIAGNOSIS — B35.1 OM (ONYCHOMYCOSIS): ICD-10-CM

## 2022-02-16 DIAGNOSIS — L60.0 INGROWN TOENAIL OF BOTH FEET: Primary | ICD-10-CM

## 2022-02-16 DIAGNOSIS — E11.9 TYPE 2 DIABETES MELLITUS WITHOUT COMPLICATION, UNSPECIFIED WHETHER LONG TERM INSULIN USE (HCC): ICD-10-CM

## 2022-02-16 PROCEDURE — 99203 OFFICE O/P NEW LOW 30 MIN: CPT | Performed by: PODIATRIST

## 2022-02-16 PROCEDURE — 11720 DEBRIDE NAIL 1-5: CPT | Performed by: PODIATRIST

## 2022-02-16 PROCEDURE — 99202 OFFICE O/P NEW SF 15 MIN: CPT | Performed by: PODIATRIST
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
## 2022-03-02 RX ORDER — SODIUM CHLORIDE, SODIUM LACTATE, POTASSIUM CHLORIDE, CALCIUM CHLORIDE 600; 310; 30; 20 MG/100ML; MG/100ML; MG/100ML; MG/100ML
1000 INJECTION, SOLUTION INTRAVENOUS CONTINUOUS
Status: CANCELLED | OUTPATIENT
Start: 2022-03-02

## 2022-03-03 ENCOUNTER — HOSPITAL ENCOUNTER (OUTPATIENT)
Dept: PREADMISSION TESTING | Age: 52
Discharge: HOME | End: 2022-03-03

## 2022-03-07 ENCOUNTER — TELEPHONE (OUTPATIENT)
Dept: FAMILY MEDICINE CLINIC | Age: 52
End: 2022-03-07

## 2022-03-09 RX ORDER — SODIUM CHLORIDE, SODIUM LACTATE, POTASSIUM CHLORIDE, CALCIUM CHLORIDE 600; 310; 30; 20 MG/100ML; MG/100ML; MG/100ML; MG/100ML
1000 INJECTION, SOLUTION INTRAVENOUS CONTINUOUS
Status: CANCELLED | OUTPATIENT
Start: 2022-03-09

## 2022-03-11 ENCOUNTER — HOSPITAL ENCOUNTER (OUTPATIENT)
Dept: PREADMISSION TESTING | Age: 52
Discharge: HOME | End: 2022-03-15
Payer: COMMERCIAL

## 2022-03-11 VITALS
TEMPERATURE: 97.6 F | SYSTOLIC BLOOD PRESSURE: 164 MMHG | OXYGEN SATURATION: 97 % | DIASTOLIC BLOOD PRESSURE: 94 MMHG | WEIGHT: 293 LBS | RESPIRATION RATE: 18 BRPM | HEART RATE: 78 BPM | BODY MASS INDEX: 45.99 KG/M2 | HEIGHT: 67 IN

## 2022-03-11 LAB
ANION GAP SERPL CALCULATED.3IONS-SCNC: 13 MMOL/L (ref 9–17)
BUN BLDV-MCNC: 7 MG/DL (ref 6–20)
CHLORIDE BLD-SCNC: 105 MMOL/L (ref 98–107)
CO2: 23 MMOL/L (ref 20–31)
CREAT SERPL-MCNC: 0.53 MG/DL (ref 0.5–0.9)
GFR AFRICAN AMERICAN: >60 ML/MIN
GFR NON-AFRICAN AMERICAN: >60 ML/MIN
GFR SERPL CREATININE-BSD FRML MDRD: NORMAL ML/MIN/{1.73_M2}
GLUCOSE BLD-MCNC: 124 MG/DL (ref 70–99)
HCT VFR BLD CALC: 40.5 % (ref 36.3–47.1)
HEMOGLOBIN: 12.4 G/DL (ref 11.9–15.1)
MCH RBC QN AUTO: 27.4 PG (ref 25.2–33.5)
MCHC RBC AUTO-ENTMCNC: 30.6 G/DL (ref 28.4–34.8)
MCV RBC AUTO: 89.6 FL (ref 82.6–102.9)
NRBC AUTOMATED: 0 PER 100 WBC
PDW BLD-RTO: 17.2 % (ref 11.8–14.4)
PLATELET # BLD: 289 K/UL (ref 138–453)
PMV BLD AUTO: 9.9 FL (ref 8.1–13.5)
POTASSIUM SERPL-SCNC: 4.2 MMOL/L (ref 3.7–5.3)
RBC # BLD: 4.52 M/UL (ref 3.95–5.11)
SODIUM BLD-SCNC: 141 MMOL/L (ref 135–144)
WBC # BLD: 9 K/UL (ref 3.5–11.3)

## 2022-03-11 PROCEDURE — 82565 ASSAY OF CREATININE: CPT

## 2022-03-11 PROCEDURE — 84520 ASSAY OF UREA NITROGEN: CPT

## 2022-03-11 PROCEDURE — 80051 ELECTROLYTE PANEL: CPT

## 2022-03-11 PROCEDURE — 82947 ASSAY GLUCOSE BLOOD QUANT: CPT

## 2022-03-11 PROCEDURE — 36415 COLL VENOUS BLD VENIPUNCTURE: CPT

## 2022-03-11 PROCEDURE — 85027 COMPLETE CBC AUTOMATED: CPT

## 2022-03-11 PROCEDURE — 93005 ELECTROCARDIOGRAM TRACING: CPT | Performed by: ANESTHESIOLOGY
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
## 2022-03-12 LAB
EKG ATRIAL RATE: 73 BPM
EKG P AXIS: 59 DEGREES
EKG P-R INTERVAL: 166 MS
EKG Q-T INTERVAL: 406 MS
EKG QRS DURATION: 94 MS
EKG QTC CALCULATION (BAZETT): 447 MS
EKG R AXIS: 11 DEGREES
EKG T AXIS: 4 DEGREES
EKG VENTRICULAR RATE: 73 BPM

## 2022-03-17 ENCOUNTER — ANESTHESIA (OUTPATIENT)
Dept: OPERATING ROOM | Age: 52
End: 2022-03-17
Payer: COMMERCIAL

## 2022-03-17 ENCOUNTER — HOSPITAL ENCOUNTER (OUTPATIENT)
Age: 52
Setting detail: OUTPATIENT SURGERY
Discharge: HOME | End: 2022-03-17
Attending: PODIATRIST | Admitting: PODIATRIST
Payer: COMMERCIAL

## 2022-03-17 ENCOUNTER — ANESTHESIA EVENT (OUTPATIENT)
Dept: OPERATING ROOM | Age: 52
End: 2022-03-17
Payer: COMMERCIAL

## 2022-03-17 VITALS — SYSTOLIC BLOOD PRESSURE: 122 MMHG | DIASTOLIC BLOOD PRESSURE: 72 MMHG | OXYGEN SATURATION: 99 %

## 2022-03-17 VITALS
HEIGHT: 67 IN | BODY MASS INDEX: 45.99 KG/M2 | SYSTOLIC BLOOD PRESSURE: 144 MMHG | WEIGHT: 293 LBS | HEART RATE: 70 BPM | OXYGEN SATURATION: 97 % | DIASTOLIC BLOOD PRESSURE: 72 MMHG | TEMPERATURE: 98.1 F | RESPIRATION RATE: 17 BRPM

## 2022-03-17 DIAGNOSIS — G89.18 POST-OP PAIN: Primary | ICD-10-CM

## 2022-03-17 LAB
GLUCOSE BLD-MCNC: 123 MG/DL (ref 74–100)
POC POTASSIUM: 4.2 MMOL/L (ref 3.5–4.5)

## 2022-03-17 PROCEDURE — 84132 ASSAY OF SERUM POTASSIUM: CPT

## 2022-03-17 PROCEDURE — 2580000003 HC RX 258

## 2022-03-17 PROCEDURE — 3600000015 HC SURGERY LEVEL 5 ADDTL 15MIN: Performed by: PODIATRIST

## 2022-03-17 PROCEDURE — 81025 URINE PREGNANCY TEST: CPT

## 2022-03-17 PROCEDURE — 3600000005 HC SURGERY LEVEL 5 BASE: Performed by: PODIATRIST

## 2022-03-17 PROCEDURE — 7100000040 HC SPAR PHASE II RECOVERY - FIRST 15 MIN: Performed by: PODIATRIST

## 2022-03-17 PROCEDURE — 2500000003 HC RX 250 WO HCPCS: Performed by: PODIATRIST

## 2022-03-17 PROCEDURE — 2709999900 HC NON-CHARGEABLE SUPPLY: Performed by: PODIATRIST

## 2022-03-17 PROCEDURE — 3700000001 HC ADD 15 MINUTES (ANESTHESIA): Performed by: PODIATRIST

## 2022-03-17 PROCEDURE — 3700000000 HC ANESTHESIA ATTENDED CARE: Performed by: PODIATRIST

## 2022-03-17 PROCEDURE — 6360000002 HC RX W HCPCS

## 2022-03-17 PROCEDURE — 82947 ASSAY GLUCOSE BLOOD QUANT: CPT

## 2022-03-17 PROCEDURE — 2500000003 HC RX 250 WO HCPCS

## 2022-03-17 PROCEDURE — 7100000041 HC SPAR PHASE II RECOVERY - ADDTL 15 MIN: Performed by: PODIATRIST

## 2022-03-17 RX ORDER — PROPOFOL 10 MG/ML
INJECTION, EMULSION INTRAVENOUS CONTINUOUS PRN
Status: DISCONTINUED | OUTPATIENT
Start: 2022-03-17 | End: 2022-03-17 | Stop reason: SDUPTHER

## 2022-03-17 RX ORDER — FENTANYL CITRATE 50 UG/ML
25 INJECTION, SOLUTION INTRAMUSCULAR; INTRAVENOUS EVERY 5 MIN PRN
Status: DISCONTINUED | OUTPATIENT
Start: 2022-03-17 | End: 2022-03-17 | Stop reason: HOSPADM

## 2022-03-17 RX ORDER — MIDAZOLAM HYDROCHLORIDE 1 MG/ML
INJECTION INTRAMUSCULAR; INTRAVENOUS PRN
Status: DISCONTINUED | OUTPATIENT
Start: 2022-03-17 | End: 2022-03-17 | Stop reason: SDUPTHER

## 2022-03-17 RX ORDER — DIPHENHYDRAMINE HYDROCHLORIDE 50 MG/ML
12.5 INJECTION INTRAMUSCULAR; INTRAVENOUS
Status: DISCONTINUED | OUTPATIENT
Start: 2022-03-17 | End: 2022-03-17 | Stop reason: HOSPADM

## 2022-03-17 RX ORDER — FENTANYL CITRATE 50 UG/ML
50 INJECTION, SOLUTION INTRAMUSCULAR; INTRAVENOUS EVERY 5 MIN PRN
Status: DISCONTINUED | OUTPATIENT
Start: 2022-03-17 | End: 2022-03-17 | Stop reason: HOSPADM

## 2022-03-17 RX ORDER — KETAMINE HCL IN NACL, ISO-OSM 100MG/10ML
SYRINGE (ML) INJECTION PRN
Status: DISCONTINUED | OUTPATIENT
Start: 2022-03-17 | End: 2022-03-17 | Stop reason: SDUPTHER

## 2022-03-17 RX ORDER — ACETAMINOPHEN AND CODEINE PHOSPHATE 300; 30 MG/1; MG/1
1 TABLET ORAL EVERY 6 HOURS PRN
Qty: 28 TABLET | Refills: 0 | Status: SHIPPED | OUTPATIENT
Start: 2022-03-17 | End: 2022-03-24

## 2022-03-17 RX ORDER — SODIUM CHLORIDE 9 MG/ML
25 INJECTION, SOLUTION INTRAVENOUS PRN
Status: DISCONTINUED | OUTPATIENT
Start: 2022-03-17 | End: 2022-03-17 | Stop reason: HOSPADM

## 2022-03-17 RX ORDER — LIDOCAINE HYDROCHLORIDE 10 MG/ML
INJECTION, SOLUTION EPIDURAL; INFILTRATION; INTRACAUDAL; PERINEURAL PRN
Status: DISCONTINUED | OUTPATIENT
Start: 2022-03-17 | End: 2022-03-17 | Stop reason: ALTCHOICE

## 2022-03-17 RX ORDER — SODIUM CHLORIDE 0.9 % (FLUSH) 0.9 %
5-40 SYRINGE (ML) INJECTION PRN
Status: DISCONTINUED | OUTPATIENT
Start: 2022-03-17 | End: 2022-03-17 | Stop reason: HOSPADM

## 2022-03-17 RX ORDER — ONDANSETRON 2 MG/ML
4 INJECTION INTRAMUSCULAR; INTRAVENOUS
Status: DISCONTINUED | OUTPATIENT
Start: 2022-03-17 | End: 2022-03-17 | Stop reason: HOSPADM

## 2022-03-17 RX ORDER — SODIUM CHLORIDE 0.9 % (FLUSH) 0.9 %
5-40 SYRINGE (ML) INJECTION EVERY 12 HOURS SCHEDULED
Status: DISCONTINUED | OUTPATIENT
Start: 2022-03-17 | End: 2022-03-17 | Stop reason: HOSPADM

## 2022-03-17 RX ORDER — BUPIVACAINE HYDROCHLORIDE 5 MG/ML
INJECTION, SOLUTION EPIDURAL; INTRACAUDAL PRN
Status: DISCONTINUED | OUTPATIENT
Start: 2022-03-17 | End: 2022-03-17 | Stop reason: ALTCHOICE

## 2022-03-17 RX ORDER — LIDOCAINE HYDROCHLORIDE 10 MG/ML
INJECTION, SOLUTION EPIDURAL; INFILTRATION; INTRACAUDAL; PERINEURAL PRN
Status: DISCONTINUED | OUTPATIENT
Start: 2022-03-17 | End: 2022-03-17 | Stop reason: SDUPTHER

## 2022-03-17 RX ORDER — SODIUM CHLORIDE, SODIUM LACTATE, POTASSIUM CHLORIDE, CALCIUM CHLORIDE 600; 310; 30; 20 MG/100ML; MG/100ML; MG/100ML; MG/100ML
INJECTION, SOLUTION INTRAVENOUS CONTINUOUS PRN
Status: DISCONTINUED | OUTPATIENT
Start: 2022-03-17 | End: 2022-03-17 | Stop reason: SDUPTHER

## 2022-03-17 RX ORDER — FENTANYL CITRATE 50 UG/ML
INJECTION, SOLUTION INTRAMUSCULAR; INTRAVENOUS PRN
Status: DISCONTINUED | OUTPATIENT
Start: 2022-03-17 | End: 2022-03-17 | Stop reason: SDUPTHER

## 2022-03-17 RX ADMIN — PROPOFOL 150 MCG/KG/MIN: 10 INJECTION, EMULSION INTRAVENOUS at 13:16

## 2022-03-17 RX ADMIN — SODIUM CHLORIDE, POTASSIUM CHLORIDE, SODIUM LACTATE AND CALCIUM CHLORIDE: 600; 310; 30; 20 INJECTION, SOLUTION INTRAVENOUS at 13:13

## 2022-03-17 RX ADMIN — Medication 3000 MG: at 13:25

## 2022-03-17 RX ADMIN — LIDOCAINE HYDROCHLORIDE 50 MG: 10 INJECTION, SOLUTION EPIDURAL; INFILTRATION; INTRACAUDAL; PERINEURAL at 13:18

## 2022-03-17 RX ADMIN — Medication 20 MG: at 13:21

## 2022-03-17 RX ADMIN — MIDAZOLAM HYDROCHLORIDE 2 MG: 1 INJECTION, SOLUTION INTRAMUSCULAR; INTRAVENOUS at 13:18

## 2022-03-17 RX ADMIN — FENTANYL CITRATE 25 MCG: 50 INJECTION, SOLUTION INTRAMUSCULAR; INTRAVENOUS at 13:36

## 2022-03-17 ASSESSMENT — PULMONARY FUNCTION TESTS
PIF_VALUE: 1
PIF_VALUE: 0
PIF_VALUE: 1
PIF_VALUE: 0
PIF_VALUE: 1
PIF_VALUE: 0
PIF_VALUE: 1

## 2022-03-17 ASSESSMENT — LIFESTYLE VARIABLES: SMOKING_STATUS: 1

## 2022-03-17 ASSESSMENT — PAIN SCALES - GENERAL
PAINLEVEL_OUTOF10: 0

## 2022-03-17 ASSESSMENT — PAIN - FUNCTIONAL ASSESSMENT: PAIN_FUNCTIONAL_ASSESSMENT: 0-10
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
## 2022-03-17 NOTE — BRIEF OP NOTE
Brief Postoperative Note      Patient: Eloina Ruiz  YOB: 1970  MRN: [id number]    Date of Procedure: 3/17/2022    Pre-Op Diagnosis: INGROWN TOENAILS    Post-Op Diagnosis: Same       Procedure  1.  P&A nail procedure - total nail 1st right  2. P&A nail procedure - total nail 1st left    Surgeon(s):  Hernan Fulton DPM    Assistant:  Resident: Evelia Messer DPM    Anesthesia: Monitor Anesthesia Care    Estimated Blood Loss (mL): Minimal    Complications: None    Specimens:   * No specimens in log *    Implants:  * No implants in log *      Drains: * No LDAs found *    Findings: ingrown toe nails bilateral hallux.  Please see op note for details    Electronically signed by Liv Reilly DPM on 3/17/2022 at 2:08 PM

## 2022-03-17 NOTE — OP NOTE
Operative Note        Patient: Elizabeth Watson  YOB: 1970  MRN: [id number]     Date of Procedure: 3/17/2022     Pre-Op Diagnosis: INGROWN TOENAILS     Post-Op Diagnosis: Same       Procedure(s):  1. P&A nail procedure 1st nail total right  2. P&A nail procedure 1st nail total left     Surgeon(s):  Koko Fan DPM     Assistant:  Resident: Rafal Millan DPM     Anesthesia: Monitor Anesthesia Care     Estimated Blood Loss (mL): Minimal     Complications: None     Specimens:   * No specimens in log *     Implants:  * No implants in log *      Drains: * No LDAs found *     Findings: ingrown toe nails bilateral hallux. Please see op note for details    Indications for procedure: Patient has had painful thickened big toe nails bilaterally for a long time and has failed conservative treatments and elected to undergo surgery. Risks and benefits were discussed. No guarantees were given or implied. Consent is signed and in the chart. Procedure: A total of 10cc 1% lidocaine plain and 0.5% marcaine 1:1 was injected for digital block of left and right hallux. Area was prepped and cleansed with hibicleanse. Digital tourniquet was applied to left and right hallux. Right Hallux: Using a spatula the entire hallux nail was freed on the right. Using a hemostat, the entire nail was removed and the borders were curetted for any nail spicules. Microtip applicators were used to apply phenol to nail matrix. Once appropriate time of phenol was met, the area was cleansed with alcohol. A dressing of silvadine,4x4, cornelio and adhesive tape was applied. Next attention was directed to the left hallux    Left Hallux: Using a spatula the entire hallux nail was freed on the left. Using a hemostat, the entire nail was removed and the borders were curetted for any nail spicules. Microtip applicators were used to apply phenol to nail matrix.   Once appropriate time of phenol was met,  the area was cleansed with alcohol. A dressing of silvadine,4x4, cornelio and adhesive tape was applied. Tourniquet was removed from both toes. The patient tolerated the above procedure and anesthesia well without complications. The patient was transported from the operating room to the PACU with vital signs stable and neurovascular status intact to Right and left foot.

## 2022-03-17 NOTE — INTERVAL H&P NOTE
Pt Name: Melani Alexander  MRN: [id number]  YOB: 1970  Date of evaluation: 3/17/2022    I have reviewed the patient's history and physical examination completed in pre-admission testing on 3/11/2022. No changes to history or on examination today, unless noted below. Cardiac clearance obtained.     WENDY Loo - CNP  3/17/22  11:36 AM

## 2022-03-18 LAB — HCG, PREGNANCY URINE (POC): NEGATIVE

## 2022-03-18 NOTE — ANESTHESIA POSTPROCEDURE EVALUATION
Department of Anesthesiology  Postprocedure Note    Patient: Jorge Palmer  MRN: [id number]  YOB: 1970  Date of evaluation: 3/18/2022  Time:  1:57 PM     Procedure Summary     Date: 03/17/22 Room / Location: [address]    Anesthesia Start: 7150 Anesthesia Stop: 1401    Procedure: Alfred Pod (Bilateral Foot) Diagnosis: (INGROWN TOENAILS)    Surgeons: Elda Braga DPM Responsible Provider: David Sexton MD    Anesthesia Type: MAC ASA Status: 4          Anesthesia Type: MAC    Nargis Phase I:      Nargis Phase II: Nargis Score: 10    Last vitals: Reviewed and per EMR flowsheets.        Anesthesia Post Evaluation    Patient location during evaluation: PACU  Patient participation: complete - patient participated  Level of consciousness: awake  Pain score: 1  Airway patency: patent  Nausea & Vomiting: no nausea and no vomiting  Complications: no  Cardiovascular status: blood pressure returned to baseline and hemodynamically stable  Respiratory status: acceptable  Hydration status: euvolemic

## 2022-03-21 NOTE — PROGRESS NOTES
Patient instructed to remove shoes and socks and instructed to sit in exam chair. Current PCP is Denise Chavez MD and date of last visit was 12/22/21. Do you have a follow up visit scheduled? No  If yes, the date is     Diabetic visit information    Blood pressure (Control is BP <140/90)  BP Readings from Last 3 Encounters:   03/17/22 (!) 144/72   03/17/22 122/72   03/11/22 (!) 164/94       BP taken with correct size cuff? - Yes   Repeated if > 140/90 Yes      Tobacco use:  Patient  reports that she has been smoking cigarettes. She has a 0.75 pack-year smoking history. She has never used smokeless tobacco.  If Smoker - Cessation materials given? - Yes       Diabetic Health Maintenance Items due  Diabetes Management   Topic Date Due    Diabetic retinal exam  Never done    Diabetic microalbuminuria test  01/29/2015    Lipid screen  02/01/2021    Diabetic foot exam  08/13/2021       Diabetic retinal exam done in last year? - Yes   If No: remind patient that it is due and they should schedule an exam    Medications  Is patient taking any medications for diabetes? -   Yes  Have blood sugars been controlled? Fasting blood sugars under 120   -   Yes   Random home sugars or today's POCT glucose is under 180 -   Yes   []  If No to the above then patient should schedule appt with PCP.      Diabetic Plan    A1C Plan  Lab Results   Component Value Date    LABA1C 7.1 06/03/2021    LABA1C 7.3 02/01/2021    LABA1C 6.5 08/13/2020      []  If A1C over 8 and last result >3 months ago - Order A1C and refer to PCP   []  If last A1C over 6 months ago - Order A1C and refer to PCP for follow up   []  If elevated blood sugars > 180 - refer to PCP for follow up    []  Blood sugar controlled - A1C under 8 and last check was < 6 months      Cholesterol Plan   Lab Results   Component Value Date    LDLCHOLESTEROL 43 02/01/2020      []  If LDL > 100 and last result >3 months ago - order Fasting lipids and refer to PCP for follow up   []  If LDL < 100 and over 1 year ago - Order Fasting lipids and refer to PCP for follow up   [] LDL is controlled. LDL < 100 and checked within the last year     Blood Pressure  BP Readings from Last 3 Encounters:   03/17/22 (!) 144/72   03/17/22 122/72   03/11/22 (!) 164/94      []  If SBP >140 mmhg - refer to PCP for follow up   []  If DBP > 90 mmhg - refer to PCP for follow up   [] BP is controlled <140/90     Order labs as PCP ordered.   (ie: Lipids, A1C, CMP)

## 2022-03-23 ENCOUNTER — OFFICE VISIT (OUTPATIENT)
Dept: PODIATRY | Age: 52
End: 2022-03-23

## 2022-03-23 VITALS
HEART RATE: 80 BPM | WEIGHT: 293 LBS | SYSTOLIC BLOOD PRESSURE: 161 MMHG | DIASTOLIC BLOOD PRESSURE: 85 MMHG | HEIGHT: 67 IN | BODY MASS INDEX: 45.99 KG/M2

## 2022-03-23 DIAGNOSIS — L60.0 INGROWN TOENAIL OF BOTH FEET: Primary | ICD-10-CM

## 2022-03-23 DIAGNOSIS — Z98.890 POST-OPERATIVE STATE: ICD-10-CM

## 2022-03-23 PROCEDURE — 99024 POSTOP FOLLOW-UP VISIT: CPT | Performed by: STUDENT IN AN ORGANIZED HEALTH CARE EDUCATION/TRAINING PROGRAM

## 2022-03-23 RX ORDER — GINSENG 100 MG
CAPSULE ORAL ONCE
Status: COMPLETED | OUTPATIENT
Start: 2022-03-23 | End: 2022-03-23

## 2022-03-23 RX ADMIN — Medication: at 13:20
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
## 2022-04-01 NOTE — PROGRESS NOTES
Patient instructed to remove shoes and socks and instructed to sit in exam chair. Current PCP is Josie Cevallos MD and date of last visit was 12/22/21. Do you have a follow up visit scheduled? No  If yes, the date is        Diabetic visit information    Blood pressure (Control is BP <140/90)  BP Readings from Last 3 Encounters:   03/23/22 (!) 161/85   03/17/22 (!) 144/72   03/17/22 122/72       BP taken with correct size cuff? - Yes   Repeated if > 140/90 Yes      Tobacco use:  Patient  reports that she has been smoking cigarettes. She has a 0.75 pack-year smoking history. She has never used smokeless tobacco.  If Smoker - Cessation materials given? - Yes       Diabetic Health Maintenance Items due  Diabetes Management   Topic Date Due    Diabetic retinal exam  Never done    Diabetic microalbuminuria test  01/29/2015    Lipid screen  02/01/2021    Diabetic foot exam  08/13/2021       Diabetic retinal exam done in last year? - Yes   If No: remind patient that it is due and they should schedule an exam    Medications  Is patient taking any medications for diabetes? -   Yes  Have blood sugars been controlled? Fasting blood sugars under 120   -   Yes   Random home sugars or today's POCT glucose is under 180 -   Yes   []  If No to the above then patient should schedule appt with PCP.      Diabetic Plan    A1C Plan  Lab Results   Component Value Date    LABA1C 7.1 06/03/2021    LABA1C 7.3 02/01/2021    LABA1C 6.5 08/13/2020      []  If A1C over 8 and last result >3 months ago - Order A1C and refer to PCP   []  If last A1C over 6 months ago - Order A1C and refer to PCP for follow up   []  If elevated blood sugars > 180 - refer to PCP for follow up    []  Blood sugar controlled - A1C under 8 and last check was < 6 months      Cholesterol Plan   Lab Results   Component Value Date    LDLCHOLESTEROL 43 02/01/2020      []  If LDL > 100 and last result >3 months ago - order Fasting lipids and refer to PCP for follow up   []  If LDL < 100 and over 1 year ago - Order Fasting lipids and refer to PCP for follow up   [] LDL is controlled. LDL < 100 and checked within the last year     Blood Pressure  BP Readings from Last 3 Encounters:   03/23/22 (!) 161/85   03/17/22 (!) 144/72   03/17/22 122/72      []  If SBP >140 mmhg - refer to PCP for follow up   []  If DBP > 90 mmhg - refer to PCP for follow up   [] BP is controlled <140/90     Order labs as PCP ordered.   (ie: Lipids, A1C, CMP)

## 2022-04-06 ENCOUNTER — OFFICE VISIT (OUTPATIENT)
Dept: PODIATRY | Age: 52
End: 2022-04-06

## 2022-04-06 VITALS
SYSTOLIC BLOOD PRESSURE: 181 MMHG | DIASTOLIC BLOOD PRESSURE: 97 MMHG | WEIGHT: 293 LBS | HEIGHT: 67 IN | BODY MASS INDEX: 45.99 KG/M2

## 2022-04-06 DIAGNOSIS — Z98.890 POST-OPERATIVE STATE: Primary | ICD-10-CM

## 2022-04-06 PROCEDURE — 99024 POSTOP FOLLOW-UP VISIT: CPT | Performed by: STUDENT IN AN ORGANIZED HEALTH CARE EDUCATION/TRAINING PROGRAM

## 2022-04-06 NOTE — PROGRESS NOTES
[facility] [address] [facility] [address], [address]  Tel: [phone]   Fax: [phone]    Subjective     Procedure: Total permanent hallux nail avulsion, bilateral  DOS: 03/17/22  POV#: 2    Interval History: 4/06/22  Patient returns to clinic today for follow-up after nail avulsion. She denies any pain or drainage. Has no longer been performing daily dressing changes because she no longer needs it. No new complaints today. HPI:  This 46 y.o. female presents for a post op visit. Patient states they are doing well. Pain is well controlled without medication. Has been performing daily soaks with RICE therapy as needed to the extremity as instructed preoperatively. Has been weightbearing as tolerated to the bilateral lower extremity. Denies any current nausea, vomiting, fever, chills, shortness of breath, chest pain or calf pain. Denies any other pedal complaints    Primary care physician is Trudi Almeida MD.    Objective     Vitals:    04/06/22 1245   BP: (!) 181/97       Lab Results   Component Value Date    LABA1C 7.1 06/03/2021       Physical Exam:  General:  Alert and oriented x3. In no acute distress. Surgical avulsion sites are healing without signs of infection. Wound beds are granular with small amount of dry eschar. No erythema and minimal edema surrounding surgical sites. No drainage. No signs of gross infection. Patient has no pain to palpation of calf. The calf is soft, supple and nontender. Satisfactory alignment is noted. Pedal pulses are palpable. Capillary refill time is less than three seconds to all digits. Sensations are intact to light touch. Assessment   Sabas Mendez is a 46 y.o. female with     Diagnosis Orders   1. Post-operative state          Plan   · Patient examined and evaluated  · The patient was educated on clinical examination findings, postoperative prognosis and protocol.  All questions were answered to patient's apparent satisfaction. · Patient to continue applying Band-aid daily for one week. Weightbearing as tolerated to the operative extremities. · Discussed s/s of infection. · Patient to RTC PRN  · Please, call the office with any questions or concerns   · Discussed with Dr. Kaela Rodriguez     No orders of the defined types were placed in this encounter. No orders of the defined types were placed in this encounter.       Tanvi Moffett DPM   Podiatric Medicine & Surgery   4/6/2022 at 3:06 PM

## 2022-04-13 NOTE — PROGRESS NOTES
I performed a history and physical examination of the patient and discussed management with the resident. I reviewed the residents note and agree with the documented findings and plan of care. Any areas of disagreement are noted on the chart. I was personally present for the key portions of any procedures. I have documented in the chart those procedures where I was not present during the key portions. I have reviewed the Podiatry Resident progress note. I agree with the chief complaint, past medical history, past surgical history, allergies, medications, social and family history as documented unless otherwise noted below. Documentation of the HPI, Physical Exam and Medical Decision Making performed by medical students or scribes is based on my personal performance of the HPI, PE and MDM. I have personally evaluated this patient and have completed at least one if not all key elements of the E/M (history, physical exam, and MDM). Additional findings are as noted. Jesenia Boswell DPM,D.P.M.

## 2022-05-09 ENCOUNTER — HOSPITAL ENCOUNTER (OUTPATIENT)
Age: 52
Discharge: HOME | End: 2022-05-09
Payer: COMMERCIAL

## 2022-05-09 DIAGNOSIS — I10 ESSENTIAL HYPERTENSION: ICD-10-CM

## 2022-05-09 DIAGNOSIS — E11.9 TYPE 2 DIABETES MELLITUS WITHOUT COMPLICATION, WITHOUT LONG-TERM CURRENT USE OF INSULIN (HCC): ICD-10-CM

## 2022-05-09 LAB
CHOLESTEROL/HDL RATIO: 5
CHOLESTEROL: 169 MG/DL
CREATININE URINE: 356.9 MG/DL (ref 28–217)
ESTIMATED AVERAGE GLUCOSE: 197 MG/DL
HBA1C MFR BLD: 8.5 % (ref 4–6)
HDLC SERPL-MCNC: 34 MG/DL
LDL CHOLESTEROL: 105 MG/DL (ref 0–130)
MICROALBUMIN/CREAT 24H UR: 375 MG/L
MICROALBUMIN/CREAT UR-RTO: 105 MCG/MG CREAT
TRIGL SERPL-MCNC: 151 MG/DL

## 2022-05-09 PROCEDURE — 36415 COLL VENOUS BLD VENIPUNCTURE: CPT

## 2022-05-09 PROCEDURE — 82570 ASSAY OF URINE CREATININE: CPT

## 2022-05-09 PROCEDURE — 80061 LIPID PANEL: CPT

## 2022-05-09 PROCEDURE — 83036 HEMOGLOBIN GLYCOSYLATED A1C: CPT

## 2022-05-09 PROCEDURE — 82043 UR ALBUMIN QUANTITATIVE: CPT

## 2022-06-02 ENCOUNTER — OFFICE VISIT (OUTPATIENT)
Dept: FAMILY MEDICINE CLINIC | Age: 52
End: 2022-06-02
Payer: COMMERCIAL

## 2022-06-02 VITALS
HEIGHT: 67 IN | DIASTOLIC BLOOD PRESSURE: 70 MMHG | BODY MASS INDEX: 44.25 KG/M2 | OXYGEN SATURATION: 96 % | WEIGHT: 281.9 LBS | HEART RATE: 82 BPM | SYSTOLIC BLOOD PRESSURE: 112 MMHG | TEMPERATURE: 98.2 F

## 2022-06-02 DIAGNOSIS — G89.29 CHRONIC LUMBOSACRAL PAIN: ICD-10-CM

## 2022-06-02 DIAGNOSIS — M54.50 CHRONIC LUMBOSACRAL PAIN: ICD-10-CM

## 2022-06-02 DIAGNOSIS — E11.9 TYPE 2 DIABETES MELLITUS WITHOUT COMPLICATION, WITHOUT LONG-TERM CURRENT USE OF INSULIN (HCC): ICD-10-CM

## 2022-06-02 DIAGNOSIS — E87.6 HYPOKALEMIA: ICD-10-CM

## 2022-06-02 DIAGNOSIS — I50.42 CHRONIC COMBINED SYSTOLIC (CONGESTIVE) AND DIASTOLIC (CONGESTIVE) HEART FAILURE (HCC): ICD-10-CM

## 2022-06-02 DIAGNOSIS — I10 ESSENTIAL HYPERTENSION: Primary | ICD-10-CM

## 2022-06-02 PROCEDURE — 99213 OFFICE O/P EST LOW 20 MIN: CPT | Performed by: STUDENT IN AN ORGANIZED HEALTH CARE EDUCATION/TRAINING PROGRAM

## 2022-06-02 PROCEDURE — 3052F HG A1C>EQUAL 8.0%<EQUAL 9.0%: CPT | Performed by: STUDENT IN AN ORGANIZED HEALTH CARE EDUCATION/TRAINING PROGRAM

## 2022-06-02 RX ORDER — POTASSIUM CHLORIDE 750 MG/1
10 TABLET, EXTENDED RELEASE ORAL 2 TIMES DAILY
Qty: 180 TABLET | Refills: 1 | Status: SHIPPED | OUTPATIENT
Start: 2022-06-02

## 2022-06-02 RX ORDER — AMLODIPINE BESYLATE 10 MG/1
TABLET ORAL
Qty: 90 TABLET | Refills: 1 | Status: SHIPPED | OUTPATIENT
Start: 2022-06-02

## 2022-06-02 RX ORDER — ASPIRIN 81 MG/1
81 TABLET ORAL DAILY
Qty: 90 TABLET | Refills: 1 | Status: SHIPPED | OUTPATIENT
Start: 2022-06-02

## 2022-06-02 RX ORDER — SACUBITRIL AND VALSARTAN 49; 51 MG/1; MG/1
1 TABLET, FILM COATED ORAL 2 TIMES DAILY
Qty: 180 TABLET | Refills: 1 | Status: SHIPPED | OUTPATIENT
Start: 2022-06-02

## 2022-06-02 RX ORDER — MELOXICAM 7.5 MG/1
7.5 TABLET ORAL DAILY
Qty: 30 TABLET | Refills: 3 | Status: SHIPPED | OUTPATIENT
Start: 2022-06-02

## 2022-06-02 RX ORDER — CARVEDILOL 6.25 MG/1
TABLET ORAL
Qty: 180 TABLET | Refills: 1 | Status: SHIPPED | OUTPATIENT
Start: 2022-06-02

## 2022-06-02 RX ORDER — FUROSEMIDE 40 MG/1
40 TABLET ORAL DAILY
Qty: 90 TABLET | Refills: 1 | Status: SHIPPED | OUTPATIENT
Start: 2022-06-02

## 2022-06-02 ASSESSMENT — PATIENT HEALTH QUESTIONNAIRE - PHQ9
SUM OF ALL RESPONSES TO PHQ9 QUESTIONS 1 & 2: 1
SUM OF ALL RESPONSES TO PHQ QUESTIONS 1-9: 1
SUM OF ALL RESPONSES TO PHQ QUESTIONS 1-9: 1
1. LITTLE INTEREST OR PLEASURE IN DOING THINGS: 1
SUM OF ALL RESPONSES TO PHQ QUESTIONS 1-9: 1
SUM OF ALL RESPONSES TO PHQ QUESTIONS 1-9: 1
2. FEELING DOWN, DEPRESSED OR HOPELESS: 0

## 2022-06-02 ASSESSMENT — ENCOUNTER SYMPTOMS
BACK PAIN: 1
SHORTNESS OF BREATH: 0
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
## 2022-06-02 NOTE — PATIENT INSTRUCTIONS
Thank you for letting us take care of you today. We hope all your questions were addressed. If a question was overlooked or something else comes to mind after you return home, please contact a member of your Care Team listed below. Your Care Team at [place] [address] is Team #3  Gorge Knowles MD (Faculty)  Jean Velasco MD (Faculty  Dalldebora Drake MD (Resident)  Tori Benoit (Resident)   Mary Vo MD (Resident)  Rose Murphy MD (Resident)  Carlos Gamboa., ANNEL Collier., ANNEL Hensley., Denton Ding., Antony Rao ([address] [place] [address])  [place], [place] (Clinical Practice Manager)  Helder Veronica, [address] [place] [address] (Clinical Pharmacist)     Office phone number: [phone]    If you need to get in right away due to illness, please be advised we have \"Same Day\" appointments available Monday-Friday. Please call us at [phone] option #3 to schedule your \"Same Day\" appointment.

## 2022-06-02 NOTE — PROGRESS NOTES
DIABETES and HYPERTENSION visit    BP Readings from Last 3 Encounters:   04/06/22 (!) 181/97   03/23/22 (!) 161/85   03/17/22 (!) 144/72        Hemoglobin A1C (%)   Date Value   05/09/2022 8.5 (H)   06/03/2021 7.1   02/01/2021 7.3     Microalb/Crt. Ratio (mcg/mg creat)   Date Value   05/09/2022 105 (H)     LDL Cholesterol (mg/dL)   Date Value   05/09/2022 105     HDL (mg/dL)   Date Value   05/09/2022 34 (L)     BUN (mg/dL)   Date Value   03/11/2022 7     CREATININE (mg/dL)   Date Value   03/11/2022 0.53     Glucose (mg/dL)   Date Value   03/11/2022 124 (H)            Have you changed or started any medications since your last visit including any over-the-counter medicines, vitamins, or herbal medicines? no   Have you stopped taking any of your medications? Is so, why? -  no  Are you having any side effects from any of your medications? - no    Have you seen any other physician or provider since your last visit? yes - Podiatry    Have you had any other diagnostic tests since your last visit? yes - Labs , EKG    Have you been seen in the emergency room and/or had an admission in a hospital since we last saw you?  yes - [facility]    Have you had your routine dental cleaning in the past 6 months?  no     Have you had your annual diabetic retinal (eye) exam? No   (ensure copy of exam is in the chart)    Do you have an active MyChart account? If no, what is the barrier?   Yes    Patient Care Team:  Jorge Christopher MD as PCP - General (Emergency Medicine)    Medical History Review  Past Medical, Family, and Social History reviewed and does not contribute to the patient presenting condition    Health Maintenance   Topic Date Due    Diabetic retinal exam  Never done    Hepatitis B vaccine (1 of 3 - Risk 3-dose series) Never done    Colorectal Cancer Screen  Never done    Pneumococcal 0-64 years Vaccine (2 - PCV) 01/13/2018    Shingles vaccine (1 of 2) Never done    Diabetic foot exam  08/13/2021    Depression Screen  02/01/2022    COVID-19 Vaccine (3 - Booster for Pfizer series) 05/13/2022    Flu vaccine (Season Ended) 09/01/2022    A1C test (Diabetic or Prediabetic)  05/09/2023    Diabetic microalbuminuria test  05/09/2023    Lipids  05/09/2023    Breast cancer screen  06/18/2023    Cervical cancer screen  02/07/2025    DTaP/Tdap/Td vaccine (3 - Td or Tdap) 02/17/2030    Hepatitis C screen  Completed    HIV screen  Completed    Hepatitis A vaccine  Aged Out    Hib vaccine  Aged Out    Meningococcal (ACWY) vaccine  Aged Out

## 2022-06-02 NOTE — PROGRESS NOTES
Attending Physician Statement  I  have discussed the care of Juvencio Reed including pertinent history and exam findings with the resident. I agree with the assessment, plan and orders as documented by the resident. /70 (Site: Left Upper Arm, Position: Sitting, Cuff Size: Large Adult) Comment: manual  Pulse 82   Temp 98.2 °F (36.8 °C)   Ht 5' 7.01\" (1.702 m)   Wt 281 lb 14.4 oz (127.9 kg)   LMP 04/20/2022   SpO2 96%   BMI 44.14 kg/m²    BP Readings from Last 3 Encounters:   06/02/22 112/70   04/06/22 (!) 181/97   03/23/22 (!) 161/85     Wt Readings from Last 3 Encounters:   06/02/22 281 lb 14.4 oz (127.9 kg)   04/06/22 295 lb (133.8 kg)   03/23/22 295 lb (133.8 kg)          Diagnosis Orders   1. Essential hypertension  amLODIPine (NORVASC) 10 MG tablet    carvedilol (COREG) 6.25 MG tablet   2. Chronic combined systolic (congestive) and diastolic (congestive) heart failure (HCC)  furosemide (LASIX) 40 MG tablet    sacubitril-valsartan (ENTRESTO) 49-51 MG per tablet    aspirin EC 81 MG EC tablet   3. Type 2 diabetes mellitus without complication, without long-term current use of insulin (HCC)  metFORMIN (GLUCOPHAGE) 1000 MG tablet   4. Hypokalemia  potassium chloride (KLOR-CON M) 10 MEQ extended release tablet   5.  Chronic lumbosacral pain  meloxicam (MOBIC) 7.5 MG tablet       Mitchell DO Ginette 6/2/2022 12:04 PM

## 2022-06-25 ENCOUNTER — HOSPITAL ENCOUNTER (EMERGENCY)
Age: 52
Discharge: HOME | End: 2022-06-25
Attending: EMERGENCY MEDICINE
Payer: COMMERCIAL

## 2022-06-25 VITALS
DIASTOLIC BLOOD PRESSURE: 64 MMHG | HEART RATE: 95 BPM | RESPIRATION RATE: 18 BRPM | TEMPERATURE: 97.1 F | OXYGEN SATURATION: 100 % | WEIGHT: 293 LBS | BODY MASS INDEX: 46.19 KG/M2 | SYSTOLIC BLOOD PRESSURE: 149 MMHG

## 2022-06-25 DIAGNOSIS — M54.32 SCIATICA OF LEFT SIDE: Primary | ICD-10-CM

## 2022-06-25 PROCEDURE — 96372 THER/PROPH/DIAG INJ SC/IM: CPT

## 2022-06-25 PROCEDURE — 99284 EMERGENCY DEPT VISIT MOD MDM: CPT

## 2022-06-25 PROCEDURE — 6360000002 HC RX W HCPCS: Performed by: EMERGENCY MEDICINE

## 2022-06-25 RX ORDER — KETOROLAC TROMETHAMINE 30 MG/ML
30 INJECTION, SOLUTION INTRAMUSCULAR; INTRAVENOUS ONCE
Status: COMPLETED | OUTPATIENT
Start: 2022-06-25 | End: 2022-06-25

## 2022-06-25 RX ORDER — CYCLOBENZAPRINE HCL 10 MG
10 TABLET ORAL 3 TIMES DAILY PRN
Qty: 10 TABLET | Refills: 0 | Status: SHIPPED | OUTPATIENT
Start: 2022-06-25 | End: 2022-07-05

## 2022-06-25 RX ORDER — HYDROCODONE BITARTRATE AND ACETAMINOPHEN 5; 325 MG/1; MG/1
1 TABLET ORAL EVERY 6 HOURS PRN
Qty: 6 TABLET | Refills: 0 | Status: SHIPPED | OUTPATIENT
Start: 2022-06-25 | End: 2022-06-28

## 2022-06-25 RX ADMIN — KETOROLAC TROMETHAMINE 30 MG: 30 INJECTION, SOLUTION INTRAMUSCULAR; INTRAVENOUS at 14:46

## 2022-06-25 ASSESSMENT — PAIN SCALES - GENERAL
PAINLEVEL_OUTOF10: 10
PAINLEVEL_OUTOF10: 10

## 2022-06-25 ASSESSMENT — PAIN DESCRIPTION - PAIN TYPE: TYPE: ACUTE PAIN

## 2022-06-25 ASSESSMENT — PAIN - FUNCTIONAL ASSESSMENT: PAIN_FUNCTIONAL_ASSESSMENT: 0-10

## 2022-06-25 ASSESSMENT — PAIN DESCRIPTION - DESCRIPTORS: DESCRIPTORS: DISCOMFORT

## 2022-06-25 ASSESSMENT — PAIN DESCRIPTION - ORIENTATION: ORIENTATION: LEFT;LOWER

## 2022-06-25 ASSESSMENT — PAIN DESCRIPTION - FREQUENCY: FREQUENCY: CONTINUOUS

## 2022-06-25 ASSESSMENT — PAIN DESCRIPTION - LOCATION: LOCATION: BACK

## 2022-06-25 NOTE — LETTER
[facility] ED  [address] [facility] [address] [facility] [address]  Phone: [phone]             June 25, 2022    Patient: Tori Nazario   YOB: 1970   Date of Visit: 6/25/2022       To Whom It May Concern:    Celestine Mcallister was seen and treated in our emergency department on 6/25/2022. She may return to work on 6/28/2022.       Sincerely,             Signature:__________________________________

## 2022-06-27 ASSESSMENT — ENCOUNTER SYMPTOMS: BACK PAIN: 1
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
## 2022-07-01 DIAGNOSIS — E78.5 DYSLIPIDEMIA: ICD-10-CM

## 2022-07-05 RX ORDER — ATORVASTATIN CALCIUM 40 MG/1
TABLET, FILM COATED ORAL
Qty: 90 TABLET | Refills: 3 | Status: SHIPPED | OUTPATIENT
Start: 2022-07-05

## 2022-07-05 NOTE — TELEPHONE ENCOUNTER
E-scribe request for med refills. Please review and e-scribe if applicable. Last Visit Date:  6/2/22  Next Visit Date:  Visit date not found    Hemoglobin A1C (%)   Date Value   05/09/2022 8.5 (H)   06/03/2021 7.1   02/01/2021 7.3             ( goal A1C is < 7)   Microalb/Crt.  Ratio (mcg/mg creat)   Date Value   05/09/2022 105 (H)     LDL Cholesterol (mg/dL)   Date Value   05/09/2022 105       (goal LDL is <100)   AST (U/L)   Date Value   09/20/2021 15     ALT (U/L)   Date Value   09/20/2021 15     BUN (mg/dL)   Date Value   03/11/2022 7     BP Readings from Last 3 Encounters:   06/25/22 (!) 149/64   06/02/22 112/70   04/06/22 (!) 181/97          (goal 120/80)        Patient Active Problem List:     Migraine     Morbid obesity ([address]     Iron deficiency     Tobacco abuse     S/P Permanent nail avulsion, 2nd digit right foot      Acute rhinosinusitis     Dyslipidemia     Vitamin B12 deficiency     Folate deficiency     Constipation     Hypertension goal BP (blood pressure) < 140/90     Bright red blood per rectum     Acute blood loss anemia     Vaginal bleeding     Ovarian cyst     Pre-diabetes     Menorrhagia     H. pylori infection     Microcytosis     Anemia     Hydradenitis     Hypertension, goal below 140/90     Right leg paresthesias     Chronic back pain     Rotator cuff injury     Vitamin D deficiency     Rotator cuff tendinitis     Smoker     Left breast abscess     Iron deficiency anemia     Breast abscess     Medial meniscus tear     Right knee pain     Arthritis of knee     Lumbar disc herniation S/P L3 4 and L4 5 and tenotomy right side for foraminotomy     Leg swelling     Pelvic mass in female     Hypocalcemia     Pericardial effusion     Hepatomegaly     Intramural leiomyoma of uterus     Heart failure (HCC)     Hyponatremia     STD (female)     Bacterial vaginosis     Cardiomyopathy ([address]     Hyperglycemia     Hyperglycemia due to type 2 diabetes mellitus (HCC)     Hyperkalemia     WIHT (acute kidney injury) ([facility]     Hidradenitis suppurativa     Type 2 diabetes mellitus with hyperglycemia, without long-term current use of insulin (HCC)     Toenail deformity     Skin infection     Arthritis     Essential hypertension     Lumbar back pain with radiculopathy affecting left lower extremity     Encounter for smoking cessation counseling      ----Steven Goldstein

## 2022-09-28 ENCOUNTER — OFFICE VISIT (OUTPATIENT)
Dept: PHYSICAL MEDICINE AND REHAB | Age: 52
End: 2022-09-28
Payer: COMMERCIAL

## 2022-09-28 VITALS
DIASTOLIC BLOOD PRESSURE: 87 MMHG | HEIGHT: 67 IN | WEIGHT: 289 LBS | BODY MASS INDEX: 45.36 KG/M2 | TEMPERATURE: 98.2 F | HEART RATE: 80 BPM | SYSTOLIC BLOOD PRESSURE: 157 MMHG

## 2022-09-28 DIAGNOSIS — M70.62 TROCHANTERIC BURSITIS OF LEFT HIP: ICD-10-CM

## 2022-09-28 DIAGNOSIS — M54.59 LUMBAR TRIGGER POINT SYNDROME: Primary | ICD-10-CM

## 2022-09-28 DIAGNOSIS — M54.17 RADICULOPATHY, LUMBOSACRAL REGION: ICD-10-CM

## 2022-09-28 PROCEDURE — 20553 NJX 1/MLT TRIGGER POINTS 3/>: CPT | Performed by: PHYSICAL MEDICINE & REHABILITATION

## 2022-09-28 PROCEDURE — 20610 DRAIN/INJ JOINT/BURSA W/O US: CPT | Performed by: PHYSICAL MEDICINE & REHABILITATION

## 2022-09-28 PROCEDURE — 99213 OFFICE O/P EST LOW 20 MIN: CPT | Performed by: PHYSICAL MEDICINE & REHABILITATION

## 2022-09-28 RX ORDER — GABAPENTIN 300 MG/1
600 CAPSULE ORAL 3 TIMES DAILY
Qty: 180 CAPSULE | Refills: 8 | Status: SHIPPED | OUTPATIENT
Start: 2022-09-28 | End: 2023-06-25

## 2022-09-28 RX ORDER — TRIAMCINOLONE ACETONIDE 40 MG/ML
40 INJECTION, SUSPENSION INTRA-ARTICULAR; INTRAMUSCULAR ONCE
Status: COMPLETED | OUTPATIENT
Start: 2022-09-28 | End: 2022-09-28

## 2022-09-28 RX ORDER — LIDOCAINE HYDROCHLORIDE 10 MG/ML
4 INJECTION, SOLUTION INFILTRATION; PERINEURAL ONCE
Status: COMPLETED | OUTPATIENT
Start: 2022-09-28 | End: 2022-09-28

## 2022-09-28 RX ORDER — LIDOCAINE HYDROCHLORIDE 10 MG/ML
7 INJECTION, SOLUTION INFILTRATION; PERINEURAL ONCE
Status: COMPLETED | OUTPATIENT
Start: 2022-09-28 | End: 2022-09-28

## 2022-09-28 RX ADMIN — LIDOCAINE HYDROCHLORIDE 7 ML: 10 INJECTION, SOLUTION INFILTRATION; PERINEURAL at 17:12

## 2022-09-28 RX ADMIN — LIDOCAINE HYDROCHLORIDE 4 ML: 10 INJECTION, SOLUTION INFILTRATION; PERINEURAL at 17:11

## 2022-09-28 RX ADMIN — TRIAMCINOLONE ACETONIDE 40 MG: 40 INJECTION, SUSPENSION INTRA-ARTICULAR; INTRAMUSCULAR at 17:09
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
## 2022-10-28 NOTE — TELEPHONE ENCOUNTER
----- Message from William Rollins DO sent at 3/13/2020  9:48 AM EDT -----  Oskar Doe RESULTS TO PCP FOR POOR GLYCEMIC CONTROL OF HER DM Ok to confirm - this is a patient of Dr. Brannon who I cannot confirm right now apparently myself.    Ok with confirming, will just keep an extra close eye on updated UPTs from now on.

## 2022-12-07 ENCOUNTER — OFFICE VISIT (OUTPATIENT)
Dept: PHYSICAL MEDICINE AND REHAB | Age: 52
End: 2022-12-07

## 2022-12-07 VITALS
DIASTOLIC BLOOD PRESSURE: 76 MMHG | TEMPERATURE: 97.3 F | SYSTOLIC BLOOD PRESSURE: 137 MMHG | HEIGHT: 67 IN | BODY MASS INDEX: 44.57 KG/M2 | WEIGHT: 284 LBS | HEART RATE: 76 BPM

## 2022-12-07 DIAGNOSIS — M70.62 TROCHANTERIC BURSITIS OF LEFT HIP: Primary | ICD-10-CM

## 2022-12-07 DIAGNOSIS — M79.10 MYALGIA: ICD-10-CM

## 2022-12-07 DIAGNOSIS — M54.59 LUMBAR TRIGGER POINT SYNDROME: ICD-10-CM

## 2022-12-07 RX ORDER — LIDOCAINE 50 MG/G
2 OINTMENT TOPICAL 3 TIMES DAILY
Qty: 50 G | Refills: 1 | Status: SHIPPED | OUTPATIENT
Start: 2022-12-07

## 2022-12-07 RX ORDER — LIDOCAINE HYDROCHLORIDE 10 MG/ML
8 INJECTION, SOLUTION INFILTRATION; PERINEURAL ONCE
Status: COMPLETED | OUTPATIENT
Start: 2022-12-07 | End: 2022-12-07

## 2022-12-07 RX ADMIN — LIDOCAINE HYDROCHLORIDE 8 ML: 10 INJECTION, SOLUTION INFILTRATION; PERINEURAL at 09:05
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
## 2022-12-15 ENCOUNTER — TELEMEDICINE (OUTPATIENT)
Dept: FAMILY MEDICINE CLINIC | Age: 52
End: 2022-12-15
Payer: COMMERCIAL

## 2022-12-15 DIAGNOSIS — J01.90 ACUTE BACTERIAL SINUSITIS: Primary | ICD-10-CM

## 2022-12-15 DIAGNOSIS — B96.89 ACUTE BACTERIAL SINUSITIS: Primary | ICD-10-CM

## 2022-12-15 DIAGNOSIS — E11.65 TYPE 2 DIABETES MELLITUS WITH HYPERGLYCEMIA, WITHOUT LONG-TERM CURRENT USE OF INSULIN (HCC): ICD-10-CM

## 2022-12-15 PROCEDURE — 99443 PR PHYS/QHP TELEPHONE EVALUATION 21-30 MIN: CPT | Performed by: STUDENT IN AN ORGANIZED HEALTH CARE EDUCATION/TRAINING PROGRAM

## 2022-12-15 RX ORDER — GLUCOSAMINE HCL/CHONDROITIN SU 500-400 MG
CAPSULE ORAL
Qty: 300 STRIP | Refills: 3 | Status: SHIPPED | OUTPATIENT
Start: 2022-12-15

## 2022-12-15 RX ORDER — INSULIN GLARGINE 100 [IU]/ML
INJECTION, SOLUTION SUBCUTANEOUS
Qty: 5 ADJUSTABLE DOSE PRE-FILLED PEN SYRINGE | Refills: 2 | Status: SHIPPED | OUTPATIENT
Start: 2022-12-15

## 2022-12-15 RX ORDER — LANCETS 30 GAUGE
1 EACH MISCELLANEOUS DAILY
Qty: 100 EACH | Refills: 3 | Status: SHIPPED | OUTPATIENT
Start: 2022-12-15 | End: 2023-01-18 | Stop reason: SDUPTHER

## 2022-12-15 RX ORDER — AMOXICILLIN AND CLAVULANATE POTASSIUM 875; 125 MG/1; MG/1
1 TABLET, FILM COATED ORAL 2 TIMES DAILY
Qty: 14 TABLET | Refills: 0 | Status: SHIPPED | OUTPATIENT
Start: 2022-12-15 | End: 2022-12-22

## 2022-12-15 NOTE — PROGRESS NOTES
Attending Physician Statement  I have discussed the care of Merry Peguero, 46 y.o. female,including pertinent history and exam findings,  with the resident Dr. Denton Weinstein MD.  History:  Chief Complaint   Patient presents with    Cough     Patient has a dry cough for a week    Drainage     Patient been having nose drainage for a week     I have reviewed the key elements of the encounter with the resident. Examination was done by resident as documented in residents note. BP Readings from Last 3 Encounters:   12/07/22 137/76   09/28/22 (!) 157/87   06/25/22 (!) 149/64     There were no vitals taken for this visit. Lab Results   Component Value Date    WBC 9.0 03/11/2022    HGB 12.4 03/11/2022    HCT 40.5 03/11/2022     03/11/2022    CHOL 169 05/09/2022    TRIG 151 (H) 05/09/2022    HDL 34 (L) 05/09/2022    ALT 15 09/20/2021    AST 15 09/20/2021     03/11/2022    K 4.2 03/11/2022     03/11/2022    CREATININE 0.53 03/11/2022    BUN 7 03/11/2022    CO2 23 03/11/2022    TSH 1.98 03/12/2020    INR 1.3 01/29/2020    GLUF 109 (H) 01/15/2013    LABA1C 8.5 (H) 05/09/2022    LABMICR 105 (H) 05/09/2022     Lab Results   Component Value Date    CALCIUM 8.8 09/20/2021    PHOS 3.7 01/28/2020     Lab Results   Component Value Date    LDLCHOLESTEROL 105 05/09/2022     I agree with the assessment, plan and diagnosis of    Diagnosis Orders   1. Acute bacterial sinusitis  amoxicillin-clavulanate (AUGMENTIN) 875-125 MG per tablet      2. Type 2 diabetes mellitus with hyperglycemia, without long-term current use of insulin (HCC)  insulin glargine (LANTUS SOLOSTAR) 100 UNIT/ML injection pen    Lancets MISC    insulin lispro (HUMALOG) 100 UNIT/ML injection vial    blood glucose monitor strips        I agree with  orders as documented by the resident. Recommendations: Agree with A&P. Return in about 3 weeks (around 1/5/2023) for FU Sinusitis, T2DM check up.    (Victor M Gil ) Dr. Isaak Lauren MD

## 2022-12-15 NOTE — PATIENT INSTRUCTIONS
Thank you for letting us take care of you today. We hope all your questions were addressed. If a question was overlooked or something else comes to mind after you return home, please contact a member of your Care Team listed below. Your Care Team at [place] [address] is Team #5  Antonio Pascual MD (Faculty)  Sepideh Zuniga MD (Resident)  Justine Goodman MD (Resident)  Paula Fonseca MD (Resident)  ADRIAN Fairbanks. ,ANNEL THOMASON, ANNEL Ware ([facility] office)  [facility] office)  [facility], [address] (Clinical Practice Manager)  Nazanin Retana, [address] [place] [address] (Clinical Pharmacist)       Office phone number: [phone]    If you need to get in right away due to illness, please be advised we have \"Same Day\" appointments available Monday-Friday. Please call us at [phone] option #3 to schedule your \"Same Day\" appointment.

## 2022-12-15 NOTE — PROGRESS NOTES
Visit Information    Have you changed or started any medications since your last visit including any over-the-counter medicines, vitamins, or herbal medicines? no   Have you stopped taking any of your medications? Is so, why? -  no  Are you having any side effects from any of your medications? - no    Have you seen any other physician or provider since your last visit?  no   Have you had any other diagnostic tests since your last visit?  no   Have you been seen in the emergency room and/or had an admission in a hospital since we last saw you?  no   Have you had your routine dental cleaning in the past 6 months?  no     Do you have an active MyChart account? If no, what is the barrier?   No:     Patient Care Team:  Joseph Kam MD as PCP - General (Emergency Medicine)    Medical History Review  Past Medical, Family, and Social History reviewed and does not contribute to the patient presenting condition    Health Maintenance   Topic Date Due    Diabetic retinal exam  Never done    Hepatitis B vaccine (1 of 3 - Risk 3-dose series) Never done    Colorectal Cancer Screen  Never done    Shingles vaccine (1 of 2) Never done    Diabetic foot exam  08/13/2021    COVID-19 Vaccine (3 - Booster for Pfizer series) 02/07/2022    Flu vaccine (1) Never done    A1C test (Diabetic or Prediabetic)  05/09/2023    Diabetic microalbuminuria test  05/09/2023    Lipids  05/09/2023    Depression Screen  06/02/2023    Breast cancer screen  06/18/2023    Cervical cancer screen  02/07/2025    DTaP/Tdap/Td vaccine (3 - Td or Tdap) 02/17/2030    Pneumococcal 0-64 years Vaccine  Completed    Hepatitis C screen  Completed    HIV screen  Completed    Hepatitis A vaccine  Aged Out    Hib vaccine  Aged Out    Meningococcal (ACWY) vaccine  Aged Out

## 2022-12-15 NOTE — PROGRESS NOTES
Lina Gandhi is a 46 y.o. female evaluated via telephone on 12/15/2022 for Cough (Patient has a dry cough for a week) and Drainage (Patient been having nose drainage for a week)      Documentation:  I communicated with the patient and/or health care decision maker about     Sinus congestion      Details of this discussion including any medical advice provided:     Is a 46 female with a past medical history of HFpEF, type 2 diabetes on insulin and hypertension calling for congestion  Ongoing for the last 9 days  Has been getting worse and not improving  Main symptoms include sinus congestion with thick green drainage from the nose as well as postnasal drip leading to a productive cough with thick green sputum and no hemoptysis  She also has a frontal sinus headache  She denies any fever, chills, abdominal pain, diarrhea, nausea, vomiting, chest pain, shortness of breath, dyspnea on exertion  She has been using Mucinex and alkaline seltzers with no relief of her symptoms  Will prescribe Augmentin twice daily for 7 days for the treatment of acute bacterial sinusitis    In regards to her type 2 diabetes she needs refills for her insulin  She states her sugars have been in the 1  range, she checks her sugars 2-3 times a day  She is currently on Lantus 15 units twice daily and Humalog sliding scale, she states she does not miss any doses of her insulin  Her last HbA1c was checked more than 3 months ago and it was 8.7 at that time and it had increased  I did advise the patient to schedule an in person appointment so we can evaluate for her type 2 diabetes and as well as recheck her HbA1c, she may need some adjustments to her insulin if her HbA1c is still elevated or higher  She also needs screening for colon cancer total address at the next visit    Patient agreement with plan    Total Time: minutes: 21-30 minutes    Lina Gandhi was evaluated through a synchronous (real-time) audio encounter.  Patient identification was verified at the start of the visit. She (or guardian if applicable) is aware that this is a billable service, which includes applicable co-pays. This visit was conducted with the patient's (and/or legal guardian's) verbal consent. She has not had a related appointment within my department in the past 7 days or scheduled within the next 24 hours. The patient was located at Home: [address] [place] [address]. The provider was located at [facility] (Appt Dept): [address] [place] [address]     Note: not billable if this call serves to triage the patient into an appointment for the relevant concern    Judith Salazar MD

## 2022-12-22 ENCOUNTER — TELEPHONE (OUTPATIENT)
Dept: FAMILY MEDICINE CLINIC | Age: 52
End: 2022-12-22

## 2022-12-26 DIAGNOSIS — E11.9 TYPE 2 DIABETES MELLITUS WITHOUT COMPLICATION, WITHOUT LONG-TERM CURRENT USE OF INSULIN (HCC): ICD-10-CM

## 2022-12-26 DIAGNOSIS — I10 ESSENTIAL HYPERTENSION: ICD-10-CM

## 2022-12-26 DIAGNOSIS — I50.42 CHRONIC COMBINED SYSTOLIC (CONGESTIVE) AND DIASTOLIC (CONGESTIVE) HEART FAILURE (HCC): ICD-10-CM

## 2022-12-27 RX ORDER — FUROSEMIDE 40 MG/1
TABLET ORAL
Qty: 90 TABLET | Refills: 1 | Status: SHIPPED | OUTPATIENT
Start: 2022-12-27

## 2022-12-27 RX ORDER — CARVEDILOL 6.25 MG/1
TABLET ORAL
Qty: 180 TABLET | Refills: 1 | Status: SHIPPED | OUTPATIENT
Start: 2022-12-27

## 2022-12-27 NOTE — TELEPHONE ENCOUNTER
Furosemide , Metformin and Carvedilol pending refills      Health Maintenance   Topic Date Due    Diabetic retinal exam  Never done    GFR test (Diabetes, CKD 3-4, OR last GFR 15-59)  Never done    Hepatitis B vaccine (1 of 3 - Risk 3-dose series) Never done    Colorectal Cancer Screen  Never done    Shingles vaccine (1 of 2) Never done    Diabetic foot exam  08/13/2021    COVID-19 Vaccine (3 - Booster for Pfizer series) 02/07/2022    Flu vaccine (1) Never done    A1C test (Diabetic or Prediabetic)  05/09/2023    Diabetic Alb to Cr ratio (uACR) test  05/09/2023    Lipids  05/09/2023    Depression Screen  06/02/2023    Breast cancer screen  06/18/2023    Cervical cancer screen  02/07/2025    DTaP/Tdap/Td vaccine (3 - Td or Tdap) 02/17/2030    Pneumococcal 0-64 years Vaccine  Completed    Hepatitis C screen  Completed    HIV screen  Completed    Hepatitis A vaccine  Aged Out    Hib vaccine  Aged Out    Meningococcal (ACWY) vaccine  Aged Out             (applicable per patient's age: Cancer Screenings, Depression Screening, Fall Risk Screening, Immunizations)    Hemoglobin A1C (%)   Date Value   05/09/2022 8.5 (H)   06/03/2021 7.1   02/01/2021 7.3     Microalb/Crt.  Ratio (mcg/mg creat)   Date Value   05/09/2022 105 (H)     LDL Cholesterol (mg/dL)   Date Value   05/09/2022 105     AST (U/L)   Date Value   09/20/2021 15     ALT (U/L)   Date Value   09/20/2021 15     BUN (mg/dL)   Date Value   03/11/2022 7      (goal A1C is < 7)   (goal LDL is <100) need 30-50% reduction from baseline     BP Readings from Last 3 Encounters:   12/07/22 137/76   09/28/22 (!) 157/87   06/25/22 (!) 149/64    (goal /80)      All Future Testing planned in [facility]:  Lab Frequency Next Occurrence       Next Visit Date:  Future Appointments   Date Time Provider Nelsy [place]   1/4/2023  3:15 PM Lena Park MD [address] [facility]   2/15/2023  3:30 PM Anastacio Haley MD [address]            Patient Active Problem List:     Migraine     Morbid obesity ([facility] [address]     Iron deficiency     Tobacco abuse     S/P Permanent nail avulsion, 2nd digit right foot      Acute rhinosinusitis     Dyslipidemia     Vitamin B12 deficiency     Folate deficiency     Constipation     Hypertension goal BP (blood pressure) < 140/90     Bright red blood per rectum     Acute blood loss anemia     Vaginal bleeding     Ovarian cyst     Pre-diabetes     Menorrhagia     H. pylori infection     Microcytosis     Anemia     Hydradenitis     Hypertension, goal below 140/90     Right leg paresthesias     Chronic back pain     Rotator cuff injury     Vitamin D deficiency     Rotator cuff tendinitis     Smoker     Left breast abscess     Iron deficiency anemia     Breast abscess     Medial meniscus tear     Right knee pain     Arthritis of knee     Lumbar disc herniation S/P L3 4 and L4 5 and tenotomy right side for foraminotomy     Leg swelling     Pelvic mass in female     Hypocalcemia     Pericardial effusion     Hepatomegaly     Intramural leiomyoma of uterus     Heart failure (HCC)     Hyponatremia     STD (female)     Bacterial vaginosis     Cardiomyopathy ([facility] [address]     Hyperglycemia     Hyperglycemia due to type 2 diabetes mellitus (HCC)     Hyperkalemia     WHIT (acute kidney injury) ([facility] [address]     Hidradenitis suppurativa     Type 2 diabetes mellitus with hyperglycemia, without long-term current use of insulin (HCC)     Toenail deformity     Skin infection     Arthritis     Essential hypertension     Lumbar back pain with radiculopathy affecting left lower extremity     Encounter for smoking cessation counseling

## 2023-01-03 ENCOUNTER — HOSPITAL ENCOUNTER (OUTPATIENT)
Age: 53
Setting detail: SPECIMEN
Discharge: HOME | End: 2023-01-03

## 2023-01-03 ENCOUNTER — OFFICE VISIT (OUTPATIENT)
Dept: OBGYN CLINIC | Age: 53
End: 2023-01-03
Payer: COMMERCIAL

## 2023-01-03 VITALS
DIASTOLIC BLOOD PRESSURE: 84 MMHG | WEIGHT: 274 LBS | HEIGHT: 67 IN | SYSTOLIC BLOOD PRESSURE: 122 MMHG | BODY MASS INDEX: 43 KG/M2

## 2023-01-03 DIAGNOSIS — E11.9 TYPE 2 DIABETES MELLITUS WITHOUT COMPLICATION, WITHOUT LONG-TERM CURRENT USE OF INSULIN (HCC): ICD-10-CM

## 2023-01-03 DIAGNOSIS — Z12.31 ENCOUNTER FOR SCREENING MAMMOGRAM FOR MALIGNANT NEOPLASM OF BREAST: ICD-10-CM

## 2023-01-03 DIAGNOSIS — Z11.51 SPECIAL SCREENING EXAMINATION FOR HUMAN PAPILLOMAVIRUS (HPV): ICD-10-CM

## 2023-01-03 DIAGNOSIS — N76.0 ACUTE VAGINITIS: ICD-10-CM

## 2023-01-03 DIAGNOSIS — Z01.419 WELL FEMALE EXAM WITH ROUTINE GYNECOLOGICAL EXAM: Primary | ICD-10-CM

## 2023-01-03 DIAGNOSIS — E11.9 TYPE 2 DIABETES MELLITUS WITHOUT COMPLICATION, WITHOUT LONG-TERM CURRENT USE OF INSULIN (HCC): Primary | ICD-10-CM

## 2023-01-03 PROCEDURE — 3074F SYST BP LT 130 MM HG: CPT | Performed by: NURSE PRACTITIONER

## 2023-01-03 PROCEDURE — 3079F DIAST BP 80-89 MM HG: CPT | Performed by: NURSE PRACTITIONER

## 2023-01-03 PROCEDURE — 99396 PREV VISIT EST AGE 40-64: CPT | Performed by: NURSE PRACTITIONER

## 2023-01-03 RX ORDER — INSULIN ASPART 100 [IU]/ML
INJECTION, SOLUTION INTRAVENOUS; SUBCUTANEOUS
Qty: 5 ADJUSTABLE DOSE PRE-FILLED PEN SYRINGE | Refills: 3 | Status: SHIPPED | OUTPATIENT
Start: 2023-01-03

## 2023-01-03 RX ORDER — FLUCONAZOLE 150 MG/1
150 TABLET ORAL ONCE
Qty: 3 TABLET | Refills: 2 | Status: SHIPPED | OUTPATIENT
Start: 2023-01-03 | End: 2023-01-03

## 2023-01-03 RX ORDER — CLOTRIMAZOLE AND BETAMETHASONE DIPROPIONATE 10; .64 MG/G; MG/G
CREAM TOPICAL
Qty: 45 G | Refills: 1 | Status: SHIPPED | OUTPATIENT
Start: 2023-01-03

## 2023-01-03 ASSESSMENT — PATIENT HEALTH QUESTIONNAIRE - PHQ9
SUM OF ALL RESPONSES TO PHQ QUESTIONS 1-9: 0
2. FEELING DOWN, DEPRESSED OR HOPELESS: 0
1. LITTLE INTEREST OR PLEASURE IN DOING THINGS: 0
SUM OF ALL RESPONSES TO PHQ QUESTIONS 1-9: 0
SUM OF ALL RESPONSES TO PHQ QUESTIONS 1-9: 0
SUM OF ALL RESPONSES TO PHQ9 QUESTIONS 1 & 2: 0
SUM OF ALL RESPONSES TO PHQ QUESTIONS 1-9: 0
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
## 2023-01-04 ENCOUNTER — TELEPHONE (OUTPATIENT)
Dept: OBGYN CLINIC | Age: 53
End: 2023-01-04

## 2023-01-04 DIAGNOSIS — N76.0 ACUTE VAGINITIS: ICD-10-CM

## 2023-01-04 LAB
C TRACH DNA GENITAL QL NAA+PROBE: NEGATIVE
CANDIDA SPECIES, DNA PROBE: POSITIVE
GARDNERELLA VAGINALIS, DNA PROBE: NEGATIVE
N. GONORRHOEAE DNA: NEGATIVE
SOURCE: ABNORMAL
SPECIMEN DESCRIPTION: NORMAL
TRICHOMONAS VAGINALIS DNA: NEGATIVE

## 2023-01-04 RX ORDER — FLUCONAZOLE 150 MG/1
150 TABLET ORAL ONCE
Qty: 2 TABLET | Refills: 0 | Status: SHIPPED | OUTPATIENT
Start: 2023-01-04 | End: 2023-01-04

## 2023-01-04 NOTE — TELEPHONE ENCOUNTER
Per Radha Packer NP, pt notified of +yeast; RX for diflucan to be sent to pt pharmacy. Pt verbalized understanding.

## 2023-01-04 NOTE — TELEPHONE ENCOUNTER
----- Message from WENDY Caldera NP sent at 1/4/2023  9:39 AM EST -----  + yeast  Diflucan 150 mg PO x 1 repeat in 7 days

## 2023-01-05 ENCOUNTER — TELEPHONE (OUTPATIENT)
Dept: FAMILY MEDICINE CLINIC | Age: 53
End: 2023-01-05

## 2023-01-06 DIAGNOSIS — M54.50 CHRONIC LUMBOSACRAL PAIN: ICD-10-CM

## 2023-01-06 DIAGNOSIS — G89.29 CHRONIC LUMBOSACRAL PAIN: ICD-10-CM

## 2023-01-06 RX ORDER — MELOXICAM 7.5 MG/1
TABLET ORAL
Qty: 30 TABLET | Refills: 3 | Status: SHIPPED | OUTPATIENT
Start: 2023-01-06

## 2023-01-06 NOTE — TELEPHONE ENCOUNTER
E-scribe request for med refill. Please review and e-scribe if applicable. Last Visit Date:  12/15/2022  Next Visit Date:  1/18/2023    Hemoglobin A1C (%)   Date Value   05/09/2022 8.5 (H)   06/03/2021 7.1   02/01/2021 7.3             ( goal A1C is < 7)   Microalb/Crt.  Ratio (mcg/mg creat)   Date Value   05/09/2022 105 (H)     LDL Cholesterol (mg/dL)   Date Value   05/09/2022 105       (goal LDL is <100)   AST (U/L)   Date Value   09/20/2021 15     ALT (U/L)   Date Value   09/20/2021 15     BUN (mg/dL)   Date Value   03/11/2022 7     BP Readings from Last 3 Encounters:   01/03/23 122/84   12/07/22 137/76   09/28/22 (!) 157/87          (goal 120/80)        Patient Active Problem List:     Migraine     Morbid obesity ([address]     Iron deficiency     Tobacco abuse     S/P Permanent nail avulsion, 2nd digit right foot      Acute rhinosinusitis     Dyslipidemia     Vitamin B12 deficiency     Folate deficiency     Constipation     Hypertension goal BP (blood pressure) < 140/90     Bright red blood per rectum     Acute blood loss anemia     Vaginal bleeding     Ovarian cyst     Pre-diabetes     Menorrhagia     H. pylori infection     Microcytosis     Anemia     Hydradenitis     Hypertension, goal below 140/90     Right leg paresthesias     Chronic back pain     Rotator cuff injury     Vitamin D deficiency     Rotator cuff tendinitis     Smoker     Left breast abscess     Iron deficiency anemia     Breast abscess     Medial meniscus tear     Right knee pain     Arthritis of knee     Lumbar disc herniation S/P L3 4 and L4 5 and tenotomy right side for foraminotomy     Leg swelling     Pelvic mass in female     Hypocalcemia     Pericardial effusion     Hepatomegaly     Intramural leiomyoma of uterus     Heart failure (HCC)     Hyponatremia     STD (female)     Bacterial vaginosis     Cardiomyopathy ([address]     Hyperglycemia     Hyperglycemia due to type 2 diabetes mellitus (HCC)     Hyperkalemia     WHIT (acute kidney injury) ([facility]     Hidradenitis suppurativa     Type 2 diabetes mellitus with hyperglycemia, without long-term current use of insulin (HCC)     Toenail deformity     Skin infection     Arthritis     Essential hypertension     Lumbar back pain with radiculopathy affecting left lower extremity     Encounter for smoking cessation counseling      ----Torito Ni

## 2023-01-11 NOTE — TELEPHONE ENCOUNTER
[facility] has Approved Insulin Aspart pen. Good from 1/5/2023-1/5/2024. Writer faxed approval to pharmacy.

## 2023-01-18 ENCOUNTER — OFFICE VISIT (OUTPATIENT)
Dept: FAMILY MEDICINE CLINIC | Age: 53
End: 2023-01-18
Payer: COMMERCIAL

## 2023-01-18 VITALS
DIASTOLIC BLOOD PRESSURE: 78 MMHG | HEART RATE: 88 BPM | WEIGHT: 274 LBS | SYSTOLIC BLOOD PRESSURE: 134 MMHG | BODY MASS INDEX: 42.91 KG/M2 | OXYGEN SATURATION: 95 %

## 2023-01-18 DIAGNOSIS — E11.65 TYPE 2 DIABETES MELLITUS WITH HYPERGLYCEMIA, WITHOUT LONG-TERM CURRENT USE OF INSULIN (HCC): Primary | ICD-10-CM

## 2023-01-18 DIAGNOSIS — I50.20 HFREF (HEART FAILURE WITH REDUCED EJECTION FRACTION) (HCC): ICD-10-CM

## 2023-01-18 LAB — HBA1C MFR BLD: 14 %

## 2023-01-18 PROCEDURE — 83036 HEMOGLOBIN GLYCOSYLATED A1C: CPT | Performed by: STUDENT IN AN ORGANIZED HEALTH CARE EDUCATION/TRAINING PROGRAM

## 2023-01-18 PROCEDURE — 99211 OFF/OP EST MAY X REQ PHY/QHP: CPT | Performed by: FAMILY MEDICINE

## 2023-01-18 PROCEDURE — 3046F HEMOGLOBIN A1C LEVEL >9.0%: CPT | Performed by: STUDENT IN AN ORGANIZED HEALTH CARE EDUCATION/TRAINING PROGRAM

## 2023-01-18 PROCEDURE — 3078F DIAST BP <80 MM HG: CPT | Performed by: STUDENT IN AN ORGANIZED HEALTH CARE EDUCATION/TRAINING PROGRAM

## 2023-01-18 PROCEDURE — 3074F SYST BP LT 130 MM HG: CPT | Performed by: STUDENT IN AN ORGANIZED HEALTH CARE EDUCATION/TRAINING PROGRAM

## 2023-01-18 PROCEDURE — 99213 OFFICE O/P EST LOW 20 MIN: CPT | Performed by: STUDENT IN AN ORGANIZED HEALTH CARE EDUCATION/TRAINING PROGRAM

## 2023-01-18 RX ORDER — SEMAGLUTIDE 1.34 MG/ML
INJECTION, SOLUTION SUBCUTANEOUS
Qty: 3 ML | Refills: 0 | Status: SHIPPED | OUTPATIENT
Start: 2023-01-18 | End: 2023-03-15

## 2023-01-18 RX ORDER — LANCETS 30 GAUGE
1 EACH MISCELLANEOUS DAILY
Qty: 100 EACH | Refills: 3 | Status: SHIPPED | OUTPATIENT
Start: 2023-01-18

## 2023-01-18 RX ORDER — BLOOD PRESSURE TEST KIT
1 KIT MISCELLANEOUS PRN
Qty: 1 EACH | Refills: 3 | Status: SHIPPED | OUTPATIENT
Start: 2023-01-18

## 2023-01-18 RX ORDER — PEN NEEDLE, DIABETIC 32GX 5/32"
NEEDLE, DISPOSABLE MISCELLANEOUS
Qty: 100 EACH | Refills: 1 | Status: SHIPPED | OUTPATIENT
Start: 2023-01-18

## 2023-01-18 SDOH — ECONOMIC STABILITY: FOOD INSECURITY: WITHIN THE PAST 12 MONTHS, YOU WORRIED THAT YOUR FOOD WOULD RUN OUT BEFORE YOU GOT MONEY TO BUY MORE.: NEVER TRUE

## 2023-01-18 SDOH — ECONOMIC STABILITY: FOOD INSECURITY: WITHIN THE PAST 12 MONTHS, THE FOOD YOU BOUGHT JUST DIDN'T LAST AND YOU DIDN'T HAVE MONEY TO GET MORE.: NEVER TRUE

## 2023-01-18 ASSESSMENT — ENCOUNTER SYMPTOMS: SHORTNESS OF BREATH: 0

## 2023-01-18 ASSESSMENT — SOCIAL DETERMINANTS OF HEALTH (SDOH): HOW HARD IS IT FOR YOU TO PAY FOR THE VERY BASICS LIKE FOOD, HOUSING, MEDICAL CARE, AND HEATING?: NOT HARD AT ALL
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
## 2023-01-18 NOTE — PROGRESS NOTES
Attending Physician Statement  I have discussed the care of Jaguar Browne, 46 y.o. female,including pertinent history and exam findings,  with the resident Dr. Lauren Tirado MD.  History:  Chief Complaint   Patient presents with    Hypertension     Here for follow up     I have reviewed the key elements of the encounter with the resident. Examination was done by resident as documented in residents note. BP Readings from Last 3 Encounters:   01/18/23 134/78   01/03/23 122/84   12/07/22 137/76     /78   Pulse 88   Wt 274 lb (124.3 kg)   SpO2 95%   BMI 42.91 kg/m²   Lab Results   Component Value Date    WBC 9.0 03/11/2022    HGB 12.4 03/11/2022    HCT 40.5 03/11/2022     03/11/2022    CHOL 169 05/09/2022    TRIG 151 (H) 05/09/2022    HDL 34 (L) 05/09/2022    ALT 15 09/20/2021    AST 15 09/20/2021     03/11/2022    K 4.2 03/11/2022     03/11/2022    CREATININE 0.53 03/11/2022    BUN 7 03/11/2022    CO2 23 03/11/2022    TSH 1.98 03/12/2020    INR 1.3 01/29/2020    GLUF 109 (H) 01/15/2013    LABA1C 14.0 01/18/2023    LABMICR 105 (H) 05/09/2022     Lab Results   Component Value Date    CALCIUM 8.8 09/20/2021    PHOS 3.7 01/28/2020     Lab Results   Component Value Date    LDLCHOLESTEROL 105 05/09/2022     I agree with the assessment, plan and diagnosis of    Diagnosis Orders   1. Type 2 diabetes mellitus with hyperglycemia, without long-term current use of insulin ([facility])  Insulin Pen Needle ([employer] PEN NEEDLES) 32G X 4 MM MISC    Alcohol Swabs PADS    Lancets MISC    POCT glycosylated hemoglobin (Hb A1C)    Semaglutide,0.25 or 0.5MG/DOS, (OZEMPIC, 0.25 OR 0.5 MG/DOSE,) 2 MG/1.5ML SOPN      2. HFrEF (heart failure with reduced ejection fraction) ([facility])  [facility] - [facility], [place], DO, Cardiology, [place]    ECHO 2D WO Color Doppler Complete        I agree with  orders as documented by the resident. Recommendations: Resident team to consider phone follow up and barrier reduction.     Return in about 6 weeks (around 3/1/2023) for FU DM.    (Anahy Espinal ) Dr. Nasra Cancino MD

## 2023-01-18 NOTE — PROGRESS NOTES
Visit Information    Have you changed or started any medications since your last visit including any over-the-counter medicines, vitamins, or herbal medicines? Have you stopped taking any of your medications? Is so, why? -    Are you having any side effects from any of your medications? -     Have you seen any other physician or provider since your last visit? Have you had any other diagnostic tests since your last visit? Have you been seen in the emergency room and/or had an admission in a hospital since we last saw you? Have you had your routine dental cleaning in the past 6 months? Do you have an active MyChart account? If no, what is the barrier?   Yes    Patient Care Team:  Jessica Pederson MD as PCP - General (Emergency Medicine)    Medical History Review  Past Medical, Family, and Social History reviewed and  contribute to the patient presenting condition    Health Maintenance   Topic Date Due    Diabetic retinal exam  Never done    Hepatitis B vaccine (1 of 3 - Risk 3-dose series) Never done    Colorectal Cancer Screen  Never done    Shingles vaccine (1 of 2) Never done    Diabetic foot exam  08/13/2021    COVID-19 Vaccine (3 - Booster for Pfizer series) 02/07/2022    Flu vaccine (1) Never done    GFR test (Diabetes, CKD 3-4, OR last GFR 15-59)  03/11/2023    A1C test (Diabetic or Prediabetic)  05/09/2023    Diabetic Alb to Cr ratio (uACR) test  05/09/2023    Lipids  05/09/2023    Breast cancer screen  06/18/2023    Depression Screen  01/03/2024    Cervical cancer screen  01/03/2028    DTaP/Tdap/Td vaccine (3 - Td or Tdap) 02/17/2030    Pneumococcal 0-64 years Vaccine  Completed    Hepatitis C screen  Completed    HIV screen  Completed    Hepatitis A vaccine  Aged Out    Hib vaccine  Aged Out    Meningococcal (ACWY) vaccine  Aged Out

## 2023-01-20 DIAGNOSIS — E11.9 TYPE 2 DIABETES MELLITUS WITHOUT COMPLICATION, WITHOUT LONG-TERM CURRENT USE OF INSULIN (HCC): Primary | ICD-10-CM

## 2023-01-20 NOTE — TELEPHONE ENCOUNTER
Patient calling stating that her meter brand is no longer covered by her insurance and she is requesting a script for a ONE TOUCH ULTRA with supplies.

## 2023-01-23 NOTE — TELEPHONE ENCOUNTER
Patient called this morning checking to see if this was order was done. She has not checked her sugar in a week.

## 2023-01-24 RX ORDER — BLOOD-GLUCOSE METER
1 EACH MISCELLANEOUS DAILY
Qty: 1 KIT | Refills: 0 | Status: SHIPPED | OUTPATIENT
Start: 2023-01-24

## 2023-01-24 RX ORDER — GLUCOSAMINE HCL/CHONDROITIN SU 500-400 MG
1 CAPSULE ORAL 3 TIMES DAILY
Qty: 100 STRIP | Refills: 5 | Status: SHIPPED | OUTPATIENT
Start: 2023-01-24

## 2023-01-24 NOTE — TELEPHONE ENCOUNTER
Patient has came into the office now stating she is still unable to check her sugars due to no working meter. During visit, patient also mentioned a shot she was told she can get once a week for her diabetes, but does not remember the name the physician said. Please address.

## 2023-01-26 ENCOUNTER — TELEPHONE (OUTPATIENT)
Dept: FAMILY MEDICINE CLINIC | Age: 53
End: 2023-01-26

## 2023-01-26 NOTE — TELEPHONE ENCOUNTER
Per [facility], [facility]. Approved Ozempic and Pen Needles. Approved from 1/26/23-1/26/24.

## 2023-02-07 ENCOUNTER — OFFICE VISIT (OUTPATIENT)
Dept: OBGYN CLINIC | Age: 53
End: 2023-02-07
Payer: COMMERCIAL

## 2023-02-07 VITALS
WEIGHT: 270 LBS | BODY MASS INDEX: 42.38 KG/M2 | DIASTOLIC BLOOD PRESSURE: 86 MMHG | HEIGHT: 67 IN | SYSTOLIC BLOOD PRESSURE: 124 MMHG

## 2023-02-07 DIAGNOSIS — Z79.4 UNCONTROLLED TYPE 2 DIABETES MELLITUS WITH HYPEROSMOLARITY, WITH LONG-TERM CURRENT USE OF INSULIN (HCC): ICD-10-CM

## 2023-02-07 DIAGNOSIS — L73.2 HIDRADENITIS SUPPURATIVA: ICD-10-CM

## 2023-02-07 DIAGNOSIS — E11.00 UNCONTROLLED TYPE 2 DIABETES MELLITUS WITH HYPEROSMOLARITY, WITH LONG-TERM CURRENT USE OF INSULIN (HCC): ICD-10-CM

## 2023-02-07 DIAGNOSIS — B37.9 CANDIDA INFECTION: ICD-10-CM

## 2023-02-07 DIAGNOSIS — N90.89 LABIA IRRITATION: Primary | ICD-10-CM

## 2023-02-07 DIAGNOSIS — I42.9 CARDIOMYOPATHY, UNSPECIFIED TYPE (HCC): ICD-10-CM

## 2023-02-07 PROCEDURE — 3079F DIAST BP 80-89 MM HG: CPT | Performed by: OBSTETRICS & GYNECOLOGY

## 2023-02-07 PROCEDURE — 3074F SYST BP LT 130 MM HG: CPT | Performed by: OBSTETRICS & GYNECOLOGY

## 2023-02-07 PROCEDURE — 3046F HEMOGLOBIN A1C LEVEL >9.0%: CPT | Performed by: OBSTETRICS & GYNECOLOGY

## 2023-02-07 PROCEDURE — 99214 OFFICE O/P EST MOD 30 MIN: CPT | Performed by: OBSTETRICS & GYNECOLOGY

## 2023-02-07 RX ORDER — CLOBETASOL PROPIONATE 0.5 MG/G
CREAM TOPICAL
Qty: 1 EACH | Refills: 3 | Status: SHIPPED | OUTPATIENT
Start: 2023-02-07
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
## 2023-02-15 ENCOUNTER — OFFICE VISIT (OUTPATIENT)
Dept: PHYSICAL MEDICINE AND REHAB | Age: 53
End: 2023-02-15

## 2023-02-15 VITALS
HEIGHT: 67 IN | TEMPERATURE: 98.8 F | BODY MASS INDEX: 42.38 KG/M2 | SYSTOLIC BLOOD PRESSURE: 172 MMHG | DIASTOLIC BLOOD PRESSURE: 112 MMHG | WEIGHT: 270 LBS | HEART RATE: 76 BPM

## 2023-02-15 DIAGNOSIS — M79.10 MYALGIA: Primary | ICD-10-CM

## 2023-02-15 DIAGNOSIS — M54.59 LUMBAR TRIGGER POINT SYNDROME: ICD-10-CM

## 2023-02-15 RX ADMIN — LIDOCAINE HYDROCHLORIDE 4 ML: 10 INJECTION, SOLUTION INFILTRATION; PERINEURAL at 15:30
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
## 2023-02-16 RX ORDER — LIDOCAINE HYDROCHLORIDE 10 MG/ML
4 INJECTION, SOLUTION INFILTRATION; PERINEURAL ONCE
Status: COMPLETED | OUTPATIENT
Start: 2023-02-16 | End: 2023-02-15

## 2023-03-07 ENCOUNTER — OFFICE VISIT (OUTPATIENT)
Dept: OBGYN CLINIC | Age: 53
End: 2023-03-07
Payer: COMMERCIAL

## 2023-03-07 ENCOUNTER — OFFICE VISIT (OUTPATIENT)
Dept: FAMILY MEDICINE CLINIC | Age: 53
End: 2023-03-07
Payer: COMMERCIAL

## 2023-03-07 VITALS
BODY MASS INDEX: 43.16 KG/M2 | HEIGHT: 67 IN | WEIGHT: 275 LBS | SYSTOLIC BLOOD PRESSURE: 132 MMHG | DIASTOLIC BLOOD PRESSURE: 78 MMHG

## 2023-03-07 VITALS — HEART RATE: 77 BPM | DIASTOLIC BLOOD PRESSURE: 86 MMHG | SYSTOLIC BLOOD PRESSURE: 148 MMHG

## 2023-03-07 DIAGNOSIS — N90.89 LABIA IRRITATION: Primary | ICD-10-CM

## 2023-03-07 DIAGNOSIS — K21.9 GASTROESOPHAGEAL REFLUX DISEASE, UNSPECIFIED WHETHER ESOPHAGITIS PRESENT: ICD-10-CM

## 2023-03-07 DIAGNOSIS — L73.2 HIDRADENITIS SUPPURATIVA: ICD-10-CM

## 2023-03-07 DIAGNOSIS — E11.9 TYPE 2 DIABETES MELLITUS WITHOUT COMPLICATION, WITHOUT LONG-TERM CURRENT USE OF INSULIN (HCC): ICD-10-CM

## 2023-03-07 DIAGNOSIS — I50.42 CHF (CONGESTIVE HEART FAILURE), NYHA CLASS II, CHRONIC, COMBINED (HCC): ICD-10-CM

## 2023-03-07 DIAGNOSIS — E11.65 TYPE 2 DIABETES MELLITUS WITH HYPERGLYCEMIA, WITHOUT LONG-TERM CURRENT USE OF INSULIN (HCC): Primary | ICD-10-CM

## 2023-03-07 LAB — HBA1C MFR BLD: 14 %

## 2023-03-07 PROCEDURE — 3078F DIAST BP <80 MM HG: CPT | Performed by: STUDENT IN AN ORGANIZED HEALTH CARE EDUCATION/TRAINING PROGRAM

## 2023-03-07 PROCEDURE — 99213 OFFICE O/P EST LOW 20 MIN: CPT | Performed by: STUDENT IN AN ORGANIZED HEALTH CARE EDUCATION/TRAINING PROGRAM

## 2023-03-07 PROCEDURE — 99213 OFFICE O/P EST LOW 20 MIN: CPT | Performed by: OBSTETRICS & GYNECOLOGY

## 2023-03-07 PROCEDURE — 3078F DIAST BP <80 MM HG: CPT | Performed by: OBSTETRICS & GYNECOLOGY

## 2023-03-07 PROCEDURE — 83036 HEMOGLOBIN GLYCOSYLATED A1C: CPT | Performed by: STUDENT IN AN ORGANIZED HEALTH CARE EDUCATION/TRAINING PROGRAM

## 2023-03-07 PROCEDURE — 3046F HEMOGLOBIN A1C LEVEL >9.0%: CPT | Performed by: STUDENT IN AN ORGANIZED HEALTH CARE EDUCATION/TRAINING PROGRAM

## 2023-03-07 PROCEDURE — 3075F SYST BP GE 130 - 139MM HG: CPT | Performed by: OBSTETRICS & GYNECOLOGY

## 2023-03-07 PROCEDURE — 3046F HEMOGLOBIN A1C LEVEL >9.0%: CPT | Performed by: OBSTETRICS & GYNECOLOGY

## 2023-03-07 PROCEDURE — 3074F SYST BP LT 130 MM HG: CPT | Performed by: STUDENT IN AN ORGANIZED HEALTH CARE EDUCATION/TRAINING PROGRAM

## 2023-03-07 RX ORDER — FAMOTIDINE 20 MG/1
20 TABLET, FILM COATED ORAL 2 TIMES DAILY PRN
Qty: 60 TABLET | Refills: 3 | Status: SHIPPED | OUTPATIENT
Start: 2023-03-07

## 2023-03-07 RX ORDER — INSULIN GLARGINE 100 [IU]/ML
INJECTION, SOLUTION SUBCUTANEOUS
Qty: 5 ADJUSTABLE DOSE PRE-FILLED PEN SYRINGE | Refills: 2 | Status: SHIPPED | OUTPATIENT
Start: 2023-03-07 | End: 2023-03-07

## 2023-03-07 RX ORDER — MELOXICAM 7.5 MG/1
TABLET ORAL
COMMUNITY
Start: 2023-02-18

## 2023-03-07 RX ORDER — INSULIN ASPART 100 [IU]/ML
INJECTION, SOLUTION INTRAVENOUS; SUBCUTANEOUS
Qty: 5 ADJUSTABLE DOSE PRE-FILLED PEN SYRINGE | Refills: 3 | Status: SHIPPED | OUTPATIENT
Start: 2023-03-07

## 2023-03-07 RX ORDER — INSULIN GLARGINE 100 [IU]/ML
INJECTION, SOLUTION SUBCUTANEOUS
Qty: 5 ADJUSTABLE DOSE PRE-FILLED PEN SYRINGE | Refills: 2 | Status: SHIPPED | OUTPATIENT
Start: 2023-03-07

## 2023-03-07 RX ORDER — POTASSIUM CHLORIDE 750 MG/1
TABLET, EXTENDED RELEASE ORAL
COMMUNITY
Start: 2023-02-18

## 2023-03-07 ASSESSMENT — ENCOUNTER SYMPTOMS
ABDOMINAL PAIN: 0
SHORTNESS OF BREATH: 0
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
## 2023-03-07 NOTE — PROGRESS NOTES
Attending Physician Statement  I have discussed the care of Maxx Cornell, 46 y.o. female,including pertinent history and exam findings,  with the resident Dr. Mariah Paris MD.  History:  Chief Complaint   Patient presents with    Diabetes Mellitus     I have reviewed the key elements of the encounter with the resident. Examination was done by resident as documented in residents note. BP Readings from Last 3 Encounters:   03/07/23 132/78   03/07/23 (!) 148/86   02/15/23 (!) 172/112     BP (!) 148/86   Pulse 77   Lab Results   Component Value Date    WBC 9.0 03/11/2022    HGB 12.4 03/11/2022    HCT 40.5 03/11/2022     03/11/2022    CHOL 169 05/09/2022    TRIG 151 (H) 05/09/2022    HDL 34 (L) 05/09/2022    ALT 15 09/20/2021    AST 15 09/20/2021     03/11/2022    K 4.2 03/11/2022     03/11/2022    CREATININE 0.53 03/11/2022    BUN 7 03/11/2022    CO2 23 03/11/2022    TSH 1.98 03/12/2020    INR 1.3 01/29/2020    GLUF 109 (H) 01/15/2013    LABA1C 14.0 03/07/2023    LABMICR 105 (H) 05/09/2022     Lab Results   Component Value Date    CALCIUM 8.8 09/20/2021    PHOS 3.7 01/28/2020     Lab Results   Component Value Date    LDLCHOLESTEROL 105 05/09/2022     I agree with the assessment, plan and diagnosis of    Diagnosis Orders   1. Type 2 diabetes mellitus with hyperglycemia, without long-term current use of insulin (HCC)  POCT glycosylated hemoglobin (Hb A1C)    insulin aspart (NOVOLOG FLEXPEN) 100 UNIT/ML injection pen    Comprehensive Metabolic Panel    CBC with Auto Differential    [facility] Diabetes [facility] DIABETES FOOT EXAM    insulin glargine (LANTUS SOLOSTAR) 100 UNIT/ML injection pen    DISCONTINUED: insulin glargine (LANTUS SOLOSTAR) 100 UNIT/ML injection pen      2. CHF (congestive heart failure), NYHA class II, chronic, combined ([address]        3.  Gastroesophageal reflux disease, unspecified whether esophagitis present  famotidine (PEPCID) 20 MG tablet        I agree with  orders as documented by the resident. Recommendations: Agree with resident A&P. Will strongly consider SGLT2 agent in the near future. Pt BP goal less than 130/80 with HFrEF. Patient advised to have close follow up and  Ozempic/GLP agent. Pt very likely to benefit from nutrition education with DM education with frequent frozen food use, etc.      Return in about 4 weeks (around 4/4/2023) for FU DM.    (Jessica Nguyễn ) Dr. Manuela Purdy MD
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
## 2023-03-22 DIAGNOSIS — E11.65 TYPE 2 DIABETES MELLITUS WITH HYPERGLYCEMIA, WITHOUT LONG-TERM CURRENT USE OF INSULIN (HCC): ICD-10-CM

## 2023-03-22 NOTE — TELEPHONE ENCOUNTER
injury) ([facility]     Hidradenitis suppurativa     Type 2 diabetes mellitus with hyperglycemia, without long-term current use of insulin (HCC)     Toenail deformity     Skin infection     Arthritis     Essential hypertension     Lumbar back pain with radiculopathy affecting left lower extremity     Encounter for smoking cessation counseling     CHF (congestive heart failure), NYHA class II, chronic, combined ([facility]      ----Reina Atkins

## 2023-03-23 RX ORDER — POTASSIUM CHLORIDE 750 MG/1
TABLET, EXTENDED RELEASE ORAL
Qty: 30 TABLET | Refills: 0 | Status: SHIPPED | OUTPATIENT
Start: 2023-03-23 | End: 2023-04-14 | Stop reason: SDUPTHER

## 2023-03-25 RX ORDER — INSULIN ASPART 100 [IU]/ML
INJECTION, SOLUTION INTRAVENOUS; SUBCUTANEOUS
Qty: 5 ADJUSTABLE DOSE PRE-FILLED PEN SYRINGE | Refills: 3 | Status: SHIPPED | OUTPATIENT
Start: 2023-03-25

## 2023-03-25 RX ORDER — PEN NEEDLE, DIABETIC 32GX 5/32"
NEEDLE, DISPOSABLE MISCELLANEOUS
Qty: 100 EACH | Refills: 1 | Status: SHIPPED | OUTPATIENT
Start: 2023-03-25

## 2023-04-05 ENCOUNTER — HOSPITAL ENCOUNTER (OUTPATIENT)
Age: 53
Setting detail: SPECIMEN
Discharge: HOME | End: 2023-04-05

## 2023-04-05 ENCOUNTER — TELEPHONE (OUTPATIENT)
Dept: FAMILY MEDICINE CLINIC | Age: 53
End: 2023-04-05

## 2023-04-05 DIAGNOSIS — E11.65 TYPE 2 DIABETES MELLITUS WITH HYPERGLYCEMIA, WITHOUT LONG-TERM CURRENT USE OF INSULIN (HCC): ICD-10-CM

## 2023-04-05 DIAGNOSIS — R89.9 ELEVATED LABORATORY TEST RESULT: Primary | ICD-10-CM

## 2023-04-05 LAB
ABSOLUTE EOS #: 0.16 K/UL (ref 0–0.44)
ABSOLUTE IMMATURE GRANULOCYTE: <0.03 K/UL (ref 0–0.3)
ABSOLUTE LYMPH #: 1.53 K/UL (ref 1.1–3.7)
ABSOLUTE MONO #: 0.35 K/UL (ref 0.1–1.2)
ALBUMIN SERPL-MCNC: 4.2 G/DL (ref 3.5–5.2)
ALBUMIN/GLOBULIN RATIO: 1.4 (ref 1–2.5)
ALP SERPL-CCNC: 141 U/L (ref 35–104)
ALT SERPL-CCNC: 13 U/L (ref 5–33)
ANION GAP SERPL CALCULATED.3IONS-SCNC: 14 MMOL/L (ref 9–17)
AST SERPL-CCNC: 13 U/L
BASOPHILS # BLD: 0 % (ref 0–2)
BASOPHILS ABSOLUTE: 0.03 K/UL (ref 0–0.2)
BILIRUB SERPL-MCNC: 0.3 MG/DL (ref 0.3–1.2)
BUN SERPL-MCNC: 7 MG/DL (ref 6–20)
CALCIUM SERPL-MCNC: 9.1 MG/DL (ref 8.6–10.4)
CHLORIDE SERPL-SCNC: 96 MMOL/L (ref 98–107)
CO2 SERPL-SCNC: 23 MMOL/L (ref 20–31)
CREAT SERPL-MCNC: 0.64 MG/DL (ref 0.5–0.9)
EOSINOPHILS RELATIVE PERCENT: 2 % (ref 1–4)
GFR SERPL CREATININE-BSD FRML MDRD: >60 ML/MIN/1.73M2
GLUCOSE SERPL-MCNC: 430 MG/DL (ref 70–99)
HCT VFR BLD AUTO: 45.9 % (ref 36.3–47.1)
HGB BLD-MCNC: 13.9 G/DL (ref 11.9–15.1)
IMMATURE GRANULOCYTES: 0 %
LYMPHOCYTES # BLD: 17 % (ref 24–43)
MCH RBC QN AUTO: 27.5 PG (ref 25.2–33.5)
MCHC RBC AUTO-ENTMCNC: 30.3 G/DL (ref 28.4–34.8)
MCV RBC AUTO: 90.7 FL (ref 82.6–102.9)
MONOCYTES # BLD: 4 % (ref 3–12)
NRBC AUTOMATED: 0 PER 100 WBC
PDW BLD-RTO: 15.9 % (ref 11.8–14.4)
PLATELET # BLD AUTO: 274 K/UL (ref 138–453)
PMV BLD AUTO: 11 FL (ref 8.1–13.5)
POTASSIUM SERPL-SCNC: 4.2 MMOL/L (ref 3.7–5.3)
PROT SERPL-MCNC: 7.2 G/DL (ref 6.4–8.3)
RBC # BLD: 5.06 M/UL (ref 3.95–5.11)
RBC # BLD: ABNORMAL 10*6/UL
SEG NEUTROPHILS: 77 % (ref 36–65)
SEGMENTED NEUTROPHILS ABSOLUTE COUNT: 6.79 K/UL (ref 1.5–8.1)
SODIUM SERPL-SCNC: 133 MMOL/L (ref 135–144)
WBC # BLD AUTO: 8.9 K/UL (ref 3.5–11.3)

## 2023-04-05 NOTE — TELEPHONE ENCOUNTER
Received healthlink today from lab regarding critical blood glucose result. Patient's blood glucose is 430. Patient is diabetic and reported compliance to her medications. I informed her of her critical glucose lab results and asked her to take her medications as instructed by her provider. Patient reported she is asymptomatic. Informed her if she is symptomatic she can go to ED. Patient voices understanding and is agreeable to go to her PCP tomorrow or to ED if symptomatic.

## 2023-04-06 ENCOUNTER — OFFICE VISIT (OUTPATIENT)
Dept: FAMILY MEDICINE CLINIC | Age: 53
End: 2023-04-06
Payer: COMMERCIAL

## 2023-04-06 VITALS
BODY MASS INDEX: 42.85 KG/M2 | SYSTOLIC BLOOD PRESSURE: 131 MMHG | WEIGHT: 273 LBS | DIASTOLIC BLOOD PRESSURE: 75 MMHG | HEIGHT: 67 IN | HEART RATE: 75 BPM

## 2023-04-06 DIAGNOSIS — E11.9 TYPE 2 DIABETES MELLITUS WITHOUT COMPLICATION, WITH LONG-TERM CURRENT USE OF INSULIN (HCC): Primary | ICD-10-CM

## 2023-04-06 DIAGNOSIS — M54.16 LUMBAR RADICULOPATHY, CHRONIC: ICD-10-CM

## 2023-04-06 DIAGNOSIS — I50.42 CHF (CONGESTIVE HEART FAILURE), NYHA CLASS II, CHRONIC, COMBINED (HCC): ICD-10-CM

## 2023-04-06 DIAGNOSIS — J06.9 VIRAL URI: ICD-10-CM

## 2023-04-06 DIAGNOSIS — Z72.0 TOBACCO ABUSE: ICD-10-CM

## 2023-04-06 DIAGNOSIS — Z79.4 TYPE 2 DIABETES MELLITUS WITHOUT COMPLICATION, WITH LONG-TERM CURRENT USE OF INSULIN (HCC): Primary | ICD-10-CM

## 2023-04-06 PROCEDURE — 3078F DIAST BP <80 MM HG: CPT | Performed by: STUDENT IN AN ORGANIZED HEALTH CARE EDUCATION/TRAINING PROGRAM

## 2023-04-06 PROCEDURE — 3046F HEMOGLOBIN A1C LEVEL >9.0%: CPT | Performed by: STUDENT IN AN ORGANIZED HEALTH CARE EDUCATION/TRAINING PROGRAM

## 2023-04-06 PROCEDURE — 99213 OFFICE O/P EST LOW 20 MIN: CPT | Performed by: STUDENT IN AN ORGANIZED HEALTH CARE EDUCATION/TRAINING PROGRAM

## 2023-04-06 PROCEDURE — 3074F SYST BP LT 130 MM HG: CPT | Performed by: STUDENT IN AN ORGANIZED HEALTH CARE EDUCATION/TRAINING PROGRAM

## 2023-04-06 RX ORDER — BENZONATATE 200 MG/1
200 CAPSULE ORAL 3 TIMES DAILY PRN
Qty: 30 CAPSULE | Refills: 0 | Status: SHIPPED | OUTPATIENT
Start: 2023-04-06 | End: 2023-04-13

## 2023-04-06 RX ORDER — INSULIN LISPRO 100 [IU]/ML
INJECTION, SOLUTION INTRAVENOUS; SUBCUTANEOUS
Qty: 15 ML | Refills: 3 | Status: SHIPPED | OUTPATIENT
Start: 2023-04-06

## 2023-04-06 SDOH — ECONOMIC STABILITY: INCOME INSECURITY: HOW HARD IS IT FOR YOU TO PAY FOR THE VERY BASICS LIKE FOOD, HOUSING, MEDICAL CARE, AND HEATING?: NOT HARD AT ALL

## 2023-04-06 SDOH — ECONOMIC STABILITY: FOOD INSECURITY: WITHIN THE PAST 12 MONTHS, THE FOOD YOU BOUGHT JUST DIDN'T LAST AND YOU DIDN'T HAVE MONEY TO GET MORE.: NEVER TRUE

## 2023-04-06 SDOH — ECONOMIC STABILITY: FOOD INSECURITY: WITHIN THE PAST 12 MONTHS, YOU WORRIED THAT YOUR FOOD WOULD RUN OUT BEFORE YOU GOT MONEY TO BUY MORE.: NEVER TRUE

## 2023-04-06 SDOH — ECONOMIC STABILITY: HOUSING INSECURITY
IN THE LAST 12 MONTHS, WAS THERE A TIME WHEN YOU DID NOT HAVE A STEADY PLACE TO SLEEP OR SLEPT IN A SHELTER (INCLUDING NOW)?: NO

## 2023-04-06 ASSESSMENT — ENCOUNTER SYMPTOMS: SHORTNESS OF BREATH: 0

## 2023-04-11 PROBLEM — N89.8 ITCHING OF VAGINA: Status: ACTIVE | Noted: 2023-04-11

## 2023-04-21 DIAGNOSIS — E11.9 TYPE 2 DIABETES MELLITUS WITHOUT COMPLICATION, WITHOUT LONG-TERM CURRENT USE OF INSULIN (HCC): ICD-10-CM

## 2023-04-24 RX ORDER — BLOOD-GLUCOSE METER
EACH MISCELLANEOUS
OUTPATIENT
Start: 2023-04-24

## 2023-04-27 ENCOUNTER — OFFICE VISIT (OUTPATIENT)
Dept: PHYSICAL MEDICINE AND REHAB | Age: 53
End: 2023-04-27
Payer: COMMERCIAL

## 2023-04-27 VITALS
BODY MASS INDEX: 42.69 KG/M2 | SYSTOLIC BLOOD PRESSURE: 124 MMHG | HEART RATE: 91 BPM | DIASTOLIC BLOOD PRESSURE: 76 MMHG | TEMPERATURE: 97.9 F | WEIGHT: 272 LBS | HEIGHT: 67 IN

## 2023-04-27 DIAGNOSIS — M51.35 OTHER INTERVERTEBRAL DISC DEGENERATION, THORACOLUMBAR REGION: ICD-10-CM

## 2023-04-27 DIAGNOSIS — M54.50 CHRONIC LOW BACK PAIN, UNSPECIFIED BACK PAIN LATERALITY, UNSPECIFIED WHETHER SCIATICA PRESENT: ICD-10-CM

## 2023-04-27 DIAGNOSIS — G89.29 CHRONIC LOW BACK PAIN, UNSPECIFIED BACK PAIN LATERALITY, UNSPECIFIED WHETHER SCIATICA PRESENT: ICD-10-CM

## 2023-04-27 DIAGNOSIS — M54.17 RADICULOPATHY, LUMBOSACRAL REGION: Primary | ICD-10-CM

## 2023-04-27 PROCEDURE — 3074F SYST BP LT 130 MM HG: CPT | Performed by: PHYSICAL MEDICINE & REHABILITATION

## 2023-04-27 PROCEDURE — 3078F DIAST BP <80 MM HG: CPT | Performed by: PHYSICAL MEDICINE & REHABILITATION

## 2023-04-27 PROCEDURE — 99212 OFFICE O/P EST SF 10 MIN: CPT | Performed by: PHYSICAL MEDICINE & REHABILITATION

## 2023-05-01 NOTE — TELEPHONE ENCOUNTER
E-scribe request for POTASSIUM CHLORIDE 10 MEQ TAB. Please review and e-scribe if applicable. Last Visit Date:  4/6/2023  Next Visit Date:  5/3/2023    Hemoglobin A1C (%)   Date Value   03/07/2023 14.0   01/18/2023 14.0   05/09/2022 8.5 (H)             ( goal A1C is < 7)   Microalb/Crt.  Ratio (mcg/mg creat)   Date Value   05/09/2022 105 (H)     LDL Cholesterol (mg/dL)   Date Value   05/09/2022 105       (goal LDL is <100)   AST (U/L)   Date Value   04/05/2023 13     ALT (U/L)   Date Value   04/05/2023 13     BUN (mg/dL)   Date Value   04/05/2023 7     BP Readings from Last 3 Encounters:   04/27/23 124/76   04/20/23 130/80   04/11/23 124/84          (goal 120/80)        Patient Active Problem List:     Migraine     Morbid obesity ([facility]     Iron deficiency     Tobacco abuse     S/P Permanent nail avulsion, 2nd digit right foot      Acute rhinosinusitis     Dyslipidemia     Vitamin B12 deficiency     Folate deficiency     Constipation     Hypertension goal BP (blood pressure) < 140/90     Bright red blood per rectum     Acute blood loss anemia     Vaginal bleeding     Ovarian cyst     Pre-diabetes     H. pylori infection     Microcytosis     Anemia     Hydradenitis     Hypertension, goal below 140/90     Right leg paresthesias     Chronic back pain     Rotator cuff injury     Vitamin D deficiency     Rotator cuff tendinitis     Smoker     Left breast abscess     Iron deficiency anemia     Breast abscess     Medial meniscus tear     Right knee pain     Arthritis of knee     Lumbar disc herniation S/P L3 4 and L4 5 and tenotomy right side for foraminotomy     Leg swelling     Pelvic mass in female     Hypocalcemia     Pericardial effusion     Hepatomegaly     Intramural leiomyoma of uterus     Heart failure (HCC)     Hyponatremia     STD (female)     Bacterial vaginosis     Cardiomyopathy ([address]     Hyperglycemia     Hyperglycemia due to type 2 diabetes mellitus (HCC)     Hyperkalemia     WHIT (acute kidney injury)

## 2023-05-02 RX ORDER — POTASSIUM CHLORIDE 750 MG/1
TABLET, EXTENDED RELEASE ORAL
Qty: 30 TABLET | Refills: 0 | Status: SHIPPED | OUTPATIENT
Start: 2023-05-02

## 2023-05-03 ENCOUNTER — OFFICE VISIT (OUTPATIENT)
Dept: FAMILY MEDICINE CLINIC | Age: 53
End: 2023-05-03
Payer: COMMERCIAL

## 2023-05-03 VITALS
SYSTOLIC BLOOD PRESSURE: 138 MMHG | DIASTOLIC BLOOD PRESSURE: 76 MMHG | HEART RATE: 94 BPM | WEIGHT: 265 LBS | BODY MASS INDEX: 41.59 KG/M2 | HEIGHT: 67 IN

## 2023-05-03 DIAGNOSIS — E11.9 TYPE 2 DIABETES MELLITUS WITHOUT COMPLICATION, WITH LONG-TERM CURRENT USE OF INSULIN (HCC): Primary | ICD-10-CM

## 2023-05-03 DIAGNOSIS — I10 ESSENTIAL HYPERTENSION: ICD-10-CM

## 2023-05-03 DIAGNOSIS — L73.2 HIDRADENITIS SUPPURATIVA OF ANUS: ICD-10-CM

## 2023-05-03 DIAGNOSIS — Z79.4 TYPE 2 DIABETES MELLITUS WITHOUT COMPLICATION, WITH LONG-TERM CURRENT USE OF INSULIN (HCC): Primary | ICD-10-CM

## 2023-05-03 DIAGNOSIS — I50.42 CHF (CONGESTIVE HEART FAILURE), NYHA CLASS II, CHRONIC, COMBINED (HCC): ICD-10-CM

## 2023-05-03 DIAGNOSIS — J30.9 ALLERGIC RHINITIS, UNSPECIFIED SEASONALITY, UNSPECIFIED TRIGGER: ICD-10-CM

## 2023-05-03 LAB — HBA1C MFR BLD: 10.4 %

## 2023-05-03 PROCEDURE — 3078F DIAST BP <80 MM HG: CPT | Performed by: STUDENT IN AN ORGANIZED HEALTH CARE EDUCATION/TRAINING PROGRAM

## 2023-05-03 PROCEDURE — 99213 OFFICE O/P EST LOW 20 MIN: CPT | Performed by: STUDENT IN AN ORGANIZED HEALTH CARE EDUCATION/TRAINING PROGRAM

## 2023-05-03 PROCEDURE — 3074F SYST BP LT 130 MM HG: CPT | Performed by: STUDENT IN AN ORGANIZED HEALTH CARE EDUCATION/TRAINING PROGRAM

## 2023-05-03 PROCEDURE — 3046F HEMOGLOBIN A1C LEVEL >9.0%: CPT | Performed by: STUDENT IN AN ORGANIZED HEALTH CARE EDUCATION/TRAINING PROGRAM

## 2023-05-03 PROCEDURE — 83036 HEMOGLOBIN GLYCOSYLATED A1C: CPT | Performed by: STUDENT IN AN ORGANIZED HEALTH CARE EDUCATION/TRAINING PROGRAM

## 2023-05-03 RX ORDER — DOXYCYCLINE HYCLATE 100 MG
100 TABLET ORAL 2 TIMES DAILY
Qty: 14 TABLET | Refills: 0 | Status: SHIPPED | OUTPATIENT
Start: 2023-05-03 | End: 2023-05-10

## 2023-05-03 RX ORDER — FLUTICASONE PROPIONATE 50 MCG
1 SPRAY, SUSPENSION (ML) NASAL DAILY
Qty: 32 G | Refills: 1 | Status: SHIPPED | OUTPATIENT
Start: 2023-05-03

## 2023-05-03 RX ORDER — PEN NEEDLE, DIABETIC 32GX 5/32"
NEEDLE, DISPOSABLE MISCELLANEOUS
Qty: 100 EACH | Refills: 1 | Status: SHIPPED | OUTPATIENT
Start: 2023-05-03

## 2023-05-03 ASSESSMENT — PATIENT HEALTH QUESTIONNAIRE - PHQ9
1. LITTLE INTEREST OR PLEASURE IN DOING THINGS: 0
2. FEELING DOWN, DEPRESSED OR HOPELESS: 0
SUM OF ALL RESPONSES TO PHQ QUESTIONS 1-9: 0
SUM OF ALL RESPONSES TO PHQ9 QUESTIONS 1 & 2: 0
SUM OF ALL RESPONSES TO PHQ QUESTIONS 1-9: 0

## 2023-05-03 ASSESSMENT — ENCOUNTER SYMPTOMS: SHORTNESS OF BREATH: 0

## 2023-05-03 NOTE — PROGRESS NOTES
Visit Information    Have you changed or started any medications since your last visit including any over-the-counter medicines, vitamins, or herbal medicines? no   Are you having any side effects from any of your medications? -  no  Have you stopped taking any of your medications? Is so, why? -  no    Have you seen any other physician or provider since your last visit? No  Have you had any other diagnostic tests since your last visit? No  Have you been seen in the emergency room and/or had an admission to a hospital since we last saw you? No  Have you had your routine dental cleaning in the past 6 months? no    Have you activated your [facility] account? If not, what are your barriers?  Yes     Patient Care Team:  Ruth Patel MD as PCP - General (Emergency Medicine)    Medical History Review  Past Medical, Family, and Social History reviewed and does not contribute to the patient presenting condition    Health Maintenance   Topic Date Due    Hepatitis B vaccine (1 of 3 - Risk 3-dose series) Never done    Colorectal Cancer Screen  Never done    Shingles vaccine (1 of 2) Never done    COVID-19 Vaccine (3 - Booster for Raygoza Peter series) 02/07/2022    Diabetic Alb to Cr ratio (uACR) test  05/09/2023    Lipids  05/09/2023    A1C test (Diabetic or Prediabetic)  06/07/2023    Breast cancer screen  06/18/2023    Flu vaccine (Season Ended) 08/01/2023    Depression Screen  01/03/2024    Diabetic retinal exam  02/20/2024    Diabetic foot exam  03/07/2024    GFR test (Diabetes, CKD 3-4, OR last GFR 15-59)  04/05/2024    Cervical cancer screen  01/03/2028    DTaP/Tdap/Td vaccine (3 - Td or Tdap) 02/17/2030    Pneumococcal 0-64 years Vaccine  Completed    Hepatitis C screen  Completed    HIV screen  Completed    Hepatitis A vaccine  Aged Out    Hib vaccine  Aged Out    Meningococcal (ACWY) vaccine  Aged Out

## 2023-05-03 NOTE — PROGRESS NOTES
Attending Physician Statement  I  have discussed the care of Talha Roche including pertinent history and exam findings with the resident. I agree with the assessment, plan and orders as documented by the resident. /76 (Site: Right Upper Arm, Position: Sitting, Cuff Size: Medium Adult)   Pulse 94   Ht 5' 7\" (1.702 m)   Wt 265 lb (120.2 kg)   BMI 41.50 kg/m²    BP Readings from Last 3 Encounters:   05/03/23 138/76   04/27/23 124/76   04/20/23 130/80     Wt Readings from Last 3 Encounters:   05/03/23 265 lb (120.2 kg)   04/27/23 272 lb (123.4 kg)   04/20/23 269 lb (122 kg)          Diagnosis Orders   1. Type 2 diabetes mellitus without complication, with long-term current use of insulin (HCC)  Insulin Pen Needle ([employer] PEN NEEDLES) 32G X 4 MM MISC      2. Essential hypertension        3. CHF (congestive heart failure), NYHA class II, chronic, combined ([facility]        4. Allergic rhinitis, unspecified seasonality, unspecified trigger  fluticasone (FLONASE) 50 MCG/ACT nasal spray      5.  Hidradenitis suppurativa of anus  doxycycline hyclate (VIBRA-TABS) 100 MG tablet          Zarina Kelley DO 5/4/2023 9:02 AM

## 2023-05-03 NOTE — PATIENT INSTRUCTIONS
Thank you for letting us take care of you today. We hope all your questions were addressed. If a question was overlooked or something else comes to mind after you return home, please contact a member of your Care Team listed below. Your Care Team at [place] [address] is Team #5  Berta Layne MD (Faculty)  Zhane Arana MD (Faculty)  Jose Lopez MD (Resident)  Susu Marte MD (Resident)  Ashia Adam MD (Resident)  Go Sanchez MD, (Resident)  Reena Anand., RUPALI Pagan.,  ADRIAN Aguilar., Delia Alexander., [facility] office)  Barrie Ross, [address] (Clinical Practice Manager)  Matthew Galarza [facility] (Clinical Pharmacist)       Office phone number: [phone]    If you need to get in right away due to illness, please be advised we have \"Same Day\" appointments available Monday-Friday. Please call us at [phone] option #3 to schedule your \"Same Day\" appointment.
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
## 2023-06-09 RX ORDER — MELOXICAM 7.5 MG/1
7.5 TABLET ORAL DAILY
Qty: 30 TABLET | Refills: 0 | Status: SHIPPED | OUTPATIENT
Start: 2023-06-09

## 2023-06-19 ENCOUNTER — INITIAL CONSULT (OUTPATIENT)
Dept: PAIN MANAGEMENT | Age: 53
End: 2023-06-19
Payer: COMMERCIAL

## 2023-06-19 VITALS — BODY MASS INDEX: 41.75 KG/M2 | WEIGHT: 266 LBS | HEIGHT: 67 IN

## 2023-06-19 DIAGNOSIS — M47.817 LUMBOSACRAL SPONDYLOSIS WITHOUT MYELOPATHY: ICD-10-CM

## 2023-06-19 DIAGNOSIS — M54.17 LUMBOSACRAL NEURITIS: Primary | ICD-10-CM

## 2023-06-19 PROCEDURE — 99214 OFFICE O/P EST MOD 30 MIN: CPT | Performed by: PAIN MEDICINE

## 2023-06-19 ASSESSMENT — ENCOUNTER SYMPTOMS
BACK PAIN: 1
BOWEL INCONTINENCE: 0
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
## 2023-06-21 ENCOUNTER — HOSPITAL ENCOUNTER (OUTPATIENT)
Dept: MRI IMAGING | Age: 53
Discharge: HOME | End: 2023-06-23
Attending: PAIN MEDICINE
Payer: COMMERCIAL

## 2023-06-21 ENCOUNTER — HOSPITAL ENCOUNTER (OUTPATIENT)
Dept: WOMENS IMAGING | Age: 53
Discharge: HOME | End: 2023-06-23
Payer: COMMERCIAL

## 2023-06-21 DIAGNOSIS — M47.817 LUMBOSACRAL SPONDYLOSIS WITHOUT MYELOPATHY: ICD-10-CM

## 2023-06-21 DIAGNOSIS — M54.17 LUMBOSACRAL NEURITIS: ICD-10-CM

## 2023-06-21 DIAGNOSIS — Z12.31 ENCOUNTER FOR SCREENING MAMMOGRAM FOR MALIGNANT NEOPLASM OF BREAST: ICD-10-CM

## 2023-06-21 LAB
EGFR, POC: >60 ML/MIN/1.73M2
POC CREATININE: 0.83 MG/DL (ref 0.51–1.19)

## 2023-06-21 PROCEDURE — 6360000004 HC RX CONTRAST MEDICATION: Performed by: PAIN MEDICINE

## 2023-06-21 PROCEDURE — 77063 BREAST TOMOSYNTHESIS BI: CPT

## 2023-06-21 PROCEDURE — 82565 ASSAY OF CREATININE: CPT

## 2023-06-21 PROCEDURE — A9579 GAD-BASE MR CONTRAST NOS,1ML: HCPCS | Performed by: PAIN MEDICINE

## 2023-06-21 PROCEDURE — 2580000003 HC RX 258: Performed by: PAIN MEDICINE

## 2023-06-21 PROCEDURE — 72158 MRI LUMBAR SPINE W/O & W/DYE: CPT

## 2023-06-21 RX ORDER — SODIUM CHLORIDE 0.9 % (FLUSH) 0.9 %
10 SYRINGE (ML) INJECTION PRN
Status: DISCONTINUED | OUTPATIENT
Start: 2023-06-21 | End: 2023-06-24 | Stop reason: HOSPADM

## 2023-06-21 RX ADMIN — SODIUM CHLORIDE, PRESERVATIVE FREE 10 ML: 5 INJECTION INTRAVENOUS at 10:21

## 2023-06-21 RX ADMIN — GADOTERIDOL 20 ML: 279.3 INJECTION, SOLUTION INTRAVENOUS at 10:21

## 2023-07-13 ENCOUNTER — OFFICE VISIT (OUTPATIENT)
Dept: PAIN MANAGEMENT | Age: 53
End: 2023-07-13
Payer: COMMERCIAL

## 2023-07-13 VITALS — BODY MASS INDEX: 41.75 KG/M2 | HEIGHT: 67 IN | WEIGHT: 266 LBS

## 2023-07-13 DIAGNOSIS — M54.16 LUMBAR BACK PAIN WITH RADICULOPATHY AFFECTING LEFT LOWER EXTREMITY: Primary | ICD-10-CM

## 2023-07-13 PROCEDURE — 99214 OFFICE O/P EST MOD 30 MIN: CPT | Performed by: NURSE PRACTITIONER

## 2023-07-13 ASSESSMENT — ENCOUNTER SYMPTOMS
BOWEL INCONTINENCE: 0
CONSTIPATION: 0
BACK PAIN: 1
COUGH: 0
SHORTNESS OF BREATH: 0

## 2023-07-13 NOTE — PROGRESS NOTES
based on sliding scale, Disp: 15 mL, Rfl: 3    diclofenac sodium (VOLTAREN) 1 % GEL, Apply 4 g topically 4 times daily, Disp: 150 g, Rfl: 1    insulin aspart (NOVOLOG FLEXPEN) 100 UNIT/ML injection pen, 0-15 units 3 times daily with meals, Disp: 5 Adjustable Dose Pre-filled Pen Syringe, Rfl: 3    famotidine (PEPCID) 20 MG tablet, Take 1 tablet by mouth 2 times daily as needed (For acid reflux), Disp: 60 tablet, Rfl: 3    insulin glargine (LANTUS SOLOSTAR) 100 UNIT/ML injection pen, INJECT 20 UNITS SUBCUTANEOUSLY TWO TIMES A DAY, Disp: 5 Adjustable Dose Pre-filled Pen Syringe, Rfl: 2    blood glucose monitor strips, 1 strip by Other route three times daily, Disp: 100 strip, Rfl: 5    Alcohol Swabs PADS, 1 Units by Does not apply route as needed (for blood sugar testing), Disp: 1 each, Rfl: 3    Lancets MISC, 1 each by Does not apply route daily, Disp: 100 each, Rfl: 3    clotrimazole-betamethasone (LOTRISONE) 1-0.05 % cream, Apply to affected area bid externally x 4 days then daily for 3 days, Disp: 45 g, Rfl: 1    furosemide (LASIX) 40 MG tablet, TAKE ONE TABLET BY MOUTH DAILY, Disp: 90 tablet, Rfl: 1    metFORMIN (GLUCOPHAGE) 1000 MG tablet, TAKE ONE TABLET BY MOUTH TWICE A DAY WITH MEALS, Disp: 180 tablet, Rfl: 1    carvedilol (COREG) 6.25 MG tablet, TAKE ONE TABLET BY MOUTH TWICE A DAY WITH MEALS, Disp: 180 tablet, Rfl: 1    blood glucose monitor strips, Test before all three meals and two hours after meals. Test as needed for symptoms of irregular blood glucose., Disp: 300 strip, Rfl: 3    gabapentin (NEURONTIN) 300 MG capsule, Take 2 capsules by mouth 3 times daily for 270 days. , Disp: 180 capsule, Rfl: 8    atorvastatin (LIPITOR) 40 MG tablet, TAKE ONE TABLET BY MOUTH DAILY, Disp: 90 tablet, Rfl: 3    amLODIPine (NORVASC) 10 MG tablet, TAKE ONE TABLET BY MOUTH DAILY, Disp: 90 tablet, Rfl: 1    sacubitril-valsartan (ENTRESTO) 49-51 MG per tablet, Take 1 tablet by mouth 2 times daily, Disp: 180 tablet, Rfl: 1

## 2023-07-15 DIAGNOSIS — K21.9 GASTROESOPHAGEAL REFLUX DISEASE, UNSPECIFIED WHETHER ESOPHAGITIS PRESENT: ICD-10-CM

## 2023-07-17 NOTE — TELEPHONE ENCOUNTER
Last visit: 06/07/2023  Last Med refill: 03/07/2023  Does patient have enough medication for 72 hours: no    Next Visit Date:  Future Appointments   Date Time Provider [address]   9/5/2023  3:00 PM [facility]   10/4/2023  4:00 PM Rizwan Westbrook MD [facility] med/reha [facility]   10/12/2023  3:40 PM WENDY Kaur - CNP [address]  [facility] Maintenance   Topic Date Due    Hepatitis B vaccine (1 of 3 - Risk 3-dose series) Never done    Colorectal Cancer Screen  Never done    Shingles vaccine (1 of 2) Never done    COVID-19 Vaccine (3 - Booster for Pfizer series) 02/07/2022    Diabetic Alb to Cr ratio (uACR) test  05/09/2023    Lipids  05/09/2023    Flu vaccine (1) 08/01/2023    Diabetic retinal exam  02/20/2024    Diabetic foot exam  03/07/2024    GFR test (Diabetes, CKD 3-4, OR last GFR 15-59)  04/05/2024    Depression Screen  05/03/2024    A1C test (Diabetic or Prediabetic)  06/07/2024    Breast cancer screen  06/21/2025    Cervical cancer screen  01/03/2028    DTaP/Tdap/Td vaccine (3 - Td or Tdap) 02/17/2030    Pneumococcal 0-64 years Vaccine  Completed    Hepatitis C screen  Completed    HIV screen  Completed    Hepatitis A vaccine  Aged Out    Hib vaccine  Aged Out    Meningococcal (ACWY) vaccine  Aged Out       Hemoglobin A1C (%)   Date Value   06/07/2023 8.8   05/03/2023 10.4   03/07/2023 14.0             ( goal A1C is < 7)   Microalb/Crt.  Ratio (mcg/mg creat)   Date Value   05/09/2022 105 (H)     LDL Cholesterol (mg/dL)   Date Value   05/09/2022 105   02/01/2020 43       (goal LDL is <100)   AST (U/L)   Date Value   04/05/2023 13     ALT (U/L)   Date Value   04/05/2023 13     BUN (mg/dL)   Date Value   04/05/2023 7     BP Readings from Last 3 Encounters:   06/07/23 130/80   05/03/23 138/76   04/27/23 124/76          (goal 120/80)    All Future Testing planned in [facility]  Lab Frequency Next Occurrence   PAP SMEAR Once 01/03/2023   ECHO 2D WO Color Doppler Complete

## 2023-07-26 RX ORDER — FAMOTIDINE 20 MG/1
TABLET, FILM COATED ORAL
Qty: 60 TABLET | Refills: 3 | Status: SHIPPED | OUTPATIENT
Start: 2023-07-26

## 2023-10-04 ENCOUNTER — OFFICE VISIT (OUTPATIENT)
Dept: PHYSICAL MEDICINE AND REHAB | Age: 53
End: 2023-10-04
Payer: COMMERCIAL

## 2023-10-04 VITALS
DIASTOLIC BLOOD PRESSURE: 80 MMHG | SYSTOLIC BLOOD PRESSURE: 142 MMHG | WEIGHT: 280.8 LBS | BODY MASS INDEX: 43.98 KG/M2 | HEART RATE: 88 BPM | TEMPERATURE: 97.8 F

## 2023-10-04 DIAGNOSIS — G89.29 CHRONIC LOW BACK PAIN, UNSPECIFIED BACK PAIN LATERALITY, UNSPECIFIED WHETHER SCIATICA PRESENT: ICD-10-CM

## 2023-10-04 DIAGNOSIS — M54.17 RADICULOPATHY, LUMBOSACRAL REGION: Primary | ICD-10-CM

## 2023-10-04 DIAGNOSIS — M54.50 CHRONIC LOW BACK PAIN, UNSPECIFIED BACK PAIN LATERALITY, UNSPECIFIED WHETHER SCIATICA PRESENT: ICD-10-CM

## 2023-10-04 DIAGNOSIS — M79.10 MYALGIA: ICD-10-CM

## 2023-10-04 PROCEDURE — 3078F DIAST BP <80 MM HG: CPT | Performed by: PHYSICAL MEDICINE & REHABILITATION

## 2023-10-04 PROCEDURE — 99212 OFFICE O/P EST SF 10 MIN: CPT | Performed by: PHYSICAL MEDICINE & REHABILITATION

## 2023-10-04 PROCEDURE — 3074F SYST BP LT 130 MM HG: CPT | Performed by: PHYSICAL MEDICINE & REHABILITATION
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
## 2023-10-20 DIAGNOSIS — Z79.4 TYPE 2 DIABETES MELLITUS WITHOUT COMPLICATION, WITH LONG-TERM CURRENT USE OF INSULIN (HCC): ICD-10-CM

## 2023-10-20 DIAGNOSIS — E11.9 TYPE 2 DIABETES MELLITUS WITHOUT COMPLICATION, WITH LONG-TERM CURRENT USE OF INSULIN (HCC): ICD-10-CM

## 2023-10-20 NOTE — TELEPHONE ENCOUNTER
Last visit: 6/7/23  Last Med refill: 4/6/23  Does patient have enough medication for 72 hours: no    Next Visit Date:  Future Appointments   Date Time Provider [address]   11/1/2023  8:00 AM Romel Mendez, [facility]   Topic Date Due    Hepatitis B vaccine (1 of 3 - 3-dose series) Never done    Colorectal Cancer Screen  Never done    Pneumococcal 0-64 years Vaccine (2 - PCV) 01/13/2018    Shingles vaccine (1 of 2) Never done    COVID-19 Vaccine (3 - Pfizer series) 02/07/2022    Diabetic Alb to Cr ratio (uACR) test  05/09/2023    Lipids  05/09/2023    Flu vaccine (1) Never done    Diabetic retinal exam  02/20/2024    Diabetic foot exam  03/07/2024    GFR test (Diabetes, CKD 3-4, OR last GFR 15-59)  04/05/2024    Depression Screen  05/03/2024    A1C test (Diabetic or Prediabetic)  06/07/2024    Breast cancer screen  06/21/2025    Cervical cancer screen  01/03/2028    DTaP/Tdap/Td vaccine (3 - Td or Tdap) 02/17/2030    Hepatitis C screen  Completed    HIV screen  Completed    Hepatitis A vaccine  Aged Out    Hib vaccine  Aged Out    Meningococcal (ACWY) vaccine  Aged Out       Hemoglobin A1C (%)   Date Value   06/07/2023 8.8   05/03/2023 10.4   03/07/2023 14.0             ( goal A1C is < 7)   No components found for: \"LABMICR\"  LDL Cholesterol (mg/dL)   Date Value   05/09/2022 105   02/01/2020 43       (goal LDL is <100)   AST (U/L)   Date Value   04/05/2023 13     ALT (U/L)   Date Value   04/05/2023 13     BUN (mg/dL)   Date Value   04/05/2023 7     BP Readings from Last 3 Encounters:   10/04/23 (!) 142/80   06/07/23 130/80   05/03/23 138/76          (goal 120/80)    All Future Testing planned in [facility]  Lab Frequency Next Occurrence   PAP SMEAR Once 01/03/2023   ECHO 2D WO Color Doppler Complete Once 01/25/2023   Hemoglobin A1C Once 04/06/2023   Cardiac Stress Test - w/Pharm Once 04/20/2023   NM MYOCARDIAL SPECT REST EXERCISE OR RX Once 04/20/2023   Echo

## 2023-11-01 ENCOUNTER — OFFICE VISIT (OUTPATIENT)
Dept: PAIN MANAGEMENT | Age: 53
End: 2023-11-01
Payer: COMMERCIAL

## 2023-11-01 VITALS — BODY MASS INDEX: 43.95 KG/M2 | WEIGHT: 280 LBS | HEIGHT: 67 IN

## 2023-11-01 DIAGNOSIS — E66.01 OBESITY, CLASS III, BMI 40-49.9 (MORBID OBESITY) (HCC): ICD-10-CM

## 2023-11-01 DIAGNOSIS — M47.817 LUMBOSACRAL SPONDYLOSIS WITHOUT MYELOPATHY: ICD-10-CM

## 2023-11-01 DIAGNOSIS — M54.16 LUMBAR BACK PAIN WITH RADICULOPATHY AFFECTING LEFT LOWER EXTREMITY: Primary | ICD-10-CM

## 2023-11-01 DIAGNOSIS — M54.17 LUMBOSACRAL NEURITIS: ICD-10-CM

## 2023-11-01 PROCEDURE — 99213 OFFICE O/P EST LOW 20 MIN: CPT | Performed by: NURSE PRACTITIONER

## 2023-11-01 ASSESSMENT — ENCOUNTER SYMPTOMS
SHORTNESS OF BREATH: 0
BOWEL INCONTINENCE: 0
COUGH: 0
CONSTIPATION: 0
BACK PAIN: 1

## 2023-12-12 ENCOUNTER — OFFICE VISIT (OUTPATIENT)
Dept: FAMILY MEDICINE CLINIC | Age: 53
End: 2023-12-12
Payer: COMMERCIAL

## 2023-12-12 ENCOUNTER — HOSPITAL ENCOUNTER (OUTPATIENT)
Age: 53
Setting detail: SPECIMEN
Discharge: HOME | End: 2023-12-12

## 2023-12-12 VITALS
DIASTOLIC BLOOD PRESSURE: 76 MMHG | HEART RATE: 84 BPM | SYSTOLIC BLOOD PRESSURE: 130 MMHG | WEIGHT: 273.4 LBS | HEIGHT: 67 IN | BODY MASS INDEX: 42.91 KG/M2

## 2023-12-12 DIAGNOSIS — N76.0 ACUTE VAGINITIS: ICD-10-CM

## 2023-12-12 DIAGNOSIS — E11.9 TYPE 2 DIABETES MELLITUS WITHOUT COMPLICATION, WITHOUT LONG-TERM CURRENT USE OF INSULIN (HCC): ICD-10-CM

## 2023-12-12 DIAGNOSIS — E11.65 TYPE 2 DIABETES MELLITUS WITH HYPERGLYCEMIA, WITHOUT LONG-TERM CURRENT USE OF INSULIN (HCC): ICD-10-CM

## 2023-12-12 DIAGNOSIS — J30.9 ALLERGIC RHINITIS, UNSPECIFIED SEASONALITY, UNSPECIFIED TRIGGER: ICD-10-CM

## 2023-12-12 DIAGNOSIS — E78.5 DYSLIPIDEMIA: ICD-10-CM

## 2023-12-12 DIAGNOSIS — I50.42 CHRONIC COMBINED SYSTOLIC (CONGESTIVE) AND DIASTOLIC (CONGESTIVE) HEART FAILURE (HCC): ICD-10-CM

## 2023-12-12 DIAGNOSIS — I10 ESSENTIAL HYPERTENSION: ICD-10-CM

## 2023-12-12 DIAGNOSIS — Z79.4 TYPE 2 DIABETES MELLITUS WITHOUT COMPLICATION, WITH LONG-TERM CURRENT USE OF INSULIN (HCC): ICD-10-CM

## 2023-12-12 DIAGNOSIS — E11.9 TYPE 2 DIABETES MELLITUS WITHOUT COMPLICATION, WITH LONG-TERM CURRENT USE OF INSULIN (HCC): ICD-10-CM

## 2023-12-12 LAB
CHOLEST SERPL-MCNC: 263 MG/DL
CHOLESTEROL/HDL RATIO: 6.7
CREAT UR-MCNC: 106 MG/DL (ref 28–217)
HBA1C MFR BLD: 9.6 %
HDLC SERPL-MCNC: 39 MG/DL
LDLC SERPL CALC-MCNC: 190 MG/DL (ref 0–130)
MICROALBUMIN UR-MCNC: <12 MG/L
MICROALBUMIN/CREAT UR-RTO: NORMAL MCG/MG CREAT
TRIGL SERPL-MCNC: 168 MG/DL

## 2023-12-12 PROCEDURE — 3078F DIAST BP <80 MM HG: CPT

## 2023-12-12 PROCEDURE — 83036 HEMOGLOBIN GLYCOSYLATED A1C: CPT

## 2023-12-12 PROCEDURE — 99213 OFFICE O/P EST LOW 20 MIN: CPT

## 2023-12-12 PROCEDURE — 3046F HEMOGLOBIN A1C LEVEL >9.0%: CPT

## 2023-12-12 PROCEDURE — 3075F SYST BP GE 130 - 139MM HG: CPT

## 2023-12-12 RX ORDER — BLOOD PRESSURE TEST KIT
1 KIT MISCELLANEOUS PRN
Qty: 1 EACH | Refills: 3 | Status: SHIPPED | OUTPATIENT
Start: 2023-12-12

## 2023-12-12 RX ORDER — FUROSEMIDE 40 MG/1
40 TABLET ORAL DAILY
Qty: 90 TABLET | Refills: 1 | Status: SHIPPED | OUTPATIENT
Start: 2023-12-12

## 2023-12-12 RX ORDER — CARVEDILOL 6.25 MG/1
6.25 TABLET ORAL 2 TIMES DAILY WITH MEALS
Qty: 180 TABLET | Refills: 1 | Status: SHIPPED | OUTPATIENT
Start: 2023-12-12

## 2023-12-12 RX ORDER — POTASSIUM CHLORIDE 750 MG/1
10 TABLET, EXTENDED RELEASE ORAL 2 TIMES DAILY
Qty: 30 TABLET | Refills: 0 | Status: SHIPPED | OUTPATIENT
Start: 2023-12-12

## 2023-12-12 RX ORDER — CLOTRIMAZOLE AND BETAMETHASONE DIPROPIONATE 10; .64 MG/G; MG/G
CREAM TOPICAL
Qty: 45 G | Refills: 1 | Status: SHIPPED | OUTPATIENT
Start: 2023-12-12

## 2023-12-12 RX ORDER — INSULIN LISPRO 100 [IU]/ML
INJECTION, SOLUTION INTRAVENOUS; SUBCUTANEOUS
Qty: 15 ML | Refills: 3 | Status: SHIPPED | OUTPATIENT
Start: 2023-12-12

## 2023-12-12 RX ORDER — MELOXICAM 7.5 MG/1
7.5 TABLET ORAL DAILY
Qty: 30 TABLET | Refills: 0 | Status: CANCELLED | OUTPATIENT
Start: 2023-12-12

## 2023-12-12 RX ORDER — AMLODIPINE BESYLATE 10 MG/1
TABLET ORAL
Qty: 90 TABLET | Refills: 1 | Status: SHIPPED | OUTPATIENT
Start: 2023-12-12

## 2023-12-12 RX ORDER — ATORVASTATIN CALCIUM 40 MG/1
40 TABLET, FILM COATED ORAL DAILY
Qty: 90 TABLET | Refills: 3 | Status: SHIPPED | OUTPATIENT
Start: 2023-12-12

## 2023-12-12 RX ORDER — DULAGLUTIDE 0.75 MG/.5ML
0.75 INJECTION, SOLUTION SUBCUTANEOUS WEEKLY
Qty: 4 ADJUSTABLE DOSE PRE-FILLED PEN SYRINGE | Refills: 1 | Status: SHIPPED | OUTPATIENT
Start: 2023-12-12

## 2023-12-12 RX ORDER — GLUCOSAMINE HCL/CHONDROITIN SU 500-400 MG
1 CAPSULE ORAL
Qty: 100 STRIP | Refills: 5 | Status: SHIPPED | OUTPATIENT
Start: 2023-12-12

## 2023-12-12 RX ORDER — FLUTICASONE PROPIONATE 50 MCG
1 SPRAY, SUSPENSION (ML) NASAL DAILY
Qty: 32 G | Refills: 1 | Status: SHIPPED | OUTPATIENT
Start: 2023-12-12

## 2023-12-12 RX ORDER — SACUBITRIL AND VALSARTAN 49; 51 MG/1; MG/1
1 TABLET, FILM COATED ORAL 2 TIMES DAILY
Qty: 180 TABLET | Refills: 1 | Status: SHIPPED | OUTPATIENT
Start: 2023-12-12

## 2023-12-12 RX ORDER — SPIRONOLACTONE 25 MG/1
25 TABLET ORAL DAILY
Qty: 30 TABLET | Refills: 3 | Status: SHIPPED | OUTPATIENT
Start: 2023-12-12

## 2023-12-12 RX ORDER — INSULIN GLARGINE 100 [IU]/ML
INJECTION, SOLUTION SUBCUTANEOUS
Qty: 5 ADJUSTABLE DOSE PRE-FILLED PEN SYRINGE | Refills: 2 | Status: SHIPPED | OUTPATIENT
Start: 2023-12-12

## 2023-12-12 ASSESSMENT — ENCOUNTER SYMPTOMS
BACK PAIN: 0
SHORTNESS OF BREATH: 0
NAUSEA: 0
COUGH: 0
RHINORRHEA: 0
DIARRHEA: 0
SORE THROAT: 0
ABDOMINAL PAIN: 0

## 2023-12-12 NOTE — PROGRESS NOTES
Attending Physician Statement  I have discussed the care of BrendaTaylorincluding pertinent history and exam findings,  with the resident. I have reviewed the key elements of all parts of the encounter with the resident. I agree with the assessment, plan and orders as documented by the resident. (GE Modifier)    Systolic CHF with EF 50-40%. Failed to FU with Cardiology- Referral done/Medications adjusted/Echo  DM- A1c 9.3- Lantus 20 units bid/CGM  3.  HM next visit
Visit Information    Have you changed or started any medications since your last visit including any over-the-counter medicines, vitamins, or herbal medicines? no   Have you stopped taking any of your medications? Is so, why? -  no  Are you having any side effects from any of your medications? - no    Have you seen any other physician or provider since your last visit? yes - Pain management - Physical Medicine   Have you had any other diagnostic tests since your last visit?  no   Have you been seen in the emergency room and/or had an admission in a hospital since we last saw you?  no   Have you had your routine dental cleaning in the past 6 months?  no     Do you have an active MyChart account? If no, what is the barrier?   Yes    Patient Care Team:  Mariah Bell DO as PCP - General (Family Medicine)    Medical History Review  Past Medical, Family, and Social History reviewed and does not contribute to the patient presenting condition    Health Maintenance   Topic Date Due    Hepatitis B vaccine (1 of 3 - 3-dose series) Never done    Colorectal Cancer Screen  Never done    Pneumococcal 0-64 years Vaccine (2 - PCV) 01/13/2018    Shingles vaccine (1 of 2) Never done    Diabetic Alb to Cr ratio (uACR) test  05/09/2023    Lipids  05/09/2023    Flu vaccine (1) Never done    COVID-19 Vaccine (3 - 2023-24 season) 09/01/2023    Diabetic retinal exam  02/20/2024    Diabetic foot exam  03/07/2024    GFR test (Diabetes, CKD 3-4, OR last GFR 15-59)  04/05/2024    Depression Screen  05/03/2024    A1C test (Diabetic or Prediabetic)  06/07/2024    Breast cancer screen  06/21/2025    Cervical cancer screen  01/03/2028    DTaP/Tdap/Td vaccine (3 - Td or Tdap) 02/17/2030    Hepatitis C screen  Completed    HIV screen  Completed    Hepatitis A vaccine  Aged Out    Hib vaccine  Aged Out    Meningococcal (ACWY) vaccine  Aged Out
LDLCHOLESTEROL 105 05/09/2022       Assessment and Plan:    1. Type 2 diabetes mellitus with hyperglycemia, without long-term current use of insulin (HCC)  - POCT glycosylated hemoglobin (Hb A1C)  - Alcohol Swabs PADS; 1 Units by Does not apply route as needed (for blood sugar testing)  Dispense: 1 each; Refill: 3  - Continuous Blood Gluc  (FREESTYLE JACKSON 2 READER) CARLOS; 1 each by Does not apply route in the morning, at noon, and at bedtime  Dispense: 1 each; Refill: 5  - Continuous Blood Gluc Sensor (FREESTYLE JACKSON 2 SENSOR) MISC; 1 each by Does not apply route in the morning, at noon, and at bedtime  Dispense: 1 each; Refill: 5  - Lipid Panel; Future  - Microalbumin, Ur; Future  - insulin glargine (LANTUS SOLOSTAR) 100 UNIT/ML injection pen; INJECT 20 UNITS SUBCUTANEOUSLY TWO TIMES A DAY  Dispense: 5 Adjustable Dose Pre-filled Pen Syringe; Refill: 2    2. Essential hypertension    - amLODIPine (NORVASC) 10 MG tablet; TAKE ONE TABLET BY MOUTH DAILY  Dispense: 90 tablet; Refill: 1  - carvedilol (COREG) 6.25 MG tablet; Take 1 tablet by mouth 2 times daily (with meals) with meals  Dispense: 180 tablet; Refill: 1    3. Dyslipidemia    - atorvastatin (LIPITOR) 40 MG tablet; Take 1 tablet by mouth daily  Dispense: 90 tablet; Refill: 3    4. Acute vaginitis  - clotrimazole-betamethasone (LOTRISONE) 1-0.05 % cream; Apply to affected area bid externally x 4 days then daily for 3 days  Dispense: 45 g; Refill: 1    5. Type 2 diabetes mellitus without complication, with long-term current use of insulin (HCC)    - dapagliflozin (FARXIGA) 10 MG tablet; Take 1 tablet by mouth every morning  Dispense: 90 tablet; Refill: 1  - Dulaglutide (TRULICITY) 4.84 WX/9.6KE SOPN; Inject 0.75 mg into the skin once a week  Dispense: 4 Adjustable Dose Pre-filled Pen Syringe;  Refill: 1  - insulin lispro, 1 Unit Dial, (HUMALOG KWIKPEN) 100 UNIT/ML SOPN; Inject 0-15 units before meals based on sliding scale  Dispense: 15 mL; Refill:

## 2024-01-16 ENCOUNTER — OFFICE VISIT (OUTPATIENT)
Dept: FAMILY MEDICINE CLINIC | Age: 54
End: 2024-01-16
Payer: COMMERCIAL

## 2024-01-16 ENCOUNTER — TELEPHONE (OUTPATIENT)
Dept: FAMILY MEDICINE CLINIC | Age: 54
End: 2024-01-16

## 2024-01-16 VITALS
HEIGHT: 67 IN | HEART RATE: 76 BPM | WEIGHT: 276.2 LBS | SYSTOLIC BLOOD PRESSURE: 165 MMHG | DIASTOLIC BLOOD PRESSURE: 98 MMHG | BODY MASS INDEX: 43.35 KG/M2

## 2024-01-16 DIAGNOSIS — Z79.4 TYPE 2 DIABETES MELLITUS WITH HYPERGLYCEMIA, WITH LONG-TERM CURRENT USE OF INSULIN (HCC): ICD-10-CM

## 2024-01-16 DIAGNOSIS — E11.9 TYPE 2 DIABETES MELLITUS WITHOUT COMPLICATION, WITH LONG-TERM CURRENT USE OF INSULIN (HCC): Primary | ICD-10-CM

## 2024-01-16 DIAGNOSIS — Z79.4 TYPE 2 DIABETES MELLITUS WITHOUT COMPLICATION, WITH LONG-TERM CURRENT USE OF INSULIN (HCC): Primary | ICD-10-CM

## 2024-01-16 DIAGNOSIS — Z12.11 COLON CANCER SCREENING: ICD-10-CM

## 2024-01-16 DIAGNOSIS — E11.65 TYPE 2 DIABETES MELLITUS WITH HYPERGLYCEMIA, WITHOUT LONG-TERM CURRENT USE OF INSULIN (HCC): ICD-10-CM

## 2024-01-16 DIAGNOSIS — E66.01 OBESITY, CLASS III, BMI 40-49.9 (MORBID OBESITY) (HCC): ICD-10-CM

## 2024-01-16 DIAGNOSIS — E11.65 TYPE 2 DIABETES MELLITUS WITH HYPERGLYCEMIA, WITH LONG-TERM CURRENT USE OF INSULIN (HCC): ICD-10-CM

## 2024-01-16 DIAGNOSIS — I50.42 CHRONIC COMBINED SYSTOLIC (CONGESTIVE) AND DIASTOLIC (CONGESTIVE) HEART FAILURE (HCC): ICD-10-CM

## 2024-01-16 PROCEDURE — 3080F DIAST BP >= 90 MM HG: CPT

## 2024-01-16 PROCEDURE — 99213 OFFICE O/P EST LOW 20 MIN: CPT

## 2024-01-16 PROCEDURE — 3077F SYST BP >= 140 MM HG: CPT

## 2024-01-16 RX ORDER — PEN NEEDLE, DIABETIC 32GX 5/32"
NEEDLE, DISPOSABLE MISCELLANEOUS
Qty: 100 EACH | Refills: 1 | Status: SHIPPED | OUTPATIENT
Start: 2024-01-16

## 2024-01-16 RX ORDER — DULAGLUTIDE 0.75 MG/.5ML
0.75 INJECTION, SOLUTION SUBCUTANEOUS WEEKLY
Qty: 4 ML | Refills: 5 | Status: SHIPPED | OUTPATIENT
Start: 2024-01-16

## 2024-01-16 ASSESSMENT — ENCOUNTER SYMPTOMS
ABDOMINAL PAIN: 0
DIARRHEA: 0
SORE THROAT: 0
BACK PAIN: 0
COUGH: 0
SHORTNESS OF BREATH: 0
RHINORRHEA: 0
NAUSEA: 0

## 2024-01-16 ASSESSMENT — PATIENT HEALTH QUESTIONNAIRE - PHQ9
SUM OF ALL RESPONSES TO PHQ9 QUESTIONS 1 & 2: 0
DEPRESSION UNABLE TO ASSESS: PT REFUSES
1. LITTLE INTEREST OR PLEASURE IN DOING THINGS: 0
SUM OF ALL RESPONSES TO PHQ QUESTIONS 1-9: 0
SUM OF ALL RESPONSES TO PHQ QUESTIONS 1-9: 0
2. FEELING DOWN, DEPRESSED OR HOPELESS: 0
SUM OF ALL RESPONSES TO PHQ QUESTIONS 1-9: 0
SUM OF ALL RESPONSES TO PHQ QUESTIONS 1-9: 0

## 2024-01-16 NOTE — TELEPHONE ENCOUNTER
PA could not be created due to covermymeds, Patient is already switched to a different insulin pen.
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
## 2024-01-16 NOTE — PROGRESS NOTES
Visit Information    Have you changed or started any medications since your last visit including any over-the-counter medicines, vitamins, or herbal medicines? no   Have you stopped taking any of your medications? Is so, why? -  no  Are you having any side effects from any of your medications? - no    Have you seen any other physician or provider since your last visit?  no   Have you had any other diagnostic tests since your last visit?  yes - Labs, ECHO   Have you been seen in the emergency room and/or had an admission in a hospital since we last saw you?  no   Have you had your routine dental cleaning in the past 6 months?  no     Do you have an active MyChart account? If no, what is the barrier?  Yes    Patient Care Team:  Louis Rivera DO as PCP - General (Family Medicine)    Medical History Review  Past Medical, Family, and Social History reviewed and does not contribute to the patient presenting condition    Health Maintenance   Topic Date Due    Hepatitis B vaccine (1 of 3 - 3-dose series) Never done    Colorectal Cancer Screen  Never done    Pneumococcal 0-64 years Vaccine (2 - PCV) 01/13/2018    Shingles vaccine (1 of 2) Never done    Flu vaccine (1) Never done    COVID-19 Vaccine (3 - 2023-24 season) 09/01/2023    Diabetic retinal exam  02/20/2024    Diabetic foot exam  03/07/2024    A1C test (Diabetic or Prediabetic)  03/12/2024    GFR test (Diabetes, CKD 3-4, OR last GFR 15-59)  04/05/2024    Depression Screen  05/03/2024    Diabetic Alb to Cr ratio (uACR) test  12/12/2024    Lipids  12/12/2024    Breast cancer screen  06/21/2025    Cervical cancer screen  01/03/2028    DTaP/Tdap/Td vaccine (3 - Td or Tdap) 02/17/2030    Hepatitis C screen  Completed    HIV screen  Completed    Hepatitis A vaccine  Aged Out    Hib vaccine  Aged Out    Polio vaccine  Aged Out    Meningococcal (ACWY) vaccine  Aged Out

## 2024-01-16 NOTE — PATIENT INSTRUCTIONS
Thank you for letting us take care of you today. We hope all your questions were addressed. If a question was overlooked or something else comes to mind after you return home, please contact a member of your Care Team listed below.      Your Care Team at [facility] is Team #2  Klaudia Andersen M.D. (Faculty)  Denita Dumont, (Resident)  Jimmy Landers, (Resident)  Royal Ram, (Resident)  Sully Hemphill, (Resident)  Tashi Quiroga, (Resident)  Jennifer Dewey, [facility]  Jaguar Stephen, [facility]  Ira Claudio, [facility]  Nellie Monae, [facility]  Vikki Mascorro,  [facility]  Monica Contreras, [facility]  Lily Vela, [facility]  Angela Levi, [facility]  Nina (LJ) Philippe CHANDLER (Clinical Practice Manager)  Thuy Ramsey [facility] (Clinical Pharmacist)     Office phone number: [phone]    If you need to get in right away due to illness, please be advised we have \"Same Day\" appointments available Monday-Friday. Please call us at [phone] option #3 to schedule your \"Same Day\" appointment.

## 2024-01-16 NOTE — PROGRESS NOTES
Attending Physician Statement  I  have discussed the care of Kari Reddy including pertinent history and exam findings with the resident. I agree with the assessment, plan and orders as documented by the resident.      BP (!) 165/98 (Site: Right Upper Arm, Position: Sitting, Cuff Size: Large Adult)   Pulse 76   Ht 1.702 m (5' 7.01\")   Wt 125.3 kg (276 lb 3.2 oz)   BMI 43.25 kg/m²    BP Readings from Last 3 Encounters:   01/16/24 (!) 165/98   12/21/23 (!) 142/85   12/12/23 130/76     Wt Readings from Last 3 Encounters:   01/16/24 125.3 kg (276 lb 3.2 oz)   12/21/23 123.8 kg (273 lb)   12/12/23 124 kg (273 lb 6.4 oz)          Diagnosis Orders   1. Type 2 diabetes mellitus without complication, with long-term current use of insulin (HCC)  Dulaglutide (TRULICITY) 0.75 MG/0.5ML SOPN    Insulin Pen Needle ([employer] PEN NEEDLES) 32G X 4 MM MISC    Continuous Blood Gluc  (FREESTYLE JACKSON 2 READER) CARLOS    Continuous Blood Gluc Sensor (FREESTYLE JACKSON 2 SENSOR) MISC    DISCONTINUED: Continuous Blood Gluc  (FREESTYLE JACKSON 2 READER) CARLOS    DISCONTINUED: Continuous Blood Gluc Sensor (FREESTYLE JACKSON 2 SENSOR) MISC      2. Obesity, Class III, BMI 40-49.9 (morbid obesity) (HCC)        3. Chronic combined systolic (congestive) and diastolic (congestive) heart failure (HCC)        4. Type 2 diabetes mellitus with hyperglycemia, without long-term current use of insulin (HCC)        5. Type 2 diabetes mellitus with hyperglycemia, with long-term current use of insulin (HCC)        6. Colon cancer screening  Fecal DNA Colorectal cancer screening (Cologuard)        Discussed diet changes for further glycemic control; trulicity increased; increased long acting insulin- if persistently elevated recommend in-house pharmacy consult, will likely need daytime short acting insulin with SS.    Increase statin, goal <70 LDL        Dipesh Chance DO 1/16/2024 3:45 PM

## 2024-01-24 ENCOUNTER — APPOINTMENT (OUTPATIENT)
Dept: GENERAL RADIOLOGY | Age: 54
End: 2024-01-24
Payer: COMMERCIAL

## 2024-01-24 ENCOUNTER — HOSPITAL ENCOUNTER (EMERGENCY)
Age: 54
Discharge: HOME | End: 2024-01-25
Attending: STUDENT IN AN ORGANIZED HEALTH CARE EDUCATION/TRAINING PROGRAM
Payer: COMMERCIAL

## 2024-01-24 DIAGNOSIS — J44.1 COPD EXACERBATION (HCC): Primary | ICD-10-CM

## 2024-01-24 LAB
BASOPHILS # BLD: 0.1 K/UL (ref 0–0.2)
BASOPHILS NFR BLD: 1 % (ref 0–2)
EOSINOPHIL # BLD: 0.2 K/UL (ref 0–0.4)
EOSINOPHILS RELATIVE PERCENT: 2 % (ref 0–4)
ERYTHROCYTE [DISTWIDTH] IN BLOOD BY AUTOMATED COUNT: 16.3 % (ref 11.5–14.9)
FLUAV RNA RESP QL NAA+PROBE: NOT DETECTED
FLUBV RNA RESP QL NAA+PROBE: NOT DETECTED
HCT VFR BLD AUTO: 39.7 % (ref 36–46)
HGB BLD-MCNC: 12.6 G/DL (ref 12–16)
INR PPP: 1
LYMPHOCYTES NFR BLD: 1.8 K/UL (ref 1–4.8)
LYMPHOCYTES RELATIVE PERCENT: 18 % (ref 24–44)
MCH RBC QN AUTO: 28.1 PG (ref 26–34)
MCHC RBC AUTO-ENTMCNC: 31.6 G/DL (ref 31–37)
MCV RBC AUTO: 88.7 FL (ref 80–100)
MONOCYTES NFR BLD: 0.5 K/UL (ref 0.1–1.3)
MONOCYTES NFR BLD: 5 % (ref 1–7)
NEUTROPHILS NFR BLD: 74 % (ref 36–66)
NEUTS SEG NFR BLD: 7.3 K/UL (ref 1.3–9.1)
PLATELET # BLD AUTO: 264 K/UL (ref 150–450)
PMV BLD AUTO: 8.5 FL (ref 6–12)
PROTHROMBIN TIME: 13.7 SEC (ref 11.8–14.6)
RBC # BLD AUTO: 4.48 M/UL (ref 4–5.2)
SARS-COV-2 RNA RESP QL NAA+PROBE: NOT DETECTED
SOURCE: NORMAL
SPECIMEN DESCRIPTION: NORMAL
WBC OTHER # BLD: 9.8 K/UL (ref 3.5–11)

## 2024-01-24 PROCEDURE — 87636 SARSCOV2 & INF A&B AMP PRB: CPT

## 2024-01-24 PROCEDURE — 85610 PROTHROMBIN TIME: CPT

## 2024-01-24 PROCEDURE — 83735 ASSAY OF MAGNESIUM: CPT

## 2024-01-24 PROCEDURE — 99284 EMERGENCY DEPT VISIT MOD MDM: CPT

## 2024-01-24 PROCEDURE — 83880 ASSAY OF NATRIURETIC PEPTIDE: CPT

## 2024-01-24 PROCEDURE — 71045 X-RAY EXAM CHEST 1 VIEW: CPT

## 2024-01-24 PROCEDURE — 94640 AIRWAY INHALATION TREATMENT: CPT

## 2024-01-24 PROCEDURE — 85025 COMPLETE CBC W/AUTO DIFF WBC: CPT

## 2024-01-24 PROCEDURE — 80053 COMPREHEN METABOLIC PANEL: CPT

## 2024-01-24 PROCEDURE — 6370000000 HC RX 637 (ALT 250 FOR IP): Performed by: STUDENT IN AN ORGANIZED HEALTH CARE EDUCATION/TRAINING PROGRAM

## 2024-01-24 PROCEDURE — 84484 ASSAY OF TROPONIN QUANT: CPT

## 2024-01-24 PROCEDURE — 36415 COLL VENOUS BLD VENIPUNCTURE: CPT

## 2024-01-24 RX ORDER — ALBUTEROL SULFATE 90 UG/1
4 AEROSOL, METERED RESPIRATORY (INHALATION) ONCE
Status: COMPLETED | OUTPATIENT
Start: 2024-01-24 | End: 2024-01-24

## 2024-01-24 RX ADMIN — ALBUTEROL SULFATE 4 PUFF: 90 AEROSOL, METERED RESPIRATORY (INHALATION) at 23:33

## 2024-01-24 ASSESSMENT — PAIN - FUNCTIONAL ASSESSMENT: PAIN_FUNCTIONAL_ASSESSMENT: NONE - DENIES PAIN

## 2024-01-24 ASSESSMENT — ENCOUNTER SYMPTOMS
ABDOMINAL PAIN: 0
VOMITING: 0
NAUSEA: 0
EYE REDNESS: 0
SHORTNESS OF BREATH: 1
DIARRHEA: 0
RHINORRHEA: 0
COUGH: 1
SORE THROAT: 0
EYE DISCHARGE: 0

## 2024-01-25 VITALS
HEIGHT: 67 IN | TEMPERATURE: 98 F | BODY MASS INDEX: 42.85 KG/M2 | OXYGEN SATURATION: 99 % | DIASTOLIC BLOOD PRESSURE: 87 MMHG | HEART RATE: 75 BPM | RESPIRATION RATE: 19 BRPM | SYSTOLIC BLOOD PRESSURE: 165 MMHG | WEIGHT: 273 LBS

## 2024-01-25 LAB
ALBUMIN SERPL-MCNC: 3.9 G/DL (ref 3.5–5.2)
ALP SERPL-CCNC: 115 U/L (ref 35–104)
ALT SERPL-CCNC: 12 U/L (ref 5–33)
ANION GAP SERPL CALCULATED.3IONS-SCNC: 11 MMOL/L (ref 9–17)
AST SERPL-CCNC: 15 U/L
BILIRUB SERPL-MCNC: 0.3 MG/DL (ref 0.3–1.2)
BNP SERPL-MCNC: 570 PG/ML
BUN SERPL-MCNC: 8 MG/DL (ref 6–20)
CALCIUM SERPL-MCNC: 9.4 MG/DL (ref 8.6–10.4)
CHLORIDE SERPL-SCNC: 104 MMOL/L (ref 98–107)
CO2 SERPL-SCNC: 25 MMOL/L (ref 20–31)
CREAT SERPL-MCNC: 0.7 MG/DL (ref 0.5–0.9)
GFR SERPL CREATININE-BSD FRML MDRD: >60 ML/MIN/1.73M2
GLUCOSE SERPL-MCNC: 230 MG/DL (ref 70–99)
MAGNESIUM SERPL-MCNC: 1.8 MG/DL (ref 1.6–2.6)
POTASSIUM SERPL-SCNC: 3.5 MMOL/L (ref 3.7–5.3)
PROT SERPL-MCNC: 7 G/DL (ref 6.4–8.3)
SODIUM SERPL-SCNC: 140 MMOL/L (ref 135–144)
TROPONIN I SERPL HS-MCNC: 22 NG/L (ref 0–14)
TROPONIN I SERPL HS-MCNC: 22 NG/L (ref 0–14)

## 2024-01-25 PROCEDURE — 84484 ASSAY OF TROPONIN QUANT: CPT

## 2024-01-25 PROCEDURE — 36415 COLL VENOUS BLD VENIPUNCTURE: CPT

## 2024-01-25 PROCEDURE — 6370000000 HC RX 637 (ALT 250 FOR IP): Performed by: STUDENT IN AN ORGANIZED HEALTH CARE EDUCATION/TRAINING PROGRAM

## 2024-01-25 RX ORDER — ALBUTEROL SULFATE 90 UG/1
2 AEROSOL, METERED RESPIRATORY (INHALATION) 4 TIMES DAILY PRN
Qty: 18 G | Refills: 0 | Status: SHIPPED | OUTPATIENT
Start: 2024-01-25

## 2024-01-25 RX ORDER — DOXYCYCLINE 100 MG/1
100 CAPSULE ORAL ONCE
Status: COMPLETED | OUTPATIENT
Start: 2024-01-25 | End: 2024-01-25

## 2024-01-25 RX ORDER — DOXYCYCLINE HYCLATE 100 MG
100 TABLET ORAL 2 TIMES DAILY
Qty: 10 TABLET | Refills: 0 | Status: SHIPPED | OUTPATIENT
Start: 2024-01-25 | End: 2024-01-30

## 2024-01-25 RX ADMIN — DOXYCYCLINE 100 MG: 100 CAPSULE ORAL at 01:57

## 2024-01-25 ASSESSMENT — LIFESTYLE VARIABLES
HOW MANY STANDARD DRINKS CONTAINING ALCOHOL DO YOU HAVE ON A TYPICAL DAY: PATIENT DOES NOT DRINK
HOW OFTEN DO YOU HAVE A DRINK CONTAINING ALCOHOL: NEVER

## 2024-01-25 ASSESSMENT — PAIN - FUNCTIONAL ASSESSMENT: PAIN_FUNCTIONAL_ASSESSMENT: NONE - DENIES PAIN

## 2024-01-25 NOTE — ED PROVIDER NOTES
Disposition discussion with patient/family, Shared Decision Making: Discussed with the patient we will treat for COPD exacerbation with increasing sputum with doxycycline    Follow-up with the primary doctor within a couple days    Return for difficulty breathing    53-year-old presenting with congestion cough with sputum    Suspect COPD exacerbation and bronchitis    Starting on doxycycline with primary follow-up          DATA FOR LAB AND RADIOLOGY TESTS ORDERED BELOW ARE REVIEWED BY THE ED CLINICIAN:    RADIOLOGY: All x-rays, CT, MRI, and formal ultrasound images (except ED bedside ultrasound) are read by the radiologist, see reports below, unless otherwise noted in MDM or here.  Reports below are reviewed by myself.  XR CHEST PORTABLE   Final Result   1. Cardiomegaly with mild edema.             LABS: Lab orders shown below, the results are reviewed by myself, and all abnormals are listed below.  Labs Reviewed   CBC WITH AUTO DIFFERENTIAL - Abnormal; Notable for the following components:       Result Value    RDW 16.3 (*)     Neutrophils % 74 (*)     Lymphocytes % 18 (*)     All other components within normal limits   COMPREHENSIVE METABOLIC PANEL - Abnormal; Notable for the following components:    Potassium 3.5 (*)     Glucose 230 (*)     Alkaline Phosphatase 115 (*)     All other components within normal limits   TROPONIN - Abnormal; Notable for the following components:    Troponin, High Sensitivity 22 (*)     All other components within normal limits   TROPONIN - Abnormal; Notable for the following components:    Troponin, High Sensitivity 22 (*)     All other components within normal limits   BRAIN NATRIURETIC PEPTIDE - Abnormal; Notable for the following components:    Pro- (*)     All other components within normal limits   COVID-19 & INFLUENZA COMBO   MAGNESIUM   PROTIME-INR       Vitals Reviewed:    Vitals:    01/24/24 2245 01/24/24 2333 01/25/24 0018   BP: (!) 189/97     Pulse: (!) 105

## 2024-01-25 NOTE — ED NOTES
Discharge instructions reviewed with patient, noting all directions and education by provider. Patient verbalizes understanding of all information reviewed, gathered personal items, and transferred under own power off unit to lobby without incident.  
none

## 2024-01-25 NOTE — ED TRIAGE NOTES
Mode of arrival (squad #, walk in, police, etc) : walk in        Chief complaint(s): SOB        Arrival Note (brief scenario, treatment PTA, etc).: pt has had productive cough x1 week, no hx of asth,a or COPD. Pt states it is not getting worse but it isn't better        C= \"Have you ever felt that you should Cut down on your drinking?\"  No  A= \"Have people Annoyed you by criticizing your drinking?\"  No  G= \"Have you ever felt bad or Guilty about your drinking?\"  No  E= \"Have you ever had a drink as an Eye-opener first thing in the morning to steady your nerves or to help a hangover?\"  No      Deferred []      Reason for deferring: N/A    *If yes to two or more: probable alcohol abuse.*

## 2024-03-19 ENCOUNTER — OFFICE VISIT (OUTPATIENT)
Dept: FAMILY MEDICINE CLINIC | Age: 54
End: 2024-03-19
Payer: COMMERCIAL

## 2024-03-19 VITALS
BODY MASS INDEX: 43.26 KG/M2 | HEIGHT: 67 IN | HEART RATE: 87 BPM | WEIGHT: 275.6 LBS | SYSTOLIC BLOOD PRESSURE: 156 MMHG | DIASTOLIC BLOOD PRESSURE: 84 MMHG

## 2024-03-19 DIAGNOSIS — L68.0 HIRSUTISM: ICD-10-CM

## 2024-03-19 DIAGNOSIS — E11.9 TYPE 2 DIABETES MELLITUS WITHOUT COMPLICATION, WITH LONG-TERM CURRENT USE OF INSULIN (HCC): ICD-10-CM

## 2024-03-19 DIAGNOSIS — G47.30 SLEEP APNEA, UNSPECIFIED TYPE: ICD-10-CM

## 2024-03-19 DIAGNOSIS — I10 HYPERTENSION GOAL BP (BLOOD PRESSURE) < 140/90: ICD-10-CM

## 2024-03-19 DIAGNOSIS — E11.65 TYPE 2 DIABETES MELLITUS WITH HYPERGLYCEMIA, WITHOUT LONG-TERM CURRENT USE OF INSULIN (HCC): Primary | ICD-10-CM

## 2024-03-19 DIAGNOSIS — R09.82 POST-NASAL DRAINAGE: ICD-10-CM

## 2024-03-19 DIAGNOSIS — Z79.4 TYPE 2 DIABETES MELLITUS WITHOUT COMPLICATION, WITH LONG-TERM CURRENT USE OF INSULIN (HCC): ICD-10-CM

## 2024-03-19 LAB — HBA1C MFR BLD: 8.4 %

## 2024-03-19 PROCEDURE — 3052F HG A1C>EQUAL 8.0%<EQUAL 9.0%: CPT

## 2024-03-19 PROCEDURE — 3077F SYST BP >= 140 MM HG: CPT

## 2024-03-19 PROCEDURE — 83036 HEMOGLOBIN GLYCOSYLATED A1C: CPT

## 2024-03-19 PROCEDURE — 3079F DIAST BP 80-89 MM HG: CPT

## 2024-03-19 PROCEDURE — 99213 OFFICE O/P EST LOW 20 MIN: CPT

## 2024-03-19 RX ORDER — BLOOD PRESSURE TEST KIT
1 KIT MISCELLANEOUS 3 TIMES DAILY
Qty: 1 KIT | Refills: 0 | Status: SHIPPED | OUTPATIENT
Start: 2024-03-19

## 2024-03-19 RX ORDER — DULAGLUTIDE 1.5 MG/.5ML
1.5 INJECTION, SOLUTION SUBCUTANEOUS WEEKLY
Qty: 4 ADJUSTABLE DOSE PRE-FILLED PEN SYRINGE | Refills: 0 | Status: SHIPPED | OUTPATIENT
Start: 2024-03-19

## 2024-03-19 RX ORDER — INSULIN GLARGINE 100 [IU]/ML
INJECTION, SOLUTION SUBCUTANEOUS
Qty: 5 ADJUSTABLE DOSE PRE-FILLED PEN SYRINGE | Refills: 5 | Status: SHIPPED | OUTPATIENT
Start: 2024-03-19

## 2024-03-19 RX ORDER — INSULIN LISPRO 100 [IU]/ML
INJECTION, SOLUTION INTRAVENOUS; SUBCUTANEOUS
Qty: 15 ML | Refills: 5 | Status: SHIPPED | OUTPATIENT
Start: 2024-03-19

## 2024-03-19 ASSESSMENT — ENCOUNTER SYMPTOMS
NAUSEA: 0
BACK PAIN: 0
COUGH: 0
DIARRHEA: 0
ABDOMINAL PAIN: 0
SORE THROAT: 0
RHINORRHEA: 0
SHORTNESS OF BREATH: 0

## 2024-03-19 NOTE — PATIENT INSTRUCTIONS
Thank you for letting us take care of you today. We hope all your questions were addressed. If a question was overlooked or something else comes to mind after you return home, please contact a member of your Care Team listed below.      Your Care Team at [facility] is Team #1  Maryellen Ramos, Faculty Advisor  Ruslan Au, Resident Physician  Louis Rivera, Resident Physician  Trinh Sue, Resident Physician  Jennifer Dewey, Replaced by [facility]  Ira Claudio, Replaced by [facility]  Jaguar Stephen, Replaced by [facility]  [facility]  Vikki Mascorro, Replaced by [facility]  Monica Contreras, [facility]  Lily Vela, Replaced by [facility]  Angela Levi[facility]  Nina (LJ) Philippe,   Thuy Ramsey [facility] (Clinical Pharmacist)     Office phone number: [phone]    If you need to get in right away due to illness, please be advised we have \"Same Day\" appointments available Monday-Friday. Please call us at [phone] option #3 to schedule your \"Same Day\" appointment.
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
## 2024-03-19 NOTE — PROGRESS NOTES
Attending Physician Statement  I have discussed the care of Kari Reddy, including pertinent history and exam findings,  with the resident. I have reviewed the key elements of all parts of the encounter with the resident.  I agree with the assessment, plan and orders as documented by the resident.  (GE Modifier)    Glen Haines MD

## 2024-03-19 NOTE — PROGRESS NOTES
Diabetic visit information    BP Readings from Last 3 Encounters:   02/15/24 120/88   01/25/24 (!) 165/87   01/16/24 (!) 165/98       Hemoglobin A1C (%)   Date Value   12/12/2023 9.6   06/07/2023 8.8   05/03/2023 10.4     LDL Cholesterol (mg/dL)   Date Value   12/12/2023 190 (H)               Have you changed or started any medications since your last visit including any over-the-counter medicines, vitamins, or herbal medicines? no   Have you stopped taking any of your medications? Is so, why? -  no  Are you having any side effects from any of your medications? - no    Have you seen any other physician or provider since your last visit?  yes - Cardio   Have you had any other diagnostic tests since your last visit?  yes - Labs, XR   Have you been seen in the emergency room and/or had an admission in a hospital since we last saw you?  yes - .[facility]     Have you had your annual diabetic retinal (eye) exam? No   (ensure copy of exam is in the chart)    Have you had your routine dental cleaning in the past 6 months? no    Do you have an active [employer] account?  If not, what are your barriers?  Yes    Patient Care Team:  Louis Rivera DO as PCP - General (Family Medicine)  Rishabh Bernstein MD as PCP - Empaneled Provider    Medical history Review  Past Medical, Family, and Social History reviewed and does contribute to the patient presenting condition.    Health Maintenance   Topic Date Due    Hepatitis B vaccine (1 of 3 - 3-dose series) Never done    Colorectal Cancer Screen  Never done    Pneumococcal 0-64 years Vaccine (2 of 2 - PCV) 01/13/2018    Shingles vaccine (1 of 2) Never done    Flu vaccine (1) Never done    COVID-19 Vaccine (3 - 2023-24 season) 09/01/2023    Diabetic retinal exam  02/20/2024    Diabetic foot exam  03/07/2024    A1C test (Diabetic or Prediabetic)  03/12/2024    Diabetic Alb to Cr ratio (uACR) test  12/12/2024    Lipids  12/12/2024    Depression Screen  01/16/2025    GFR test

## 2024-04-07 ENCOUNTER — HOSPITAL ENCOUNTER (EMERGENCY)
Age: 54
Discharge: HOME | End: 2024-04-08
Attending: EMERGENCY MEDICINE
Payer: COMMERCIAL

## 2024-04-07 DIAGNOSIS — J06.9 VIRAL URI WITH COUGH: Primary | ICD-10-CM

## 2024-04-07 DIAGNOSIS — R06.2 WHEEZING: ICD-10-CM

## 2024-04-07 PROCEDURE — 99285 EMERGENCY DEPT VISIT HI MDM: CPT

## 2024-04-08 ENCOUNTER — APPOINTMENT (OUTPATIENT)
Dept: GENERAL RADIOLOGY | Age: 54
End: 2024-04-08
Payer: COMMERCIAL

## 2024-04-08 VITALS
OXYGEN SATURATION: 98 % | BODY MASS INDEX: 42.85 KG/M2 | HEART RATE: 74 BPM | SYSTOLIC BLOOD PRESSURE: 141 MMHG | RESPIRATION RATE: 24 BRPM | WEIGHT: 273 LBS | HEIGHT: 67 IN | DIASTOLIC BLOOD PRESSURE: 80 MMHG | TEMPERATURE: 97.6 F

## 2024-04-08 LAB
ALBUMIN SERPL-MCNC: 4 G/DL (ref 3.5–5.2)
ALP SERPL-CCNC: 99 U/L (ref 35–104)
ALT SERPL-CCNC: 38 U/L (ref 5–33)
ANION GAP SERPL CALCULATED.3IONS-SCNC: 13 MMOL/L (ref 9–17)
AST SERPL-CCNC: 32 U/L
BASOPHILS # BLD: 0.1 K/UL (ref 0–0.2)
BASOPHILS NFR BLD: 1 % (ref 0–2)
BILIRUB SERPL-MCNC: 0.3 MG/DL (ref 0.3–1.2)
BNP SERPL-MCNC: 2681 PG/ML
BUN SERPL-MCNC: 13 MG/DL (ref 6–20)
CALCIUM SERPL-MCNC: 8.5 MG/DL (ref 8.6–10.4)
CHLORIDE SERPL-SCNC: 105 MMOL/L (ref 98–107)
CO2 SERPL-SCNC: 24 MMOL/L (ref 20–31)
CREAT SERPL-MCNC: 0.6 MG/DL (ref 0.5–0.9)
EKG ATRIAL RATE: 90 BPM
EKG P AXIS: 66 DEGREES
EKG P-R INTERVAL: 154 MS
EKG Q-T INTERVAL: 406 MS
EKG QRS DURATION: 92 MS
EKG QTC CALCULATION (BAZETT): 496 MS
EKG R AXIS: 73 DEGREES
EKG T AXIS: 47 DEGREES
EKG VENTRICULAR RATE: 90 BPM
EOSINOPHIL # BLD: 0.1 K/UL (ref 0–0.4)
EOSINOPHILS RELATIVE PERCENT: 1 % (ref 0–4)
ERYTHROCYTE [DISTWIDTH] IN BLOOD BY AUTOMATED COUNT: 17.7 % (ref 11.5–14.9)
FLUAV RNA RESP QL NAA+PROBE: NOT DETECTED
FLUBV RNA RESP QL NAA+PROBE: NOT DETECTED
GFR SERPL CREATININE-BSD FRML MDRD: >90 ML/MIN/1.73M2
GLUCOSE SERPL-MCNC: 205 MG/DL (ref 70–99)
HCT VFR BLD AUTO: 37 % (ref 36–46)
HGB BLD-MCNC: 11.5 G/DL (ref 12–16)
LIPASE SERPL-CCNC: 53 U/L (ref 13–60)
LYMPHOCYTES NFR BLD: 1.3 K/UL (ref 1–4.8)
LYMPHOCYTES RELATIVE PERCENT: 13 % (ref 24–44)
MAGNESIUM SERPL-MCNC: 1.9 MG/DL (ref 1.6–2.6)
MCH RBC QN AUTO: 27 PG (ref 26–34)
MCHC RBC AUTO-ENTMCNC: 31.2 G/DL (ref 31–37)
MCV RBC AUTO: 86.5 FL (ref 80–100)
MONOCYTES NFR BLD: 0.3 K/UL (ref 0.1–1.3)
MONOCYTES NFR BLD: 3 % (ref 1–7)
NEUTROPHILS NFR BLD: 82 % (ref 36–66)
NEUTS SEG NFR BLD: 8.2 K/UL (ref 1.3–9.1)
PLATELET # BLD AUTO: 286 K/UL (ref 150–450)
PMV BLD AUTO: 7.9 FL (ref 6–12)
POTASSIUM SERPL-SCNC: 3.8 MMOL/L (ref 3.7–5.3)
PROT SERPL-MCNC: 6.8 G/DL (ref 6.4–8.3)
RBC # BLD AUTO: 4.28 M/UL (ref 4–5.2)
SARS-COV-2 RNA RESP QL NAA+PROBE: NOT DETECTED
SODIUM SERPL-SCNC: 142 MMOL/L (ref 135–144)
SOURCE: NORMAL
SPECIMEN DESCRIPTION: NORMAL
TROPONIN I SERPL HS-MCNC: 23 NG/L (ref 0–14)
TROPONIN I SERPL HS-MCNC: 27 NG/L (ref 0–14)
WBC OTHER # BLD: 10 K/UL (ref 3.5–11)

## 2024-04-08 PROCEDURE — 80053 COMPREHEN METABOLIC PANEL: CPT

## 2024-04-08 PROCEDURE — 6370000000 HC RX 637 (ALT 250 FOR IP)

## 2024-04-08 PROCEDURE — 85025 COMPLETE CBC W/AUTO DIFF WBC: CPT

## 2024-04-08 PROCEDURE — 93010 ELECTROCARDIOGRAM REPORT: CPT | Performed by: INTERNAL MEDICINE

## 2024-04-08 PROCEDURE — 94761 N-INVAS EAR/PLS OXIMETRY MLT: CPT

## 2024-04-08 PROCEDURE — 83735 ASSAY OF MAGNESIUM: CPT

## 2024-04-08 PROCEDURE — 84484 ASSAY OF TROPONIN QUANT: CPT

## 2024-04-08 PROCEDURE — 96374 THER/PROPH/DIAG INJ IV PUSH: CPT

## 2024-04-08 PROCEDURE — 6360000002 HC RX W HCPCS: Performed by: EMERGENCY MEDICINE

## 2024-04-08 PROCEDURE — 71045 X-RAY EXAM CHEST 1 VIEW: CPT

## 2024-04-08 PROCEDURE — 36415 COLL VENOUS BLD VENIPUNCTURE: CPT

## 2024-04-08 PROCEDURE — 94640 AIRWAY INHALATION TREATMENT: CPT

## 2024-04-08 PROCEDURE — 83880 ASSAY OF NATRIURETIC PEPTIDE: CPT

## 2024-04-08 PROCEDURE — 83690 ASSAY OF LIPASE: CPT

## 2024-04-08 PROCEDURE — 96375 TX/PRO/DX INJ NEW DRUG ADDON: CPT

## 2024-04-08 PROCEDURE — 87636 SARSCOV2 & INF A&B AMP PRB: CPT

## 2024-04-08 PROCEDURE — 93005 ELECTROCARDIOGRAM TRACING: CPT | Performed by: EMERGENCY MEDICINE

## 2024-04-08 PROCEDURE — 2580000003 HC RX 258: Performed by: EMERGENCY MEDICINE

## 2024-04-08 RX ORDER — LABETALOL HYDROCHLORIDE 5 MG/ML
10 INJECTION, SOLUTION INTRAVENOUS ONCE
Status: COMPLETED | OUTPATIENT
Start: 2024-04-08 | End: 2024-04-08

## 2024-04-08 RX ORDER — ALBUTEROL SULFATE 90 UG/1
2 AEROSOL, METERED RESPIRATORY (INHALATION) EVERY 6 HOURS PRN
Qty: 18 G | Refills: 3 | Status: SHIPPED | OUTPATIENT
Start: 2024-04-08

## 2024-04-08 RX ORDER — PREDNISONE 50 MG/1
50 TABLET ORAL DAILY
Qty: 5 TABLET | Refills: 0 | Status: SHIPPED | OUTPATIENT
Start: 2024-04-08 | End: 2024-04-13

## 2024-04-08 RX ORDER — IPRATROPIUM BROMIDE AND ALBUTEROL SULFATE 2.5; .5 MG/3ML; MG/3ML
1 SOLUTION RESPIRATORY (INHALATION)
Status: DISCONTINUED | OUTPATIENT
Start: 2024-04-08 | End: 2024-04-08 | Stop reason: HOSPADM

## 2024-04-08 RX ORDER — IPRATROPIUM BROMIDE AND ALBUTEROL SULFATE 2.5; .5 MG/3ML; MG/3ML
1 SOLUTION RESPIRATORY (INHALATION)
Status: DISCONTINUED | OUTPATIENT
Start: 2024-04-08 | End: 2024-04-08

## 2024-04-08 RX ORDER — IPRATROPIUM BROMIDE AND ALBUTEROL SULFATE 2.5; .5 MG/3ML; MG/3ML
SOLUTION RESPIRATORY (INHALATION)
Status: COMPLETED
Start: 2024-04-08 | End: 2024-04-08

## 2024-04-08 RX ADMIN — IPRATROPIUM BROMIDE AND ALBUTEROL SULFATE 1 DOSE: 2.5; .5 SOLUTION RESPIRATORY (INHALATION) at 00:59

## 2024-04-08 RX ADMIN — METHYLPREDNISOLONE SODIUM SUCCINATE 125 MG: 125 INJECTION INTRAMUSCULAR; INTRAVENOUS at 00:23

## 2024-04-08 RX ADMIN — LABETALOL HYDROCHLORIDE 10 MG: 5 INJECTION, SOLUTION INTRAVENOUS at 01:54

## 2024-04-08 ASSESSMENT — ENCOUNTER SYMPTOMS
COUGH: 1
SHORTNESS OF BREATH: 1
WHEEZING: 1
NAUSEA: 0
ABDOMINAL PAIN: 0
VOMITING: 0

## 2024-04-08 ASSESSMENT — PAIN - FUNCTIONAL ASSESSMENT: PAIN_FUNCTIONAL_ASSESSMENT: NONE - DENIES PAIN

## 2024-04-08 ASSESSMENT — LIFESTYLE VARIABLES
HOW OFTEN DO YOU HAVE A DRINK CONTAINING ALCOHOL: NEVER
HOW MANY STANDARD DRINKS CONTAINING ALCOHOL DO YOU HAVE ON A TYPICAL DAY: PATIENT DOES NOT DRINK

## 2024-04-08 NOTE — ED PROVIDER NOTES
ORDERED BELOW ARE REVIEWED BY THE ED CLINICIAN:    RADIOLOGY: All x-rays, CT, MRI, and formal ultrasound images (except ED bedside ultrasound) are read by the radiologist, see reports below, unless otherwise noted in MDM or here.  Reports below are reviewed by myself.  XR CHEST PORTABLE   Preliminary Result   1. Moderate cardiomegaly. Pericardial effusion not excluded.   2. Minimal pulmonary vascular congestion without overt pulmonary edema.   3. Probable minimal streaky atelectatic changes at the left lung base.   4. No other focal abnormality demonstrated.             LABS: Lab orders shown below, the results are reviewed by myself, and all abnormals are listed below.  Labs Reviewed   CBC WITH AUTO DIFFERENTIAL - Abnormal; Notable for the following components:       Result Value    Hemoglobin 11.5 (*)     RDW 17.7 (*)     Neutrophils % 82 (*)     Lymphocytes % 13 (*)     All other components within normal limits   COMPREHENSIVE METABOLIC PANEL - Abnormal; Notable for the following components:    Glucose 205 (*)     Calcium 8.5 (*)     ALT 38 (*)     AST 32 (*)     All other components within normal limits   TROPONIN - Abnormal; Notable for the following components:    Troponin, High Sensitivity 27 (*)     All other components within normal limits   TROPONIN - Abnormal; Notable for the following components:    Troponin, High Sensitivity 23 (*)     All other components within normal limits   BRAIN NATRIURETIC PEPTIDE - Abnormal; Notable for the following components:    Pro-BNP 2,681 (*)     All other components within normal limits   COVID-19 & INFLUENZA COMBO   LIPASE   MAGNESIUM       Vitals Reviewed:    Vitals:    04/08/24 0122 04/08/24 0130 04/08/24 0145 04/08/24 0200   BP: (!) 175/100 (!) 170/108 (!) 170/121 129/84   Pulse: 82 82 89 74   Resp: 22 16 19 (!) 39   Temp:       SpO2: 93% 94% 97% 96%   Weight:       Height:         MEDICATIONS GIVEN TO PATIENT THIS ENCOUNTER:  Orders Placed This Encounter

## 2024-04-08 NOTE — ED NOTES
Mode of arrival (squad #, walk in, police, etc) : Walk-in        Chief complaint(s): SOB, Wheezing        Arrival Note (brief scenario, treatment PTA, etc).: Pt arrived to ED with c/o SOB with lower chest pressure. Pt has a difficult time breathing time breathing in and is wheezing with exertion. Pt denies being sick. Pt states she does have COPD and occasionally smokes. Pt states that she just found her HTN medications but did not take them today.         C= \"Have you ever felt that you should Cut down on your drinking?\"  No  A= \"Have people Annoyed you by criticizing your drinking?\"  No  G= \"Have you ever felt bad or Guilty about your drinking?\"  No  E= \"Have you ever had a drink as an Eye-opener first thing in the morning to steady your nerves or to help a hangover?\"  No      Deferred []      Reason for deferring: N/A    *If yes to two or more: probable alcohol abuse.*

## 2024-04-27 ENCOUNTER — OFFICE VISIT (OUTPATIENT)
Dept: FAMILY MEDICINE CLINIC | Age: 54
End: 2024-04-27
Payer: COMMERCIAL

## 2024-04-27 VITALS
HEART RATE: 93 BPM | SYSTOLIC BLOOD PRESSURE: 167 MMHG | DIASTOLIC BLOOD PRESSURE: 93 MMHG | TEMPERATURE: 99 F | OXYGEN SATURATION: 97 %

## 2024-04-27 DIAGNOSIS — J44.1 COPD EXACERBATION (HCC): Primary | ICD-10-CM

## 2024-04-27 PROCEDURE — 99214 OFFICE O/P EST MOD 30 MIN: CPT | Performed by: FAMILY MEDICINE

## 2024-04-27 PROCEDURE — 3077F SYST BP >= 140 MM HG: CPT | Performed by: FAMILY MEDICINE

## 2024-04-27 PROCEDURE — 3080F DIAST BP >= 90 MM HG: CPT | Performed by: FAMILY MEDICINE

## 2024-04-27 RX ORDER — ONDANSETRON 4 MG/1
4 TABLET, FILM COATED ORAL EVERY 8 HOURS PRN
Qty: 30 TABLET | Refills: 0 | Status: SHIPPED | OUTPATIENT
Start: 2024-04-27

## 2024-04-27 RX ORDER — AZITHROMYCIN 250 MG/1
TABLET, FILM COATED ORAL
Qty: 6 TABLET | Refills: 0 | Status: SHIPPED | OUTPATIENT
Start: 2024-04-27 | End: 2024-05-07

## 2024-04-27 RX ORDER — BENZONATATE 100 MG/1
100 CAPSULE ORAL 3 TIMES DAILY PRN
Qty: 30 CAPSULE | Refills: 0 | Status: SHIPPED | OUTPATIENT
Start: 2024-04-27 | End: 2024-05-27

## 2024-04-27 RX ORDER — PREDNISONE 20 MG/1
40 TABLET ORAL DAILY
Qty: 10 TABLET | Refills: 0 | Status: SHIPPED | OUTPATIENT
Start: 2024-04-27 | End: 2024-05-02

## 2024-04-27 ASSESSMENT — ENCOUNTER SYMPTOMS
SINUS PAIN: 0
WHEEZING: 1
COUGH: 1
SHORTNESS OF BREATH: 1

## 2024-04-27 NOTE — PROGRESS NOTES
Tarsha Matos MD  [facility] FAMILY [facility]IC[facility]  [address]  [address]  [place] [address]  Dept: [phone]    Kari Reddy is a 53 y.o. female who presents today for their medical conditions/complaints as noted below.  Kari Reddy is here today c/o Congestion (Onset 1-2 weeks ) and Cough       HPI:     HPI    Patient presents to the walk-in clinic for evaluation of congestion, cough that began 1.5 weeks ago  Endorses productive cough with occasional mild wheezing, shortness of breath only on exertion  Smokes 6 to 7 cigarettes/day, has been told before that she may have COPD, her breathing is typically well-controlled  Endorses nausea but no vomiting or diarrhea  Throat feels irritated  Denies fever, sinus pain, ear pain, rash  Using Mucinex, albuterol  T2DM, states her sugars are well-controlled, no hypoglycemia  Declined COVID testing today    Blood pressure elevated, was elevated during the last PCP visit as well  BP Readings from Last 3 Encounters:   04/27/24 (!) 167/93   04/08/24 (!) 141/80   03/19/24 (!) 156/84       Patient Active Problem List   Diagnosis    Migraine    Morbid obesity (HCC)    Iron deficiency    Tobacco abuse    S/P Permanent nail avulsion, 2nd digit right foot     Acute rhinosinusitis    Dyslipidemia    Vitamin B12 deficiency    Folate deficiency    Constipation    Hypertension goal BP (blood pressure) < 140/90    Bright red blood per rectum    Acute blood loss anemia    Vaginal bleeding    Ovarian cyst    Pre-diabetes    H. pylori infection    Microcytosis    Anemia    Hydradenitis    Hypertension, goal below 140/90    Right leg paresthesias    Chronic back pain    Rotator cuff injury    Vitamin D deficiency    Rotator cuff tendinitis    Smoker    Left breast abscess    Iron deficiency anemia    Breast abscess    Medial meniscus tear    Right knee pain    Arthritis of knee    Lumbar disc herniation S/P L3 4

## 2024-04-28 DIAGNOSIS — Z79.4 TYPE 2 DIABETES MELLITUS WITHOUT COMPLICATION, WITH LONG-TERM CURRENT USE OF INSULIN (HCC): ICD-10-CM

## 2024-04-28 DIAGNOSIS — E11.9 TYPE 2 DIABETES MELLITUS WITHOUT COMPLICATION, WITH LONG-TERM CURRENT USE OF INSULIN (HCC): ICD-10-CM

## 2024-04-29 NOTE — TELEPHONE ENCOUNTER
Last visit: 03/19/2024  Last Med refill: 12/12/2023  Does patient have enough medication for 72 hours: No:     Next Visit Date:  Future Appointments   Date Time Provider Department Center   8/12/2024  2:30 PM Ramy Benitez MD [facility]       Health Maintenance   Topic Date Due    Hepatitis B vaccine (1 of 3 - 3-dose series) Never done    Colorectal Cancer Screen  Never done    Pneumococcal 0-64 years Vaccine (2 of 2 - PCV) 01/13/2018    Shingles vaccine (1 of 2) Never done    COVID-19 Vaccine (3 - 2023-24 season) 09/01/2023    Diabetic retinal exam  02/20/2024    Diabetic foot exam  03/07/2024    Flu vaccine (Season Ended) 08/01/2024    Diabetic Alb to Cr ratio (uACR) test  12/12/2024    Lipids  12/12/2024    Depression Screen  01/16/2025    A1C test (Diabetic or Prediabetic)  03/19/2025    GFR test (Diabetes, CKD 3-4, OR last GFR 15-59)  04/08/2025    Breast cancer screen  06/21/2025    Cervical cancer screen  01/03/2028    DTaP/Tdap/Td vaccine (3 - Td or Tdap) 02/17/2030    Hepatitis C screen  Completed    HIV screen  Completed    Hepatitis A vaccine  Aged Out    Hib vaccine  Aged Out    Polio vaccine  Aged Out    Meningococcal (ACWY) vaccine  Aged Out       Hemoglobin A1C (%)   Date Value   03/19/2024 8.4   12/12/2023 9.6   06/07/2023 8.8             ( goal A1C is < 7)   No components found for: \"LABMICR\"  LDL Cholesterol (mg/dL)   Date Value   12/12/2023 190 (H)   05/09/2022 105       (goal LDL is <100)   AST (U/L)   Date Value   04/08/2024 32 (H)     ALT (U/L)   Date Value   04/08/2024 38 (H)     BUN (mg/dL)   Date Value   04/08/2024 13     BP Readings from Last 3 Encounters:   04/27/24 (!) 167/93   04/08/24 (!) 141/80   03/19/24 (!) 156/84          (goal 120/80)    All Future Testing planned in [facility]  Lab Frequency Next Occurrence   Sleep Study with PAP Titration Once 04/19/2024   Cortisol Total Once 03/19/2024   Luteinizing Hormone Once 03/19/2024   Follicle Stimulating Hormone Once

## 2024-05-06 RX ORDER — DULAGLUTIDE 1.5 MG/.5ML
INJECTION, SOLUTION SUBCUTANEOUS
Qty: 2 ML | Refills: 5 | Status: SHIPPED | OUTPATIENT
Start: 2024-05-06

## 2024-05-10 ENCOUNTER — APPOINTMENT (OUTPATIENT)
Dept: GENERAL RADIOLOGY | Age: 54
DRG: 291 | End: 2024-05-10
Payer: COMMERCIAL

## 2024-05-10 ENCOUNTER — HOSPITAL ENCOUNTER (INPATIENT)
Age: 54
LOS: 5 days | Discharge: ANOTHER HOSPITAL | DRG: 291 | End: 2024-05-15
Attending: STUDENT IN AN ORGANIZED HEALTH CARE EDUCATION/TRAINING PROGRAM | Admitting: INTERNAL MEDICINE
Payer: COMMERCIAL

## 2024-05-10 ENCOUNTER — APPOINTMENT (OUTPATIENT)
Dept: CT IMAGING | Age: 54
DRG: 291 | End: 2024-05-10
Payer: COMMERCIAL

## 2024-05-10 DIAGNOSIS — I50.9 ACUTE ON CHRONIC CONGESTIVE HEART FAILURE, UNSPECIFIED HEART FAILURE TYPE (HCC): Primary | ICD-10-CM

## 2024-05-10 DIAGNOSIS — R06.03 RESPIRATORY DISTRESS: ICD-10-CM

## 2024-05-10 DIAGNOSIS — I50.40 COMBINED SYSTOLIC AND DIASTOLIC CONGESTIVE HEART FAILURE, UNSPECIFIED HF CHRONICITY (HCC): ICD-10-CM

## 2024-05-10 DIAGNOSIS — I48.92 ATRIAL FLUTTER, UNSPECIFIED TYPE (HCC): ICD-10-CM

## 2024-05-10 DIAGNOSIS — I42.9 CARDIOMYOPATHY, UNSPECIFIED TYPE (HCC): ICD-10-CM

## 2024-05-10 DIAGNOSIS — I50.23 ACUTE ON CHRONIC SYSTOLIC HEART FAILURE (HCC): ICD-10-CM

## 2024-05-10 PROBLEM — J96.01 ACUTE HYPOXEMIC RESPIRATORY FAILURE (HCC): Status: ACTIVE | Noted: 2024-05-10

## 2024-05-10 LAB
ALBUMIN SERPL-MCNC: 4.1 G/DL (ref 3.5–5.2)
ALP SERPL-CCNC: 115 U/L (ref 35–104)
ALT SERPL-CCNC: 14 U/L (ref 5–33)
ANION GAP SERPL CALCULATED.3IONS-SCNC: 14 MMOL/L (ref 9–17)
AST SERPL-CCNC: 18 U/L
BASOPHILS # BLD: 0.18 K/UL (ref 0–0.2)
BASOPHILS NFR BLD: 1 % (ref 0–2)
BILIRUB DIRECT SERPL-MCNC: 0.1 MG/DL
BILIRUB INDIRECT SERPL-MCNC: 0.6 MG/DL (ref 0–1)
BILIRUB SERPL-MCNC: 0.7 MG/DL (ref 0.3–1.2)
BNP SERPL-MCNC: 2752 PG/ML
BODY TEMPERATURE: 37
BUN SERPL-MCNC: 7 MG/DL (ref 6–20)
CALCIUM SERPL-MCNC: 8.9 MG/DL (ref 8.6–10.4)
CHLORIDE SERPL-SCNC: 102 MMOL/L (ref 98–107)
CO2 SERPL-SCNC: 21 MMOL/L (ref 20–31)
COHGB MFR BLD: 4.3 % (ref 0–5)
CREAT SERPL-MCNC: 0.6 MG/DL (ref 0.5–0.9)
D DIMER PPP FEU-MCNC: 1 UG/ML FEU (ref 0–0.59)
EOSINOPHIL # BLD: 0.18 K/UL (ref 0–0.4)
EOSINOPHILS RELATIVE PERCENT: 1 % (ref 0–4)
ERYTHROCYTE [DISTWIDTH] IN BLOOD BY AUTOMATED COUNT: 17.6 % (ref 11.5–14.9)
EST. AVERAGE GLUCOSE BLD GHB EST-MCNC: 220 MG/DL
FLUAV RNA RESP QL NAA+PROBE: NOT DETECTED
FLUBV RNA RESP QL NAA+PROBE: NOT DETECTED
GFR, ESTIMATED: >90 ML/MIN/1.73M2
GLUCOSE BLD-MCNC: 307 MG/DL (ref 65–105)
GLUCOSE SERPL-MCNC: 379 MG/DL (ref 70–99)
HBA1C MFR BLD: 9.3 % (ref 4–6)
HCO3 VENOUS: 20.4 MMOL/L (ref 24–30)
HCT VFR BLD AUTO: 43.8 % (ref 36–46)
HGB BLD-MCNC: 13.3 G/DL (ref 12–16)
LIPASE SERPL-CCNC: 46 U/L (ref 13–60)
LYMPHOCYTES NFR BLD: 1.62 K/UL (ref 1–4.8)
LYMPHOCYTES RELATIVE PERCENT: 9 % (ref 24–44)
MAGNESIUM SERPL-MCNC: 2 MG/DL (ref 1.6–2.6)
MAGNESIUM SERPL-MCNC: 2 MG/DL (ref 1.6–2.6)
MCH RBC QN AUTO: 26.4 PG (ref 26–34)
MCHC RBC AUTO-ENTMCNC: 30.4 G/DL (ref 31–37)
MCV RBC AUTO: 86.6 FL (ref 80–100)
METHEMOGLOBIN: 0.3 % (ref 0–1.9)
MONOCYTES NFR BLD: 0.54 K/UL (ref 0.1–1.3)
MONOCYTES NFR BLD: 3 % (ref 1–7)
MORPHOLOGY: NORMAL
NEGATIVE BASE EXCESS, VEN: 6.5 MMOL/L (ref 0–2)
NEUTROPHILS NFR BLD: 86 % (ref 36–66)
NEUTS SEG NFR BLD: 15.48 K/UL (ref 1.3–9.1)
O2 SAT, VEN: 68.1 % (ref 60–85)
PCO2, VEN: 45.9 MM HG (ref 39–55)
PH VENOUS: 7.27 (ref 7.32–7.42)
PLATELET # BLD AUTO: 292 K/UL (ref 150–450)
PMV BLD AUTO: 8.8 FL (ref 6–12)
PO2, VEN: 39.8 MM HG (ref 30–50)
POTASSIUM SERPL-SCNC: 4.2 MMOL/L (ref 3.7–5.3)
PROCALCITONIN SERPL-MCNC: 0.1 NG/ML
PROT SERPL-MCNC: 7.2 G/DL (ref 6.4–8.3)
RBC # BLD AUTO: 5.06 M/UL (ref 4–5.2)
SARS-COV-2 RNA RESP QL NAA+PROBE: NOT DETECTED
SODIUM SERPL-SCNC: 137 MMOL/L (ref 135–144)
SOURCE: NORMAL
SPECIMEN DESCRIPTION: NORMAL
TEXT FOR RESPIRATORY: ABNORMAL
TROPONIN I SERPL HS-MCNC: 28 NG/L (ref 0–14)
TROPONIN I SERPL HS-MCNC: 35 NG/L (ref 0–14)
TROPONIN I SERPL HS-MCNC: 51 NG/L (ref 0–14)
TROPONIN I SERPL HS-MCNC: 78 NG/L (ref 0–14)
WBC OTHER # BLD: 18 K/UL (ref 3.5–11)

## 2024-05-10 PROCEDURE — 82947 ASSAY GLUCOSE BLOOD QUANT: CPT

## 2024-05-10 PROCEDURE — 96375 TX/PRO/DX INJ NEW DRUG ADDON: CPT

## 2024-05-10 PROCEDURE — 83036 HEMOGLOBIN GLYCOSYLATED A1C: CPT

## 2024-05-10 PROCEDURE — 96365 THER/PROPH/DIAG IV INF INIT: CPT

## 2024-05-10 PROCEDURE — 6370000000 HC RX 637 (ALT 250 FOR IP): Performed by: INTERNAL MEDICINE

## 2024-05-10 PROCEDURE — 2500000003 HC RX 250 WO HCPCS: Performed by: STUDENT IN AN ORGANIZED HEALTH CARE EDUCATION/TRAINING PROGRAM

## 2024-05-10 PROCEDURE — 83880 ASSAY OF NATRIURETIC PEPTIDE: CPT

## 2024-05-10 PROCEDURE — 93005 ELECTROCARDIOGRAM TRACING: CPT | Performed by: STUDENT IN AN ORGANIZED HEALTH CARE EDUCATION/TRAINING PROGRAM

## 2024-05-10 PROCEDURE — 87636 SARSCOV2 & INF A&B AMP PRB: CPT

## 2024-05-10 PROCEDURE — 96374 THER/PROPH/DIAG INJ IV PUSH: CPT

## 2024-05-10 PROCEDURE — 94761 N-INVAS EAR/PLS OXIMETRY MLT: CPT

## 2024-05-10 PROCEDURE — 82800 BLOOD PH: CPT

## 2024-05-10 PROCEDURE — 84484 ASSAY OF TROPONIN QUANT: CPT

## 2024-05-10 PROCEDURE — 6370000000 HC RX 637 (ALT 250 FOR IP): Performed by: STUDENT IN AN ORGANIZED HEALTH CARE EDUCATION/TRAINING PROGRAM

## 2024-05-10 PROCEDURE — 99223 1ST HOSP IP/OBS HIGH 75: CPT | Performed by: INTERNAL MEDICINE

## 2024-05-10 PROCEDURE — 84145 PROCALCITONIN (PCT): CPT

## 2024-05-10 PROCEDURE — 2060000000 HC ICU INTERMEDIATE R&B

## 2024-05-10 PROCEDURE — 80048 BASIC METABOLIC PNL TOTAL CA: CPT

## 2024-05-10 PROCEDURE — 94640 AIRWAY INHALATION TREATMENT: CPT

## 2024-05-10 PROCEDURE — 71045 X-RAY EXAM CHEST 1 VIEW: CPT

## 2024-05-10 PROCEDURE — 83735 ASSAY OF MAGNESIUM: CPT

## 2024-05-10 PROCEDURE — 83690 ASSAY OF LIPASE: CPT

## 2024-05-10 PROCEDURE — 82805 BLOOD GASES W/O2 SATURATION: CPT

## 2024-05-10 PROCEDURE — 6360000002 HC RX W HCPCS: Performed by: STUDENT IN AN ORGANIZED HEALTH CARE EDUCATION/TRAINING PROGRAM

## 2024-05-10 PROCEDURE — 85025 COMPLETE CBC W/AUTO DIFF WBC: CPT

## 2024-05-10 PROCEDURE — 6360000002 HC RX W HCPCS: Performed by: INTERNAL MEDICINE

## 2024-05-10 PROCEDURE — 80076 HEPATIC FUNCTION PANEL: CPT

## 2024-05-10 PROCEDURE — 99285 EMERGENCY DEPT VISIT HI MDM: CPT

## 2024-05-10 PROCEDURE — 71260 CT THORAX DX C+: CPT

## 2024-05-10 PROCEDURE — 6360000004 HC RX CONTRAST MEDICATION: Performed by: STUDENT IN AN ORGANIZED HEALTH CARE EDUCATION/TRAINING PROGRAM

## 2024-05-10 PROCEDURE — 36415 COLL VENOUS BLD VENIPUNCTURE: CPT

## 2024-05-10 PROCEDURE — 2580000003 HC RX 258: Performed by: STUDENT IN AN ORGANIZED HEALTH CARE EDUCATION/TRAINING PROGRAM

## 2024-05-10 PROCEDURE — 2700000000 HC OXYGEN THERAPY PER DAY

## 2024-05-10 PROCEDURE — 2580000003 HC RX 258: Performed by: INTERNAL MEDICINE

## 2024-05-10 PROCEDURE — 85379 FIBRIN DEGRADATION QUANT: CPT

## 2024-05-10 RX ORDER — ENOXAPARIN SODIUM 150 MG/ML
1 INJECTION SUBCUTANEOUS 2 TIMES DAILY
Status: DISCONTINUED | OUTPATIENT
Start: 2024-05-11 | End: 2024-05-11

## 2024-05-10 RX ORDER — ACETAMINOPHEN 325 MG/1
650 TABLET ORAL EVERY 6 HOURS PRN
Status: DISCONTINUED | OUTPATIENT
Start: 2024-05-10 | End: 2024-05-15 | Stop reason: HOSPADM

## 2024-05-10 RX ORDER — IPRATROPIUM BROMIDE AND ALBUTEROL SULFATE 2.5; .5 MG/3ML; MG/3ML
1 SOLUTION RESPIRATORY (INHALATION)
Status: DISCONTINUED | OUTPATIENT
Start: 2024-05-10 | End: 2024-05-15 | Stop reason: HOSPADM

## 2024-05-10 RX ORDER — 0.9 % SODIUM CHLORIDE 0.9 %
100 INTRAVENOUS SOLUTION INTRAVENOUS ONCE
Status: COMPLETED | OUTPATIENT
Start: 2024-05-10 | End: 2024-05-10

## 2024-05-10 RX ORDER — SODIUM CHLORIDE 0.9 % (FLUSH) 0.9 %
10 SYRINGE (ML) INJECTION PRN
Status: DISCONTINUED | OUTPATIENT
Start: 2024-05-10 | End: 2024-05-15 | Stop reason: HOSPADM

## 2024-05-10 RX ORDER — SPIRONOLACTONE 25 MG/1
25 TABLET ORAL ONCE
Status: DISCONTINUED | OUTPATIENT
Start: 2024-05-10 | End: 2024-05-15 | Stop reason: HOSPADM

## 2024-05-10 RX ORDER — FUROSEMIDE 10 MG/ML
40 INJECTION INTRAMUSCULAR; INTRAVENOUS ONCE
Status: COMPLETED | OUTPATIENT
Start: 2024-05-10 | End: 2024-05-10

## 2024-05-10 RX ORDER — ACETAMINOPHEN 650 MG/1
650 SUPPOSITORY RECTAL EVERY 6 HOURS PRN
Status: DISCONTINUED | OUTPATIENT
Start: 2024-05-10 | End: 2024-05-15 | Stop reason: HOSPADM

## 2024-05-10 RX ORDER — SODIUM CHLORIDE 9 MG/ML
INJECTION, SOLUTION INTRAVENOUS PRN
Status: DISCONTINUED | OUTPATIENT
Start: 2024-05-10 | End: 2024-05-15 | Stop reason: HOSPADM

## 2024-05-10 RX ORDER — TURMERIC ROOT EXTRACT 500 MG
500 TABLET ORAL DAILY
COMMUNITY

## 2024-05-10 RX ORDER — INSULIN GLARGINE 100 [IU]/ML
20 INJECTION, SOLUTION SUBCUTANEOUS 2 TIMES DAILY
Status: DISCONTINUED | OUTPATIENT
Start: 2024-05-10 | End: 2024-05-12

## 2024-05-10 RX ORDER — ENOXAPARIN SODIUM 100 MG/ML
90 INJECTION SUBCUTANEOUS ONCE
Status: COMPLETED | OUTPATIENT
Start: 2024-05-10 | End: 2024-05-10

## 2024-05-10 RX ORDER — POTASSIUM CHLORIDE 7.45 MG/ML
10 INJECTION INTRAVENOUS PRN
Status: DISCONTINUED | OUTPATIENT
Start: 2024-05-10 | End: 2024-05-15 | Stop reason: HOSPADM

## 2024-05-10 RX ORDER — ALBUTEROL SULFATE 2.5 MG/3ML
2.5 SOLUTION RESPIRATORY (INHALATION)
Status: DISCONTINUED | OUTPATIENT
Start: 2024-05-10 | End: 2024-05-15 | Stop reason: HOSPADM

## 2024-05-10 RX ORDER — POTASSIUM CHLORIDE 20 MEQ/1
40 TABLET, EXTENDED RELEASE ORAL PRN
Status: DISCONTINUED | OUTPATIENT
Start: 2024-05-10 | End: 2024-05-15 | Stop reason: HOSPADM

## 2024-05-10 RX ORDER — MAGNESIUM SULFATE HEPTAHYDRATE 40 MG/ML
2000 INJECTION, SOLUTION INTRAVENOUS PRN
Status: DISCONTINUED | OUTPATIENT
Start: 2024-05-10 | End: 2024-05-15 | Stop reason: HOSPADM

## 2024-05-10 RX ORDER — POLYETHYLENE GLYCOL 3350 17 G/17G
17 POWDER, FOR SOLUTION ORAL DAILY PRN
Status: DISCONTINUED | OUTPATIENT
Start: 2024-05-10 | End: 2024-05-15 | Stop reason: HOSPADM

## 2024-05-10 RX ORDER — SODIUM CHLORIDE 0.9 % (FLUSH) 0.9 %
5-40 SYRINGE (ML) INJECTION PRN
Status: DISCONTINUED | OUTPATIENT
Start: 2024-05-10 | End: 2024-05-15 | Stop reason: HOSPADM

## 2024-05-10 RX ORDER — FAMOTIDINE 20 MG/1
20 TABLET, FILM COATED ORAL DAILY
Status: DISCONTINUED | OUTPATIENT
Start: 2024-05-10 | End: 2024-05-15 | Stop reason: HOSPADM

## 2024-05-10 RX ORDER — RED BEET 500 MG
1000 CAPSULE ORAL DAILY
COMMUNITY

## 2024-05-10 RX ORDER — ONDANSETRON 2 MG/ML
4 INJECTION INTRAMUSCULAR; INTRAVENOUS EVERY 6 HOURS PRN
Status: DISCONTINUED | OUTPATIENT
Start: 2024-05-10 | End: 2024-05-10

## 2024-05-10 RX ORDER — INSULIN LISPRO 100 [IU]/ML
0-8 INJECTION, SOLUTION INTRAVENOUS; SUBCUTANEOUS
Status: DISCONTINUED | OUTPATIENT
Start: 2024-05-10 | End: 2024-05-15 | Stop reason: HOSPADM

## 2024-05-10 RX ORDER — ASPIRIN 81 MG/1
81 TABLET ORAL DAILY
Status: DISCONTINUED | OUTPATIENT
Start: 2024-05-10 | End: 2024-05-15 | Stop reason: HOSPADM

## 2024-05-10 RX ORDER — HYDRALAZINE HYDROCHLORIDE 20 MG/ML
5 INJECTION INTRAMUSCULAR; INTRAVENOUS ONCE
Status: COMPLETED | OUTPATIENT
Start: 2024-05-10 | End: 2024-05-10

## 2024-05-10 RX ORDER — CARVEDILOL 6.25 MG/1
6.25 TABLET ORAL 2 TIMES DAILY WITH MEALS
Status: DISCONTINUED | OUTPATIENT
Start: 2024-05-10 | End: 2024-05-13

## 2024-05-10 RX ORDER — SPIRONOLACTONE 25 MG/1
25 TABLET ORAL DAILY
Status: DISCONTINUED | OUTPATIENT
Start: 2024-05-10 | End: 2024-05-15 | Stop reason: HOSPADM

## 2024-05-10 RX ORDER — FUROSEMIDE 10 MG/ML
40 INJECTION INTRAMUSCULAR; INTRAVENOUS 2 TIMES DAILY
Status: COMPLETED | OUTPATIENT
Start: 2024-05-11 | End: 2024-05-14

## 2024-05-10 RX ORDER — MAGNESIUM SULFATE HEPTAHYDRATE 40 MG/ML
2000 INJECTION, SOLUTION INTRAVENOUS ONCE
Status: COMPLETED | OUTPATIENT
Start: 2024-05-10 | End: 2024-05-10

## 2024-05-10 RX ORDER — INSULIN LISPRO 100 [IU]/ML
0-4 INJECTION, SOLUTION INTRAVENOUS; SUBCUTANEOUS NIGHTLY
Status: DISCONTINUED | OUTPATIENT
Start: 2024-05-10 | End: 2024-05-15 | Stop reason: HOSPADM

## 2024-05-10 RX ORDER — DILTIAZEM HYDROCHLORIDE 5 MG/ML
20 INJECTION INTRAVENOUS ONCE
Status: DISCONTINUED | OUTPATIENT
Start: 2024-05-10 | End: 2024-05-10

## 2024-05-10 RX ORDER — SODIUM CHLORIDE 0.9 % (FLUSH) 0.9 %
5-40 SYRINGE (ML) INJECTION EVERY 12 HOURS SCHEDULED
Status: DISCONTINUED | OUTPATIENT
Start: 2024-05-10 | End: 2024-05-15 | Stop reason: HOSPADM

## 2024-05-10 RX ORDER — ONDANSETRON 4 MG/1
4 TABLET, ORALLY DISINTEGRATING ORAL EVERY 8 HOURS PRN
Status: DISCONTINUED | OUTPATIENT
Start: 2024-05-10 | End: 2024-05-10

## 2024-05-10 RX ORDER — DEXTROSE MONOHYDRATE 100 MG/ML
INJECTION, SOLUTION INTRAVENOUS CONTINUOUS PRN
Status: DISCONTINUED | OUTPATIENT
Start: 2024-05-10 | End: 2024-05-15 | Stop reason: HOSPADM

## 2024-05-10 RX ORDER — ATORVASTATIN CALCIUM 40 MG/1
40 TABLET, FILM COATED ORAL DAILY
Status: DISCONTINUED | OUTPATIENT
Start: 2024-05-10 | End: 2024-05-15 | Stop reason: HOSPADM

## 2024-05-10 RX ORDER — ALBUTEROL SULFATE 2.5 MG/3ML
15 SOLUTION RESPIRATORY (INHALATION)
Status: DISCONTINUED | OUTPATIENT
Start: 2024-05-10 | End: 2024-05-15 | Stop reason: HOSPADM

## 2024-05-10 RX ORDER — ENOXAPARIN SODIUM 100 MG/ML
30 INJECTION SUBCUTANEOUS 2 TIMES DAILY
Status: DISCONTINUED | OUTPATIENT
Start: 2024-05-10 | End: 2024-05-10

## 2024-05-10 RX ADMIN — HYDRALAZINE HYDROCHLORIDE 5 MG: 20 INJECTION INTRAMUSCULAR; INTRAVENOUS at 17:04

## 2024-05-10 RX ADMIN — MAGNESIUM SULFATE HEPTAHYDRATE 2000 MG: 40 INJECTION, SOLUTION INTRAVENOUS at 14:27

## 2024-05-10 RX ADMIN — SODIUM CHLORIDE, PRESERVATIVE FREE 10 ML: 5 INJECTION INTRAVENOUS at 20:39

## 2024-05-10 RX ADMIN — IPRATROPIUM BROMIDE AND ALBUTEROL SULFATE 3 DOSE: .5; 2.5 SOLUTION RESPIRATORY (INHALATION) at 14:37

## 2024-05-10 RX ADMIN — ATORVASTATIN CALCIUM 40 MG: 40 TABLET, FILM COATED ORAL at 20:36

## 2024-05-10 RX ADMIN — SODIUM CHLORIDE 100 ML: 9 INJECTION, SOLUTION INTRAVENOUS at 15:45

## 2024-05-10 RX ADMIN — SODIUM CHLORIDE, PRESERVATIVE FREE 10 ML: 5 INJECTION INTRAVENOUS at 15:45

## 2024-05-10 RX ADMIN — INSULIN GLARGINE 20 UNITS: 100 INJECTION, SOLUTION SUBCUTANEOUS at 20:38

## 2024-05-10 RX ADMIN — INSULIN LISPRO 4 UNITS: 100 INJECTION, SOLUTION INTRAVENOUS; SUBCUTANEOUS at 20:39

## 2024-05-10 RX ADMIN — WATER 125 MG: 1 INJECTION INTRAMUSCULAR; INTRAVENOUS; SUBCUTANEOUS at 14:29

## 2024-05-10 RX ADMIN — IPRATROPIUM BROMIDE AND ALBUTEROL SULFATE 1 DOSE: .5; 2.5 SOLUTION RESPIRATORY (INHALATION) at 21:06

## 2024-05-10 RX ADMIN — CARVEDILOL 6.25 MG: 6.25 TABLET, FILM COATED ORAL at 20:36

## 2024-05-10 RX ADMIN — NYSTATIN 500000 UNITS: 100000 SUSPENSION ORAL at 20:38

## 2024-05-10 RX ADMIN — FAMOTIDINE 20 MG: 20 TABLET, FILM COATED ORAL at 20:36

## 2024-05-10 RX ADMIN — CEFTRIAXONE SODIUM 2000 MG: 2 INJECTION, POWDER, FOR SOLUTION INTRAMUSCULAR; INTRAVENOUS at 20:48

## 2024-05-10 RX ADMIN — ENOXAPARIN SODIUM 30 MG: 100 INJECTION SUBCUTANEOUS at 20:39

## 2024-05-10 RX ADMIN — ASPIRIN 81 MG: 81 TABLET, COATED ORAL at 20:36

## 2024-05-10 RX ADMIN — ENOXAPARIN SODIUM 90 MG: 100 INJECTION SUBCUTANEOUS at 21:50

## 2024-05-10 RX ADMIN — SACUBITRIL AND VALSARTAN 1 TABLET: 49; 51 TABLET, FILM COATED ORAL at 20:37

## 2024-05-10 RX ADMIN — SPIRONOLACTONE 25 MG: 25 TABLET ORAL at 20:36

## 2024-05-10 RX ADMIN — FUROSEMIDE 40 MG: 10 INJECTION, SOLUTION INTRAMUSCULAR; INTRAVENOUS at 17:01

## 2024-05-10 RX ADMIN — IOPAMIDOL 75 ML: 755 INJECTION, SOLUTION INTRAVENOUS at 15:45

## 2024-05-10 RX ADMIN — SODIUM CHLORIDE: 9 INJECTION, SOLUTION INTRAVENOUS at 20:47

## 2024-05-10 ASSESSMENT — PAIN - FUNCTIONAL ASSESSMENT: PAIN_FUNCTIONAL_ASSESSMENT: 0-10

## 2024-05-10 ASSESSMENT — PAIN SCALES - GENERAL: PAINLEVEL_OUTOF10: 0

## 2024-05-10 NOTE — ED PROVIDER NOTES
normal in caliber scattered calcified plaque.  Cardiomegaly with small pericardial effusion.  The esophagus is unremarkable.  No pathologic hilar or mediastinal adenopathy. Lungs/pleura: Ground-glass opacification with interlobular septal thickening throughout the lungs most consistent with pulmonary edema.  Small right and trace left pleural effusion.  No pneumothorax.  The central airways are patent. Upper Abdomen: Limited images of the upper abdomen are unremarkable. Soft Tissues/Bones: No acute osseous or soft tissue abnormality.  Multilevel osteoarthritic spurring in the thoracic spine.     1. No evidence of pulmonary embolic disease. 2. Findings most consistent with CHF.  Cardiomegaly with pulmonary edema and small bilateral pleural effusions. 3. Small pericardial effusion.     XR CHEST PORTABLE    Result Date: 5/10/2024  EXAMINATION: ONE XRAY VIEW OF THE CHEST 5/10/2024 2:21 pm COMPARISON: Chest 04/08/2024 HISTORY: Shortness of breath FINDINGS: The cardiac silhouette is enlarged.  Calcifications involving the aorta reflect atherosclerosis. The mediastinal and hilar silhouettes appear unremarkable. Vascular engorgement and cephalization is demonstrated with bilateral peribronchial cuffing and perivascular haziness.  Blunting right left lateral costophrenic sulci and hazy opacity lower left lung partially obscuring the left hemidiaphragm. No pneumothorax is seen. No acute osseous abnormality is identified.     1.  Congestive heart failure is most likely given the radiographic findings; pneumonia is also a consideration in areas of consolidation with pleural effusion.  Similar appearance to prior chest radiograph. 2. Calcific atherosclerosis aorta. 3. Cardiomegaly.      EKG    EKG-1421  Sinus tachycardia rate of 162.  No ST segment changes, no ectopy.  Normal axis, poor R wave progression.  T wave flattening lead I, aVL, QRS, 86 ms.  QTc by visit formula on machine is 505 gentle believe is accurate given the

## 2024-05-10 NOTE — H&P
cranial nerves II through XII grossly intact  Skin: No gross lesions, rashes, bruising or bleeding on exposed skin area  Extremities: 1+ bilateral lower extremity edema, symmetrical    Investigations:      Laboratory Testing:  Recent Results (from the past 24 hour(s))   COVID-19 & Influenza Combo    Collection Time: 05/10/24  2:16 PM    Specimen: Nasopharyngeal Swab   Result Value Ref Range    Specimen Description .NASOPHARYNGEAL SWAB     Source .NASOPHARYNGEAL SWAB     SARS-CoV-2 RNA, RT PCR Not Detected Not Detected    Influenza A Not Detected Not Detected    Influenza B Not Detected Not Detected   EKG 12 Lead    Collection Time: 05/10/24  2:21 PM   Result Value Ref Range    Ventricular Rate 162 BPM    Atrial Rate 162 BPM    P-R Interval 130 ms    QRS Duration 86 ms    Q-T Interval 308 ms    QTc Calculation (Bazett) 505 ms    R Axis 26 degrees    T Axis -64 degrees   Basic Metabolic Panel    Collection Time: 05/10/24  2:27 PM   Result Value Ref Range    Sodium 137 135 - 144 mmol/L    Potassium 4.2 3.7 - 5.3 mmol/L    Chloride 102 98 - 107 mmol/L    CO2 21 20 - 31 mmol/L    Anion Gap 14 9 - 17 mmol/L    Glucose 379 (H) 70 - 99 mg/dL    BUN 7 6 - 20 mg/dL    Creatinine 0.6 0.5 - 0.9 mg/dL    Est, Glom Filt Rate >90 >60 mL/min/1.73m2    Calcium 8.9 8.6 - 10.4 mg/dL   CBC with Auto Differential    Collection Time: 05/10/24  2:27 PM   Result Value Ref Range    WBC 18.0 (H) 3.5 - 11.0 k/uL    RBC 5.06 4.0 - 5.2 m/uL    Hemoglobin 13.3 12.0 - 16.0 g/dL    Hematocrit 43.8 36 - 46 %    MCV 86.6 80 - 100 fL    MCH 26.4 26 - 34 pg    MCHC 30.4 (L) 31 - 37 g/dL    RDW 17.6 (H) 11.5 - 14.9 %    Platelets 292 150 - 450 k/uL    MPV 8.8 6.0 - 12.0 fL    Neutrophils % 86 (H) 36 - 66 %    Lymphocytes % 9 (L) 24 - 44 %    Monocytes % 3 1 - 7 %    Eosinophils % 1 0 - 4 %    Basophils % 1 0 - 2 %    Neutrophils Absolute 15.48 (H) 1.3 - 9.1 k/uL    Lymphocytes Absolute 1.62 1.0 - 4.8 k/uL    Monocytes Absolute 0.54 0.1 - 1.3 k/uL

## 2024-05-10 NOTE — ED TRIAGE NOTES
Mode of arrival (squad #, walk in, police, etc) : walk in        Chief complaint(s): Respiratory distress        Arrival Note (brief scenario, treatment PTA, etc).: Pt arrived to ED trouble breathing. Pt recently diagnosed with COPD. Pt reports feeling full from her belly up. Pt is A&OX4.        C= \"Have you ever felt that you should Cut down on your drinking?\"  No  A= \"Have people Annoyed you by criticizing your drinking?\"  No  G= \"Have you ever felt bad or Guilty about your drinking?\"  No  E= \"Have you ever had a drink as an Eye-opener first thing in the morning to steady your nerves or to help a hangover?\"  No      Deferred []      Reason for deferring: N/A    *If yes to two or more: probable alcohol abuse.*

## 2024-05-10 NOTE — ED NOTES
Report given to ANNA Hinds from [facility].   Report method by phone   The following was reviewed with receiving RN:   Current vital signs:  BP (!) 160/91   Pulse (!) 104   Resp 21   Ht 1.702 m (5' 7\")   Wt 124.7 kg (275 lb)   SpO2 96%   BMI 43.07 kg/m²                      Any medication or safety alerts were reviewed. Any pending diagnostics and notifications were also reviewed, as well as any safety concerns or issues, abnormal labs, abnormal imaging, and abnormal assessment findings. Questions were answered.

## 2024-05-11 LAB
ANION GAP SERPL CALCULATED.3IONS-SCNC: 13 MMOL/L (ref 9–17)
BACTERIA URNS QL MICRO: ABNORMAL
BASOPHILS # BLD: 0.14 K/UL (ref 0–0.2)
BASOPHILS NFR BLD: 1 % (ref 0–2)
BILIRUB UR QL STRIP: NEGATIVE
BUN SERPL-MCNC: 8 MG/DL (ref 6–20)
CALCIUM SERPL-MCNC: 9.1 MG/DL (ref 8.6–10.4)
CASTS #/AREA URNS LPF: ABNORMAL /LPF
CHLORIDE SERPL-SCNC: 100 MMOL/L (ref 98–107)
CLARITY UR: CLEAR
CO2 SERPL-SCNC: 25 MMOL/L (ref 20–31)
COLOR UR: YELLOW
CREAT SERPL-MCNC: 0.7 MG/DL (ref 0.5–0.9)
EOSINOPHIL # BLD: 0 K/UL (ref 0–0.4)
EOSINOPHILS RELATIVE PERCENT: 0 % (ref 0–4)
EPI CELLS #/AREA URNS HPF: ABNORMAL /HPF
ERYTHROCYTE [DISTWIDTH] IN BLOOD BY AUTOMATED COUNT: 17.8 % (ref 11.5–14.9)
GFR, ESTIMATED: >90 ML/MIN/1.73M2
GLUCOSE BLD-MCNC: 251 MG/DL (ref 65–105)
GLUCOSE BLD-MCNC: 279 MG/DL (ref 65–105)
GLUCOSE BLD-MCNC: 396 MG/DL (ref 65–105)
GLUCOSE BLD-MCNC: 406 MG/DL (ref 65–105)
GLUCOSE SERPL-MCNC: 273 MG/DL (ref 70–99)
GLUCOSE UR STRIP-MCNC: ABNORMAL MG/DL
HCT VFR BLD AUTO: 41.9 % (ref 36–46)
HGB BLD-MCNC: 12.8 G/DL (ref 12–16)
HGB UR QL STRIP.AUTO: ABNORMAL
KETONES UR STRIP-MCNC: NEGATIVE MG/DL
LEUKOCYTE ESTERASE UR QL STRIP: ABNORMAL
LYMPHOCYTES NFR BLD: 0.97 K/UL (ref 1–4.8)
LYMPHOCYTES RELATIVE PERCENT: 7 % (ref 24–44)
MAGNESIUM SERPL-MCNC: 2.1 MG/DL (ref 1.6–2.6)
MCH RBC QN AUTO: 26.1 PG (ref 26–34)
MCHC RBC AUTO-ENTMCNC: 30.5 G/DL (ref 31–37)
MCV RBC AUTO: 85.4 FL (ref 80–100)
MONOCYTES NFR BLD: 0.56 K/UL (ref 0.1–1.3)
MONOCYTES NFR BLD: 4 % (ref 1–7)
MORPHOLOGY: ABNORMAL
MORPHOLOGY: ABNORMAL
NEUTROPHILS NFR BLD: 88 % (ref 36–66)
NEUTS SEG NFR BLD: 12.23 K/UL (ref 1.3–9.1)
NITRITE UR QL STRIP: NEGATIVE
PH UR STRIP: 7.5 [PH] (ref 5–8)
PLATELET # BLD AUTO: 251 K/UL (ref 150–450)
PMV BLD AUTO: 8.5 FL (ref 6–12)
POTASSIUM SERPL-SCNC: 4.3 MMOL/L (ref 3.7–5.3)
PROT UR STRIP-MCNC: NEGATIVE MG/DL
RBC # BLD AUTO: 4.9 M/UL (ref 4–5.2)
RBC #/AREA URNS HPF: ABNORMAL /HPF
SODIUM SERPL-SCNC: 138 MMOL/L (ref 135–144)
SP GR UR STRIP: 1.02 (ref 1–1.03)
TROPONIN I SERPL HS-MCNC: 124 NG/L (ref 0–14)
UROBILINOGEN UR STRIP-ACNC: NORMAL EU/DL (ref 0–1)
WBC #/AREA URNS HPF: ABNORMAL /HPF
WBC OTHER # BLD: 13.9 K/UL (ref 3.5–11)

## 2024-05-11 PROCEDURE — 94761 N-INVAS EAR/PLS OXIMETRY MLT: CPT

## 2024-05-11 PROCEDURE — 99232 SBSQ HOSP IP/OBS MODERATE 35: CPT | Performed by: INTERNAL MEDICINE

## 2024-05-11 PROCEDURE — 80048 BASIC METABOLIC PNL TOTAL CA: CPT

## 2024-05-11 PROCEDURE — 6360000002 HC RX W HCPCS: Performed by: INTERNAL MEDICINE

## 2024-05-11 PROCEDURE — 84484 ASSAY OF TROPONIN QUANT: CPT

## 2024-05-11 PROCEDURE — 85025 COMPLETE CBC W/AUTO DIFF WBC: CPT

## 2024-05-11 PROCEDURE — 2060000000 HC ICU INTERMEDIATE R&B

## 2024-05-11 PROCEDURE — 94664 DEMO&/EVAL PT USE INHALER: CPT

## 2024-05-11 PROCEDURE — 94640 AIRWAY INHALATION TREATMENT: CPT

## 2024-05-11 PROCEDURE — 6370000000 HC RX 637 (ALT 250 FOR IP): Performed by: INTERNAL MEDICINE

## 2024-05-11 PROCEDURE — 93005 ELECTROCARDIOGRAM TRACING: CPT | Performed by: INTERNAL MEDICINE

## 2024-05-11 PROCEDURE — 87086 URINE CULTURE/COLONY COUNT: CPT

## 2024-05-11 PROCEDURE — 82947 ASSAY GLUCOSE BLOOD QUANT: CPT

## 2024-05-11 PROCEDURE — 81001 URINALYSIS AUTO W/SCOPE: CPT

## 2024-05-11 PROCEDURE — 2580000003 HC RX 258: Performed by: INTERNAL MEDICINE

## 2024-05-11 PROCEDURE — 83735 ASSAY OF MAGNESIUM: CPT

## 2024-05-11 PROCEDURE — 36415 COLL VENOUS BLD VENIPUNCTURE: CPT

## 2024-05-11 PROCEDURE — 6370000000 HC RX 637 (ALT 250 FOR IP)

## 2024-05-11 RX ORDER — LIDOCAINE 4 G/G
1 PATCH TOPICAL DAILY
Status: DISCONTINUED | OUTPATIENT
Start: 2024-05-11 | End: 2024-05-15 | Stop reason: HOSPADM

## 2024-05-11 RX ORDER — ENOXAPARIN SODIUM 150 MG/ML
1 INJECTION SUBCUTANEOUS 2 TIMES DAILY
Status: DISCONTINUED | OUTPATIENT
Start: 2024-05-11 | End: 2024-05-15 | Stop reason: HOSPADM

## 2024-05-11 RX ORDER — INSULIN LISPRO 100 [IU]/ML
4 INJECTION, SOLUTION INTRAVENOUS; SUBCUTANEOUS ONCE
Status: COMPLETED | OUTPATIENT
Start: 2024-05-11 | End: 2024-05-11

## 2024-05-11 RX ADMIN — SODIUM CHLORIDE, PRESERVATIVE FREE 10 ML: 5 INJECTION INTRAVENOUS at 21:54

## 2024-05-11 RX ADMIN — CARVEDILOL 6.25 MG: 6.25 TABLET, FILM COATED ORAL at 08:23

## 2024-05-11 RX ADMIN — NYSTATIN 500000 UNITS: 100000 SUSPENSION ORAL at 22:01

## 2024-05-11 RX ADMIN — PREDNISONE 50 MG: 20 TABLET ORAL at 08:22

## 2024-05-11 RX ADMIN — ENOXAPARIN SODIUM 135 MG: 150 INJECTION SUBCUTANEOUS at 21:59

## 2024-05-11 RX ADMIN — INSULIN LISPRO 4 UNITS: 100 INJECTION, SOLUTION INTRAVENOUS; SUBCUTANEOUS at 08:20

## 2024-05-11 RX ADMIN — INSULIN LISPRO 4 UNITS: 100 INJECTION, SOLUTION INTRAVENOUS; SUBCUTANEOUS at 22:00

## 2024-05-11 RX ADMIN — CEFTRIAXONE SODIUM 2000 MG: 2 INJECTION, POWDER, FOR SOLUTION INTRAMUSCULAR; INTRAVENOUS at 21:59

## 2024-05-11 RX ADMIN — INSULIN GLARGINE 20 UNITS: 100 INJECTION, SOLUTION SUBCUTANEOUS at 08:20

## 2024-05-11 RX ADMIN — IPRATROPIUM BROMIDE AND ALBUTEROL SULFATE 1 DOSE: .5; 2.5 SOLUTION RESPIRATORY (INHALATION) at 07:55

## 2024-05-11 RX ADMIN — IPRATROPIUM BROMIDE AND ALBUTEROL SULFATE 1 DOSE: .5; 2.5 SOLUTION RESPIRATORY (INHALATION) at 11:16

## 2024-05-11 RX ADMIN — SACUBITRIL AND VALSARTAN 1 TABLET: 49; 51 TABLET, FILM COATED ORAL at 22:01

## 2024-05-11 RX ADMIN — SACUBITRIL AND VALSARTAN 1 TABLET: 49; 51 TABLET, FILM COATED ORAL at 08:28

## 2024-05-11 RX ADMIN — ATORVASTATIN CALCIUM 40 MG: 40 TABLET, FILM COATED ORAL at 08:21

## 2024-05-11 RX ADMIN — INSULIN LISPRO 4 UNITS: 100 INJECTION, SOLUTION INTRAVENOUS; SUBCUTANEOUS at 13:23

## 2024-05-11 RX ADMIN — NYSTATIN 500000 UNITS: 100000 SUSPENSION ORAL at 13:23

## 2024-05-11 RX ADMIN — ACETAMINOPHEN 650 MG: 325 TABLET ORAL at 09:41

## 2024-05-11 RX ADMIN — NYSTATIN 500000 UNITS: 100000 SUSPENSION ORAL at 08:20

## 2024-05-11 RX ADMIN — INSULIN GLARGINE 20 UNITS: 100 INJECTION, SOLUTION SUBCUTANEOUS at 21:59

## 2024-05-11 RX ADMIN — CARVEDILOL 6.25 MG: 6.25 TABLET, FILM COATED ORAL at 16:34

## 2024-05-11 RX ADMIN — INSULIN LISPRO 8 UNITS: 100 INJECTION, SOLUTION INTRAVENOUS; SUBCUTANEOUS at 16:34

## 2024-05-11 RX ADMIN — ENOXAPARIN SODIUM 135 MG: 150 INJECTION SUBCUTANEOUS at 08:25

## 2024-05-11 RX ADMIN — NYSTATIN 500000 UNITS: 100000 SUSPENSION ORAL at 16:34

## 2024-05-11 RX ADMIN — SPIRONOLACTONE 25 MG: 25 TABLET ORAL at 08:23

## 2024-05-11 RX ADMIN — IPRATROPIUM BROMIDE AND ALBUTEROL SULFATE 1 DOSE: .5; 2.5 SOLUTION RESPIRATORY (INHALATION) at 15:29

## 2024-05-11 RX ADMIN — FAMOTIDINE 20 MG: 20 TABLET, FILM COATED ORAL at 08:23

## 2024-05-11 RX ADMIN — FUROSEMIDE 40 MG: 10 INJECTION, SOLUTION INTRAMUSCULAR; INTRAVENOUS at 08:21

## 2024-05-11 RX ADMIN — SODIUM CHLORIDE, PRESERVATIVE FREE 10 ML: 5 INJECTION INTRAVENOUS at 08:25

## 2024-05-11 RX ADMIN — ACETAMINOPHEN 650 MG: 325 TABLET ORAL at 18:54

## 2024-05-11 RX ADMIN — ASPIRIN 81 MG: 81 TABLET, COATED ORAL at 08:24

## 2024-05-11 RX ADMIN — FUROSEMIDE 40 MG: 10 INJECTION, SOLUTION INTRAMUSCULAR; INTRAVENOUS at 16:34

## 2024-05-11 RX ADMIN — IPRATROPIUM BROMIDE AND ALBUTEROL SULFATE 1 DOSE: .5; 2.5 SOLUTION RESPIRATORY (INHALATION) at 20:09

## 2024-05-11 ASSESSMENT — PAIN DESCRIPTION - LOCATION: LOCATION: FLANK

## 2024-05-11 ASSESSMENT — PAIN SCALES - GENERAL: PAINLEVEL_OUTOF10: 8

## 2024-05-11 NOTE — PLAN OF CARE
Problem: Discharge Planning  Goal: Discharge to home or other facility with appropriate resources  Outcome: Progressing     Problem: Pain  Goal: Verbalizes/displays adequate comfort level or baseline comfort level  Outcome: Progressing     Problem: Respiratory - Adult  Goal: Achieves optimal ventilation and oxygenation  Outcome: Progressing  Note: Pt remains on 2L/nc this shift        Problem: Cardiovascular - Adult  Goal: Maintains optimal cardiac output and hemodynamic stability  Outcome: Progressing  Flowsheets (Taken 5/11/2024 0347)  Maintains optimal cardiac output and hemodynamic stability: Monitor blood pressure and heart rate     Problem: Infection - Adult  Goal: Absence of infection at discharge  Outcome: Progressing  Flowsheets (Taken 5/11/2024 0347)  Absence of infection at discharge:   Assess and monitor for signs and symptoms of infection   Administer medications as ordered

## 2024-05-12 LAB
ANION GAP SERPL CALCULATED.3IONS-SCNC: 11 MMOL/L (ref 9–17)
BUN SERPL-MCNC: 15 MG/DL (ref 6–20)
CALCIUM SERPL-MCNC: 8.8 MG/DL (ref 8.6–10.4)
CHLORIDE SERPL-SCNC: 101 MMOL/L (ref 98–107)
CO2 SERPL-SCNC: 26 MMOL/L (ref 20–31)
CREAT SERPL-MCNC: 0.8 MG/DL (ref 0.5–0.9)
GFR, ESTIMATED: 88 ML/MIN/1.73M2
GLUCOSE BLD-MCNC: 226 MG/DL (ref 65–105)
GLUCOSE BLD-MCNC: 240 MG/DL (ref 65–105)
GLUCOSE BLD-MCNC: 292 MG/DL (ref 65–105)
GLUCOSE BLD-MCNC: 390 MG/DL (ref 65–105)
GLUCOSE BLD-MCNC: 428 MG/DL (ref 65–105)
GLUCOSE SERPL-MCNC: 224 MG/DL (ref 70–99)
MAGNESIUM SERPL-MCNC: 2 MG/DL (ref 1.6–2.6)
MICROORGANISM SPEC CULT: NORMAL
POTASSIUM SERPL-SCNC: 3.7 MMOL/L (ref 3.7–5.3)
SODIUM SERPL-SCNC: 138 MMOL/L (ref 135–144)
SPECIMEN DESCRIPTION: NORMAL
TROPONIN I SERPL HS-MCNC: 158 NG/L (ref 0–14)

## 2024-05-12 PROCEDURE — 2580000003 HC RX 258: Performed by: INTERNAL MEDICINE

## 2024-05-12 PROCEDURE — 6370000000 HC RX 637 (ALT 250 FOR IP): Performed by: INTERNAL MEDICINE

## 2024-05-12 PROCEDURE — 94761 N-INVAS EAR/PLS OXIMETRY MLT: CPT

## 2024-05-12 PROCEDURE — 36415 COLL VENOUS BLD VENIPUNCTURE: CPT

## 2024-05-12 PROCEDURE — 80048 BASIC METABOLIC PNL TOTAL CA: CPT

## 2024-05-12 PROCEDURE — 83735 ASSAY OF MAGNESIUM: CPT

## 2024-05-12 PROCEDURE — 99232 SBSQ HOSP IP/OBS MODERATE 35: CPT | Performed by: INTERNAL MEDICINE

## 2024-05-12 PROCEDURE — 82947 ASSAY GLUCOSE BLOOD QUANT: CPT

## 2024-05-12 PROCEDURE — 6360000002 HC RX W HCPCS: Performed by: INTERNAL MEDICINE

## 2024-05-12 PROCEDURE — 84484 ASSAY OF TROPONIN QUANT: CPT

## 2024-05-12 PROCEDURE — 2060000000 HC ICU INTERMEDIATE R&B

## 2024-05-12 PROCEDURE — 94640 AIRWAY INHALATION TREATMENT: CPT

## 2024-05-12 PROCEDURE — 6370000000 HC RX 637 (ALT 250 FOR IP)

## 2024-05-12 RX ORDER — INSULIN LISPRO 100 [IU]/ML
4 INJECTION, SOLUTION INTRAVENOUS; SUBCUTANEOUS ONCE
Status: COMPLETED | OUTPATIENT
Start: 2024-05-12 | End: 2024-05-12

## 2024-05-12 RX ORDER — INSULIN LISPRO 100 [IU]/ML
5 INJECTION, SOLUTION INTRAVENOUS; SUBCUTANEOUS
Status: DISCONTINUED | OUTPATIENT
Start: 2024-05-12 | End: 2024-05-15 | Stop reason: HOSPADM

## 2024-05-12 RX ORDER — INSULIN GLARGINE 100 [IU]/ML
25 INJECTION, SOLUTION SUBCUTANEOUS 2 TIMES DAILY
Status: DISCONTINUED | OUTPATIENT
Start: 2024-05-12 | End: 2024-05-12

## 2024-05-12 RX ORDER — INSULIN GLARGINE 100 [IU]/ML
30 INJECTION, SOLUTION SUBCUTANEOUS 2 TIMES DAILY
Status: DISCONTINUED | OUTPATIENT
Start: 2024-05-12 | End: 2024-05-14

## 2024-05-12 RX ADMIN — NYSTATIN 500000 UNITS: 100000 SUSPENSION ORAL at 12:49

## 2024-05-12 RX ADMIN — INSULIN LISPRO 4 UNITS: 100 INJECTION, SOLUTION INTRAVENOUS; SUBCUTANEOUS at 20:28

## 2024-05-12 RX ADMIN — NYSTATIN 500000 UNITS: 100000 SUSPENSION ORAL at 08:33

## 2024-05-12 RX ADMIN — PREDNISONE 50 MG: 20 TABLET ORAL at 08:33

## 2024-05-12 RX ADMIN — ENOXAPARIN SODIUM 135 MG: 150 INJECTION SUBCUTANEOUS at 20:27

## 2024-05-12 RX ADMIN — SODIUM CHLORIDE, PRESERVATIVE FREE 10 ML: 5 INJECTION INTRAVENOUS at 20:29

## 2024-05-12 RX ADMIN — CARVEDILOL 6.25 MG: 6.25 TABLET, FILM COATED ORAL at 08:33

## 2024-05-12 RX ADMIN — NYSTATIN 500000 UNITS: 100000 SUSPENSION ORAL at 18:07

## 2024-05-12 RX ADMIN — INSULIN LISPRO 2 UNITS: 100 INJECTION, SOLUTION INTRAVENOUS; SUBCUTANEOUS at 08:32

## 2024-05-12 RX ADMIN — CEFTRIAXONE SODIUM 2000 MG: 2 INJECTION, POWDER, FOR SOLUTION INTRAMUSCULAR; INTRAVENOUS at 20:35

## 2024-05-12 RX ADMIN — FUROSEMIDE 40 MG: 10 INJECTION, SOLUTION INTRAMUSCULAR; INTRAVENOUS at 08:34

## 2024-05-12 RX ADMIN — ENOXAPARIN SODIUM 135 MG: 150 INJECTION SUBCUTANEOUS at 08:35

## 2024-05-12 RX ADMIN — SACUBITRIL AND VALSARTAN 1 TABLET: 49; 51 TABLET, FILM COATED ORAL at 08:35

## 2024-05-12 RX ADMIN — INSULIN GLARGINE 30 UNITS: 100 INJECTION, SOLUTION SUBCUTANEOUS at 20:28

## 2024-05-12 RX ADMIN — INSULIN GLARGINE 25 UNITS: 100 INJECTION, SOLUTION SUBCUTANEOUS at 11:08

## 2024-05-12 RX ADMIN — INSULIN LISPRO 5 UNITS: 100 INJECTION, SOLUTION INTRAVENOUS; SUBCUTANEOUS at 18:14

## 2024-05-12 RX ADMIN — CARVEDILOL 6.25 MG: 6.25 TABLET, FILM COATED ORAL at 18:07

## 2024-05-12 RX ADMIN — FUROSEMIDE 40 MG: 10 INJECTION, SOLUTION INTRAMUSCULAR; INTRAVENOUS at 18:07

## 2024-05-12 RX ADMIN — ATORVASTATIN CALCIUM 40 MG: 40 TABLET, FILM COATED ORAL at 08:33

## 2024-05-12 RX ADMIN — SACUBITRIL AND VALSARTAN 1 TABLET: 49; 51 TABLET, FILM COATED ORAL at 20:26

## 2024-05-12 RX ADMIN — IPRATROPIUM BROMIDE AND ALBUTEROL SULFATE 1 DOSE: .5; 2.5 SOLUTION RESPIRATORY (INHALATION) at 10:58

## 2024-05-12 RX ADMIN — NYSTATIN 500000 UNITS: 100000 SUSPENSION ORAL at 20:29

## 2024-05-12 RX ADMIN — IPRATROPIUM BROMIDE AND ALBUTEROL SULFATE 1 DOSE: .5; 2.5 SOLUTION RESPIRATORY (INHALATION) at 15:46

## 2024-05-12 RX ADMIN — FAMOTIDINE 20 MG: 20 TABLET, FILM COATED ORAL at 08:33

## 2024-05-12 RX ADMIN — ASPIRIN 81 MG: 81 TABLET, COATED ORAL at 08:33

## 2024-05-12 RX ADMIN — INSULIN LISPRO 4 UNITS: 100 INJECTION, SOLUTION INTRAVENOUS; SUBCUTANEOUS at 12:49

## 2024-05-12 RX ADMIN — SPIRONOLACTONE 25 MG: 25 TABLET ORAL at 08:33

## 2024-05-12 RX ADMIN — IPRATROPIUM BROMIDE AND ALBUTEROL SULFATE 1 DOSE: .5; 2.5 SOLUTION RESPIRATORY (INHALATION) at 19:50

## 2024-05-12 RX ADMIN — IPRATROPIUM BROMIDE AND ALBUTEROL SULFATE 1 DOSE: .5; 2.5 SOLUTION RESPIRATORY (INHALATION) at 07:42

## 2024-05-12 RX ADMIN — INSULIN LISPRO 4 UNITS: 100 INJECTION, SOLUTION INTRAVENOUS; SUBCUTANEOUS at 20:27

## 2024-05-12 RX ADMIN — SODIUM CHLORIDE, PRESERVATIVE FREE 10 ML: 5 INJECTION INTRAVENOUS at 10:09

## 2024-05-12 RX ADMIN — INSULIN LISPRO 8 UNITS: 100 INJECTION, SOLUTION INTRAVENOUS; SUBCUTANEOUS at 18:08
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
## 2024-05-12 NOTE — PLAN OF CARE
Problem: Discharge Planning  Goal: Discharge to home or other facility with appropriate resources  Outcome: Progressing     Problem: Pain  Goal: Verbalizes/displays adequate comfort level or baseline comfort level  Outcome: Progressing     Problem: Respiratory - Adult  Goal: Achieves optimal ventilation and oxygenation  Outcome: Progressing     Problem: Cardiovascular - Adult  Goal: Maintains optimal cardiac output and hemodynamic stability  Outcome: Progressing     Problem: Infection - Adult  Goal: Absence of infection at discharge  Outcome: Progressing     Problem: Chronic Conditions and Co-morbidities  Goal: Patient's chronic conditions and co-morbidity symptoms are monitored and maintained or improved  Outcome: Progressing

## 2024-05-12 NOTE — PLAN OF CARE
Problem: Discharge Planning  Goal: Discharge to home or other facility with appropriate resources  5/12/2024 1838 by Tonie Mederos RN  Outcome: Progressing     Problem: Pain  Goal: Verbalizes/displays adequate comfort level or baseline comfort level  5/12/2024 1838 by Tonie Mederos RN  Outcome: Progressing     Problem: Respiratory - Adult  Goal: Achieves optimal ventilation and oxygenation  5/12/2024 1838 by Tonie Medeors RN  Outcome: Progressing     Problem: Cardiovascular - Adult  Goal: Maintains optimal cardiac output and hemodynamic stability  5/12/2024 1838 by Tonie Mederos RN  Outcome: Progressing     Problem: Infection - Adult  Goal: Absence of infection at discharge  5/12/2024 1838 by Tonie Mederos RN  Outcome: Progressing     Problem: Chronic Conditions and Co-morbidities  Goal: Patient's chronic conditions and co-morbidity symptoms are monitored and maintained or improved  5/12/2024 1838 by Tonie Mederos RN  Outcome: Progressing

## 2024-05-12 NOTE — CARE COORDINATION
Case Management Assessment  Initial Evaluation    Date/Time of Evaluation: 5/12/2024 1:59 PM  Assessment Completed by: Monica Gomez RN    If patient is discharged prior to next notation, then this note serves as note for discharge by case management.    Patient Name: Kari Reddy                   YOB: 1970  Diagnosis: Respiratory distress [R06.03]  Acute hypoxemic respiratory failure (HCC) [J96.01]  Acute on chronic congestive heart failure, unspecified heart failure type (HCC) [I50.9]                   Date / Time: 5/10/2024  2:18 PM    Patient Admission Status: Inpatient   Readmission Risk (Low < 19, Mod (19-27), High > 27): Readmission Risk Score: 13.5    Current PCP: Louis Rivera, DO  PCP verified by CM? Yes    Chart Reviewed: Yes      History Provided by: Patient  Patient Orientation: Alert and Oriented    Patient Cognition: Alert    Hospitalization in the last 30 days (Readmission):  No    If yes, Readmission Assessment in CM Navigator will be completed.    Advance Directives:      Code Status: DNR-CCA   Patient's Primary Decision Maker is: Patient Declined (Legal Next of Kin Remains as Decision Maker)      Discharge Planning:    Patient lives with: Family Members Type of Home: House  Primary Care Giver: Self  Patient Support Systems include: Family Members   Current Financial resources: None  Current community resources:    Current services prior to admission: None            Current DME:              Type of Home Care services:  None    ADLS  Prior functional level: Independent in ADLs/IADLs  Current functional level:      PT AM-PAC:   /24  OT AM-PAC:   /24    Family can provide assistance at DC: No  Would you like Case Management to discuss the discharge plan with any other family members/significant others, and if so, who? No  Plans to Return to Present Housing: Yes  Other Identified Issues/Barriers to RETURNING to current housing: none  Potential Assistance needed at discharge:

## 2024-05-12 NOTE — PLAN OF CARE
Problem: Discharge Planning  Goal: Discharge to home or other facility with appropriate resources  5/12/2024 0740 by Madina Carmona RN  Outcome: Progressing     Problem: Pain  Goal: Verbalizes/displays adequate comfort level or baseline comfort level  5/12/2024 0740 by Madina Carmona RN  Outcome: Progressing     Problem: Respiratory - Adult  Goal: Achieves optimal ventilation and oxygenation  5/12/2024 0740 by Madina Carmona RN  Outcome: Progressing     Problem: Cardiovascular - Adult  Goal: Maintains optimal cardiac output and hemodynamic stability  5/12/2024 0740 by Madina Carmona RN  Outcome: Progressing     Problem: Infection - Adult  Goal: Absence of infection at discharge  5/12/2024 0740 by Madina Carmona RN  Outcome: Progressing  Flowsheets (Taken 5/12/2024 0740)  Absence of infection at discharge:   Assess and monitor for signs and symptoms of infection   Administer medications as ordered     Problem: Chronic Conditions and Co-morbidities  Goal: Patient's chronic conditions and co-morbidity symptoms are monitored and maintained or improved  5/12/2024 0740 by Madina Carmona RN  Outcome: Progressing

## 2024-05-13 ENCOUNTER — HOSPITAL ENCOUNTER (INPATIENT)
Age: 54
Discharge: HOME | DRG: 291 | End: 2024-05-15
Attending: INTERNAL MEDICINE
Payer: COMMERCIAL

## 2024-05-13 LAB
ANION GAP SERPL CALCULATED.3IONS-SCNC: 12 MMOL/L (ref 9–17)
ANION GAP SERPL CALCULATED.3IONS-SCNC: 13 MMOL/L (ref 9–17)
BUN SERPL-MCNC: 17 MG/DL (ref 6–20)
BUN SERPL-MCNC: 18 MG/DL (ref 6–20)
CALCIUM SERPL-MCNC: 9 MG/DL (ref 8.6–10.4)
CALCIUM SERPL-MCNC: 9.3 MG/DL (ref 8.6–10.4)
CHLORIDE SERPL-SCNC: 100 MMOL/L (ref 98–107)
CHLORIDE SERPL-SCNC: 98 MMOL/L (ref 98–107)
CO2 SERPL-SCNC: 27 MMOL/L (ref 20–31)
CO2 SERPL-SCNC: 27 MMOL/L (ref 20–31)
CREAT SERPL-MCNC: 0.8 MG/DL (ref 0.5–0.9)
CREAT SERPL-MCNC: 0.9 MG/DL (ref 0.5–0.9)
ECHO AO ROOT DIAM: 3.4 CM
ECHO AO ROOT INDEX: 1.43 CM/M2
ECHO AV AREA PEAK VELOCITY: 2.8 CM2
ECHO AV AREA VTI: 3.4 CM2
ECHO AV AREA/BSA PEAK VELOCITY: 1.2 CM2/M2
ECHO AV AREA/BSA VTI: 1.4 CM2/M2
ECHO AV MEAN GRADIENT: 4 MMHG
ECHO AV MEAN VELOCITY: 1 M/S
ECHO AV PEAK GRADIENT: 10 MMHG
ECHO AV PEAK VELOCITY: 1.6 M/S
ECHO AV VELOCITY RATIO: 0.75
ECHO AV VTI: 20.9 CM
ECHO BSA: 2.43 M2
ECHO EST RA PRESSURE: 8 MMHG
ECHO LA AREA 2C: 21.7 CM2
ECHO LA AREA 4C: 26.9 CM2
ECHO LA DIAMETER INDEX: 1.93 CM/M2
ECHO LA DIAMETER: 4.6 CM
ECHO LA MAJOR AXIS: 6.1 CM
ECHO LA MINOR AXIS: 5.7 CM
ECHO LA TO AORTIC ROOT RATIO: 1.35
ECHO LA VOL BP: 83 ML (ref 22–52)
ECHO LA VOL MOD A2C: 68 ML (ref 22–52)
ECHO LA VOL MOD A4C: 96 ML (ref 22–52)
ECHO LA VOL/BSA BIPLANE: 35 ML/M2 (ref 16–34)
ECHO LA VOLUME INDEX MOD A2C: 29 ML/M2 (ref 16–34)
ECHO LA VOLUME INDEX MOD A4C: 40 ML/M2 (ref 16–34)
ECHO LV E' LATERAL VELOCITY: 9 CM/S
ECHO LV E' SEPTAL VELOCITY: 4 CM/S
ECHO LV EDV A2C: 135 ML
ECHO LV EDV A4C: 106 ML
ECHO LV EDV INDEX A4C: 45 ML/M2
ECHO LV EDV NDEX A2C: 57 ML/M2
ECHO LV EJECTION FRACTION A2C: 26 %
ECHO LV EJECTION FRACTION A4C: 25 %
ECHO LV EJECTION FRACTION BIPLANE: 26 % (ref 55–100)
ECHO LV ESV A2C: 100 ML
ECHO LV ESV A4C: 79 ML
ECHO LV ESV INDEX A2C: 42 ML/M2
ECHO LV ESV INDEX A4C: 33 ML/M2
ECHO LV FRACTIONAL SHORTENING: 12 % (ref 28–44)
ECHO LV INTERNAL DIMENSION DIASTOLE INDEX: 3.28 CM/M2
ECHO LV INTERNAL DIMENSION DIASTOLIC: 7.8 CM (ref 3.9–5.3)
ECHO LV INTERNAL DIMENSION SYSTOLIC INDEX: 2.9 CM/M2
ECHO LV INTERNAL DIMENSION SYSTOLIC: 6.9 CM
ECHO LV IVSD: 1 CM (ref 0.6–0.9)
ECHO LV MASS 2D: 365.1 G (ref 67–162)
ECHO LV MASS INDEX 2D: 153.4 G/M2 (ref 43–95)
ECHO LV POSTERIOR WALL DIASTOLIC: 0.9 CM (ref 0.6–0.9)
ECHO LV RELATIVE WALL THICKNESS RATIO: 0.23
ECHO LVOT AREA: 3.8 CM2
ECHO LVOT AV VTI INDEX: 0.89
ECHO LVOT DIAM: 2.2 CM
ECHO LVOT MEAN GRADIENT: 3 MMHG
ECHO LVOT PEAK GRADIENT: 5 MMHG
ECHO LVOT PEAK VELOCITY: 1.2 M/S
ECHO LVOT STROKE VOLUME INDEX: 29.7 ML/M2
ECHO LVOT SV: 70.7 ML
ECHO LVOT VTI: 18.6 CM
ECHO MV A VELOCITY: 0.33 M/S
ECHO MV E DECELERATION TIME (DT): 169 MS
ECHO MV E VELOCITY: 1.15 M/S
ECHO MV E/A RATIO: 3.48
ECHO MV E/E' LATERAL: 12.78
ECHO MV E/E' RATIO (AVERAGED): 20.76
ECHO MV E/E' SEPTAL: 28.75
ECHO RA AREA 4C: 27.2 CM2
ECHO RA END SYSTOLIC VOLUME APICAL 4 CHAMBER INDEX BSA: 42 ML/M2
ECHO RA VOLUME: 101 ML
ECHO RIGHT VENTRICULAR SYSTOLIC PRESSURE (RVSP): 17 MMHG
ECHO RV TAPSE: 2.9 CM (ref 1.7–?)
ECHO TV REGURGITANT MAX VELOCITY: 1.48 M/S
ECHO TV REGURGITANT PEAK GRADIENT: 9 MMHG
EKG ATRIAL RATE: 162 BPM
EKG ATRIAL RATE: 71 BPM
EKG ATRIAL RATE: 82 BPM
EKG P AXIS: 59 DEGREES
EKG P AXIS: 72 DEGREES
EKG P-R INTERVAL: 130 MS
EKG P-R INTERVAL: 154 MS
EKG P-R INTERVAL: 154 MS
EKG Q-T INTERVAL: 308 MS
EKG Q-T INTERVAL: 416 MS
EKG Q-T INTERVAL: 418 MS
EKG QRS DURATION: 86 MS
EKG QRS DURATION: 92 MS
EKG QRS DURATION: 96 MS
EKG QTC CALCULATION (BAZETT): 452 MS
EKG QTC CALCULATION (BAZETT): 488 MS
EKG QTC CALCULATION (BAZETT): 505 MS
EKG R AXIS: 24 DEGREES
EKG R AXIS: 26 DEGREES
EKG R AXIS: 4 DEGREES
EKG T AXIS: -64 DEGREES
EKG T AXIS: 19 DEGREES
EKG T AXIS: 31 DEGREES
EKG VENTRICULAR RATE: 162 BPM
EKG VENTRICULAR RATE: 71 BPM
EKG VENTRICULAR RATE: 82 BPM
GFR, ESTIMATED: 76 ML/MIN/1.73M2
GFR, ESTIMATED: 88 ML/MIN/1.73M2
GLUCOSE BLD-MCNC: 208 MG/DL (ref 65–105)
GLUCOSE BLD-MCNC: 257 MG/DL (ref 65–105)
GLUCOSE BLD-MCNC: 427 MG/DL (ref 65–105)
GLUCOSE SERPL-MCNC: 201 MG/DL (ref 70–99)
GLUCOSE SERPL-MCNC: 247 MG/DL (ref 70–99)
MAGNESIUM SERPL-MCNC: 2 MG/DL (ref 1.6–2.6)
MAGNESIUM SERPL-MCNC: 2.1 MG/DL (ref 1.6–2.6)
POTASSIUM SERPL-SCNC: 3.6 MMOL/L (ref 3.7–5.3)
POTASSIUM SERPL-SCNC: 3.7 MMOL/L (ref 3.7–5.3)
SODIUM SERPL-SCNC: 138 MMOL/L (ref 135–144)
SODIUM SERPL-SCNC: 139 MMOL/L (ref 135–144)
TROPONIN I SERPL HS-MCNC: 128 NG/L (ref 0–14)

## 2024-05-13 PROCEDURE — 94761 N-INVAS EAR/PLS OXIMETRY MLT: CPT

## 2024-05-13 PROCEDURE — 83735 ASSAY OF MAGNESIUM: CPT

## 2024-05-13 PROCEDURE — 6370000000 HC RX 637 (ALT 250 FOR IP): Performed by: INTERNAL MEDICINE

## 2024-05-13 PROCEDURE — 82947 ASSAY GLUCOSE BLOOD QUANT: CPT

## 2024-05-13 PROCEDURE — 2580000003 HC RX 258: Performed by: INTERNAL MEDICINE

## 2024-05-13 PROCEDURE — 6370000000 HC RX 637 (ALT 250 FOR IP): Performed by: NURSE PRACTITIONER

## 2024-05-13 PROCEDURE — 94640 AIRWAY INHALATION TREATMENT: CPT

## 2024-05-13 PROCEDURE — 93010 ELECTROCARDIOGRAM REPORT: CPT | Performed by: INTERNAL MEDICINE

## 2024-05-13 PROCEDURE — 93005 ELECTROCARDIOGRAM TRACING: CPT | Performed by: SURGERY

## 2024-05-13 PROCEDURE — 99232 SBSQ HOSP IP/OBS MODERATE 35: CPT | Performed by: INTERNAL MEDICINE

## 2024-05-13 PROCEDURE — 6360000002 HC RX W HCPCS: Performed by: INTERNAL MEDICINE

## 2024-05-13 PROCEDURE — 6370000000 HC RX 637 (ALT 250 FOR IP): Performed by: SURGERY

## 2024-05-13 PROCEDURE — 93306 TTE W/DOPPLER COMPLETE: CPT

## 2024-05-13 PROCEDURE — 84484 ASSAY OF TROPONIN QUANT: CPT

## 2024-05-13 PROCEDURE — 2060000000 HC ICU INTERMEDIATE R&B

## 2024-05-13 PROCEDURE — 99233 SBSQ HOSP IP/OBS HIGH 50: CPT | Performed by: SURGERY

## 2024-05-13 PROCEDURE — 36415 COLL VENOUS BLD VENIPUNCTURE: CPT

## 2024-05-13 PROCEDURE — 93005 ELECTROCARDIOGRAM TRACING: CPT | Performed by: INTERNAL MEDICINE

## 2024-05-13 PROCEDURE — 80048 BASIC METABOLIC PNL TOTAL CA: CPT

## 2024-05-13 RX ORDER — POTASSIUM CHLORIDE 20 MEQ/1
20 TABLET, EXTENDED RELEASE ORAL 2 TIMES DAILY WITH MEALS
Status: DISCONTINUED | OUTPATIENT
Start: 2024-05-13 | End: 2024-05-15 | Stop reason: HOSPADM

## 2024-05-13 RX ORDER — INSULIN LISPRO 100 [IU]/ML
4 INJECTION, SOLUTION INTRAVENOUS; SUBCUTANEOUS ONCE
Status: COMPLETED | OUTPATIENT
Start: 2024-05-13 | End: 2024-05-13

## 2024-05-13 RX ORDER — SALIVA STIMULANT COMB. NO.3
SPRAY, NON-AEROSOL (ML) MUCOUS MEMBRANE PRN
Status: DISCONTINUED | OUTPATIENT
Start: 2024-05-13 | End: 2024-05-15 | Stop reason: HOSPADM

## 2024-05-13 RX ORDER — CARVEDILOL 12.5 MG/1
12.5 TABLET ORAL 2 TIMES DAILY WITH MEALS
Status: DISCONTINUED | OUTPATIENT
Start: 2024-05-13 | End: 2024-05-15 | Stop reason: HOSPADM

## 2024-05-13 RX ADMIN — NYSTATIN 500000 UNITS: 100000 SUSPENSION ORAL at 11:38

## 2024-05-13 RX ADMIN — NYSTATIN 500000 UNITS: 100000 SUSPENSION ORAL at 18:08

## 2024-05-13 RX ADMIN — IPRATROPIUM BROMIDE AND ALBUTEROL SULFATE 1 DOSE: .5; 2.5 SOLUTION RESPIRATORY (INHALATION) at 07:40

## 2024-05-13 RX ADMIN — SPIRONOLACTONE 25 MG: 25 TABLET ORAL at 07:36

## 2024-05-13 RX ADMIN — INSULIN LISPRO 2 UNITS: 100 INJECTION, SOLUTION INTRAVENOUS; SUBCUTANEOUS at 07:35

## 2024-05-13 RX ADMIN — INSULIN GLARGINE 30 UNITS: 100 INJECTION, SOLUTION SUBCUTANEOUS at 21:06

## 2024-05-13 RX ADMIN — FUROSEMIDE 40 MG: 10 INJECTION, SOLUTION INTRAMUSCULAR; INTRAVENOUS at 18:08

## 2024-05-13 RX ADMIN — INSULIN LISPRO 4 UNITS: 100 INJECTION, SOLUTION INTRAVENOUS; SUBCUTANEOUS at 21:05

## 2024-05-13 RX ADMIN — NYSTATIN 500000 UNITS: 100000 SUSPENSION ORAL at 07:36

## 2024-05-13 RX ADMIN — POTASSIUM CHLORIDE 20 MEQ: 1500 TABLET, EXTENDED RELEASE ORAL at 11:37

## 2024-05-13 RX ADMIN — SACUBITRIL AND VALSARTAN 1 TABLET: 49; 51 TABLET, FILM COATED ORAL at 07:37

## 2024-05-13 RX ADMIN — INSULIN LISPRO 5 UNITS: 100 INJECTION, SOLUTION INTRAVENOUS; SUBCUTANEOUS at 11:31

## 2024-05-13 RX ADMIN — SODIUM CHLORIDE, PRESERVATIVE FREE 10 ML: 5 INJECTION INTRAVENOUS at 07:38

## 2024-05-13 RX ADMIN — INSULIN LISPRO 5 UNITS: 100 INJECTION, SOLUTION INTRAVENOUS; SUBCUTANEOUS at 07:35

## 2024-05-13 RX ADMIN — CARVEDILOL 6.25 MG: 6.25 TABLET, FILM COATED ORAL at 07:36

## 2024-05-13 RX ADMIN — FUROSEMIDE 40 MG: 10 INJECTION, SOLUTION INTRAMUSCULAR; INTRAVENOUS at 07:39

## 2024-05-13 RX ADMIN — IPRATROPIUM BROMIDE AND ALBUTEROL SULFATE 1 DOSE: .5; 2.5 SOLUTION RESPIRATORY (INHALATION) at 21:14

## 2024-05-13 RX ADMIN — SACUBITRIL AND VALSARTAN 1 TABLET: 49; 51 TABLET, FILM COATED ORAL at 21:10

## 2024-05-13 RX ADMIN — NYSTATIN 500000 UNITS: 100000 SUSPENSION ORAL at 21:10

## 2024-05-13 RX ADMIN — ASPIRIN 81 MG: 81 TABLET, COATED ORAL at 07:37

## 2024-05-13 RX ADMIN — ENOXAPARIN SODIUM 135 MG: 150 INJECTION SUBCUTANEOUS at 07:37

## 2024-05-13 RX ADMIN — SODIUM CHLORIDE, PRESERVATIVE FREE 10 ML: 5 INJECTION INTRAVENOUS at 21:10

## 2024-05-13 RX ADMIN — ATORVASTATIN CALCIUM 40 MG: 40 TABLET, FILM COATED ORAL at 07:37

## 2024-05-13 RX ADMIN — POTASSIUM CHLORIDE 20 MEQ: 1500 TABLET, EXTENDED RELEASE ORAL at 18:10

## 2024-05-13 RX ADMIN — PREDNISONE 50 MG: 20 TABLET ORAL at 07:37

## 2024-05-13 RX ADMIN — INSULIN LISPRO 5 UNITS: 100 INJECTION, SOLUTION INTRAVENOUS; SUBCUTANEOUS at 18:08

## 2024-05-13 RX ADMIN — IPRATROPIUM BROMIDE AND ALBUTEROL SULFATE 1 DOSE: .5; 2.5 SOLUTION RESPIRATORY (INHALATION) at 13:07

## 2024-05-13 RX ADMIN — Medication: at 19:48

## 2024-05-13 RX ADMIN — CEFTRIAXONE SODIUM 2000 MG: 2 INJECTION, POWDER, FOR SOLUTION INTRAMUSCULAR; INTRAVENOUS at 21:49

## 2024-05-13 RX ADMIN — IPRATROPIUM BROMIDE AND ALBUTEROL SULFATE 1 DOSE: .5; 2.5 SOLUTION RESPIRATORY (INHALATION) at 16:50

## 2024-05-13 RX ADMIN — ENOXAPARIN SODIUM 135 MG: 150 INJECTION SUBCUTANEOUS at 21:07

## 2024-05-13 RX ADMIN — CARVEDILOL 12.5 MG: 12.5 TABLET, FILM COATED ORAL at 18:08

## 2024-05-13 RX ADMIN — INSULIN LISPRO 4 UNITS: 100 INJECTION, SOLUTION INTRAVENOUS; SUBCUTANEOUS at 11:31

## 2024-05-13 RX ADMIN — INSULIN GLARGINE 30 UNITS: 100 INJECTION, SOLUTION SUBCUTANEOUS at 07:34

## 2024-05-13 RX ADMIN — INSULIN LISPRO 4 UNITS: 100 INJECTION, SOLUTION INTRAVENOUS; SUBCUTANEOUS at 21:06

## 2024-05-13 RX ADMIN — FAMOTIDINE 20 MG: 20 TABLET, FILM COATED ORAL at 07:39

## 2024-05-13 NOTE — PLAN OF CARE
Narrative:     The left ventricle is severely dilated, normal LV wall thickness, severe  global hypokinesis  Severely reduced LV systolic function, ejection fraction 25 to 30%  The right ventricle appears dilated, function appears impaired  Left and the right atrium appears moderately dilated  The aortic valve structure appears normal no stenosis no regurgitation  Mitral valve structure appears normal mild regurgitation no stenosis  Tricuspid valve structure appears normal, trace regurgitation no stenosis  Pulmonary valve structure appears normal no stenosis no regurgitation  Normal aortic root dimensions  IVC not well-visualized  No significant pericardial effusion seen     Reduced EF fom 45 % to  25- 30 % please keep npo after midnight for cath in am       Electronically signed by WENDY Roldan NP on 5/13/24 at 4:11 PM EDT

## 2024-05-13 NOTE — FLOWSHEET NOTE
05/13/24 1601   Treatment Team Notification   Reason for Communication Evaluate   Name of Team Member Notified Andry Mixon   Treatment Team Role Advanced Practice Nurse   Method of Communication Secure Message   Response Waiting for response   Notification Time 1602     Dr. Marquez contemplating discharge. Patient needs holter monitor but may be too late today.   Echo results.     The left ventricle is severely dilated, normal LV wall thickness, severe global hypokinesis  Severely reduced LV systolic function, ejection fraction 25 to 30%  The right ventricle appears dilated, function appears impaired  Left and the right atrium appears moderately dilated  The aortic valve structure appears normal no stenosis no regurgitation  Mitral valve structure appears normal mild regurgitation no stenosis   Tricuspid valve structure appears normal, trace regurgitation no stenosis  Pulmonary valve structure appears normal no stenosis no regurgitation  Normal aortic root dimensions  IVC not well-visualized  No significant pericardial effusion seen      Per Andry Mixon - NPO at midnight for possible cardiac cath

## 2024-05-13 NOTE — CARE COORDINATION
ONGOING DISCHARGE PLAN:    Patient is alert and oriented x4.    Spoke with patient regarding discharge plan and patient confirms that plan is still to discharge to home with no needs    Echo Ordered for today     Holter monitor ordered    IV lasix     Possible heart cath     Will continue to follow for additional discharge needs.    If patient is discharged prior to next notation, then this note serves as note for discharge by case management.    Electronically signed by Prisca Garcia RN on 5/13/2024 at 1:45 PM

## 2024-05-13 NOTE — PLAN OF CARE
Problem: Discharge Planning  Goal: Discharge to home or other facility with appropriate resources  5/13/2024 0458 by Madina Carmona RN  Outcome: Progressing     Problem: Pain  Goal: Verbalizes/displays adequate comfort level or baseline comfort level  5/13/2024 0458 by Madina Carmona RN  Outcome: Progressing     Problem: Respiratory - Adult  Goal: Achieves optimal ventilation and oxygenation  5/13/2024 0458 by Madina Carmona RN  Outcome: Progressing  Note: PT remained on RA this shift.      Problem: Cardiovascular - Adult  Goal: Maintains optimal cardiac output and hemodynamic stability  5/13/2024 0458 by Madina Carmona RN  Outcome: Progressing     Problem: Infection - Adult  Goal: Absence of infection at discharge  5/13/2024 0458 by Madina Carmona RN  Outcome: Progressing  Note: Pt given IV antibiotics as ordered      Problem: Chronic Conditions and Co-morbidities  Goal: Patient's chronic conditions and co-morbidity symptoms are monitored and maintained or improved  5/13/2024 0458 by Madina Carmona RN  Outcome: Progressing

## 2024-05-14 LAB
BASOPHILS # BLD: 0.17 K/UL (ref 0–0.2)
BASOPHILS NFR BLD: 1 % (ref 0–2)
EKG ATRIAL RATE: 84 BPM
EKG P AXIS: 67 DEGREES
EKG P-R INTERVAL: 158 MS
EKG Q-T INTERVAL: 384 MS
EKG QRS DURATION: 96 MS
EKG QTC CALCULATION (BAZETT): 453 MS
EKG T AXIS: 18 DEGREES
EKG VENTRICULAR RATE: 84 BPM
EOSINOPHIL # BLD: 0 K/UL (ref 0–0.4)
EOSINOPHILS RELATIVE PERCENT: 0 % (ref 0–4)
ERYTHROCYTE [DISTWIDTH] IN BLOOD BY AUTOMATED COUNT: 17.8 % (ref 11.5–14.9)
GLUCOSE BLD-MCNC: 200 MG/DL (ref 65–105)
GLUCOSE BLD-MCNC: 214 MG/DL (ref 65–105)
GLUCOSE BLD-MCNC: 248 MG/DL (ref 65–105)
GLUCOSE SERPL-MCNC: 642 MG/DL (ref 70–99)
HCT VFR BLD AUTO: 49.2 % (ref 36–46)
HGB BLD-MCNC: 15.2 G/DL (ref 12–16)
LYMPHOCYTES NFR BLD: 3.51 K/UL (ref 1–4.8)
LYMPHOCYTES RELATIVE PERCENT: 21 % (ref 24–44)
MCH RBC QN AUTO: 26.2 PG (ref 26–34)
MCHC RBC AUTO-ENTMCNC: 30.9 G/DL (ref 31–37)
MCV RBC AUTO: 84.7 FL (ref 80–100)
MONOCYTES NFR BLD: 0.84 K/UL (ref 0.1–1.3)
MONOCYTES NFR BLD: 5 % (ref 1–7)
MORPHOLOGY: ABNORMAL
MORPHOLOGY: ABNORMAL
NEUTROPHILS NFR BLD: 73 % (ref 36–66)
NEUTS SEG NFR BLD: 12.18 K/UL (ref 1.3–9.1)
PLATELET # BLD AUTO: 316 K/UL (ref 150–450)
PMV BLD AUTO: 8.5 FL (ref 6–12)
RBC # BLD AUTO: 5.81 M/UL (ref 4–5.2)
WBC OTHER # BLD: 16.7 K/UL (ref 3.5–11)

## 2024-05-14 PROCEDURE — 82947 ASSAY GLUCOSE BLOOD QUANT: CPT

## 2024-05-14 PROCEDURE — 85027 COMPLETE CBC AUTOMATED: CPT

## 2024-05-14 PROCEDURE — 6370000000 HC RX 637 (ALT 250 FOR IP): Performed by: NURSE PRACTITIONER

## 2024-05-14 PROCEDURE — 94761 N-INVAS EAR/PLS OXIMETRY MLT: CPT

## 2024-05-14 PROCEDURE — 99233 SBSQ HOSP IP/OBS HIGH 50: CPT | Performed by: NURSE PRACTITIONER

## 2024-05-14 PROCEDURE — 93010 ELECTROCARDIOGRAM REPORT: CPT | Performed by: INTERNAL MEDICINE

## 2024-05-14 PROCEDURE — 6360000002 HC RX W HCPCS: Performed by: INTERNAL MEDICINE

## 2024-05-14 PROCEDURE — 6360000002 HC RX W HCPCS: Performed by: NURSE PRACTITIONER

## 2024-05-14 PROCEDURE — 94640 AIRWAY INHALATION TREATMENT: CPT

## 2024-05-14 PROCEDURE — 6370000000 HC RX 637 (ALT 250 FOR IP): Performed by: INTERNAL MEDICINE

## 2024-05-14 PROCEDURE — 6370000000 HC RX 637 (ALT 250 FOR IP): Performed by: SURGERY

## 2024-05-14 PROCEDURE — 99232 SBSQ HOSP IP/OBS MODERATE 35: CPT | Performed by: INTERNAL MEDICINE

## 2024-05-14 PROCEDURE — 2580000003 HC RX 258: Performed by: INTERNAL MEDICINE

## 2024-05-14 PROCEDURE — 2060000000 HC ICU INTERMEDIATE R&B

## 2024-05-14 PROCEDURE — 36415 COLL VENOUS BLD VENIPUNCTURE: CPT

## 2024-05-14 RX ORDER — SALIVA STIMULANT COMB. NO.3
SPRAY, NON-AEROSOL (ML) MUCOUS MEMBRANE PRN
Status: CANCELLED | OUTPATIENT
Start: 2024-05-14

## 2024-05-14 RX ORDER — POLYETHYLENE GLYCOL 3350 17 G/17G
17 POWDER, FOR SOLUTION ORAL DAILY PRN
Status: CANCELLED | OUTPATIENT
Start: 2024-05-14

## 2024-05-14 RX ORDER — INSULIN GLARGINE 100 [IU]/ML
35 INJECTION, SOLUTION SUBCUTANEOUS 2 TIMES DAILY
Status: DISCONTINUED | OUTPATIENT
Start: 2024-05-14 | End: 2024-05-15

## 2024-05-14 RX ORDER — INSULIN LISPRO 100 [IU]/ML
0-4 INJECTION, SOLUTION INTRAVENOUS; SUBCUTANEOUS NIGHTLY
Status: CANCELLED | OUTPATIENT
Start: 2024-05-14

## 2024-05-14 RX ORDER — CARVEDILOL 12.5 MG/1
12.5 TABLET ORAL 2 TIMES DAILY WITH MEALS
Status: CANCELLED | OUTPATIENT
Start: 2024-05-14

## 2024-05-14 RX ORDER — DEXTROSE MONOHYDRATE 100 MG/ML
INJECTION, SOLUTION INTRAVENOUS CONTINUOUS PRN
Status: CANCELLED | OUTPATIENT
Start: 2024-05-14

## 2024-05-14 RX ORDER — LIDOCAINE 4 G/G
1 PATCH TOPICAL DAILY
Status: CANCELLED | OUTPATIENT
Start: 2024-05-15

## 2024-05-14 RX ORDER — SPIRONOLACTONE 25 MG/1
25 TABLET ORAL DAILY
Status: CANCELLED | OUTPATIENT
Start: 2024-05-15

## 2024-05-14 RX ORDER — INSULIN GLARGINE 100 [IU]/ML
35 INJECTION, SOLUTION SUBCUTANEOUS 2 TIMES DAILY
Status: CANCELLED | OUTPATIENT
Start: 2024-05-14

## 2024-05-14 RX ORDER — POTASSIUM CHLORIDE 20 MEQ/1
20 TABLET, EXTENDED RELEASE ORAL 2 TIMES DAILY WITH MEALS
Status: CANCELLED | OUTPATIENT
Start: 2024-05-14

## 2024-05-14 RX ORDER — ALBUTEROL SULFATE 2.5 MG/3ML
2.5 SOLUTION RESPIRATORY (INHALATION)
Status: CANCELLED | OUTPATIENT
Start: 2024-05-14

## 2024-05-14 RX ORDER — ACETAMINOPHEN 650 MG/1
650 SUPPOSITORY RECTAL EVERY 6 HOURS PRN
Status: CANCELLED | OUTPATIENT
Start: 2024-05-14

## 2024-05-14 RX ORDER — ATORVASTATIN CALCIUM 40 MG/1
40 TABLET, FILM COATED ORAL DAILY
Status: CANCELLED | OUTPATIENT
Start: 2024-05-15

## 2024-05-14 RX ORDER — POTASSIUM CHLORIDE 20 MEQ/1
40 TABLET, EXTENDED RELEASE ORAL PRN
Status: CANCELLED | OUTPATIENT
Start: 2024-05-14

## 2024-05-14 RX ORDER — FUROSEMIDE 10 MG/ML
40 INJECTION INTRAMUSCULAR; INTRAVENOUS 2 TIMES DAILY
OUTPATIENT
Start: 2024-05-14 | End: 2024-05-15

## 2024-05-14 RX ORDER — ALBUTEROL SULFATE 2.5 MG/3ML
15 SOLUTION RESPIRATORY (INHALATION)
Status: CANCELLED | OUTPATIENT
Start: 2024-05-14

## 2024-05-14 RX ORDER — FUROSEMIDE 40 MG/1
40 TABLET ORAL DAILY
Status: DISCONTINUED | OUTPATIENT
Start: 2024-05-15 | End: 2024-05-15 | Stop reason: HOSPADM

## 2024-05-14 RX ORDER — MAGNESIUM SULFATE HEPTAHYDRATE 40 MG/ML
2000 INJECTION, SOLUTION INTRAVENOUS PRN
Status: CANCELLED | OUTPATIENT
Start: 2024-05-14

## 2024-05-14 RX ORDER — SODIUM CHLORIDE 9 MG/ML
INJECTION, SOLUTION INTRAVENOUS PRN
Status: CANCELLED | OUTPATIENT
Start: 2024-05-14

## 2024-05-14 RX ORDER — POTASSIUM CHLORIDE 20 MEQ/1
40 TABLET, EXTENDED RELEASE ORAL ONCE
Status: COMPLETED | OUTPATIENT
Start: 2024-05-14 | End: 2024-05-14

## 2024-05-14 RX ORDER — IPRATROPIUM BROMIDE AND ALBUTEROL SULFATE 2.5; .5 MG/3ML; MG/3ML
1 SOLUTION RESPIRATORY (INHALATION)
Status: CANCELLED | OUTPATIENT
Start: 2024-05-14

## 2024-05-14 RX ORDER — POTASSIUM CHLORIDE 7.45 MG/ML
10 INJECTION INTRAVENOUS PRN
Status: CANCELLED | OUTPATIENT
Start: 2024-05-14

## 2024-05-14 RX ORDER — FUROSEMIDE 40 MG/1
40 TABLET ORAL DAILY
Status: CANCELLED | OUTPATIENT
Start: 2024-05-15

## 2024-05-14 RX ORDER — INSULIN LISPRO 100 [IU]/ML
6 INJECTION, SOLUTION INTRAVENOUS; SUBCUTANEOUS ONCE
Status: COMPLETED | OUTPATIENT
Start: 2024-05-14 | End: 2024-05-14

## 2024-05-14 RX ORDER — FAMOTIDINE 20 MG/1
20 TABLET, FILM COATED ORAL DAILY
Status: CANCELLED | OUTPATIENT
Start: 2024-05-15

## 2024-05-14 RX ORDER — SODIUM CHLORIDE 0.9 % (FLUSH) 0.9 %
5-40 SYRINGE (ML) INJECTION EVERY 12 HOURS SCHEDULED
Status: CANCELLED | OUTPATIENT
Start: 2024-05-14

## 2024-05-14 RX ORDER — ASPIRIN 81 MG/1
81 TABLET ORAL DAILY
Status: CANCELLED | OUTPATIENT
Start: 2024-05-15

## 2024-05-14 RX ORDER — ACETAMINOPHEN 325 MG/1
650 TABLET ORAL EVERY 6 HOURS PRN
Status: CANCELLED | OUTPATIENT
Start: 2024-05-14

## 2024-05-14 RX ORDER — SODIUM CHLORIDE 0.9 % (FLUSH) 0.9 %
10 SYRINGE (ML) INJECTION PRN
Status: CANCELLED | OUTPATIENT
Start: 2024-05-14

## 2024-05-14 RX ORDER — ENOXAPARIN SODIUM 150 MG/ML
1 INJECTION SUBCUTANEOUS 2 TIMES DAILY
Status: CANCELLED | OUTPATIENT
Start: 2024-05-14

## 2024-05-14 RX ORDER — INSULIN LISPRO 100 [IU]/ML
5 INJECTION, SOLUTION INTRAVENOUS; SUBCUTANEOUS
Status: CANCELLED | OUTPATIENT
Start: 2024-05-14

## 2024-05-14 RX ORDER — INSULIN LISPRO 100 [IU]/ML
0-8 INJECTION, SOLUTION INTRAVENOUS; SUBCUTANEOUS
Status: CANCELLED | OUTPATIENT
Start: 2024-05-14

## 2024-05-14 RX ORDER — SODIUM CHLORIDE 0.9 % (FLUSH) 0.9 %
5-40 SYRINGE (ML) INJECTION PRN
Status: CANCELLED | OUTPATIENT
Start: 2024-05-14

## 2024-05-14 RX ADMIN — FUROSEMIDE 40 MG: 10 INJECTION, SOLUTION INTRAMUSCULAR; INTRAVENOUS at 17:47

## 2024-05-14 RX ADMIN — POTASSIUM CHLORIDE 20 MEQ: 1500 TABLET, EXTENDED RELEASE ORAL at 17:47

## 2024-05-14 RX ADMIN — SPIRONOLACTONE 25 MG: 25 TABLET ORAL at 09:36

## 2024-05-14 RX ADMIN — IPRATROPIUM BROMIDE AND ALBUTEROL SULFATE 1 DOSE: .5; 2.5 SOLUTION RESPIRATORY (INHALATION) at 15:31

## 2024-05-14 RX ADMIN — NYSTATIN 500000 UNITS: 100000 SUSPENSION ORAL at 17:47

## 2024-05-14 RX ADMIN — PREDNISONE 50 MG: 20 TABLET ORAL at 09:36

## 2024-05-14 RX ADMIN — INSULIN LISPRO 4 UNITS: 100 INJECTION, SOLUTION INTRAVENOUS; SUBCUTANEOUS at 21:27

## 2024-05-14 RX ADMIN — SODIUM CHLORIDE, PRESERVATIVE FREE 10 ML: 5 INJECTION INTRAVENOUS at 10:43

## 2024-05-14 RX ADMIN — CEFTRIAXONE SODIUM 2000 MG: 2 INJECTION, POWDER, FOR SOLUTION INTRAMUSCULAR; INTRAVENOUS at 20:50

## 2024-05-14 RX ADMIN — NYSTATIN 500000 UNITS: 100000 SUSPENSION ORAL at 09:36

## 2024-05-14 RX ADMIN — INSULIN LISPRO 5 UNITS: 100 INJECTION, SOLUTION INTRAVENOUS; SUBCUTANEOUS at 09:49

## 2024-05-14 RX ADMIN — POTASSIUM CHLORIDE 40 MEQ: 1500 TABLET, EXTENDED RELEASE ORAL at 09:44

## 2024-05-14 RX ADMIN — INSULIN GLARGINE 35 UNITS: 100 INJECTION, SOLUTION SUBCUTANEOUS at 20:53

## 2024-05-14 RX ADMIN — INSULIN LISPRO 2 UNITS: 100 INJECTION, SOLUTION INTRAVENOUS; SUBCUTANEOUS at 09:44

## 2024-05-14 RX ADMIN — NYSTATIN 500000 UNITS: 100000 SUSPENSION ORAL at 20:53

## 2024-05-14 RX ADMIN — NYSTATIN 500000 UNITS: 100000 SUSPENSION ORAL at 12:35

## 2024-05-14 RX ADMIN — IPRATROPIUM BROMIDE AND ALBUTEROL SULFATE 1 DOSE: .5; 2.5 SOLUTION RESPIRATORY (INHALATION) at 11:36

## 2024-05-14 RX ADMIN — CARVEDILOL 12.5 MG: 12.5 TABLET, FILM COATED ORAL at 09:35

## 2024-05-14 RX ADMIN — SACUBITRIL AND VALSARTAN 1 TABLET: 49; 51 TABLET, FILM COATED ORAL at 20:55

## 2024-05-14 RX ADMIN — ASPIRIN 81 MG: 81 TABLET, COATED ORAL at 09:36

## 2024-05-14 RX ADMIN — FUROSEMIDE 40 MG: 10 INJECTION, SOLUTION INTRAMUSCULAR; INTRAVENOUS at 09:36

## 2024-05-14 RX ADMIN — IPRATROPIUM BROMIDE AND ALBUTEROL SULFATE 1 DOSE: .5; 2.5 SOLUTION RESPIRATORY (INHALATION) at 08:06

## 2024-05-14 RX ADMIN — INSULIN LISPRO 2 UNITS: 100 INJECTION, SOLUTION INTRAVENOUS; SUBCUTANEOUS at 17:47

## 2024-05-14 RX ADMIN — INSULIN LISPRO 6 UNITS: 100 INJECTION, SOLUTION INTRAVENOUS; SUBCUTANEOUS at 21:27

## 2024-05-14 RX ADMIN — SODIUM CHLORIDE, PRESERVATIVE FREE 10 ML: 5 INJECTION INTRAVENOUS at 20:57

## 2024-05-14 RX ADMIN — CARVEDILOL 12.5 MG: 12.5 TABLET, FILM COATED ORAL at 17:47

## 2024-05-14 RX ADMIN — FAMOTIDINE 20 MG: 20 TABLET, FILM COATED ORAL at 09:36

## 2024-05-14 RX ADMIN — SACUBITRIL AND VALSARTAN 1 TABLET: 49; 51 TABLET, FILM COATED ORAL at 09:38

## 2024-05-14 RX ADMIN — POTASSIUM CHLORIDE 20 MEQ: 1500 TABLET, EXTENDED RELEASE ORAL at 09:36

## 2024-05-14 RX ADMIN — ENOXAPARIN SODIUM 135 MG: 150 INJECTION SUBCUTANEOUS at 20:56

## 2024-05-14 RX ADMIN — INSULIN LISPRO 5 UNITS: 100 INJECTION, SOLUTION INTRAVENOUS; SUBCUTANEOUS at 17:48

## 2024-05-14 RX ADMIN — ATORVASTATIN CALCIUM 40 MG: 40 TABLET, FILM COATED ORAL at 09:35

## 2024-05-14 NOTE — FLOWSHEET NOTE
05/13/24 2051   Treatment Team Notification   Reason for Communication Review case   Name of Team Member Notified VERNA Hooks NP   Treatment Team Role Advanced Practice Nurse   Method of Communication Secure Message   Response Waiting for response   Notification Time 2051     Pt's blood sugar 427. VERNA Hooks NP notified. See new orders for additional 4 units humalog

## 2024-05-14 NOTE — PLAN OF CARE
Problem: Discharge Planning  Goal: Discharge to home or other facility with appropriate resources  Outcome: Progressing     Problem: Pain  Goal: Verbalizes/displays adequate comfort level or baseline comfort level  Outcome: Progressing     Problem: Respiratory - Adult  Goal: Achieves optimal ventilation and oxygenation  Outcome: Progressing     Problem: Infection - Adult  Goal: Absence of infection at discharge  Outcome: Progressing      Pt scheduled to establish with you on 6/1. Requesting cialis, pended refill if okay

## 2024-05-14 NOTE — DISCHARGE SUMMARY
[facility]   IN-PATIENT SERVICE   [facility]    Discharge Summary     Patient ID: Kari Reddy  :  1970   MRN: [id number]     ACCOUNT:  [id number]   Patient's PCP: Louis Rivera DO  Admit Date: 5/10/2024   Discharge Date: 2024     Length of Stay: 4  Code Status:  DNR-CCA  Admitting Physician: Naresh Chery MD  Discharge Physician: Chalo Marquez MD     Active Discharge Diagnoses:     Primary Problem  Acute hypoxemic respiratory failure (HCC)      Hospital Problems  HFrEF, transthoracic echocardiogram shows EF of 25-30%.  Troponin elevation noted on admission.  Being sent to [facility] for cardiac catheterization.  acute hypoxemic respiratory insufficiency secondary to COPD (no official diagnosis on file, has never had PFTs), possible ACE and obesity hypoventilation syndrome + combined systolic and diastolic heart failure now improved.  On room air  NSTEMI, for heart cath today  Oral thrush, improved.  On nystatin  Essential hypertension, now better controlled  Diabetes mellitus type 2, blood sugars elevated.  Continue to adjust insulin     Admission Condition:  fair     Discharged Condition: fair    Hospital Stay:     Hospital Course:  Kari Reddy is a 53 y.o. female who was admitted for the management of   Acute hypoxemic respiratory failure (HCC) , presented to ER with Respiratory Distress    53-year-old female presented to the hospital with concerns of shortness of breath.  Past medical history significant for CAD, combined congestive heart failure with last EF of 45%, diabetes mellitus type 2, hypertension hyperlipidemia migraines, morbid obesity presented to the hospital for shortness of breath.  Troponins were noted to be elevated, clinical exam and lab testing consistent with CHF exacerbation.  On auscultation there were concerns for COPD exacerbation although the patient does not have official PFTs on file.  The patient was placed on steroids,

## 2024-05-14 NOTE — PLAN OF CARE
Problem: Pain  Goal: Verbalizes/displays adequate comfort level or baseline comfort level  Outcome: Progressing     Problem: Respiratory - Adult  Goal: Achieves optimal ventilation and oxygenation  Outcome: Progressing     Problem: Cardiovascular - Adult  Goal: Maintains optimal cardiac output and hemodynamic stability  Outcome: Progressing     Problem: Infection - Adult  Goal: Absence of infection at discharge  Outcome: Progressing

## 2024-05-14 NOTE — FLOWSHEET NOTE
05/14/24 0559   Treatment Team Notification   Reason for Communication Review case   Name of Team Member Notified Dr. NATHANIEL Fu   Treatment Team Role Consulting Provider   Method of Communication Secure Message   Response Waiting for response   Notification Time 0559     Pt had 12 beat run of Vtach. Pt is asymptomatic and resting in bed. Dr. LITA Fu notified. No new orders received at this time.

## 2024-05-14 NOTE — CARE COORDINATION
ONGOING DISCHARGE PLAN:    Patient is alert and oriented x4.    Spoke with patient regarding discharge plan and patient confirms that plan is still to discharge to home with no needs    Patient will have a heart cath done today     Will need a Holter monitor at discharge       Will continue to follow for additional discharge needs.    If patient is discharged prior to next notation, then this note serves as note for discharge by case management.    Electronically signed by Prisca Garcia RN on 5/14/2024 at 11:46 AM

## 2024-05-15 ENCOUNTER — HOSPITAL ENCOUNTER (OUTPATIENT)
Age: 54
Setting detail: OUTPATIENT SURGERY
Discharge: ANOTHER HOSPITAL | End: 2024-05-15
Attending: INTERNAL MEDICINE | Admitting: INTERNAL MEDICINE
Payer: COMMERCIAL

## 2024-05-15 ENCOUNTER — HOSPITAL ENCOUNTER (INPATIENT)
Age: 54
LOS: 1 days | Discharge: HOME | DRG: 313 | End: 2024-05-16
Attending: FAMILY MEDICINE | Admitting: INTERNAL MEDICINE
Payer: COMMERCIAL

## 2024-05-15 VITALS
HEART RATE: 81 BPM | RESPIRATION RATE: 16 BRPM | WEIGHT: 293 LBS | DIASTOLIC BLOOD PRESSURE: 86 MMHG | SYSTOLIC BLOOD PRESSURE: 130 MMHG | BODY MASS INDEX: 45.99 KG/M2 | OXYGEN SATURATION: 99 % | HEIGHT: 67 IN | TEMPERATURE: 97.4 F

## 2024-05-15 VITALS
RESPIRATION RATE: 16 BRPM | DIASTOLIC BLOOD PRESSURE: 85 MMHG | HEART RATE: 86 BPM | SYSTOLIC BLOOD PRESSURE: 136 MMHG | OXYGEN SATURATION: 98 % | TEMPERATURE: 98.1 F

## 2024-05-15 DIAGNOSIS — I21.4 NSTEMI (NON-ST ELEVATED MYOCARDIAL INFARCTION) (HCC): ICD-10-CM

## 2024-05-15 DIAGNOSIS — E11.65 TYPE 2 DIABETES MELLITUS WITH HYPERGLYCEMIA, WITHOUT LONG-TERM CURRENT USE OF INSULIN (HCC): ICD-10-CM

## 2024-05-15 DIAGNOSIS — I50.23 ACUTE ON CHRONIC SYSTOLIC HEART FAILURE (HCC): ICD-10-CM

## 2024-05-15 DIAGNOSIS — M54.16 LUMBAR RADICULOPATHY, CHRONIC: ICD-10-CM

## 2024-05-15 DIAGNOSIS — R06.03 RESPIRATORY DISTRESS: ICD-10-CM

## 2024-05-15 DIAGNOSIS — I48.92 ATRIAL FLUTTER, UNSPECIFIED TYPE (HCC): ICD-10-CM

## 2024-05-15 PROBLEM — I50.40 COMBINED SYSTOLIC AND DIASTOLIC CONGESTIVE HEART FAILURE (HCC): Status: ACTIVE | Noted: 2020-01-31

## 2024-05-15 PROBLEM — R07.9 CHEST PAIN: Status: ACTIVE | Noted: 2024-05-15

## 2024-05-15 LAB
ANION GAP SERPL CALCULATED.3IONS-SCNC: 12 MMOL/L (ref 9–17)
BUN SERPL-MCNC: 23 MG/DL (ref 6–20)
CALCIUM SERPL-MCNC: 9.5 MG/DL (ref 8.6–10.4)
CHLORIDE SERPL-SCNC: 101 MMOL/L (ref 98–107)
CO2 SERPL-SCNC: 25 MMOL/L (ref 20–31)
CREAT SERPL-MCNC: 0.9 MG/DL (ref 0.5–0.9)
GFR, ESTIMATED: 76 ML/MIN/1.73M2
GLUCOSE BLD-MCNC: 274 MG/DL (ref 65–105)
GLUCOSE BLD-MCNC: 325 MG/DL (ref 65–105)
GLUCOSE BLD-MCNC: 388 MG/DL (ref 65–105)
GLUCOSE BLD-MCNC: 390 MG/DL (ref 65–105)
GLUCOSE SERPL-MCNC: 302 MG/DL (ref 70–99)
POTASSIUM SERPL-SCNC: 4.4 MMOL/L (ref 3.7–5.3)
SODIUM SERPL-SCNC: 138 MMOL/L (ref 135–144)

## 2024-05-15 PROCEDURE — 6360000002 HC RX W HCPCS: Performed by: INTERNAL MEDICINE

## 2024-05-15 PROCEDURE — 6370000000 HC RX 637 (ALT 250 FOR IP): Performed by: NURSE PRACTITIONER

## 2024-05-15 PROCEDURE — 36415 COLL VENOUS BLD VENIPUNCTURE: CPT

## 2024-05-15 PROCEDURE — 94664 DEMO&/EVAL PT USE INHALER: CPT

## 2024-05-15 PROCEDURE — B2111ZZ FLUOROSCOPY OF MULTIPLE CORONARY ARTERIES USING LOW OSMOLAR CONTRAST: ICD-10-PCS | Performed by: INTERNAL MEDICINE

## 2024-05-15 PROCEDURE — 2580000003 HC RX 258: Performed by: INTERNAL MEDICINE

## 2024-05-15 PROCEDURE — 6370000000 HC RX 637 (ALT 250 FOR IP): Performed by: INTERNAL MEDICINE

## 2024-05-15 PROCEDURE — 94761 N-INVAS EAR/PLS OXIMETRY MLT: CPT

## 2024-05-15 PROCEDURE — 94640 AIRWAY INHALATION TREATMENT: CPT

## 2024-05-15 PROCEDURE — 99239 HOSP IP/OBS DSCHRG MGMT >30: CPT | Performed by: INTERNAL MEDICINE

## 2024-05-15 PROCEDURE — 99233 SBSQ HOSP IP/OBS HIGH 50: CPT | Performed by: NURSE PRACTITIONER

## 2024-05-15 PROCEDURE — 93458 L HRT ARTERY/VENTRICLE ANGIO: CPT | Performed by: INTERNAL MEDICINE

## 2024-05-15 PROCEDURE — 82947 ASSAY GLUCOSE BLOOD QUANT: CPT

## 2024-05-15 PROCEDURE — 6360000002 HC RX W HCPCS: Performed by: NURSE PRACTITIONER

## 2024-05-15 PROCEDURE — 2709999900 HC NON-CHARGEABLE SUPPLY: Performed by: INTERNAL MEDICINE

## 2024-05-15 PROCEDURE — 93454 CORONARY ARTERY ANGIO S&I: CPT | Performed by: INTERNAL MEDICINE

## 2024-05-15 PROCEDURE — 2060000000 HC ICU INTERMEDIATE R&B

## 2024-05-15 PROCEDURE — 6360000004 HC RX CONTRAST MEDICATION: Performed by: INTERNAL MEDICINE

## 2024-05-15 PROCEDURE — 2500000003 HC RX 250 WO HCPCS: Performed by: INTERNAL MEDICINE

## 2024-05-15 PROCEDURE — C1894 INTRO/SHEATH, NON-LASER: HCPCS | Performed by: INTERNAL MEDICINE

## 2024-05-15 PROCEDURE — C1769 GUIDE WIRE: HCPCS | Performed by: INTERNAL MEDICINE

## 2024-05-15 PROCEDURE — 80048 BASIC METABOLIC PNL TOTAL CA: CPT

## 2024-05-15 PROCEDURE — 7100000010 HC PHASE II RECOVERY - FIRST 15 MIN: Performed by: INTERNAL MEDICINE

## 2024-05-15 PROCEDURE — 6370000000 HC RX 637 (ALT 250 FOR IP): Performed by: SURGERY

## 2024-05-15 RX ORDER — INSULIN GLARGINE 100 [IU]/ML
40 INJECTION, SOLUTION SUBCUTANEOUS 2 TIMES DAILY
Status: DISCONTINUED | OUTPATIENT
Start: 2024-05-15 | End: 2024-05-15 | Stop reason: HOSPADM

## 2024-05-15 RX ORDER — ALBUTEROL SULFATE 2.5 MG/3ML
2.5 SOLUTION RESPIRATORY (INHALATION)
Status: DISCONTINUED | OUTPATIENT
Start: 2024-05-15 | End: 2024-05-16 | Stop reason: HOSPADM

## 2024-05-15 RX ORDER — ASPIRIN 81 MG/1
81 TABLET ORAL DAILY
Status: DISCONTINUED | OUTPATIENT
Start: 2024-05-16 | End: 2024-05-16 | Stop reason: HOSPADM

## 2024-05-15 RX ORDER — LIDOCAINE 4 G/G
1 PATCH TOPICAL DAILY
Status: DISCONTINUED | OUTPATIENT
Start: 2024-05-16 | End: 2024-05-16 | Stop reason: HOSPADM

## 2024-05-15 RX ORDER — ATORVASTATIN CALCIUM 40 MG/1
40 TABLET, FILM COATED ORAL DAILY
Status: DISCONTINUED | OUTPATIENT
Start: 2024-05-16 | End: 2024-05-16 | Stop reason: HOSPADM

## 2024-05-15 RX ORDER — INSULIN GLARGINE 100 [IU]/ML
40 INJECTION, SOLUTION SUBCUTANEOUS 2 TIMES DAILY
Status: DISCONTINUED | OUTPATIENT
Start: 2024-05-15 | End: 2024-05-15

## 2024-05-15 RX ORDER — POTASSIUM CHLORIDE 20 MEQ/1
40 TABLET, EXTENDED RELEASE ORAL PRN
Status: DISCONTINUED | OUTPATIENT
Start: 2024-05-15 | End: 2024-05-16 | Stop reason: HOSPADM

## 2024-05-15 RX ORDER — POTASSIUM CHLORIDE 20 MEQ/1
20 TABLET, EXTENDED RELEASE ORAL 2 TIMES DAILY WITH MEALS
Status: DISCONTINUED | OUTPATIENT
Start: 2024-05-15 | End: 2024-05-16 | Stop reason: HOSPADM

## 2024-05-15 RX ORDER — INSULIN GLARGINE 100 [IU]/ML
45 INJECTION, SOLUTION SUBCUTANEOUS 2 TIMES DAILY
Status: DISCONTINUED | OUTPATIENT
Start: 2024-05-15 | End: 2024-05-16 | Stop reason: HOSPADM

## 2024-05-15 RX ORDER — INSULIN LISPRO 100 [IU]/ML
0-4 INJECTION, SOLUTION INTRAVENOUS; SUBCUTANEOUS NIGHTLY
Status: DISCONTINUED | OUTPATIENT
Start: 2024-05-15 | End: 2024-05-16 | Stop reason: HOSPADM

## 2024-05-15 RX ORDER — SODIUM CHLORIDE 9 MG/ML
INJECTION, SOLUTION INTRAVENOUS CONTINUOUS
Status: DISCONTINUED | OUTPATIENT
Start: 2024-05-15 | End: 2024-05-15 | Stop reason: HOSPADM

## 2024-05-15 RX ORDER — LIDOCAINE HYDROCHLORIDE 10 MG/ML
INJECTION, SOLUTION INFILTRATION; PERINEURAL PRN
Status: DISCONTINUED | OUTPATIENT
Start: 2024-05-15 | End: 2024-05-15 | Stop reason: HOSPADM

## 2024-05-15 RX ORDER — SPIRONOLACTONE 25 MG/1
25 TABLET ORAL DAILY
Status: DISCONTINUED | OUTPATIENT
Start: 2024-05-16 | End: 2024-05-16 | Stop reason: HOSPADM

## 2024-05-15 RX ORDER — CARVEDILOL 12.5 MG/1
12.5 TABLET ORAL 2 TIMES DAILY WITH MEALS
Status: DISCONTINUED | OUTPATIENT
Start: 2024-05-15 | End: 2024-05-16 | Stop reason: HOSPADM

## 2024-05-15 RX ORDER — MAGNESIUM SULFATE HEPTAHYDRATE 40 MG/ML
2000 INJECTION, SOLUTION INTRAVENOUS PRN
Status: DISCONTINUED | OUTPATIENT
Start: 2024-05-15 | End: 2024-05-16 | Stop reason: HOSPADM

## 2024-05-15 RX ORDER — INSULIN LISPRO 100 [IU]/ML
5 INJECTION, SOLUTION INTRAVENOUS; SUBCUTANEOUS
Status: DISCONTINUED | OUTPATIENT
Start: 2024-05-15 | End: 2024-05-16 | Stop reason: HOSPADM

## 2024-05-15 RX ORDER — MIDAZOLAM HYDROCHLORIDE 1 MG/ML
INJECTION INTRAMUSCULAR; INTRAVENOUS PRN
Status: DISCONTINUED | OUTPATIENT
Start: 2024-05-15 | End: 2024-05-15 | Stop reason: HOSPADM

## 2024-05-15 RX ORDER — ENOXAPARIN SODIUM 150 MG/ML
1 INJECTION SUBCUTANEOUS 2 TIMES DAILY
Status: DISCONTINUED | OUTPATIENT
Start: 2024-05-15 | End: 2024-05-15

## 2024-05-15 RX ORDER — FAMOTIDINE 20 MG/1
20 TABLET, FILM COATED ORAL DAILY
Status: DISCONTINUED | OUTPATIENT
Start: 2024-05-16 | End: 2024-05-16 | Stop reason: HOSPADM

## 2024-05-15 RX ORDER — SALIVA STIMULANT COMB. NO.3
SPRAY, NON-AEROSOL (ML) MUCOUS MEMBRANE PRN
Status: DISCONTINUED | OUTPATIENT
Start: 2024-05-15 | End: 2024-05-16 | Stop reason: HOSPADM

## 2024-05-15 RX ORDER — SODIUM CHLORIDE 0.9 % (FLUSH) 0.9 %
10 SYRINGE (ML) INJECTION PRN
Status: DISCONTINUED | OUTPATIENT
Start: 2024-05-15 | End: 2024-05-16 | Stop reason: HOSPADM

## 2024-05-15 RX ORDER — SODIUM CHLORIDE 0.9 % (FLUSH) 0.9 %
5-40 SYRINGE (ML) INJECTION PRN
Status: DISCONTINUED | OUTPATIENT
Start: 2024-05-15 | End: 2024-05-16 | Stop reason: HOSPADM

## 2024-05-15 RX ORDER — POTASSIUM CHLORIDE 7.45 MG/ML
10 INJECTION INTRAVENOUS PRN
Status: DISCONTINUED | OUTPATIENT
Start: 2024-05-15 | End: 2024-05-16 | Stop reason: HOSPADM

## 2024-05-15 RX ORDER — HEPARIN SODIUM 1000 [USP'U]/ML
INJECTION, SOLUTION INTRAVENOUS; SUBCUTANEOUS PRN
Status: DISCONTINUED | OUTPATIENT
Start: 2024-05-15 | End: 2024-05-15 | Stop reason: HOSPADM

## 2024-05-15 RX ORDER — DEXTROSE MONOHYDRATE 100 MG/ML
INJECTION, SOLUTION INTRAVENOUS CONTINUOUS PRN
Status: DISCONTINUED | OUTPATIENT
Start: 2024-05-15 | End: 2024-05-16 | Stop reason: HOSPADM

## 2024-05-15 RX ORDER — MAGNESIUM SULFATE 1 G/100ML
1000 INJECTION INTRAVENOUS ONCE
Status: DISCONTINUED | OUTPATIENT
Start: 2024-05-15 | End: 2024-05-15 | Stop reason: HOSPADM

## 2024-05-15 RX ORDER — IPRATROPIUM BROMIDE AND ALBUTEROL SULFATE 2.5; .5 MG/3ML; MG/3ML
1 SOLUTION RESPIRATORY (INHALATION)
Status: DISCONTINUED | OUTPATIENT
Start: 2024-05-15 | End: 2024-05-16 | Stop reason: HOSPADM

## 2024-05-15 RX ORDER — SODIUM CHLORIDE 0.9 % (FLUSH) 0.9 %
5-40 SYRINGE (ML) INJECTION EVERY 12 HOURS SCHEDULED
Status: DISCONTINUED | OUTPATIENT
Start: 2024-05-15 | End: 2024-05-16 | Stop reason: HOSPADM

## 2024-05-15 RX ORDER — INSULIN LISPRO 100 [IU]/ML
0-8 INJECTION, SOLUTION INTRAVENOUS; SUBCUTANEOUS
Status: DISCONTINUED | OUTPATIENT
Start: 2024-05-15 | End: 2024-05-16 | Stop reason: HOSPADM

## 2024-05-15 RX ORDER — SODIUM CHLORIDE 9 MG/ML
INJECTION, SOLUTION INTRAVENOUS PRN
Status: DISCONTINUED | OUTPATIENT
Start: 2024-05-15 | End: 2024-05-16 | Stop reason: HOSPADM

## 2024-05-15 RX ORDER — INSULIN GLARGINE 100 [IU]/ML
40 INJECTION, SOLUTION SUBCUTANEOUS 2 TIMES DAILY
Status: CANCELLED | OUTPATIENT
Start: 2024-05-15

## 2024-05-15 RX ORDER — POLYETHYLENE GLYCOL 3350 17 G/17G
17 POWDER, FOR SOLUTION ORAL DAILY PRN
Status: DISCONTINUED | OUTPATIENT
Start: 2024-05-15 | End: 2024-05-16 | Stop reason: HOSPADM

## 2024-05-15 RX ORDER — ACETAMINOPHEN 325 MG/1
650 TABLET ORAL EVERY 6 HOURS PRN
Status: DISCONTINUED | OUTPATIENT
Start: 2024-05-15 | End: 2024-05-16 | Stop reason: HOSPADM

## 2024-05-15 RX ORDER — FUROSEMIDE 40 MG/1
40 TABLET ORAL DAILY
Status: DISCONTINUED | OUTPATIENT
Start: 2024-05-16 | End: 2024-05-16 | Stop reason: HOSPADM

## 2024-05-15 RX ORDER — ALBUTEROL SULFATE 2.5 MG/3ML
15 SOLUTION RESPIRATORY (INHALATION)
Status: DISCONTINUED | OUTPATIENT
Start: 2024-05-15 | End: 2024-05-16 | Stop reason: HOSPADM

## 2024-05-15 RX ORDER — ACETAMINOPHEN 650 MG/1
650 SUPPOSITORY RECTAL EVERY 6 HOURS PRN
Status: DISCONTINUED | OUTPATIENT
Start: 2024-05-15 | End: 2024-05-16 | Stop reason: HOSPADM

## 2024-05-15 RX ADMIN — IPRATROPIUM BROMIDE AND ALBUTEROL SULFATE 1 DOSE: .5; 2.5 SOLUTION RESPIRATORY (INHALATION) at 07:15

## 2024-05-15 RX ADMIN — POTASSIUM CHLORIDE 20 MEQ: 1500 TABLET, EXTENDED RELEASE ORAL at 08:26

## 2024-05-15 RX ADMIN — MAGNESIUM SULFATE HEPTAHYDRATE 1000 MG: 1 INJECTION, SOLUTION INTRAVENOUS at 11:51

## 2024-05-15 RX ADMIN — SACUBITRIL AND VALSARTAN 1 TABLET: 49; 51 TABLET, FILM COATED ORAL at 08:30

## 2024-05-15 RX ADMIN — NYSTATIN 500000 UNITS: 100000 SUSPENSION ORAL at 08:26

## 2024-05-15 RX ADMIN — CARVEDILOL 12.5 MG: 12.5 TABLET, FILM COATED ORAL at 08:26

## 2024-05-15 RX ADMIN — FUROSEMIDE 40 MG: 40 TABLET ORAL at 08:26

## 2024-05-15 RX ADMIN — SODIUM CHLORIDE, PRESERVATIVE FREE 10 ML: 5 INJECTION INTRAVENOUS at 21:59

## 2024-05-15 RX ADMIN — SACUBITRIL AND VALSARTAN 1 TABLET: 49; 51 TABLET, FILM COATED ORAL at 21:57

## 2024-05-15 RX ADMIN — IPRATROPIUM BROMIDE AND ALBUTEROL SULFATE 1 DOSE: 2.5; .5 SOLUTION RESPIRATORY (INHALATION) at 20:58

## 2024-05-15 RX ADMIN — SODIUM CHLORIDE: 0.9 INJECTION, SOLUTION INTRAVENOUS at 13:04

## 2024-05-15 RX ADMIN — NYSTATIN 500000 UNITS: 100000 SUSPENSION ORAL at 21:57

## 2024-05-15 RX ADMIN — CEFTRIAXONE SODIUM 1000 MG: 1 INJECTION, POWDER, FOR SOLUTION INTRAMUSCULAR; INTRAVENOUS at 21:56

## 2024-05-15 RX ADMIN — INSULIN LISPRO 4 UNITS: 100 INJECTION, SOLUTION INTRAVENOUS; SUBCUTANEOUS at 21:56

## 2024-05-15 RX ADMIN — INSULIN GLARGINE 45 UNITS: 100 INJECTION, SOLUTION SUBCUTANEOUS at 21:57

## 2024-05-15 RX ADMIN — CARVEDILOL 12.5 MG: 12.5 TABLET, FILM COATED ORAL at 16:52

## 2024-05-15 RX ADMIN — ASPIRIN 81 MG: 81 TABLET, COATED ORAL at 08:26

## 2024-05-15 RX ADMIN — SODIUM CHLORIDE, PRESERVATIVE FREE 10 ML: 5 INJECTION INTRAVENOUS at 08:32

## 2024-05-15 RX ADMIN — POTASSIUM CHLORIDE 20 MEQ: 1500 TABLET, EXTENDED RELEASE ORAL at 16:52

## 2024-05-15 RX ADMIN — IPRATROPIUM BROMIDE AND ALBUTEROL SULFATE 1 DOSE: .5; 2.5 SOLUTION RESPIRATORY (INHALATION) at 10:48

## 2024-05-15 RX ADMIN — NYSTATIN 500000 UNITS: 100000 SUSPENSION ORAL at 16:52

## 2024-05-15 RX ADMIN — INSULIN LISPRO 5 UNITS: 100 INJECTION, SOLUTION INTRAVENOUS; SUBCUTANEOUS at 16:53

## 2024-05-15 RX ADMIN — INSULIN LISPRO 8 UNITS: 100 INJECTION, SOLUTION INTRAVENOUS; SUBCUTANEOUS at 16:52

## 2024-05-15 ASSESSMENT — PAIN SCALES - GENERAL: PAINLEVEL_OUTOF10: 0

## 2024-05-15 NOTE — PROGRESS NOTES
Patient returned to PCC room post procedure.  Assessment & VS obtained. Restrictions/Education reviewed with patient. Post procedure pathway initiated. Pt without complications.  Right radial site with 12 mls of air in vasc band. Air to be removed per protocol.  No hematoma noted. RN at bedside. Pulses obtained and documented. Patient eating and drinking w/o difficulty. Report called to [facility] RN, Catalina. All questions answered. Will continue to monitor.  [id number]- Transport here to  patient.

## 2024-05-15 NOTE — CARE COORDINATION
ONGOING DISCHARGE PLAN:    Patient is alert and oriented x4.    Spoke with patient regarding discharge plan and patient confirms that plan is still home without needs. Declined VNS.    Plan is for cardiac cath.    Will continue to follow for additional discharge needs.    If patient is discharged prior to next notation, then this note serves as note for discharge by case management.    Electronically signed by Lizet Tapia RN on 5/15/2024 at 11:58 AM

## 2024-05-15 NOTE — PROGRESS NOTES
Patient returned from [facility]. Returned with 20 mL in Vascular band in R radial. Okay to start removing at 3:30.All belongings with patient. Diet order is placed.

## 2024-05-15 NOTE — PROGRESS NOTES
Patient admitted, consent signed and questions answered.  Call light to reach with side rails up 2 of 2. Bilateral Groin clipped with writer and ANNA Gilliam present.  RN at bedside with patient.  History and physical up to date. VS & Pulses obtained and documented. Will continue to monitor.

## 2024-05-15 NOTE — PROGRESS NOTES
[facility]   IN-PATIENT SERVICE   [facility]    HISTORY AND PHYSICAL EXAMINATION            Date:   5/12/2024  Patient name:  Kari Reddy  Date of admission:  5/10/2024  2:18 PM  MRN:   [id number]  Account:  [id number]  YOB: 1970  PCP:    Louis Rivera DO  Room:   [address] [place] [address]  Code Status:    DNR-CCA    Chief Complaint:     Chief Complaint   Patient presents with    Respiratory Distress       History Obtained From:     patient    History of Present Illness:     The patient is a 53 y.o.  Non- / non  female who presents with Respiratory Distress   and she is admitted to the hospital for the management of shortness of breath.    53-year-old -American female with history of CAD, combined congestive heart failure, diabetes mellitus type 2, hypertension, hyperlipidemia, iron deficiency, migraine headaches, morbid obesity presents to the hospital with chief concerns of worsening shortness of breath.  The patient reports that she has been coughing, bringing up yellow-colored sputum and has been short of breath for the past week now however does not like coming to the hospital.  She denies having fevers or chills.  Today the patient reports that her breathing worsened to a point that she had to come to the ER.  She denies having chest pain, no palpitations, denies lower extremity edema has worsened.  She reports compliance to her Lasix, tries to be compliant with sodium restriction.  Does not weigh herself daily at home.    Denies other concerns.    5/12/2024  Still coughing, bringing light yellow sputum.  Reports breathing is much better.  On room air    Past Medical History:     Past Medical History:   Diagnosis Date    CAD (coronary artery disease)     no stents    Cardiomyopathy (HCC)     CHF (congestive heart failure) (HCC)     Chronic back pain 02/13/2015    Diabetes mellitus (HCC)     Heart failure (HCC) 2020    HTN (hypertension) 04/08/2011    
    [facility]   IN-PATIENT SERVICE   [facility]    HISTORY AND PHYSICAL EXAMINATION            Date:   5/15/2024  Patient name:  Kari Reddy  Date of admission:  5/10/2024  2:18 PM  MRN:   [id number]  Account:  [id number]  YOB: 1970  PCP:    Louis Rivera DO  Room:   [address] [place] [address]  Code Status:    DNR-CCA    Chief Complaint:     Chief Complaint   Patient presents with    Respiratory Distress       History Obtained From:     patient    History of Present Illness:     The patient is a 53 y.o.  Non- / non  female who presents with Respiratory Distress   and she is admitted to the hospital for the management of shortness of breath.    53-year-old -American female with history of CAD, combined congestive heart failure, diabetes mellitus type 2, hypertension, hyperlipidemia, iron deficiency, migraine headaches, morbid obesity presents to the hospital with chief concerns of worsening shortness of breath.  The patient reports that she has been coughing, bringing up yellow-colored sputum and has been short of breath for the past week now however does not like coming to the hospital.  She denies having fevers or chills.  Today the patient reports that her breathing worsened to a point that she had to come to the ER.  She denies having chest pain, no palpitations, denies lower extremity edema has worsened.  She reports compliance to her Lasix, tries to be compliant with sodium restriction.  Does not weigh herself daily at home.    Denies other concerns.    5/13/2024  Patient reports breathing is much improved since she has presented to the hospital.  Pending echo  Denies chest pain    Past Medical History:     Past Medical History:   Diagnosis Date    CAD (coronary artery disease)     no stents    Cardiomyopathy (HCC)     CHF (congestive heart failure) (HCC)     Chronic back pain 02/13/2015    Diabetes mellitus (HCC)     Heart failure (HCC) 2020    HTN 
  Physician Progress Note      PATIENT:               MARY FLYNN  [facility] #:                  [id number]  :                       1970  ADMIT DATE:       5/10/2024 2:18 PM  DISCH DATE:  RESPONDING  PROVIDER #:        Naresh Chery MD          QUERY TEXT:    Patient admitted with acute on chronic respiratory failure and acute on   chronic CHF.   Noted documentation of NSTEMI by IM and Elevated troponin   likely due to supply/demand mismatch by cardiology. If possible, please   document in progress notes and discharge summary if you are evaluating and /or   treating any of the following:    The medical record reflects the following:    Risk Factors: CHF, respiratory failure  Clinical Indicators: Troponin HS 51, 78, 124, 158, 128, no c/o chest pain  Treatment: IV Lasix, Spiraldactone, Coreg, Cardiology consult, supplemental O2    Thank you!    Jack Jones, BSN,RN, CRCR  RN Clinical   Options provided:  -- NSTEMI confirmed  -- Elevated troponin due to Type II MI d/t CHF and NSTEMI ruled out  -- Elevated troponin due to demand ischemia due to CHF and NSTEMI ruled out  -- NSTEMI ruled out and elevated troponin due to (please specify), .  -- Other - I will add my own diagnosis  -- Disagree - Not applicable / Not valid  -- Disagree - Clinically unable to determine / Unknown  -- Refer to Clinical Documentation Reviewer    PROVIDER RESPONSE TEXT:    After study, elevated troponin due to demand ischemia due to CHF and NSTEMI   ruled out.    Query created by: Jack Jones on 2024 1:21 PM      Electronically signed by:  Naresh Chery MD 2024 1:26 PM          
BRONCHOSPASM/BRONCHOCONSTRICTION     [x]         IMPROVE AERATION/BREATH SOUNDS  [x]   ADMINISTER BRONCHODILATOR THERAPY AS APPROPRIATE  [x]   ASSESS BREATH SOUNDS  []   IMPLEMENT AEROSOL/MDI PROTOCOL  [x]   PATIENT EDUCATION AS NEEDED    
Cardiology notified of 16 beat of Vtach.     \"Thanks. Pt should have BMP and CBC if not previously ordered\"    MD notified that labs done at 0500 all WNL  
Dr. Benitez (Cardiology) put on patient list for consult.  
Dr. Kaur notified of increase in troponins. Orders received for trop to be taken in AM and full dose lovenox Q12.   
Dr. Kaur notified of trop of 78 via secure message.     No new orders at this time   
[facility]   Progress Note                   Date:   5/12/24  Patient name: Kari Reddy  Date of admission:  5/10/2024  2:18 PM  MRN:   [id number]  YOB: 1970  PCP: Louis Rivera DO    Reason for Admission:     Subjective:       Clinical Changes / Abnormalities:   Patient seen and examined in ECHO . Denies chest pain or shortness of breath. Tele/vitals/labs reviewed upstairs  . Had a brief a flutter RVR with spontaneous conversion to  SR         Medications:   Scheduled Meds:   insulin glargine  30 Units SubCUTAneous BID    insulin lispro  5 Units SubCUTAneous TID WC    enoxaparin  1 mg/kg SubCUTAneous BID    lidocaine  1 patch TransDERmal Daily    spironolactone  25 mg Oral Once    sodium chloride flush  5-40 mL IntraVENous 2 times per day    ipratropium 0.5 mg-albuterol 2.5 mg  1 Dose Inhalation Q4H WA RT    aspirin EC  81 mg Oral Daily    atorvastatin  40 mg Oral Daily    carvedilol  6.25 mg Oral BID WC    famotidine  20 mg Oral Daily    sacubitril-valsartan  1 tablet Oral BID    spironolactone  25 mg Oral Daily    furosemide  40 mg IntraVENous BID    nystatin  5 mL Oral 4x Daily    insulin lispro  0-8 Units SubCUTAneous TID WC    insulin lispro  0-4 Units SubCUTAneous Nightly    cefTRIAXone (ROCEPHIN) IV  2,000 mg IntraVENous Q24H    predniSONE  50 mg Oral Daily     Continuous Infusions:   sodium chloride 10 mL/hr at 05/10/24 2047    dextrose       CBC:   Recent Labs     05/10/24  1427 05/11/24  0507   WBC 18.0* 13.9*   HGB 13.3 12.8    251       BMP:    Recent Labs     05/12/24  0755 05/13/24  0537 05/13/24  0809    139 138   K 3.7 3.7 3.6*    100 98   CO2 26 27 27   BUN 15 18 17   CREATININE 0.8 0.8 0.9   GLUCOSE 224* 247* 201*       Hepatic:   Recent Labs     05/10/24  1427   AST 18   ALT 14   BILITOT 0.7   ALKPHOS 115*       Troponin: No results for input(s): \"TROPONINI\" in the last 72 hours.  BNP: No results for input(s): \"BNP\" in the last 72 
[facility]   Progress Note                   Date:   5/12/24  Patient name: Kari Reddy  Date of admission:  5/10/2024  2:18 PM  MRN:   [id number]  YOB: 1970  PCP: Louis Rivera DO    Reason for Admission:     Subjective:       Clinical Changes / Abnormalities:   Patient seen and examined in room lying quietly in bed. No CV issues/concerns overnight. Denies chest pain or shortness of breath. Tele/vitals/labs reviewed. Cath yesterday was cancelled d/t scheduling issues with transportation. Pt. Had 2 runs NSVT this AM, asymptomatic, and one run tachycardia with BBB. Denies any palpitations.          Medications:   Scheduled Meds   insulin glargine  40 Units SubCUTAneous BID    furosemide  40 mg Oral Daily    potassium chloride  20 mEq Oral BID with meals    carvedilol  12.5 mg Oral BID WC    insulin lispro  5 Units SubCUTAneous TID WC    enoxaparin  1 mg/kg SubCUTAneous BID    lidocaine  1 patch TransDERmal Daily    spironolactone  25 mg Oral Once    sodium chloride flush  5-40 mL IntraVENous 2 times per day    ipratropium 0.5 mg-albuterol 2.5 mg  1 Dose Inhalation Q4H WA RT    aspirin EC  81 mg Oral Daily    atorvastatin  40 mg Oral Daily    famotidine  20 mg Oral Daily    sacubitril-valsartan  1 tablet Oral BID    spironolactone  25 mg Oral Daily    nystatin  5 mL Oral 4x Daily    insulin lispro  0-8 Units SubCUTAneous TID WC    insulin lispro  0-4 Units SubCUTAneous Nightly     Continuous Infusions:   sodium chloride 10 mL/hr at 05/10/24 2047    dextrose       CBC:   Recent Labs     05/14/24  0515   WBC 16.7*   HGB 15.2          BMP:    Recent Labs     05/13/24  0537 05/13/24  0809 05/14/24  2147    138  --    K 3.7 3.6*  --     98  --    CO2 27 27  --    BUN 18 17  --    CREATININE 0.8 0.9  --    GLUCOSE 247* 201* 642*       Hepatic:   No results for input(s): \"AST\", \"ALT\", \"BILITOT\", \"ALKPHOS\" in the last 72 hours.    Invalid input(s): 
[facility]   Progress Note                   Date:   5/12/24  Patient name: Kari Reddy  Date of admission:  5/10/2024  2:18 PM  MRN:   [id number]  YOB: 1970  PCP: Louis Rivera DO    Reason for Admission:     Subjective:       Clinical Changes / Abnormalities:   Patient seen and examined in room lying quietly in bed. Denies chest pain or shortness of breath. Tele/vitals/labs reviewed. Breathing much better Denies any present issues/concerns. NPO for cath today.         Medications:   Scheduled Meds   potassium chloride  40 mEq Oral Once    insulin glargine  35 Units SubCUTAneous BID    potassium chloride  20 mEq Oral BID with meals    carvedilol  12.5 mg Oral BID WC    insulin lispro  5 Units SubCUTAneous TID WC    enoxaparin  1 mg/kg SubCUTAneous BID    lidocaine  1 patch TransDERmal Daily    spironolactone  25 mg Oral Once    sodium chloride flush  5-40 mL IntraVENous 2 times per day    ipratropium 0.5 mg-albuterol 2.5 mg  1 Dose Inhalation Q4H WA RT    aspirin EC  81 mg Oral Daily    atorvastatin  40 mg Oral Daily    famotidine  20 mg Oral Daily    sacubitril-valsartan  1 tablet Oral BID    spironolactone  25 mg Oral Daily    furosemide  40 mg IntraVENous BID    nystatin  5 mL Oral 4x Daily    insulin lispro  0-8 Units SubCUTAneous TID WC    insulin lispro  0-4 Units SubCUTAneous Nightly    cefTRIAXone (ROCEPHIN) IV  2,000 mg IntraVENous Q24H    predniSONE  50 mg Oral Daily     Continuous Infusions:   sodium chloride 10 mL/hr at 05/10/24 2047    dextrose       CBC:   Recent Labs     05/14/24  0515   WBC 16.7*   HGB 15.2          BMP:    Recent Labs     05/12/24  0755 05/13/24  0537 05/13/24  0809    139 138   K 3.7 3.7 3.6*    100 98   CO2 26 27 27   BUN 15 18 17   CREATININE 0.8 0.8 0.9   GLUCOSE 224* 247* 201*       Hepatic:   No results for input(s): \"AST\", \"ALT\", \"BILITOT\", \"ALKPHOS\" in the last 72 hours.    Invalid input(s): \"ALB\"    Troponin: No 
[facility]   Progress Note                   Date:   5/12/24  Patient name: Kari Reddy  Date of admission:  5/10/2024  2:18 PM  MRN:   [id number]  YOB: 1970  PCP: Louis Rivera DO    Reason for Admission:     Subjective:       Clinical Changes / Abnormalities:   Pt less SOB today with SpO2 95% on RA; denies chest pain.  Maintaining diuresis with net output of 3 L since admission.      Medications:   Scheduled Meds:   insulin glargine  30 Units SubCUTAneous BID    insulin lispro  5 Units SubCUTAneous TID WC    enoxaparin  1 mg/kg SubCUTAneous BID    lidocaine  1 patch TransDERmal Daily    spironolactone  25 mg Oral Once    sodium chloride flush  5-40 mL IntraVENous 2 times per day    ipratropium 0.5 mg-albuterol 2.5 mg  1 Dose Inhalation Q4H WA RT    aspirin EC  81 mg Oral Daily    atorvastatin  40 mg Oral Daily    carvedilol  6.25 mg Oral BID WC    famotidine  20 mg Oral Daily    sacubitril-valsartan  1 tablet Oral BID    spironolactone  25 mg Oral Daily    furosemide  40 mg IntraVENous BID    nystatin  5 mL Oral 4x Daily    insulin lispro  0-8 Units SubCUTAneous TID WC    insulin lispro  0-4 Units SubCUTAneous Nightly    cefTRIAXone (ROCEPHIN) IV  2,000 mg IntraVENous Q24H    predniSONE  50 mg Oral Daily     Continuous Infusions:   sodium chloride 10 mL/hr at 05/10/24 2047    dextrose       CBC:   Recent Labs     05/10/24  1427 05/11/24  0507   WBC 18.0* 13.9*   HGB 13.3 12.8    251     BMP:    Recent Labs     05/11/24  0507 05/12/24  0755 05/13/24  0537    138 139   K 4.3 3.7 3.7    101 100   CO2 25 26 27   BUN 8 15 18   CREATININE 0.7 0.8 0.8   GLUCOSE 273* 224* 247*     Hepatic:   Recent Labs     05/10/24  1427   AST 18   ALT 14   BILITOT 0.7   ALKPHOS 115*     Troponin: No results for input(s): \"TROPONINI\" in the last 72 hours.  BNP: No results for input(s): \"BNP\" in the last 72 hours.  Lipids: No results for input(s): \"CHOL\", \"HDL\" in the last 72 
MD notified of BS: 396  
Md notified    \"Patients . Will give 8 units as per order\"  
Patient having severe right sided flank pain. Pain 8/10. Patient states she was having a coughing spell right before the pain. Tylenol given and MD notified. Patient thinks she may have \"pulled a muscle\"  
Patient now has a diet due to not having heart cath today.   
Pharmacy Medication History Note      List of current medications patient is taking is complete.     Source of information: patient, dispense report, OARRS    Changes made to medication list:  Medications flagged for removal (include reason, ex. noncompliance):  Amlodipine - patient reports not taking  Meloxicam - patient reports not taking    Medications removed (include reason, ex. therapy complete or physician discontinued):  Benzonatate - course complete   Novolog - course complete. Now on Humalog    Medications added/doses adjusted:  Turmeric 500 mg daily   Beet root 1000 mg daily   Trulicity is taken on     Other notes (ex. Recent course of antibiotics, Coumadin dosing):  The patient had bottles for carvedilol, atorvastatin, Entresto, Farxiga, furosemide, potassium chloride, and spironolactone that she brought in from home. These vials were all dispensed in  or earlier and the dispense report does not indicate that there are more recent fills for these medications.   Patient reports she has taken no medications today.   OARRS negative     Denies use of other OTC or herbal medications.      Allergies clarified    Medication list provided to the patient: no  Medication education provided to the patient: none    Prior to Admission Medications   Prescriptions Last Dose Informant Patient Reported? Taking?   Alcohol Swabs PADS   No No   Si Units by Does not apply route as needed (for blood sugar testing)   Blood Pressure KIT   No No   Si each by Does not apply route in the morning, at noon, and at bedtime   Continuous Blood Gluc  (FREESTYLE JACKSON 2 READER) CARLOS   No No   Si each by Does not apply route in the morning and at bedtime   Continuous Blood Gluc Sensor (FREESTYLE JACKSON 2 SENSOR) MISC   No No   Si each by Does not apply route in the morning and at bedtime   Insulin Pen Needle ([employer] PEN NEEDLES) 32G X 4 MM MISC   No No   Sig: USE WITH INSULIN DAILY   Lancets MISC   No No 
Pt's  RN spoke with ABDIEL Mars. Orders received for an additional 4 units as well as night time dose and lantus.   
Report called to cath lab. All belongings given to patient. Telemetry removed.   
Writer spoke with cath lab about patient scheduled insulins this morning. Advised to hold all.   
Filt Rate 76 >60 mL/min/1.73m2    Calcium 9.3 8.6 - 10.4 mg/dL   Magnesium    Collection Time: 05/13/24  8:09 AM   Result Value Ref Range    Magnesium 2.1 1.6 - 2.6 mg/dL   Echo (TTE) complete (PRN contrast/bubble/strain/3D)    Collection Time: 05/13/24  9:50 AM   Result Value Ref Range    LV EDV A2C 135 mL    LV EDV A4C 106 mL    LV ESV A2C 100 mL    LV ESV A4C 79 mL    IVSd 1.0 (A) 0.6 - 0.9 cm    LVIDd 7.8 (A) 3.9 - 5.3 cm    LVIDs 6.9 cm    LVOT Diameter 2.2 cm    LVOT Mean Gradient 3 mmHg    LVOT VTI 18.6 cm    LVOT Peak Velocity 1.2 m/s    LVOT Peak Gradient 5 mmHg    LVPWd 0.9 0.6 - 0.9 cm    LV E' Lateral Velocity 9 cm/s    LV E' Septal Velocity 4 cm/s    LV Ejection Fraction A2C 26 %    LV Ejection Fraction A4C 25 %    EF BP 26 (A) 55 - 100 %    LVOT Area 3.8 cm2    LVOT SV 70.7 ml    LA Minor Axis 5.7 cm    LA Major Axis 6.1 cm    LA Area 2C 21.7 cm2    LA Area 4C 26.9 cm2    LA Volume MOD A2C 68 (A) 22 - 52 mL    LA Volume MOD A4C 96 (A) 22 - 52 mL    LA Volume BP 83 (A) 22 - 52 mL    LA Diameter 4.6 cm    RA Area 4C 27.2 cm2    RA Volume 101 ml    AV Mean Gradient 4 mmHg    AV VTI 20.9 cm    AV Mean Velocity 1.0 m/s    AV Peak Velocity 1.6 m/s    AV Peak Gradient 10 mmHg    AV Area by VTI 3.4 cm2    AV Area by Peak Velocity 2.8 cm2    Aortic Root 3.4 cm    MV E Wave Deceleration Time 169.0 ms    MV A Velocity 0.33 m/s    MV E Velocity 1.15 m/s    TAPSE 2.9 1.7 cm    TR Max Velocity 1.48 m/s    TR Peak Gradient 9 mmHg    Body Surface Area 2.43 m2    Fractional Shortening 2D 12 28 - 44 %    LV ESV Index A4C 33 mL/m2    LV EDV Index A4C 45 mL/m2    LV ESV Index A2C 42 mL/m2    LV EDV Index A2C 57 mL/m2    LVIDd Index 3.28 cm/m2    LVIDs Index 2.90 cm/m2    LV RWT Ratio 0.23     LV Mass 2D 365.1 (A) 67 - 162 g    LV Mass 2D Index 153.4 (A) 43 - 95 g/m2    MV E/A 3.48     E/E' Ratio (Averaged) 20.76     E/E' Lateral 12.78     E/E' Septal 28.75     LA Volume Index BP 35 (A) 16 - 34 ml/m2    LVOT Stroke Volume 
Neutrophils Absolute 15.48 (H) 1.3 - 9.1 k/uL    Lymphocytes Absolute 1.62 1.0 - 4.8 k/uL    Monocytes Absolute 0.54 0.1 - 1.3 k/uL    Eosinophils Absolute 0.18 0.0 - 0.4 k/uL    Basophils Absolute 0.18 0.0 - 0.2 k/uL    Morphology Normal    Magnesium    Collection Time: 05/10/24  2:27 PM   Result Value Ref Range    Magnesium 2.0 1.6 - 2.6 mg/dL   D-Dimer, Quantitative    Collection Time: 05/10/24  2:27 PM   Result Value Ref Range    D-Dimer, Quant 1.00 (H) 0.00 - 0.59 ug/mL FEU   Troponin    Collection Time: 05/10/24  2:27 PM   Result Value Ref Range    Troponin, High Sensitivity 28 (H) 0 - 14 ng/L   Lipase    Collection Time: 05/10/24  2:27 PM   Result Value Ref Range    Lipase 46 13 - 60 U/L   Hepatic Function Panel    Collection Time: 05/10/24  2:27 PM   Result Value Ref Range    Albumin 4.1 3.5 - 5.2 g/dL    Alkaline Phosphatase 115 (H) 35 - 104 U/L    ALT 14 5 - 33 U/L    AST 18 <32 U/L    Total Bilirubin 0.7 0.3 - 1.2 mg/dL    Bilirubin, Direct 0.1 <0.3 mg/dL    Bilirubin, Indirect 0.6 0.0 - 1.0 mg/dL    Total Protein 7.2 6.4 - 8.3 g/dL   Hemoglobin A1c    Collection Time: 05/10/24  2:27 PM   Result Value Ref Range    Hemoglobin A1C 9.3 (H) 4.0 - 6.0 %    Estimated Avg Glucose 220 mg/dL   Blood Gas, Venous    Collection Time: 05/10/24  2:30 PM   Result Value Ref Range    pH, Lev 7.266 (L) 7.320 - 7.420    pCO2, Lev 45.9 39.0 - 55.0 mm Hg    pO2, Lev 39.8 30.0 - 50.0 mm Hg    HCO3, Venous 20.4 (L) 24.0 - 30.0 mmol/L    Negative Base Excess, Lev 6.5 (H) 0.0 - 2.0 mmol/L    O2 Sat, Lev 68.1 60.0 - 85.0 %    Carboxyhemoglobin 4.3 0 - 5 %    Methemoglobin 0.3 0.0 - 1.9 %    Pt Temp 37     Text for Respiratory VENOUS BLOOD GAS    Troponin    Collection Time: 05/10/24  5:07 PM   Result Value Ref Range    Troponin, High Sensitivity 35 (H) 0 - 14 ng/L   POC Glucose Fingerstick    Collection Time: 05/10/24  7:49 PM   Result Value Ref Range    POC Glucose 307 (H) 65 - 105 mg/dL   Procalcitonin    Collection Time:

## 2024-05-15 NOTE — H&P
!30 !     Echo 5/13/2024  The left ventricle is severely dilated, normal LV wall thickness, severe global hypokinesis  Severely reduced LV systolic function, ejection fraction 25 to 30%  The right ventricle appears dilated, function appears impaired  Left and the right atrium appears moderately dilated  The aortic valve structure appears normal no stenosis no regurgitation  Mitral valve structure appears normal mild regurgitation no stenosis   Tricuspid valve structure appears normal, trace regurgitation no stenosis  Pulmonary valve structure appears normal no stenosis no regurgitation  Normal aortic root dimensions  IVC not well-visualized  No significant pericardial effusion seen     IMPRESSION:       Acute-on-chronic respiratory failure with suspected COPD and acute bronchitis  Episode of aflutter RVR   Acute on chronic systolic CHF exacerbation with significant drop in EF  H/o ACE and cor pulmonale  H/o non-ischemic cardiomyopathy with improved LVEF at 45% by most recent echo from 12/21/23  Elevated troponin likely due to supply /demand mismatch  Non-occlusive CAD on cardiac catheterization in 2020  Essential HTN  Type II DM  DNRCCA      [id number] Score for Atrial Fibrillation Stroke Risk    Risk   Factors   Component Value   C CHF Yes 1   H HTN Yes 1   A2 Age >= 75 No,  (53 y.o.) 0   D DM Yes 1   S2 Prior Stroke/TIA No 0   V Vascular Disease No 0   A Age 65-74 No,  (53 y.o.) 0   Sc Sex female 1     [id number]  Score   4   Score last updated 5/13/24 10:00 AM EDT       Disclaimer: Risk Score calculation is dependent on accuracy of patient problem list and past encounter diagnosis.   RECOMMENDATIONS:     Stable. Denies any chest pain/pressure. Echo reviewed as above with significantly reduced LVEF 25-30%. Given this along with trop elevation on admission will plan for cardiac cath. I have discussed risks (including but not limited to vascular injury, infection, hematoma, contrast induced  kidney dysfunction, CVA and MI), benefits, alternatives in detail. All questions answered. Patient agrees to proceed.    Continue ASA and atorvastatin  Continue PO Lasix  Will f/u on BMP & Mg today. Replace e-lytes to keep K>4 and Mg >2.   Continue Entresto, spironolactone and carvedilol. On Farxiga at home - [employer] while inpatient d/t formulary  No further Afib, one episode. Will plan holter on Discharge    Plan:  Proceed with planned procedure.  Further orders to follow.      Risks, benefits, and alternatives of cardiac catheterization were discussed, in detail, with patient. Risks include, but not limited to, bleeding, requiring blood transfusion, vascular complication requiring surgery, renal failure with need of dialysis, CVA, MI, death and anesthesia complications including intubation were discussed. Patient verbalized understanding and agreed to proceed with the procedure understanding the above risks and alternatives to the procedure.        Pre Procedure Conscious Sedation Data:     ASA Class:                  [] I [x] II [] III [] IV     Mallampati Class:       [] I [x] II [] III [] IV       MD Ming.  Fellow, cardiovascular disease   [facility], [place], [place]

## 2024-05-16 ENCOUNTER — APPOINTMENT (OUTPATIENT)
Age: 54
DRG: 313 | End: 2024-05-16
Attending: INTERNAL MEDICINE
Payer: COMMERCIAL

## 2024-05-16 VITALS
RESPIRATION RATE: 16 BRPM | SYSTOLIC BLOOD PRESSURE: 151 MMHG | HEART RATE: 80 BPM | TEMPERATURE: 97.9 F | DIASTOLIC BLOOD PRESSURE: 110 MMHG | OXYGEN SATURATION: 98 %

## 2024-05-16 LAB
ANION GAP SERPL CALCULATED.3IONS-SCNC: 9 MMOL/L (ref 9–17)
BASOPHILS # BLD: 0.14 K/UL (ref 0–0.2)
BASOPHILS NFR BLD: 1 % (ref 0–2)
BUN SERPL-MCNC: 17 MG/DL (ref 6–20)
CALCIUM SERPL-MCNC: 8.8 MG/DL (ref 8.6–10.4)
CHLORIDE SERPL-SCNC: 102 MMOL/L (ref 98–107)
CO2 SERPL-SCNC: 26 MMOL/L (ref 20–31)
CREAT SERPL-MCNC: 0.7 MG/DL (ref 0.5–0.9)
EOSINOPHIL # BLD: 0.14 K/UL (ref 0–0.4)
EOSINOPHILS RELATIVE PERCENT: 1 % (ref 0–4)
ERYTHROCYTE [DISTWIDTH] IN BLOOD BY AUTOMATED COUNT: 18.1 % (ref 11.5–14.9)
GFR, ESTIMATED: >90 ML/MIN/1.73M2
GLUCOSE BLD-MCNC: 139 MG/DL (ref 65–105)
GLUCOSE BLD-MCNC: 309 MG/DL (ref 65–105)
GLUCOSE BLD-MCNC: 406 MG/DL (ref 65–105)
GLUCOSE SERPL-MCNC: 147 MG/DL (ref 70–99)
HCT VFR BLD AUTO: 47 % (ref 36–46)
HGB BLD-MCNC: 14.5 G/DL (ref 12–16)
LYMPHOCYTES NFR BLD: 3.01 K/UL (ref 1–4.8)
LYMPHOCYTES RELATIVE PERCENT: 22 % (ref 24–44)
MAGNESIUM SERPL-MCNC: 2.1 MG/DL (ref 1.6–2.6)
MCH RBC QN AUTO: 26.2 PG (ref 26–34)
MCHC RBC AUTO-ENTMCNC: 30.7 G/DL (ref 31–37)
MCV RBC AUTO: 85.2 FL (ref 80–100)
MONOCYTES NFR BLD: 0.82 K/UL (ref 0.1–1.3)
MONOCYTES NFR BLD: 6 % (ref 1–7)
MORPHOLOGY: ABNORMAL
MORPHOLOGY: ABNORMAL
NEUTROPHILS NFR BLD: 70 % (ref 36–66)
NEUTS SEG NFR BLD: 9.59 K/UL (ref 1.3–9.1)
PLATELET # BLD AUTO: 268 K/UL (ref 150–450)
PMV BLD AUTO: 8.5 FL (ref 6–12)
POTASSIUM SERPL-SCNC: 4.5 MMOL/L (ref 3.7–5.3)
RBC # BLD AUTO: 5.52 M/UL (ref 4–5.2)
SODIUM SERPL-SCNC: 137 MMOL/L (ref 135–144)
WBC OTHER # BLD: 13.7 K/UL (ref 3.5–11)

## 2024-05-16 PROCEDURE — 6370000000 HC RX 637 (ALT 250 FOR IP): Performed by: INTERNAL MEDICINE

## 2024-05-16 PROCEDURE — 85025 COMPLETE CBC W/AUTO DIFF WBC: CPT

## 2024-05-16 PROCEDURE — 94761 N-INVAS EAR/PLS OXIMETRY MLT: CPT

## 2024-05-16 PROCEDURE — 83735 ASSAY OF MAGNESIUM: CPT

## 2024-05-16 PROCEDURE — 99233 SBSQ HOSP IP/OBS HIGH 50: CPT | Performed by: SURGERY

## 2024-05-16 PROCEDURE — 82947 ASSAY GLUCOSE BLOOD QUANT: CPT

## 2024-05-16 PROCEDURE — 2580000003 HC RX 258: Performed by: INTERNAL MEDICINE

## 2024-05-16 PROCEDURE — 99222 1ST HOSP IP/OBS MODERATE 55: CPT | Performed by: INTERNAL MEDICINE

## 2024-05-16 PROCEDURE — 80048 BASIC METABOLIC PNL TOTAL CA: CPT

## 2024-05-16 PROCEDURE — 94640 AIRWAY INHALATION TREATMENT: CPT

## 2024-05-16 PROCEDURE — 93225 XTRNL ECG REC<48 HRS REC: CPT

## 2024-05-16 PROCEDURE — 36415 COLL VENOUS BLD VENIPUNCTURE: CPT

## 2024-05-16 RX ORDER — INSULIN GLARGINE 100 [IU]/ML
30 INJECTION, SOLUTION SUBCUTANEOUS 2 TIMES DAILY
Qty: 10 ML | Refills: 3 | Status: SHIPPED | OUTPATIENT
Start: 2024-05-16

## 2024-05-16 RX ORDER — SALIVA STIMULANT COMB. NO.3
1 SPRAY, NON-AEROSOL (ML) MUCOUS MEMBRANE PRN
Qty: 44.3 ML | Refills: 0 | Status: SHIPPED | OUTPATIENT
Start: 2024-05-16

## 2024-05-16 RX ORDER — LIDOCAINE 4 G/G
1 PATCH TOPICAL DAILY
Qty: 30 PATCH | Refills: 0 | Status: SHIPPED | OUTPATIENT
Start: 2024-05-17

## 2024-05-16 RX ORDER — CARVEDILOL 12.5 MG/1
12.5 TABLET ORAL 2 TIMES DAILY WITH MEALS
Qty: 60 TABLET | Refills: 3 | Status: SHIPPED | OUTPATIENT
Start: 2024-05-16

## 2024-05-16 RX ADMIN — IPRATROPIUM BROMIDE AND ALBUTEROL SULFATE 1 DOSE: 2.5; .5 SOLUTION RESPIRATORY (INHALATION) at 11:08

## 2024-05-16 RX ADMIN — IPRATROPIUM BROMIDE AND ALBUTEROL SULFATE 1 DOSE: 2.5; .5 SOLUTION RESPIRATORY (INHALATION) at 07:47

## 2024-05-16 RX ADMIN — ASPIRIN 81 MG: 81 TABLET, COATED ORAL at 09:50

## 2024-05-16 RX ADMIN — SODIUM CHLORIDE, PRESERVATIVE FREE 10 ML: 5 INJECTION INTRAVENOUS at 09:50

## 2024-05-16 RX ADMIN — INSULIN GLARGINE 45 UNITS: 100 INJECTION, SOLUTION SUBCUTANEOUS at 09:49

## 2024-05-16 RX ADMIN — INSULIN LISPRO 5 UNITS: 100 INJECTION, SOLUTION INTRAVENOUS; SUBCUTANEOUS at 12:37

## 2024-05-16 RX ADMIN — FUROSEMIDE 40 MG: 40 TABLET ORAL at 09:50

## 2024-05-16 RX ADMIN — FAMOTIDINE 20 MG: 20 TABLET, FILM COATED ORAL at 09:50

## 2024-05-16 RX ADMIN — NYSTATIN 500000 UNITS: 100000 SUSPENSION ORAL at 12:37

## 2024-05-16 RX ADMIN — POTASSIUM CHLORIDE 20 MEQ: 1500 TABLET, EXTENDED RELEASE ORAL at 09:50

## 2024-05-16 RX ADMIN — SACUBITRIL AND VALSARTAN 1 TABLET: 49; 51 TABLET, FILM COATED ORAL at 09:51

## 2024-05-16 RX ADMIN — SPIRONOLACTONE 25 MG: 25 TABLET ORAL at 09:50

## 2024-05-16 RX ADMIN — INSULIN LISPRO 6 UNITS: 100 INJECTION, SOLUTION INTRAVENOUS; SUBCUTANEOUS at 12:37

## 2024-05-16 RX ADMIN — CARVEDILOL 12.5 MG: 12.5 TABLET, FILM COATED ORAL at 09:50

## 2024-05-16 RX ADMIN — NYSTATIN 500000 UNITS: 100000 SUSPENSION ORAL at 09:50

## 2024-05-16 RX ADMIN — INSULIN LISPRO 5 UNITS: 100 INJECTION, SOLUTION INTRAVENOUS; SUBCUTANEOUS at 09:49

## 2024-05-16 RX ADMIN — ATORVASTATIN CALCIUM 40 MG: 40 TABLET, FILM COATED ORAL at 09:50

## 2024-05-16 NOTE — DISCHARGE INSTRUCTIONS
Low-sodium diet  Please reconsider LifeVest  .  Self daily, if greater than 3 pounds weight gain in less than 24 hours please contact CHF clinic

## 2024-05-16 NOTE — CARE COORDINATION
ONGOING DISCHARGE PLAN:    Patient is alert and oriented x4.    Spoke with patient regarding discharge plan and patient confirms that plan is still home without needs. Declined VNS. Will be discharged home today.    Will continue to follow for additional discharge needs.    If patient is discharged prior to next notation, then this note serves as note for discharge by case management.    Electronically signed by Lizet Tapia RN on 5/16/2024 at 3:28 PM

## 2024-05-16 NOTE — PROGRESS NOTES
Spoke with ANNA Subramanian regarding Holter monitor order. Pt is not expected to go home today. RN will call when pt is being discharged.

## 2024-05-16 NOTE — PROGRESS NOTES
CLINICAL PHARMACY NOTE: MEDS TO BEDS    Total # of Prescriptions Filled: 5   The following medications were delivered to the patient:  Lantus Solostar 100/ml  Carvedilol 12.5mg  Lidocaine Patch 4%  Diclofenac 1% Gel  Nystatin [id number]/ml Susp    Additional Documentation:  Delivered medications to patients room

## 2024-05-16 NOTE — PROGRESS NOTES
Discharge instructions reviewed with patient. Patient verbalized understanding and had no further questions. Patient wheeled to car.

## 2024-05-17 ENCOUNTER — CARE COORDINATION (OUTPATIENT)
Dept: CASE MANAGEMENT | Age: 54
End: 2024-05-17

## 2024-05-17 NOTE — CARE COORDINATION
Care Transitions Outreach Attempt    Call within 2 business days of discharge: Yes   Attempted to reach patient for transitions of care follow up. Unable to reach patient. Attempted x 2 today to reach, left message to return call.      Patient: Kari Reddy Patient : 1970 MRN: [id number]    Last Discharge Facility       Date Complaint Diagnosis Description Type Department Provider    5/15/24  Atrial flutter, unspecified type (HCC) ... Admission (Discharged) Naresh Abrams MD    5/15/24  Respiratory distress Admission (Discharged) Arsalan Mathur MD              Was this an external facility discharge? No Discharge Facility Name: [facility]    Noted following upcoming appointments from discharge chart review:   [facility] follow up appointment(s):   Future Appointments   Date Time Provider Department Center   2024  2:30 PM Ramy Benitez MD [facility]     Non-[facility]  follow up appointment(s): n/a

## 2024-05-20 ENCOUNTER — TELEPHONE (OUTPATIENT)
Dept: FAMILY MEDICINE CLINIC | Age: 54
End: 2024-05-20

## 2024-05-20 ENCOUNTER — CARE COORDINATION (OUTPATIENT)
Dept: CASE MANAGEMENT | Age: 54
End: 2024-05-20

## 2024-05-20 NOTE — TELEPHONE ENCOUNTER
Care Transitions Initial Follow Up Call    Outreach made within 2 business days of discharge: Yes    Patient: Kari Reddy Patient : 1970   MRN: [id number]  Reason for Admission: There are no discharge diagnoses documented for the most recent discharge.  Discharge Date: 24       Spoke with: patient    Discharge department/facility: [facility] Interactive Patient Contact:  Was patient able to fill all prescriptions: No: None given  Was patient instructed to bring all medications to the follow-up visit: Yes  Is patient taking all medications as directed in the discharge summary? Yes  Does patient understand their discharge instructions: Yes  Does patient have questions or concerns that need addressed prior to 7-14 day follow up office visit: no    Scheduled appointment with PCP within 7-14 days    Follow Up  Future Appointments   Date Time Provider Department Center   2024  1:15 PM Arnold Villar MD [facility]   2024  2:30 PM Ramy Benitez MD [facility]

## 2024-05-20 NOTE — CARE COORDINATION
Care Transitions Outreach Attempt #2  Initial 24 hour call.     Call within 2 business days of discharge: Yes   Attempted to reach patient for transitions of care follow up. Unable to reach patient. HIPAA compliant message left on  requesting a return call. Will end care transitions if no return call received. Chart routed to PCP for scheduling.     Patient: Kari Reddy Patient : 1970 MRN: [id number]    Last Discharge Facility       Date Complaint Diagnosis Description Type Department Provider    5/15/24  Atrial flutter, unspecified type (HCC) ... Admission (Discharged) Naresh Abrams MD    5/15/24  Respiratory distress Admission (Discharged) Arsalan Mathur MD              Was this an external facility discharge? No Discharge Facility: [facility]    Noted following upcoming appointments from discharge chart review:   [facility] follow up appointment(s):   Future Appointments   Date Time Provider Department Center   2024  2:30 PM Ramy Benitez MD [facility] Nicole LPN  Care Transition Nurse

## 2024-05-22 ENCOUNTER — OFFICE VISIT (OUTPATIENT)
Dept: FAMILY MEDICINE CLINIC | Age: 54
End: 2024-05-22
Payer: COMMERCIAL

## 2024-05-22 ENCOUNTER — HOSPITAL ENCOUNTER (OUTPATIENT)
Age: 54
Setting detail: SPECIMEN
Discharge: HOME | End: 2024-05-22

## 2024-05-22 VITALS
HEIGHT: 67 IN | WEIGHT: 282 LBS | HEART RATE: 82 BPM | BODY MASS INDEX: 44.26 KG/M2 | SYSTOLIC BLOOD PRESSURE: 116 MMHG | DIASTOLIC BLOOD PRESSURE: 72 MMHG

## 2024-05-22 DIAGNOSIS — Z09 HOSPITAL DISCHARGE FOLLOW-UP: ICD-10-CM

## 2024-05-22 DIAGNOSIS — L68.0 HIRSUTISM: ICD-10-CM

## 2024-05-22 DIAGNOSIS — Z72.0 TOBACCO ABUSE: ICD-10-CM

## 2024-05-22 DIAGNOSIS — B37.0 ORAL CANDIDIASIS: ICD-10-CM

## 2024-05-22 DIAGNOSIS — I21.4 NSTEMI (NON-ST ELEVATED MYOCARDIAL INFARCTION) (HCC): ICD-10-CM

## 2024-05-22 DIAGNOSIS — R07.9 CHEST PAIN, UNSPECIFIED TYPE: Primary | ICD-10-CM

## 2024-05-22 PROBLEM — I48.92 ATRIAL FLUTTER (HCC): Status: ACTIVE | Noted: 2024-05-22

## 2024-05-22 LAB
CORTIS SERPL-MCNC: 14.2 UG/DL (ref 2.5–19.5)
CORTISOL COLLECTION INFO: NORMAL
FSH SERPL-ACNC: 23.2 MIU/ML
LH SERPL-ACNC: 18.1 MIU/ML (ref 1.7–8.6)
TESTOST SERPL-MCNC: 26 NG/DL (ref 3–41)

## 2024-05-22 PROCEDURE — 1111F DSCHRG MED/CURRENT MED MERGE: CPT

## 2024-05-22 PROCEDURE — 99215 OFFICE O/P EST HI 40 MIN: CPT

## 2024-05-22 PROCEDURE — 3078F DIAST BP <80 MM HG: CPT

## 2024-05-22 PROCEDURE — 3074F SYST BP LT 130 MM HG: CPT

## 2024-05-22 RX ORDER — FLUCONAZOLE 100 MG/1
200 TABLET ORAL DAILY
Qty: 14 TABLET | Refills: 0 | Status: SHIPPED | OUTPATIENT
Start: 2024-05-22 | End: 2024-05-29

## 2024-05-22 RX ORDER — NICOTINE 21 MG/24HR
1 PATCH, TRANSDERMAL 24 HOURS TRANSDERMAL DAILY
Qty: 14 PATCH | Refills: 0 | Status: SHIPPED | OUTPATIENT
Start: 2024-05-22

## 2024-05-22 SDOH — ECONOMIC STABILITY: FOOD INSECURITY: WITHIN THE PAST 12 MONTHS, YOU WORRIED THAT YOUR FOOD WOULD RUN OUT BEFORE YOU GOT MONEY TO BUY MORE.: NEVER TRUE

## 2024-05-22 SDOH — ECONOMIC STABILITY: FOOD INSECURITY: WITHIN THE PAST 12 MONTHS, THE FOOD YOU BOUGHT JUST DIDN'T LAST AND YOU DIDN'T HAVE MONEY TO GET MORE.: NEVER TRUE

## 2024-05-22 SDOH — ECONOMIC STABILITY: INCOME INSECURITY: HOW HARD IS IT FOR YOU TO PAY FOR THE VERY BASICS LIKE FOOD, HOUSING, MEDICAL CARE, AND HEATING?: NOT HARD AT ALL

## 2024-05-22 ASSESSMENT — ENCOUNTER SYMPTOMS
ABDOMINAL PAIN: 0
DIARRHEA: 0
SORE THROAT: 0
NAUSEA: 0
COUGH: 0
SHORTNESS OF BREATH: 0
CONSTIPATION: 0
VOMITING: 0

## 2024-05-22 ASSESSMENT — PATIENT HEALTH QUESTIONNAIRE - PHQ9
1. LITTLE INTEREST OR PLEASURE IN DOING THINGS: NOT AT ALL
SUM OF ALL RESPONSES TO PHQ QUESTIONS 1-9: 0
SUM OF ALL RESPONSES TO PHQ9 QUESTIONS 1 & 2: 0
2. FEELING DOWN, DEPRESSED OR HOPELESS: NOT AT ALL
SUM OF ALL RESPONSES TO PHQ QUESTIONS 1-9: 0

## 2024-05-22 NOTE — PATIENT INSTRUCTIONS
Thank you for letting us take care of you today. We hope all your questions were addressed. If a question was overlooked or something else comes to mind after you return home, please contact a member of your Care Team listed below.      Your Care Team at [facility] is Team #  Gavin Sanches, (Faculty)  Jessica Hussein (Resident)  Jessica Steinberg (Resident)   Shani Pacheco (Resident)  Arnold Villar (Resident)  Jennifer Long., [facility]  Vikki Mascorro., [facility]  Ira Claudio, [facility]  Angela Levi, [facility]  Jaguar Stephen, [facility]  Nellie Monae, [facility]  Monica Contreras, [facility]  Lily Vela, CHANDLER Wood (LJ) [facility] Paez (Clinical Practice Manager)  Thuy Ramsey [facility] (Clinical Pharmacist)     Office phone number: [phone]    If you need to get in right away due to illness, please be advised we have \"Same Day\" appointments available Monday-Friday. Please call us at [phone] option #[phone] to schedule your \"Same Day\" appointment.

## 2024-05-22 NOTE — PROGRESS NOTES
Kari Reddy (:  1970) is a 53 y.o. female,Established patient, here for evaluation of the following chief complaint(s):    Follow-Up from Hospital (Patient states everything is going okay )    Assessment & Plan     1. Hospital discharge follow-up  -     IA DISCHARGE MEDS RECONCILED W/ CURRENT OUTPATIENT MED LIST  2. Chest pain, unspecified type  3. Tobacco abuse  -     nicotine (NICODERM CQ) 14 MG/24HR; Place 1 patch onto the skin daily, Disp-14 patch, R-0Normal  4. Oral candidiasis  -     fluconazole (DIFLUCAN) 100 MG tablet; Take 2 tablets by mouth daily for 7 days, Disp-14 tablet, R-0Normal    Patient currently asymptomatic.  No chest pain.  Patient advised that if she develops any symptoms including chest pain or shortness of breath she is to immediately go to the hospital.  Patient voiced understanding.    For oral Candida we will try oral Diflucan at this time.    Return in 2 months (on 2024), or if symptoms worsen or fail to improve, for heart and lung disease.       Subjective     HPI    Ms. Kari Reddy, 53-year-old female, with previous medical history of CAD, HFrEF, DM type II, and HTN.    Presents to the [facility] medicine office today post hospital stay.    Patient was admitted to the hospital at [facility] due to chest pain.  Patient was admitted from 5/15-.  Patient ended up needing a cath and was transferred to [place].  On cath patient was found to have RCA stenosis with decision to treat medically.  No stent was placed.  Patient was also recommended LifeVest however patient refused and states that because she is DNR CCA status.  Patient has follow-up with cardiology in August.    At this time patient denies any further chest pain or shortness of breath.  Patient is asymptomatic today.  Patient blood pressure is 140/85.  Pulse 96.  Patient is taking her medications as prescribed.    Patient does states that she would like further treatment for oral
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
## 2024-05-22 NOTE — PROGRESS NOTES
Visit Information    Have you changed or started any medications since your last visit including any over-the-counter medicines, vitamins, or herbal medicines? no   Are you having any side effects from any of your medications? -  no  Have you stopped taking any of your medications? Is so, why? -  no    Have you seen any other physician or provider since your last visit? No  Have you had any other diagnostic tests since your last visit? Yes - Records Obtained  Have you been seen in the emergency room and/or had an admission to a hospital since we last saw you? Yes - Records Obtained  Have you had your routine dental cleaning in the past 6 months? no    Have you activated your [employer] account? If not, what are your barriers? Yes     Patient Care Team:  Louis Rivera DO as PCP - General (Family Medicine)  Dipesh Chance DO as PCP - Empaneled Provider    Medical History Review  Past Medical, Family, and Social History reviewed and does not contribute to the patient presenting condition    Health Maintenance   Topic Date Due    Hepatitis B vaccine (1 of 3 - 3-dose series) Never done    Colorectal Cancer Screen  Never done    Pneumococcal 0-64 years Vaccine (2 of 2 - PCV) 01/13/2018    Shingles vaccine (1 of 2) Never done    COVID-19 Vaccine (3 - 2023-24 season) 09/01/2023    Diabetic retinal exam  02/20/2024    Diabetic foot exam  03/07/2024    Flu vaccine (Season Ended) 08/01/2024    A1C test (Diabetic or Prediabetic)  08/10/2024    Diabetic Alb to Cr ratio (uACR) test  12/12/2024    Lipids  12/12/2024    Depression Screen  01/16/2025    GFR test (Diabetes, CKD 3-4, OR last GFR 15-59)  05/16/2025    Breast cancer screen  06/21/2025    Cervical cancer screen  01/03/2028    DTaP/Tdap/Td vaccine (3 - Td or Tdap) 02/17/2030    Hepatitis C screen  Completed    HIV screen  Completed    Hepatitis A vaccine  Aged Out    Hib vaccine  Aged Out    Polio vaccine  Aged Out    Meningococcal (ACWY) vaccine  Aged Out

## 2024-05-22 NOTE — PROGRESS NOTES
Attending Physician Statement  I have discussed the care of Kari Reddy, including pertinent history and exam findings,  with the resident. I have seen and examined the patient and the key elements of all parts of the encounter have been performed by me.  I agree with the assessment, plan and orders as documented by the resident.  (GC Modifier)    Glen Haines MD

## 2024-06-02 DIAGNOSIS — J44.1 COPD EXACERBATION (HCC): ICD-10-CM

## 2024-06-02 RX ORDER — PREDNISONE 20 MG/1
40 TABLET ORAL DAILY
Qty: 10 TABLET | Refills: 0 | OUTPATIENT
Start: 2024-06-02 | End: 2024-06-07

## 2024-06-09 NOTE — DISCHARGE SUMMARY
needed (dry mouth)            CHANGE how you take these medications      carvedilol 12.5 MG tablet  Commonly known as: COREG  Take 1 tablet by mouth 2 times daily (with meals)  What changed:   medication strength  how much to take  additional instructions     diclofenac sodium 1 % Gel  Commonly known as: VOLTAREN  Apply 4 g topically 4 times daily as needed for Pain  What changed:   when to take this  reasons to take this            CONTINUE taking these medications      albuterol sulfate  (90 Base) MCG/ACT inhaler  Commonly known as: Proventil HFA  Inhale 2 puffs into the lungs every 6 hours as needed for Wheezing     Alcohol Swabs Pads  1 Units by Does not apply route as needed (for blood sugar testing)     aspirin EC 81 MG EC tablet  Take 1 tablet by mouth daily     atorvastatin 40 MG tablet  Commonly known as: LIPITOR  Take 1 tablet by mouth daily     Beet Root 500 MG Caps     * blood glucose test strips  Test before all three meals and two hours after meals. Test as needed for symptoms of irregular blood glucose.     * blood glucose test strips  1 strip by Other route three times daily     Blood Pressure Kit  1 each by Does not apply route in the morning, at noon, and at bedtime     dapagliflozin 10 MG tablet  Commonly known as: Farxiga  Take 1 tablet by mouth every morning     Entresto 49-51 MG per tablet  Generic drug: sacubitril-valsartan  Take 1 tablet by mouth 2 times daily     famotidine 20 MG tablet  Commonly known as: PEPCID  TAKE ONE TABLET BY MOUTH BY MOUTH TWICE A DAY AS  NEEDED FOR ACID REFLUX     fluticasone 50 MCG/ACT nasal spray  Commonly known as: FLONASE  1 spray by Each Nostril route daily     FreeStyle Karen 2 Rhodelia Maggie  1 each by Does not apply route in the morning and at bedtime     FreeStyle Karen 2 Sensor Misc  1 each by Does not apply route in the morning and at bedtime     furosemide 40 MG tablet  Commonly known as: LASIX  Take 1 tablet by mouth daily     insulin lispro (1 Unit

## 2024-06-13 RX ORDER — POTASSIUM CHLORIDE 750 MG/1
10 TABLET, EXTENDED RELEASE ORAL 2 TIMES DAILY
Qty: 30 TABLET | Refills: 0 | Status: SHIPPED | OUTPATIENT
Start: 2024-06-13

## 2024-06-13 NOTE — TELEPHONE ENCOUNTER
Last visit: 05/22/2024  Last Med refill: 12/12/2023  Does patient have enough medication for 72 hours: No:     Next Visit Date:  Future Appointments   Date Time Provider Department Center   6/14/2024  8:30 AM [facility]   8/12/2024  2:30 PM Ramy Benitez MD [facility]       Health Maintenance   Topic Date Due    Hepatitis B vaccine (1 of 3 - 3-dose series) Never done    Colorectal Cancer Screen  Never done    Pneumococcal 0-64 years Vaccine (2 of 2 - PCV) 01/13/2018    Shingles vaccine (1 of 2) Never done    COVID-19 Vaccine (3 - 2023-24 season) 09/01/2023    Diabetic retinal exam  02/20/2024    Diabetic foot exam  03/07/2024    Flu vaccine (Season Ended) 08/01/2024    A1C test (Diabetic or Prediabetic)  08/10/2024    Diabetic Alb to Cr ratio (uACR) test  12/12/2024    Lipids  12/12/2024    GFR test (Diabetes, CKD 3-4, OR last GFR 15-59)  05/16/2025    Depression Screen  05/22/2025    Breast cancer screen  06/21/2025    Cervical cancer screen  01/03/2028    DTaP/Tdap/Td vaccine (3 - Td or Tdap) 02/17/2030    Hepatitis C screen  Completed    HIV screen  Completed    Hepatitis A vaccine  Aged Out    Hib vaccine  Aged Out    Polio vaccine  Aged Out    Meningococcal (ACWY) vaccine  Aged Out       Hemoglobin A1C (%)   Date Value   05/10/2024 9.3 (H)   03/19/2024 8.4   12/12/2023 9.6             ( goal A1C is < 7)   No components found for: \"LABMICR\"  No components found for: \"LDLCHOLESTEROL\", \"LDLCALC\"    (goal LDL is <100)   AST (U/L)   Date Value   05/10/2024 18     ALT (U/L)   Date Value   05/10/2024 14     BUN (mg/dL)   Date Value   05/16/2024 17     BP Readings from Last 3 Encounters:   05/22/24 116/72   05/16/24 (!) 151/110   05/15/24 136/85          (goal 120/80)    All Future Testing planned in [facility]  Lab Frequency Next Occurrence   Sleep Study with PAP Titration Once 04/19/2024   Basic Metabolic Panel Once 05/23/2024               Patient Active Problem List:     Migraine

## 2024-06-14 ENCOUNTER — HOSPITAL ENCOUNTER (OUTPATIENT)
Dept: PREADMISSION TESTING | Age: 54
Discharge: HOME | End: 2024-06-14

## 2024-06-19 ENCOUNTER — HOSPITAL ENCOUNTER (OUTPATIENT)
Dept: PREADMISSION TESTING | Age: 54
Discharge: HOME | End: 2024-06-19
Payer: COMMERCIAL

## 2024-06-19 VITALS
WEIGHT: 277.2 LBS | TEMPERATURE: 97.8 F | RESPIRATION RATE: 18 BRPM | HEART RATE: 89 BPM | HEIGHT: 67 IN | BODY MASS INDEX: 43.51 KG/M2 | SYSTOLIC BLOOD PRESSURE: 143 MMHG | OXYGEN SATURATION: 100 % | DIASTOLIC BLOOD PRESSURE: 84 MMHG

## 2024-06-19 LAB
ANION GAP SERPL CALCULATED.3IONS-SCNC: 13 MMOL/L (ref 9–17)
BUN SERPL-MCNC: 12 MG/DL (ref 6–20)
BUN/CREAT SERPL: 15 (ref 9–20)
CALCIUM SERPL-MCNC: 9.6 MG/DL (ref 8.6–10.4)
CHLORIDE SERPL-SCNC: 104 MMOL/L (ref 98–107)
CO2 SERPL-SCNC: 25 MMOL/L (ref 20–31)
CREAT SERPL-MCNC: 0.8 MG/DL (ref 0.5–0.9)
ERYTHROCYTE [DISTWIDTH] IN BLOOD BY AUTOMATED COUNT: 19.1 % (ref 11.8–14.4)
EST. AVERAGE GLUCOSE BLD GHB EST-MCNC: 240 MG/DL
GFR, ESTIMATED: 88 ML/MIN/1.73M2
GLUCOSE SERPL-MCNC: 120 MG/DL (ref 70–99)
HBA1C MFR BLD: 10 % (ref 4–6)
HCT VFR BLD AUTO: 45.3 % (ref 36.3–47.1)
HGB BLD-MCNC: 13.9 G/DL (ref 11.9–15.1)
MCH RBC QN AUTO: 26.3 PG (ref 25.2–33.5)
MCHC RBC AUTO-ENTMCNC: 30.7 G/DL (ref 28.4–34.8)
MCV RBC AUTO: 85.8 FL (ref 82.6–102.9)
NRBC BLD-RTO: 0 PER 100 WBC
PLATELET # BLD AUTO: 268 K/UL (ref 138–453)
PMV BLD AUTO: 10.1 FL (ref 8.1–13.5)
POTASSIUM SERPL-SCNC: 3.9 MMOL/L (ref 3.7–5.3)
RBC # BLD AUTO: 5.28 M/UL (ref 3.95–5.11)
SODIUM SERPL-SCNC: 142 MMOL/L (ref 135–144)
WBC OTHER # BLD: 8.2 K/UL (ref 3.5–11.3)

## 2024-06-19 PROCEDURE — 36415 COLL VENOUS BLD VENIPUNCTURE: CPT

## 2024-06-19 PROCEDURE — 80048 BASIC METABOLIC PNL TOTAL CA: CPT

## 2024-06-19 PROCEDURE — 83036 HEMOGLOBIN GLYCOSYLATED A1C: CPT

## 2024-06-19 PROCEDURE — 85027 COMPLETE CBC AUTOMATED: CPT

## 2024-06-19 ASSESSMENT — PAIN DESCRIPTION - LOCATION: LOCATION: BACK;LEG

## 2024-06-19 ASSESSMENT — PAIN SCALES - GENERAL: PAINLEVEL_OUTOF10: 7

## 2024-06-19 ASSESSMENT — PAIN DESCRIPTION - ORIENTATION: ORIENTATION: LEFT

## 2024-06-19 ASSESSMENT — PAIN DESCRIPTION - DESCRIPTORS: DESCRIPTORS: ACHING

## 2024-06-19 NOTE — PRE-PROCEDURE INSTRUCTIONS
ARRIVE AT THE HOSPITAL ON Friday, 6/28/2024 .  Dr Milton\"s office will call you the week prior to surgery with your arrival time for the day of surgery.    Once you enter the hospital lobby, take the elevators to the second floor.  Check-In is at the surgery registration desk.      Continue to take your home medications as you normally do up to and including the night before surgery with the exception of any blood thinning medications.      Blood Thinning Medications:  Please stop prescription blood thinning medications such as Apixaban (Eliquis); Clopidogrel (Plavix); Dabigatran (Pradaxa); Prasugrel (Effient); Rivaroxaban (Xarelto); Ticagrelor (Brilinta); Warfarin (Coumadin) only as directed by your surgeon and/or the prescribing physician    Some common examples of other medications that can thin your blood are: Aspirin, Ibuprofen (Advil, Motrin), Naproxen (Aleve), Meloxicam (Mobic), Celecoxib (Celebrex), Fish Oil, many Herbal Supplements.  These medications should usually be stopped at least 7 days prior to surgery.    Trulicity-stop 1 week prior to surgery, aspirin-contact family doctor regarding when to stop prior to surgery, voltaren gel, beet root, tumeric    Tylenol is OK to take for pain the week prior to surgery.    Failure to stop certain medications may interfere with your scheduled surgery.    If you receive instructions from your surgeon regarding what medications to stop prior to surgery, please follow those specific instructions.      If You Have Diabetes:  Do not take any of your diabetic medications, (injectables or by mouth) the morning of surgery unless otherwise instructed by the doctor who manages your diabetes. If you are taking insulin, contact the doctor the manages your diabetes for instructions about any changes to your insulin dosages the day before surgery.    If your blood sugar is low enough the morning of surgery that it is causing problems for you please call the PreOp department at  overnight for your surgery please bring the machine with you.    Do not wear any jewelry or body piercings day of surgery.  No nail polish on the operative extremity (arm/leg surgeries)    If you are staying overnight with us, please bring a small bag of necessary personal items.      Please wear loose, comfortable clothing.  If you are potentially going to have a cast or brace bring clothing that will fit over them.                                                                                                                          In case of illness - If you have cold or flu like symptoms (high fever, runny nose, sore throat, cough, etc.) rash, nausea, vomiting, loose stools, and/or recent contact with someone who has a contagious disease (chicken pox, measles, etc.) Please call your doctor before coming to the hospital.     Day of Surgery/Procedure:    As a patient at [facility] you can expect quality medical and nursing care that is centered on your individual needs.  Our goal is to make your surgical experience as comfortable as possible    .  Transportation After Your Surgery/Procedure:    You will need a friend or family member to drive you home after your procedure.  Your  must be 18 years of age or older and able to sign off on your discharge instructions.  A taxi cab or any other form of public transportation is not acceptable.  Your friend or family member must stay at the hospital throughout your procedure.    Someone must remain with you for the first 24 hours after your surgery if you receive anesthesia or medication.  If you do not have someone to stay with you, your procedure may be cancelled.      If you have any other questions regarding your procedure or the day of surgery, please call [phone]
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
## 2024-06-20 NOTE — PERIOP NOTE
Dr. Lopez reviewed H&P, labs, EKG, holter monitor, echo, and cardiac cath. Requesting cardiac clearance. Notified Lorene at Dr. Milton's office, she will send request.

## 2024-06-21 PROBLEM — Z09 HOSPITAL DISCHARGE FOLLOW-UP: Status: RESOLVED | Noted: 2024-05-22 | Resolved: 2024-06-21

## 2024-06-26 ENCOUNTER — ANESTHESIA EVENT (OUTPATIENT)
Dept: OPERATING ROOM | Age: 54
End: 2024-06-26
Payer: COMMERCIAL

## 2024-06-28 ENCOUNTER — HOSPITAL ENCOUNTER (OUTPATIENT)
Age: 54
Setting detail: OUTPATIENT SURGERY
Discharge: HOME | End: 2024-06-28
Attending: OTOLARYNGOLOGY | Admitting: OTOLARYNGOLOGY
Payer: COMMERCIAL

## 2024-06-28 ENCOUNTER — ANESTHESIA (OUTPATIENT)
Dept: OPERATING ROOM | Age: 54
End: 2024-06-28
Payer: COMMERCIAL

## 2024-06-28 VITALS
TEMPERATURE: 96.8 F | BODY MASS INDEX: 43.38 KG/M2 | SYSTOLIC BLOOD PRESSURE: 112 MMHG | HEART RATE: 75 BPM | WEIGHT: 277 LBS | DIASTOLIC BLOOD PRESSURE: 96 MMHG | OXYGEN SATURATION: 100 % | RESPIRATION RATE: 22 BRPM

## 2024-06-28 DIAGNOSIS — G89.18 POSTOPERATIVE PAIN: Primary | ICD-10-CM

## 2024-06-28 DIAGNOSIS — R49.0 FLACCID DYSPHONIA: ICD-10-CM

## 2024-06-28 DIAGNOSIS — J38.3 OTHER DISEASES OF VOCAL CORDS: ICD-10-CM

## 2024-06-28 DIAGNOSIS — J31.2 CHRONIC PHARYNGITIS: ICD-10-CM

## 2024-06-28 LAB
GLUCOSE BLD-MCNC: 189 MG/DL (ref 65–105)
GLUCOSE BLD-MCNC: 214 MG/DL (ref 65–105)

## 2024-06-28 PROCEDURE — 3600000002 HC SURGERY LEVEL 2 BASE: Performed by: OTOLARYNGOLOGY

## 2024-06-28 PROCEDURE — 6360000002 HC RX W HCPCS: Performed by: NURSE ANESTHETIST, CERTIFIED REGISTERED

## 2024-06-28 PROCEDURE — 3700000000 HC ANESTHESIA ATTENDED CARE: Performed by: OTOLARYNGOLOGY

## 2024-06-28 PROCEDURE — 88305 TISSUE EXAM BY PATHOLOGIST: CPT

## 2024-06-28 PROCEDURE — 7100000011 HC PHASE II RECOVERY - ADDTL 15 MIN: Performed by: OTOLARYNGOLOGY

## 2024-06-28 PROCEDURE — 88342 IMHCHEM/IMCYTCHM 1ST ANTB: CPT

## 2024-06-28 PROCEDURE — 7100000000 HC PACU RECOVERY - FIRST 15 MIN: Performed by: OTOLARYNGOLOGY

## 2024-06-28 PROCEDURE — 3700000001 HC ADD 15 MINUTES (ANESTHESIA): Performed by: OTOLARYNGOLOGY

## 2024-06-28 PROCEDURE — 88312 SPECIAL STAINS GROUP 1: CPT

## 2024-06-28 PROCEDURE — 88341 IMHCHEM/IMCYTCHM EA ADD ANTB: CPT

## 2024-06-28 PROCEDURE — 2709999900 HC NON-CHARGEABLE SUPPLY: Performed by: OTOLARYNGOLOGY

## 2024-06-28 PROCEDURE — 82947 ASSAY GLUCOSE BLOOD QUANT: CPT

## 2024-06-28 PROCEDURE — 2580000003 HC RX 258: Performed by: ANESTHESIOLOGY

## 2024-06-28 PROCEDURE — 7100000010 HC PHASE II RECOVERY - FIRST 15 MIN: Performed by: OTOLARYNGOLOGY

## 2024-06-28 PROCEDURE — 7100000001 HC PACU RECOVERY - ADDTL 15 MIN: Performed by: OTOLARYNGOLOGY

## 2024-06-28 PROCEDURE — 3600000012 HC SURGERY LEVEL 2 ADDTL 15MIN: Performed by: OTOLARYNGOLOGY

## 2024-06-28 PROCEDURE — 2500000003 HC RX 250 WO HCPCS: Performed by: NURSE ANESTHETIST, CERTIFIED REGISTERED

## 2024-06-28 RX ORDER — ONDANSETRON 2 MG/ML
4 INJECTION INTRAMUSCULAR; INTRAVENOUS
Status: DISCONTINUED | OUTPATIENT
Start: 2024-06-28 | End: 2024-06-28 | Stop reason: HOSPADM

## 2024-06-28 RX ORDER — SODIUM CHLORIDE 9 MG/ML
INJECTION, SOLUTION INTRAVENOUS CONTINUOUS
Status: DISCONTINUED | OUTPATIENT
Start: 2024-06-28 | End: 2024-06-28 | Stop reason: HOSPADM

## 2024-06-28 RX ORDER — LIDOCAINE HYDROCHLORIDE 10 MG/ML
1 INJECTION, SOLUTION EPIDURAL; INFILTRATION; INTRACAUDAL; PERINEURAL
Status: DISCONTINUED | OUTPATIENT
Start: 2024-06-29 | End: 2024-06-28 | Stop reason: HOSPADM

## 2024-06-28 RX ORDER — NALOXONE HYDROCHLORIDE 0.4 MG/ML
INJECTION, SOLUTION INTRAMUSCULAR; INTRAVENOUS; SUBCUTANEOUS PRN
Status: DISCONTINUED | OUTPATIENT
Start: 2024-06-28 | End: 2024-06-28 | Stop reason: SDUPTHER

## 2024-06-28 RX ORDER — DEXAMETHASONE SODIUM PHOSPHATE 10 MG/ML
INJECTION, SOLUTION INTRAMUSCULAR; INTRAVENOUS PRN
Status: DISCONTINUED | OUTPATIENT
Start: 2024-06-28 | End: 2024-06-28 | Stop reason: SDUPTHER

## 2024-06-28 RX ORDER — FENTANYL CITRATE 50 UG/ML
INJECTION, SOLUTION INTRAMUSCULAR; INTRAVENOUS PRN
Status: DISCONTINUED | OUTPATIENT
Start: 2024-06-28 | End: 2024-06-28 | Stop reason: SDUPTHER

## 2024-06-28 RX ORDER — FENTANYL CITRATE 50 UG/ML
25 INJECTION, SOLUTION INTRAMUSCULAR; INTRAVENOUS EVERY 5 MIN PRN
Status: DISCONTINUED | OUTPATIENT
Start: 2024-06-28 | End: 2024-06-28 | Stop reason: HOSPADM

## 2024-06-28 RX ORDER — SODIUM CHLORIDE, SODIUM LACTATE, POTASSIUM CHLORIDE, CALCIUM CHLORIDE 600; 310; 30; 20 MG/100ML; MG/100ML; MG/100ML; MG/100ML
INJECTION, SOLUTION INTRAVENOUS CONTINUOUS
Status: DISCONTINUED | OUTPATIENT
Start: 2024-06-28 | End: 2024-06-28 | Stop reason: HOSPADM

## 2024-06-28 RX ORDER — FENTANYL CITRATE 50 UG/ML
50 INJECTION, SOLUTION INTRAMUSCULAR; INTRAVENOUS EVERY 5 MIN PRN
Status: DISCONTINUED | OUTPATIENT
Start: 2024-06-28 | End: 2024-06-28 | Stop reason: HOSPADM

## 2024-06-28 RX ORDER — OXYCODONE HYDROCHLORIDE AND ACETAMINOPHEN 5; 325 MG/1; MG/1
1 TABLET ORAL EVERY 6 HOURS PRN
Qty: 12 TABLET | Refills: 0 | Status: SHIPPED | OUTPATIENT
Start: 2024-06-28 | End: 2024-07-01

## 2024-06-28 RX ORDER — ETOMIDATE 2 MG/ML
INJECTION INTRAVENOUS PRN
Status: DISCONTINUED | OUTPATIENT
Start: 2024-06-28 | End: 2024-06-28 | Stop reason: SDUPTHER

## 2024-06-28 RX ORDER — SUCCINYLCHOLINE/SOD CL,ISO/PF 100 MG/5ML
SYRINGE (ML) INTRAVENOUS PRN
Status: DISCONTINUED | OUTPATIENT
Start: 2024-06-28 | End: 2024-06-28 | Stop reason: SDUPTHER

## 2024-06-28 RX ORDER — MIDAZOLAM HYDROCHLORIDE 1 MG/ML
INJECTION INTRAMUSCULAR; INTRAVENOUS PRN
Status: DISCONTINUED | OUTPATIENT
Start: 2024-06-28 | End: 2024-06-28 | Stop reason: SDUPTHER

## 2024-06-28 RX ORDER — SODIUM CHLORIDE 0.9 % (FLUSH) 0.9 %
5-40 SYRINGE (ML) INJECTION PRN
Status: DISCONTINUED | OUTPATIENT
Start: 2024-06-28 | End: 2024-06-28 | Stop reason: HOSPADM

## 2024-06-28 RX ORDER — LIDOCAINE HYDROCHLORIDE 20 MG/ML
INJECTION, SOLUTION EPIDURAL; INFILTRATION; INTRACAUDAL; PERINEURAL PRN
Status: DISCONTINUED | OUTPATIENT
Start: 2024-06-28 | End: 2024-06-28 | Stop reason: SDUPTHER

## 2024-06-28 RX ORDER — SODIUM CHLORIDE 0.9 % (FLUSH) 0.9 %
5-40 SYRINGE (ML) INJECTION EVERY 12 HOURS SCHEDULED
Status: DISCONTINUED | OUTPATIENT
Start: 2024-06-28 | End: 2024-06-28 | Stop reason: HOSPADM

## 2024-06-28 RX ORDER — DIPHENHYDRAMINE HYDROCHLORIDE 50 MG/ML
12.5 INJECTION INTRAMUSCULAR; INTRAVENOUS
Status: DISCONTINUED | OUTPATIENT
Start: 2024-06-28 | End: 2024-06-28 | Stop reason: HOSPADM

## 2024-06-28 RX ORDER — PROCHLORPERAZINE EDISYLATE 5 MG/ML
5 INJECTION INTRAMUSCULAR; INTRAVENOUS
Status: DISCONTINUED | OUTPATIENT
Start: 2024-06-28 | End: 2024-06-28 | Stop reason: HOSPADM

## 2024-06-28 RX ORDER — ROCURONIUM BROMIDE 10 MG/ML
INJECTION, SOLUTION INTRAVENOUS PRN
Status: DISCONTINUED | OUTPATIENT
Start: 2024-06-28 | End: 2024-06-28 | Stop reason: SDUPTHER

## 2024-06-28 RX ORDER — ONDANSETRON 2 MG/ML
INJECTION INTRAMUSCULAR; INTRAVENOUS PRN
Status: DISCONTINUED | OUTPATIENT
Start: 2024-06-28 | End: 2024-06-28 | Stop reason: SDUPTHER

## 2024-06-28 RX ORDER — OXYCODONE HYDROCHLORIDE 5 MG/1
5 TABLET ORAL
Status: DISCONTINUED | OUTPATIENT
Start: 2024-06-28 | End: 2024-06-28 | Stop reason: HOSPADM

## 2024-06-28 RX ORDER — SODIUM CHLORIDE 9 MG/ML
INJECTION, SOLUTION INTRAVENOUS PRN
Status: DISCONTINUED | OUTPATIENT
Start: 2024-06-28 | End: 2024-06-28 | Stop reason: HOSPADM

## 2024-06-28 RX ADMIN — MIDAZOLAM 2 MG: 1 INJECTION INTRAMUSCULAR; INTRAVENOUS at 08:50

## 2024-06-28 RX ADMIN — Medication 100 MG: at 08:55

## 2024-06-28 RX ADMIN — ONDANSETRON 4 MG: 2 INJECTION INTRAMUSCULAR; INTRAVENOUS at 09:07

## 2024-06-28 RX ADMIN — FENTANYL CITRATE 50 MCG: 50 INJECTION INTRAMUSCULAR; INTRAVENOUS at 08:55

## 2024-06-28 RX ADMIN — NALOXONE HYDROCHLORIDE 0.08 MG: 0.4 INJECTION, SOLUTION INTRAMUSCULAR; INTRAVENOUS; SUBCUTANEOUS at 09:33

## 2024-06-28 RX ADMIN — LIDOCAINE HYDROCHLORIDE 100 MG: 20 INJECTION, SOLUTION EPIDURAL; INFILTRATION; INTRACAUDAL; PERINEURAL at 08:55

## 2024-06-28 RX ADMIN — ETOMIDATE 20 MG: 2 INJECTION, SOLUTION INTRAVENOUS at 08:55

## 2024-06-28 RX ADMIN — DEXAMETHASONE SODIUM PHOSPHATE 4 MG: 10 INJECTION, SOLUTION INTRAMUSCULAR; INTRAVENOUS at 09:07

## 2024-06-28 RX ADMIN — SODIUM CHLORIDE, POTASSIUM CHLORIDE, SODIUM LACTATE AND CALCIUM CHLORIDE: 600; 310; 30; 20 INJECTION, SOLUTION INTRAVENOUS at 08:15

## 2024-06-28 RX ADMIN — ROCURONIUM BROMIDE 20 MG: 10 INJECTION, SOLUTION INTRAVENOUS at 09:14

## 2024-06-28 RX ADMIN — SUGAMMADEX 200 MG: 100 INJECTION, SOLUTION INTRAVENOUS at 09:18

## 2024-06-28 RX ADMIN — ETOMIDATE 6 MG: 2 INJECTION, SOLUTION INTRAVENOUS at 09:13

## 2024-06-28 ASSESSMENT — PAIN - FUNCTIONAL ASSESSMENT
PAIN_FUNCTIONAL_ASSESSMENT: 0-10
PAIN_FUNCTIONAL_ASSESSMENT: FACE, LEGS, ACTIVITY, CRY, AND CONSOLABILITY (FLACC)
PAIN_FUNCTIONAL_ASSESSMENT: PREVENTS OR INTERFERES SOME ACTIVE ACTIVITIES AND ADLS

## 2024-06-28 ASSESSMENT — LIFESTYLE VARIABLES: SMOKING_STATUS: 1

## 2024-06-28 ASSESSMENT — PAIN DESCRIPTION - DESCRIPTORS: DESCRIPTORS: ACHING

## 2024-06-28 ASSESSMENT — ENCOUNTER SYMPTOMS: SHORTNESS OF BREATH: 0

## 2024-06-28 NOTE — ANESTHESIA POSTPROCEDURE EVALUATION
Department of Anesthesiology  Postprocedure Note    Patient: Kari Reddy  MRN: [id number]  YOB: 1970  Date of evaluation: 6/28/2024    Procedure Summary       Date: 06/28/24 Room / Location: [address]    Anesthesia Start: 0850 Anesthesia Stop: 0947    Procedure: DIRECT LARYNGOSCOPY WITH BIOPSY Diagnosis:       Chronic pharyngitis      Other diseases of vocal cords      Flaccid dysphonia      (Chronic pharyngitis [J31.2])      (Other diseases of vocal cords [J38.3])      (Flaccid dysphonia [R49.0])    Surgeons: Batsheva Milton MD Responsible Provider: Wendy Santos MD    Anesthesia Type: general ASA Status: 4            Anesthesia Type: No value filed.    Nargis Phase I: Nargis Score: 10    Nargis Phase II: Nargis Score: 10    Anesthesia Post Evaluation    Cardiovascular status: hemodynamically stable    No notable events documented.

## 2024-06-28 NOTE — ANESTHESIA PRE PROCEDURE
Department of Anesthesiology  Preprocedure Note       Name:  Kari Reddy   Age:  53 y.o.  :  1970                                          MRN:  [id number]         Date:  2024      Surgeon: Surgeon(s):  Batsheva Milton MD    Procedure: Procedure(s):  DIRECT LARYNGOSCOPY WITH BIOPSY    Medications prior to admission:   Prior to Admission medications    Medication Sig Start Date End Date Taking? Authorizing Provider   potassium chloride (KLOR-CON M) 10 MEQ extended release tablet TAKE 1 TABLET BY MOUTH TWICE A DAY 24   Louis Rivera DO   nicotine (NICODERM CQ) 14 MG/24HR Place 1 patch onto the skin daily 24   Arnold Villar MD   insulin glargine (LANTUS) 100 UNIT/ML injection vial Inject 30 Units into the skin 2 times daily 24   Chalo Marquez MD   carvedilol (COREG) 12.5 MG tablet Take 1 tablet by mouth 2 times daily (with meals) 24   Chalo Marquez MD   lidocaine 4 % external patch Place 1 patch onto the skin daily 24   Chalo Marquez MD   diclofenac sodium (VOLTAREN) 1 % GEL Apply 4 g topically 4 times daily as needed for Pain 24   Chalo Marquez MD   saliva substitute (BIOTENE/MOUTH KOTE) SOLN liquid Take 1 Dose by mouth as needed (dry mouth) 24   Chalo Marquez MD   Misc Natural Products (BEET ROOT) 500 MG CAPS Take 1,000 mg by mouth daily    ProviderDeirdre MD   Turmeric 500 MG TABS Take 500 mg by mouth daily    ProviderDeirdre MD   dulaglutide (TRULICITY) 1.5 MG/0.5ML SC injection INJECT 1.5 MG UNDER THE SKIN ONCE WEEKLY 24   Louis Rivera DO   ondansetron (ZOFRAN) 4 MG tablet Take 1 tablet by mouth every 8 hours as needed for Nausea or Vomiting 24   Tarsha Matos MD   albuterol sulfate HFA (PROVENTIL HFA) 108 (90 Base) MCG/ACT inhaler Inhale 2 puffs into the lungs every 6 hours as needed for Wheezing 24   Mansour, Mansour Mohamed I, DO   insulin lispro, 1 Unit Dial, (HUMALOG KWIKPEN) 100 UNIT/ML SOPN Inject 0-15

## 2024-06-28 NOTE — DISCHARGE INSTRUCTIONS
[phone]  POST-OPERATIVE INSTRUCTIONS: DIRECT LARYNGOSCOPY   Please observe absolute voice rest for at least seven days.  This gives the raw tissue a chance to begin to heal.  Plan to carry a pad of paper and a pen with you at all times.    Hoarseness may last up to 2 to 3 weeks.  During this time swelling will gradually decrease and the lining of the vocal cords will regenerate.      If you speak, please do so in a normal tone of voice.  This creates less trauma to the vocal cords than whispering or shouting.    You may conduct business, but not discuss philosophy.  Try to keep personal or phone conversations to a minimum for the first week after surgery.      Please drink plenty of fluids.  Dehydration is extremely harmful to the vocal cords.    You may resume your normal diet as tolerated.    Avoid excessive coughing or throat clearing.  These are two of the most damaging things you can do to the vocal cords, especially during the healing process.      If antibiotics are prescribed, please take them as directed until they are all gone.  You may take the pain medication as needed.    Anti-reflux medications are usually started either before or at the time of surgery.  This is to protect your vocal cords from stomach acid during the healing process.      You will be informed if further therapy is required.  Occasionally procedures are staged so that abnormal scar tissue will not form.    Contact our office for a follow-up appointment for 2 weeks from the day of surgery.      Contact our office and report any:  Airway obstruction or excessive noise during breathing Excessive Sputum or pain not relieved by medication  Difficult swallowing     Temperature above 101 degrees      You are being sent home with a prescription for opioid pain medication. This medication was prescribed by your physician. Be sure to follow the instructions below.    Follow instructions as written on the bottle and

## 2024-06-28 NOTE — H&P
Obese BMI 43. Soft, nontender, nondistended, and active bowel sounds.  Neurologic: Normal speech and cranial nerves II through XII grossly intact. Strength 5/5 bilaterally.  Skin: No gross lesions, rashes, bruising, or bleeding on exposed skin area.  Extremities: Posterior tibial pulses 2+ bilaterally. No pedal edema.  No calf tenderness with palpation.  Psych: Normal affect.     Investigations:      Laboratory Testing:  No results found for this or any previous visit (from the past 24 hour(s)).    No results for input(s): \"HGB\", \"HCT\", \"WBC\", \"MCV\", \"PLATELET\", \"NA\", \"K\", \"CL\", \"CO2\", \"BUN\", \"CREATININE\", \"GLUCOSE\", \"INR\", \"PROTIME\", \"APTT\", \"AST\", \"ALT\", \"LABALBU\", \"HCG\" in the last 720 hours.    No results for input(s): \"COVID19\" in the last 720 hours.    *Please note that labs listed above are the most recent lab values available in [facility] at the time of the visit and additional labs may have been drawn or resulted since that time.    Imaging/Diagnostics:      No results found.    EKG: See Chart.    Diagnosis:      1. Chronic pharyngitis; Other diseases of vocal cords; Dysphonia    Plans:     1. DIRECT LARYNGOSCOPY WITH BIOPSY      WENDY Dubose CNP  6/19/2024  2:39 PM

## 2024-06-28 NOTE — OP NOTE
Operative Note      Patient: Kari Reddy  YOB: 1970  MRN: [id number]    Date of Procedure: 6/28/2024    Pre-Op Diagnosis Codes:     * Chronic pharyngitis [J31.2]     * Other diseases of vocal cords [J38.3]     * Flaccid dysphonia [R49.0]    Post-Op Diagnosis: Same       Procedure(s):  DIRECT LARYNGOSCOPY WITH BIOPSY    Surgeon(s):  Batsheva Milton MD    Assistant:   * No surgical staff found *    Anesthesia: General    Estimated Blood Loss (mL): Minimal    Complications: None    Specimens:   ID Type Source Tests Collected by Time Destination   A : LEFT VOCAL CORD Tissue Mouth SURGICAL PATHOLOGY Batsheva Milton MD 6/28/2024 0916        Implants:  * No implants in log *      Drains: * No LDAs found *    Findings:  Infection Present At Time Of Surgery (PATOS) (choose all levels that have infection present):  No infection present  Other Findings:  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  A general anesthetic was given and the patient was intubated.  A tooth guard was placed and a rigid laryngoscope was passed into the oral cavity and into the pharynx.  Examination of the hard palate, soft palate, tongue, floor of mouth, oropharynx, hypopharynx, pyriform sinuses, esophageal inlet, base of tongue, supraglottis, and true vocal cords was done.  Cup forcep biopsy was done of the left vocal cord ulcerated area that had leukoplakia and erythroplakia.  The right vocal cord also had a small patch of leukoplakia.    The mouth guard and scope was removed.  There were no complications.  Kari Reddy was awakened, extubated, and returned to recovery in acceptable condition.               Electronically signed by Batsheva Milton MD on 6/28/2024 at 9:21 AM

## 2024-07-01 RX ORDER — POTASSIUM CHLORIDE 750 MG/1
10 TABLET, EXTENDED RELEASE ORAL 2 TIMES DAILY
Qty: 30 TABLET | Refills: 0 | Status: SHIPPED | OUTPATIENT
Start: 2024-07-01

## 2024-07-01 NOTE — TELEPHONE ENCOUNTER
kidney injury) ([facility])     Hidradenitis suppurativa     Type 2 diabetes mellitus without complication, with long-term current use of insulin ([facility])     Toenail deformity     Skin infection     Arthritis     Essential hypertension     Lumbar back pain with radiculopathy affecting left lower extremity     Encounter for smoking cessation counseling     CHF (congestive heart failure), NYHA class II, chronic, combined ([facility])     Elevated laboratory test result     Itching of vagina-labia minora bl     Allergic rhinitis     Type 2 diabetes mellitus with hyperglycemia     Sleep apnea     Hirsutism     Post-nasal drainage     Acute hypoxemic respiratory failure ([facility])     Respiratory distress     NSTEMI (non-ST elevated myocardial infarction) ([facility])     Chest pain     Oral candidiasis     Atrial flutter ([facility])      ----JF

## 2024-07-02 LAB — SURGICAL PATHOLOGY REPORT: NORMAL

## 2024-07-12 RX ORDER — POTASSIUM CHLORIDE 750 MG/1
10 TABLET, EXTENDED RELEASE ORAL 2 TIMES DAILY
Qty: 30 TABLET | Refills: 0 | OUTPATIENT
Start: 2024-07-12

## 2024-08-13 ENCOUNTER — OFFICE VISIT (OUTPATIENT)
Dept: FAMILY MEDICINE CLINIC | Age: 54
End: 2024-08-13
Payer: COMMERCIAL

## 2024-08-13 VITALS — HEART RATE: 97 BPM | SYSTOLIC BLOOD PRESSURE: 150 MMHG | DIASTOLIC BLOOD PRESSURE: 84 MMHG | TEMPERATURE: 97.9 F

## 2024-08-13 DIAGNOSIS — L02.239 CARBUNCLE AND FURUNCLE OF TRUNK: Primary | ICD-10-CM

## 2024-08-13 DIAGNOSIS — L02.229 CARBUNCLE AND FURUNCLE OF TRUNK: Primary | ICD-10-CM

## 2024-08-13 DIAGNOSIS — L02.432 CARBUNCLE OF LEFT AXILLA: ICD-10-CM

## 2024-08-13 PROCEDURE — 3079F DIAST BP 80-89 MM HG: CPT

## 2024-08-13 PROCEDURE — 3077F SYST BP >= 140 MM HG: CPT

## 2024-08-13 PROCEDURE — 99213 OFFICE O/P EST LOW 20 MIN: CPT

## 2024-08-13 RX ORDER — CEPHALEXIN 500 MG/1
500 CAPSULE ORAL 4 TIMES DAILY
Qty: 28 CAPSULE | Refills: 0 | Status: SHIPPED | OUTPATIENT
Start: 2024-08-13 | End: 2024-08-20

## 2024-08-13 RX ORDER — DOXYCYCLINE HYCLATE 100 MG
100 TABLET ORAL 2 TIMES DAILY
Qty: 14 TABLET | Refills: 0 | Status: SHIPPED | OUTPATIENT
Start: 2024-08-13 | End: 2024-08-20

## 2024-08-13 ASSESSMENT — ENCOUNTER SYMPTOMS
DIARRHEA: 0
SHORTNESS OF BREATH: 0
EYE PAIN: 0
VOMITING: 0
COUGH: 0
RHINORRHEA: 0
SORE THROAT: 0
NAIL CHANGES: 0
COLOR CHANGE: 1
BACK PAIN: 0

## 2024-08-13 NOTE — PROGRESS NOTES
100 MG tablet Take 1 tablet by mouth 2 times daily for 7 days 14 tablet 0    cephALEXin (KEFLEX) 500 MG capsule Take 1 capsule by mouth 4 times daily for 7 days 28 capsule 0    potassium chloride (KLOR-CON M) 10 MEQ extended release tablet TAKE 1 TABLET BY MOUTH 2 TIMES A DAY 30 tablet 0    nicotine (NICODERM CQ) 14 MG/24HR Place 1 patch onto the skin daily 14 patch 0    insulin glargine (LANTUS) 100 UNIT/ML injection vial Inject 30 Units into the skin 2 times daily 10 mL 3    carvedilol (COREG) 12.5 MG tablet Take 1 tablet by mouth 2 times daily (with meals) 60 tablet 3    lidocaine 4 % external patch Place 1 patch onto the skin daily 30 patch 0    diclofenac sodium (VOLTAREN) 1 % GEL Apply 4 g topically 4 times daily as needed for Pain 150 g 1    saliva substitute (BIOTENE/MOUTH KOTE) SOLN liquid Take 1 Dose by mouth as needed (dry mouth) 44.3 mL 0    Misc Natural Products (BEET ROOT) 500 MG CAPS Take 1,000 mg by mouth daily      Turmeric 500 MG TABS Take 500 mg by mouth daily      dulaglutide (TRULICITY) 1.5 MG/0.5ML SC injection INJECT 1.5 MG UNDER THE SKIN ONCE WEEKLY 2 mL 5    albuterol sulfate HFA (PROVENTIL HFA) 108 (90 Base) MCG/ACT inhaler Inhale 2 puffs into the lungs every 6 hours as needed for Wheezing 18 g 3    insulin lispro, 1 Unit Dial, (HUMALOG KWIKPEN) 100 UNIT/ML SOPN Inject 0-15 units before meals based on sliding scale 15 mL 5    Insulin Pen Needle ([employer] PEN NEEDLES) 32G X 4 MM MISC USE WITH INSULIN DAILY 100 each 1    Continuous Blood Gluc  (FREESTYLE JACKSON 2 READER) CARLOS 1 each by Does not apply route in the morning and at bedtime 1 each 5    Continuous Blood Gluc Sensor (FREESTYLE JACKSON 2 SENSOR) MISC 1 each by Does not apply route in the morning and at bedtime 1 each 5    Alcohol Swabs PADS 1 Units by Does not apply route as needed (for blood sugar testing) 1 each 3    blood glucose monitor strips 1 strip by Other route three times daily 100 strip 5    dapagliflozin (FARXIGA) 10

## 2024-08-18 ENCOUNTER — APPOINTMENT (OUTPATIENT)
Dept: CT IMAGING | Age: 54
End: 2024-08-18
Payer: COMMERCIAL

## 2024-08-18 ENCOUNTER — HOSPITAL ENCOUNTER (EMERGENCY)
Age: 54
Discharge: HOME | End: 2024-08-18
Payer: COMMERCIAL

## 2024-08-18 VITALS
OXYGEN SATURATION: 100 % | RESPIRATION RATE: 18 BRPM | SYSTOLIC BLOOD PRESSURE: 153 MMHG | WEIGHT: 274 LBS | HEART RATE: 95 BPM | TEMPERATURE: 98 F | BODY MASS INDEX: 42.91 KG/M2 | DIASTOLIC BLOOD PRESSURE: 95 MMHG

## 2024-08-18 DIAGNOSIS — R10.10 PAIN OF UPPER ABDOMEN: Primary | ICD-10-CM

## 2024-08-18 DIAGNOSIS — K59.00 CONSTIPATION, UNSPECIFIED CONSTIPATION TYPE: ICD-10-CM

## 2024-08-18 DIAGNOSIS — D25.9 UTERINE LEIOMYOMA, UNSPECIFIED LOCATION: ICD-10-CM

## 2024-08-18 LAB
ALBUMIN SERPL-MCNC: 4 G/DL (ref 3.5–5.2)
ALP SERPL-CCNC: 85 U/L (ref 35–104)
ALT SERPL-CCNC: 21 U/L (ref 5–33)
ANION GAP SERPL CALCULATED.3IONS-SCNC: 14 MMOL/L (ref 9–17)
AST SERPL-CCNC: 24 U/L
BASOPHILS # BLD: <0.03 K/UL (ref 0–0.2)
BASOPHILS NFR BLD: 0 % (ref 0–2)
BILIRUB DIRECT SERPL-MCNC: 0.2 MG/DL
BILIRUB INDIRECT SERPL-MCNC: 0.4 MG/DL (ref 0–1)
BILIRUB SERPL-MCNC: 0.6 MG/DL (ref 0.3–1.2)
BUN SERPL-MCNC: 10 MG/DL (ref 6–20)
BUN/CREAT SERPL: 13 (ref 9–20)
CALCIUM SERPL-MCNC: 9.1 MG/DL (ref 8.6–10.4)
CHLORIDE SERPL-SCNC: 102 MMOL/L (ref 98–107)
CO2 SERPL-SCNC: 22 MMOL/L (ref 20–31)
CREAT SERPL-MCNC: 0.8 MG/DL (ref 0.5–0.9)
EOSINOPHIL # BLD: 0.42 K/UL (ref 0–0.44)
EOSINOPHILS RELATIVE PERCENT: 5 % (ref 1–4)
ERYTHROCYTE [DISTWIDTH] IN BLOOD BY AUTOMATED COUNT: 19.8 % (ref 11.8–14.4)
GFR, ESTIMATED: 88 ML/MIN/1.73M2
GLUCOSE SERPL-MCNC: 136 MG/DL (ref 70–99)
HCT VFR BLD AUTO: 40 % (ref 36.3–47.1)
HGB BLD-MCNC: 12.4 G/DL (ref 11.9–15.1)
IMM GRANULOCYTES # BLD AUTO: 0.02 K/UL (ref 0–0.3)
IMM GRANULOCYTES NFR BLD: 0 %
LIPASE SERPL-CCNC: 38 U/L (ref 13–60)
LYMPHOCYTES NFR BLD: 1.76 K/UL (ref 1.1–3.7)
LYMPHOCYTES RELATIVE PERCENT: 19 % (ref 24–43)
MCH RBC QN AUTO: 28.5 PG (ref 25.2–33.5)
MCHC RBC AUTO-ENTMCNC: 31 G/DL (ref 28.4–34.8)
MCV RBC AUTO: 92 FL (ref 82.6–102.9)
MONOCYTES NFR BLD: 0.48 K/UL (ref 0.1–1.2)
MONOCYTES NFR BLD: 5 % (ref 3–12)
NEUTROPHILS NFR BLD: 71 % (ref 36–65)
NEUTS SEG NFR BLD: 6.68 K/UL (ref 1.5–8.1)
NRBC BLD-RTO: 0 PER 100 WBC
PLATELET # BLD AUTO: 253 K/UL (ref 138–453)
PMV BLD AUTO: 10.6 FL (ref 8.1–13.5)
POTASSIUM SERPL-SCNC: 3.3 MMOL/L (ref 3.7–5.3)
PROT SERPL-MCNC: 6.7 G/DL (ref 6.4–8.3)
RBC # BLD AUTO: 4.35 M/UL (ref 3.95–5.11)
RBC # BLD: ABNORMAL 10*6/UL
SODIUM SERPL-SCNC: 138 MMOL/L (ref 135–144)
WBC OTHER # BLD: 9.4 K/UL (ref 3.5–11.3)

## 2024-08-18 PROCEDURE — 6370000000 HC RX 637 (ALT 250 FOR IP): Performed by: NURSE PRACTITIONER

## 2024-08-18 PROCEDURE — 96374 THER/PROPH/DIAG INJ IV PUSH: CPT

## 2024-08-18 PROCEDURE — 99285 EMERGENCY DEPT VISIT HI MDM: CPT

## 2024-08-18 PROCEDURE — 2500000003 HC RX 250 WO HCPCS: Performed by: NURSE PRACTITIONER

## 2024-08-18 PROCEDURE — 6360000004 HC RX CONTRAST MEDICATION: Performed by: NURSE PRACTITIONER

## 2024-08-18 PROCEDURE — 96375 TX/PRO/DX INJ NEW DRUG ADDON: CPT

## 2024-08-18 PROCEDURE — 6360000002 HC RX W HCPCS: Performed by: NURSE PRACTITIONER

## 2024-08-18 PROCEDURE — 83690 ASSAY OF LIPASE: CPT

## 2024-08-18 PROCEDURE — 2580000003 HC RX 258: Performed by: NURSE PRACTITIONER

## 2024-08-18 PROCEDURE — 74177 CT ABD & PELVIS W/CONTRAST: CPT

## 2024-08-18 PROCEDURE — 85025 COMPLETE CBC W/AUTO DIFF WBC: CPT

## 2024-08-18 PROCEDURE — 36415 COLL VENOUS BLD VENIPUNCTURE: CPT

## 2024-08-18 PROCEDURE — 80076 HEPATIC FUNCTION PANEL: CPT

## 2024-08-18 PROCEDURE — 80048 BASIC METABOLIC PNL TOTAL CA: CPT

## 2024-08-18 RX ORDER — FAMOTIDINE 20 MG/1
20 TABLET, FILM COATED ORAL 2 TIMES DAILY
Qty: 28 TABLET | Refills: 0 | Status: SHIPPED | OUTPATIENT
Start: 2024-08-18

## 2024-08-18 RX ORDER — MAGNESIUM CITRATE
150 SOLUTION, ORAL ORAL ONCE
Qty: 150 ML | Refills: 0 | Status: SHIPPED | OUTPATIENT
Start: 2024-08-18 | End: 2024-08-18

## 2024-08-18 RX ORDER — 0.9 % SODIUM CHLORIDE 0.9 %
80 INTRAVENOUS SOLUTION INTRAVENOUS ONCE
Status: DISCONTINUED | OUTPATIENT
Start: 2024-08-18 | End: 2024-08-18 | Stop reason: HOSPADM

## 2024-08-18 RX ORDER — SODIUM CHLORIDE 0.9 % (FLUSH) 0.9 %
10 SYRINGE (ML) INJECTION ONCE
Status: COMPLETED | OUTPATIENT
Start: 2024-08-18 | End: 2024-08-18

## 2024-08-18 RX ORDER — ONDANSETRON 2 MG/ML
4 INJECTION INTRAMUSCULAR; INTRAVENOUS ONCE
Status: COMPLETED | OUTPATIENT
Start: 2024-08-18 | End: 2024-08-18

## 2024-08-18 RX ORDER — POLYETHYLENE GLYCOL 3350 17 G/17G
17 POWDER, FOR SOLUTION ORAL DAILY
Qty: 510 G | Refills: 0 | Status: SHIPPED | OUTPATIENT
Start: 2024-08-18 | End: 2024-09-17

## 2024-08-18 RX ADMIN — ONDANSETRON 4 MG: 2 INJECTION INTRAMUSCULAR; INTRAVENOUS at 16:58

## 2024-08-18 RX ADMIN — LIDOCAINE HYDROCHLORIDE: 20 SOLUTION ORAL at 19:15

## 2024-08-18 RX ADMIN — IOPAMIDOL 75 ML: 755 INJECTION, SOLUTION INTRAVENOUS at 17:43

## 2024-08-18 RX ADMIN — FAMOTIDINE 20 MG: 10 INJECTION, SOLUTION INTRAVENOUS at 16:58

## 2024-08-18 RX ADMIN — Medication 80 ML: at 17:46

## 2024-08-18 RX ADMIN — SODIUM CHLORIDE, PRESERVATIVE FREE 10 ML: 5 INJECTION INTRAVENOUS at 17:46

## 2024-08-18 ASSESSMENT — PAIN - FUNCTIONAL ASSESSMENT: PAIN_FUNCTIONAL_ASSESSMENT: 0-10

## 2024-08-18 ASSESSMENT — PAIN SCALES - GENERAL: PAINLEVEL_OUTOF10: 8

## 2024-08-18 NOTE — ED PROVIDER NOTES
JACKSON 2 SENSOR) MISC    1 each by Does not apply route in the morning and at bedtime    DAPAGLIFLOZIN (FARXIGA) 10 MG TABLET    Take 1 tablet by mouth every morning    DICLOFENAC SODIUM (VOLTAREN) 1 % GEL    Apply 4 g topically 4 times daily as needed for Pain    DOXYCYCLINE HYCLATE (VIBRA-TABS) 100 MG TABLET    Take 1 tablet by mouth 2 times daily for 7 days    DULAGLUTIDE (TRULICITY) 1.5 MG/0.5ML SC INJECTION    INJECT 1.5 MG UNDER THE SKIN ONCE WEEKLY    FAMOTIDINE (PEPCID) 20 MG TABLET    TAKE ONE TABLET BY MOUTH BY MOUTH TWICE A DAY AS  NEEDED FOR ACID REFLUX    FLUTICASONE (FLONASE) 50 MCG/ACT NASAL SPRAY    1 spray by Each Nostril route daily    FUROSEMIDE (LASIX) 40 MG TABLET    Take 1 tablet by mouth daily    INSULIN GLARGINE (LANTUS) 100 UNIT/ML INJECTION VIAL    Inject 30 Units into the skin 2 times daily    INSULIN LISPRO, 1 UNIT DIAL, (HUMALOG KWIKPEN) 100 UNIT/ML SOPN    Inject 0-15 units before meals based on sliding scale    INSULIN PEN NEEDLE ([employer] PEN NEEDLES) 32G X 4 MM MISC    USE WITH INSULIN DAILY    LANCETS MISC    1 each by Does not apply route daily    LIDOCAINE 4 % EXTERNAL PATCH    Place 1 patch onto the skin daily    MISC NATURAL PRODUCTS (BEET ROOT) 500 MG CAPS    Take 1,000 mg by mouth daily    NICOTINE (NICODERM CQ) 14 MG/24HR    Place 1 patch onto the skin daily    ONDANSETRON (ZOFRAN) 4 MG TABLET    Take 1 tablet by mouth every 8 hours as needed for Nausea or Vomiting    POTASSIUM CHLORIDE (KLOR-CON M) 10 MEQ EXTENDED RELEASE TABLET    TAKE 1 TABLET BY MOUTH 2 TIMES A DAY    SACUBITRIL-VALSARTAN (ENTRESTO) 49-51 MG PER TABLET    Take 1 tablet by mouth 2 times daily    SALIVA SUBSTITUTE (BIOTENE/MOUTH KOTE) SOLN LIQUID    Take 1 Dose by mouth as needed (dry mouth)    SPIRONOLACTONE (ALDACTONE) 25 MG TABLET    Take 1 tablet by mouth daily    TURMERIC 500 MG TABS    Take 500 mg by mouth daily       PAST MEDICAL HISTORY         Diagnosis Date    Arthritis     CAD (coronary artery

## 2024-08-18 NOTE — ED NOTES
Pt reports constipation issues x approx one week with accompanying nausea. Pt reports she has taken 2 doses of dulcolax stool softener without laxative over the past week. Reports very small bowel movement this morning. C/o diffuse upper abd pain and nausea. Denies hx of similar issues or stomach problems. Denies other complaints or needs at this time. Lights turned down in room for pt comfort.

## 2024-08-18 NOTE — ED NOTES
Pt resting on stretcher in left side lying position, watching tv. Reports nausea has resolved and reports improvement in abd pain/tightness. States pta, the tightness will sometimes improve, but returns. Requests and is assisted with blanket. Lights turned out in room for pt comfort.

## 2024-08-19 ENCOUNTER — OFFICE VISIT (OUTPATIENT)
Dept: FAMILY MEDICINE CLINIC | Age: 54
End: 2024-08-19
Payer: COMMERCIAL

## 2024-08-19 VITALS
DIASTOLIC BLOOD PRESSURE: 72 MMHG | HEIGHT: 67 IN | BODY MASS INDEX: 45.74 KG/M2 | WEIGHT: 291.4 LBS | SYSTOLIC BLOOD PRESSURE: 116 MMHG | HEART RATE: 80 BPM

## 2024-08-19 DIAGNOSIS — Z12.11 COLON CANCER SCREENING: ICD-10-CM

## 2024-08-19 DIAGNOSIS — I50.42 CHF (CONGESTIVE HEART FAILURE), NYHA CLASS II, CHRONIC, COMBINED (HCC): ICD-10-CM

## 2024-08-19 DIAGNOSIS — E11.9 TYPE 2 DIABETES MELLITUS WITHOUT COMPLICATION, WITH LONG-TERM CURRENT USE OF INSULIN (HCC): Primary | ICD-10-CM

## 2024-08-19 DIAGNOSIS — Z79.4 TYPE 2 DIABETES MELLITUS WITHOUT COMPLICATION, WITH LONG-TERM CURRENT USE OF INSULIN (HCC): Primary | ICD-10-CM

## 2024-08-19 PROCEDURE — 3046F HEMOGLOBIN A1C LEVEL >9.0%: CPT

## 2024-08-19 PROCEDURE — 99213 OFFICE O/P EST LOW 20 MIN: CPT

## 2024-08-19 PROCEDURE — 3078F DIAST BP <80 MM HG: CPT

## 2024-08-19 PROCEDURE — 3074F SYST BP LT 130 MM HG: CPT

## 2024-08-19 RX ORDER — METOCLOPRAMIDE 10 MG/1
10 TABLET ORAL
Qty: 120 TABLET | Refills: 3 | Status: SHIPPED | OUTPATIENT
Start: 2024-08-19

## 2024-08-19 RX ORDER — DULAGLUTIDE 1.5 MG/.5ML
1.5 INJECTION, SOLUTION SUBCUTANEOUS WEEKLY
Qty: 2 ML | Refills: 5 | Status: SHIPPED | OUTPATIENT
Start: 2024-08-19

## 2024-08-19 RX ORDER — SPIRONOLACTONE 25 MG/1
25 TABLET ORAL DAILY
Qty: 30 TABLET | Refills: 5 | Status: SHIPPED | OUTPATIENT
Start: 2024-08-19

## 2024-08-19 ASSESSMENT — ENCOUNTER SYMPTOMS
NAUSEA: 1
BACK PAIN: 0
ABDOMINAL PAIN: 0
CONSTIPATION: 1
RHINORRHEA: 0
COUGH: 0
SORE THROAT: 0
DIARRHEA: 0
SHORTNESS OF BREATH: 0

## 2024-08-19 ASSESSMENT — PATIENT HEALTH QUESTIONNAIRE - PHQ9
SUM OF ALL RESPONSES TO PHQ9 QUESTIONS 1 & 2: 0
1. LITTLE INTEREST OR PLEASURE IN DOING THINGS: NOT AT ALL
SUM OF ALL RESPONSES TO PHQ QUESTIONS 1-9: 0
SUM OF ALL RESPONSES TO PHQ QUESTIONS 1-9: 0
2. FEELING DOWN, DEPRESSED OR HOPELESS: NOT AT ALL
SUM OF ALL RESPONSES TO PHQ QUESTIONS 1-9: 0
SUM OF ALL RESPONSES TO PHQ QUESTIONS 1-9: 0

## 2024-08-19 NOTE — PROGRESS NOTES
Attending Physician Statement  I  have discussed the care of Kari Reddy including pertinent history and exam findings with the resident. I agree with the assessment, plan and orders as documented by the resident.      /72 (Site: Right Upper Arm, Position: Sitting, Cuff Size: Medium Adult)   Pulse 80   Ht 1.702 m (5' 7.01\")   Wt 132.2 kg (291 lb 6.4 oz)   BMI 45.63 kg/m²    BP Readings from Last 3 Encounters:   08/19/24 116/72   08/18/24 (!) 153/95   08/13/24 (!) 150/84     Wt Readings from Last 3 Encounters:   08/19/24 132.2 kg (291 lb 6.4 oz)   08/18/24 124.3 kg (274 lb)   06/28/24 125.6 kg (277 lb)          Diagnosis Orders   1. Type 2 diabetes mellitus without complication, with long-term current use of insulin ([facility])  Hemoglobin A1c    dulaglutide (TRULICITY) 1.5 MG/0.5ML SC injection    metoclopramide (REGLAN) 10 MG tablet     DIABETES FOOT EXAM      2. Colon cancer screening  FIT-DNA (Cologuard)      3. CHF (congestive heart failure), NYHA class II, chronic, combined ([facility])  DME Order for Wearable Cardiac Defibrill    spironolactone (ALDACTONE) 25 MG tablet              Gavin Sanches DO 8/20/2024 12:02 PM

## 2024-08-19 NOTE — PATIENT INSTRUCTIONS
Thank you for letting us take care of you today. We hope all your questions were addressed. If a question was overlooked or something else comes to mind after you return home, please contact a member of your Care Team listed below.      Your Care Team at [facility] is Team #1  Maryellen Ramos M.D. (Faculty)  Jessica Steinberg M.D. (Resident)  Madison Alva D.O. (Resident)  Ruslan Au M.D. (Resident)  Rani Blood M.D. (Resident)  Ira Claudio, [facility]  Jaguar Stephen, [facility]  Nellie Monae, [facility]  Vikki Mascorro, [facility]  Monica Contreras, [facility]  Lily Vela, [facility]  Angela Levi, [facility]  Nina (LJ) Philippe,   Thuy Ramsey [facility] (Clinical Pharmacist)     Office phone number: [phone]    If you need to get in right away due to illness, please be advised we have \"Same Day\" appointments available Monday-Friday. Please call us at [phone] option #3 to schedule your \"Same Day\" appointment.
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
## 2024-08-19 NOTE — PROGRESS NOTES
Visit Information    Have you changed or started any medications since your last visit including any over-the-counter medicines, vitamins, or herbal medicines? no   Are you having any side effects from any of your medications? -  no  Have you stopped taking any of your medications? Is so, why? -  no    Have you seen any other physician or provider since your last visit? No  Have you had any other diagnostic tests since your last visit? No  Have you been seen in the emergency room and/or had an admission to a hospital since we last saw you? No  Have you had your routine dental cleaning in the past 6 months? no    Have you activated your [employer] account? If not, what are your barriers? Yes     Patient Care Team:  Louis Rivera DO as PCP - General (Family Medicine)  Tarsha Matos MD as PCP - Empaneled Provider    Medical History Review  Past Medical, Family, and Social History reviewed and does not contribute to the patient presenting condition    Health Maintenance   Topic Date Due    Hepatitis B vaccine (1 of 3 - 19+ 3-dose series) Never done    Colorectal Cancer Screen  Never done    Pneumococcal 0-64 years Vaccine (2 of 2 - PCV) 01/13/2018    Shingles vaccine (1 of 2) Never done    COVID-19 Vaccine (3 - 2023-24 season) 09/01/2023    Diabetic retinal exam  02/20/2024    Diabetic foot exam  03/07/2024    Flu vaccine (1) Never done    A1C test (Diabetic or Prediabetic)  09/19/2024    Diabetic Alb to Cr ratio (uACR) test  12/12/2024    Lipids  12/12/2024    Depression Screen  05/22/2025    Breast cancer screen  06/21/2025    GFR test (Diabetes, CKD 3-4, OR last GFR 15-59)  08/18/2025    Cervical cancer screen  01/03/2028    DTaP/Tdap/Td vaccine (3 - Td or Tdap) 02/17/2030    Hepatitis C screen  Completed    HIV screen  Completed    Hepatitis A vaccine  Aged Out    Hib vaccine  Aged Out    Polio vaccine  Aged Out    Meningococcal (ACWY) vaccine  Aged Out

## 2024-08-29 ENCOUNTER — APPOINTMENT (OUTPATIENT)
Dept: CT IMAGING | Age: 54
End: 2024-08-29
Payer: COMMERCIAL

## 2024-08-29 ENCOUNTER — HOSPITAL ENCOUNTER (EMERGENCY)
Age: 54
Discharge: HOME | End: 2024-08-30
Attending: STUDENT IN AN ORGANIZED HEALTH CARE EDUCATION/TRAINING PROGRAM
Payer: COMMERCIAL

## 2024-08-29 VITALS
TEMPERATURE: 98.4 F | WEIGHT: 275 LBS | HEIGHT: 67 IN | DIASTOLIC BLOOD PRESSURE: 87 MMHG | SYSTOLIC BLOOD PRESSURE: 180 MMHG | BODY MASS INDEX: 43.16 KG/M2 | OXYGEN SATURATION: 99 % | RESPIRATION RATE: 17 BRPM | HEART RATE: 90 BPM

## 2024-08-29 DIAGNOSIS — R10.13 ABDOMINAL PAIN, EPIGASTRIC: Primary | ICD-10-CM

## 2024-08-29 DIAGNOSIS — R11.0 NAUSEA: ICD-10-CM

## 2024-08-29 LAB
ALBUMIN SERPL-MCNC: 3.9 G/DL (ref 3.5–5.2)
ALP SERPL-CCNC: 86 U/L (ref 35–104)
ALT SERPL-CCNC: 15 U/L (ref 5–33)
ANION GAP SERPL CALCULATED.3IONS-SCNC: 12 MMOL/L (ref 9–17)
AST SERPL-CCNC: 17 U/L
BASOPHILS # BLD: 0.08 K/UL (ref 0–0.2)
BASOPHILS NFR BLD: 1 % (ref 0–2)
BILIRUB DIRECT SERPL-MCNC: 0.1 MG/DL
BILIRUB INDIRECT SERPL-MCNC: 0.4 MG/DL (ref 0–1)
BILIRUB SERPL-MCNC: 0.5 MG/DL (ref 0.3–1.2)
BUN SERPL-MCNC: 10 MG/DL (ref 6–20)
CALCIUM SERPL-MCNC: 8.9 MG/DL (ref 8.6–10.4)
CHLORIDE SERPL-SCNC: 102 MMOL/L (ref 98–107)
CO2 SERPL-SCNC: 25 MMOL/L (ref 20–31)
CREAT SERPL-MCNC: 0.7 MG/DL (ref 0.5–0.9)
EOSINOPHIL # BLD: 0.24 K/UL (ref 0–0.4)
EOSINOPHILS RELATIVE PERCENT: 3 % (ref 0–4)
ERYTHROCYTE [DISTWIDTH] IN BLOOD BY AUTOMATED COUNT: 21 % (ref 11.5–14.9)
GFR, ESTIMATED: >90 ML/MIN/1.73M2
GLUCOSE SERPL-MCNC: 159 MG/DL (ref 70–99)
HCT VFR BLD AUTO: 40.9 % (ref 36–46)
HGB BLD-MCNC: 12.7 G/DL (ref 12–16)
INR PPP: 1.1
LACTATE BLDV-SCNC: 1.6 MMOL/L (ref 0.5–2.2)
LIPASE SERPL-CCNC: 41 U/L (ref 13–60)
LYMPHOCYTES NFR BLD: 1.28 K/UL (ref 1–4.8)
LYMPHOCYTES RELATIVE PERCENT: 16 % (ref 24–44)
MAGNESIUM SERPL-MCNC: 1.9 MG/DL (ref 1.6–2.6)
MCH RBC QN AUTO: 28.2 PG (ref 26–34)
MCHC RBC AUTO-ENTMCNC: 31.1 G/DL (ref 31–37)
MCV RBC AUTO: 90.7 FL (ref 80–100)
MONOCYTES NFR BLD: 0.32 K/UL (ref 0.1–1.3)
MONOCYTES NFR BLD: 4 % (ref 1–7)
MORPHOLOGY: ABNORMAL
NEUTROPHILS NFR BLD: 76 % (ref 36–66)
NEUTS SEG NFR BLD: 6.08 K/UL (ref 1.3–9.1)
PLATELET # BLD AUTO: 242 K/UL (ref 150–450)
PMV BLD AUTO: 8.6 FL (ref 6–12)
POTASSIUM SERPL-SCNC: 3.1 MMOL/L (ref 3.7–5.3)
PROT SERPL-MCNC: 6.6 G/DL (ref 6.4–8.3)
PROTHROMBIN TIME: 14.8 SEC (ref 11.8–14.6)
RBC # BLD AUTO: 4.5 M/UL (ref 4–5.2)
SODIUM SERPL-SCNC: 139 MMOL/L (ref 135–144)
TROPONIN I SERPL HS-MCNC: 26 NG/L (ref 0–14)
WBC OTHER # BLD: 8 K/UL (ref 3.5–11)

## 2024-08-29 PROCEDURE — 36415 COLL VENOUS BLD VENIPUNCTURE: CPT

## 2024-08-29 PROCEDURE — 83605 ASSAY OF LACTIC ACID: CPT

## 2024-08-29 PROCEDURE — 83735 ASSAY OF MAGNESIUM: CPT

## 2024-08-29 PROCEDURE — 74177 CT ABD & PELVIS W/CONTRAST: CPT

## 2024-08-29 PROCEDURE — 99285 EMERGENCY DEPT VISIT HI MDM: CPT

## 2024-08-29 PROCEDURE — 83690 ASSAY OF LIPASE: CPT

## 2024-08-29 PROCEDURE — 6360000004 HC RX CONTRAST MEDICATION: Performed by: STUDENT IN AN ORGANIZED HEALTH CARE EDUCATION/TRAINING PROGRAM

## 2024-08-29 PROCEDURE — 96374 THER/PROPH/DIAG INJ IV PUSH: CPT

## 2024-08-29 PROCEDURE — 96375 TX/PRO/DX INJ NEW DRUG ADDON: CPT

## 2024-08-29 PROCEDURE — 6360000002 HC RX W HCPCS: Performed by: STUDENT IN AN ORGANIZED HEALTH CARE EDUCATION/TRAINING PROGRAM

## 2024-08-29 PROCEDURE — 80048 BASIC METABOLIC PNL TOTAL CA: CPT

## 2024-08-29 PROCEDURE — 80076 HEPATIC FUNCTION PANEL: CPT

## 2024-08-29 PROCEDURE — 6370000000 HC RX 637 (ALT 250 FOR IP): Performed by: STUDENT IN AN ORGANIZED HEALTH CARE EDUCATION/TRAINING PROGRAM

## 2024-08-29 PROCEDURE — 93005 ELECTROCARDIOGRAM TRACING: CPT | Performed by: STUDENT IN AN ORGANIZED HEALTH CARE EDUCATION/TRAINING PROGRAM

## 2024-08-29 PROCEDURE — 84484 ASSAY OF TROPONIN QUANT: CPT

## 2024-08-29 PROCEDURE — 85610 PROTHROMBIN TIME: CPT

## 2024-08-29 PROCEDURE — 2500000003 HC RX 250 WO HCPCS: Performed by: STUDENT IN AN ORGANIZED HEALTH CARE EDUCATION/TRAINING PROGRAM

## 2024-08-29 PROCEDURE — 2580000003 HC RX 258: Performed by: STUDENT IN AN ORGANIZED HEALTH CARE EDUCATION/TRAINING PROGRAM

## 2024-08-29 PROCEDURE — 85025 COMPLETE CBC W/AUTO DIFF WBC: CPT

## 2024-08-29 RX ORDER — ONDANSETRON 2 MG/ML
4 INJECTION INTRAMUSCULAR; INTRAVENOUS ONCE
Status: COMPLETED | OUTPATIENT
Start: 2024-08-29 | End: 2024-08-29

## 2024-08-29 RX ORDER — 0.9 % SODIUM CHLORIDE 0.9 %
1000 INTRAVENOUS SOLUTION INTRAVENOUS ONCE
Status: COMPLETED | OUTPATIENT
Start: 2024-08-29 | End: 2024-08-30

## 2024-08-29 RX ORDER — 0.9 % SODIUM CHLORIDE 0.9 %
100 INTRAVENOUS SOLUTION INTRAVENOUS ONCE
Status: COMPLETED | OUTPATIENT
Start: 2024-08-29 | End: 2024-08-29

## 2024-08-29 RX ORDER — MAGNESIUM HYDROXIDE/ALUMINUM HYDROXICE/SIMETHICONE 120; 1200; 1200 MG/30ML; MG/30ML; MG/30ML
30 SUSPENSION ORAL ONCE
Status: COMPLETED | OUTPATIENT
Start: 2024-08-29 | End: 2024-08-29

## 2024-08-29 RX ORDER — IOPAMIDOL 755 MG/ML
75 INJECTION, SOLUTION INTRAVASCULAR
Status: COMPLETED | OUTPATIENT
Start: 2024-08-29 | End: 2024-08-29

## 2024-08-29 RX ORDER — SODIUM CHLORIDE 0.9 % (FLUSH) 0.9 %
10 SYRINGE (ML) INJECTION ONCE
Status: COMPLETED | OUTPATIENT
Start: 2024-08-29 | End: 2024-08-29

## 2024-08-29 RX ADMIN — SODIUM CHLORIDE 1000 ML: 9 INJECTION, SOLUTION INTRAVENOUS at 21:27

## 2024-08-29 RX ADMIN — FAMOTIDINE 20 MG: 10 INJECTION, SOLUTION INTRAVENOUS at 21:29

## 2024-08-29 RX ADMIN — ALUMINUM HYDROXIDE, MAGNESIUM HYDROXIDE, AND SIMETHICONE 30 ML: 200; 200; 20 SUSPENSION ORAL at 21:31

## 2024-08-29 RX ADMIN — ONDANSETRON 4 MG: 2 INJECTION INTRAMUSCULAR; INTRAVENOUS at 21:27

## 2024-08-29 RX ADMIN — IOPAMIDOL 75 ML: 755 INJECTION, SOLUTION INTRAVENOUS at 22:32

## 2024-08-29 RX ADMIN — SODIUM CHLORIDE 100 ML: 9 INJECTION, SOLUTION INTRAVENOUS at 22:32

## 2024-08-29 RX ADMIN — SODIUM CHLORIDE, PRESERVATIVE FREE 10 ML: 5 INJECTION INTRAVENOUS at 22:32

## 2024-08-29 ASSESSMENT — ENCOUNTER SYMPTOMS
DIARRHEA: 0
EYE REDNESS: 0
RHINORRHEA: 0
SORE THROAT: 0
EYE DISCHARGE: 0
ABDOMINAL PAIN: 1
VOMITING: 0
SHORTNESS OF BREATH: 0
NAUSEA: 1

## 2024-08-29 ASSESSMENT — PAIN DESCRIPTION - ORIENTATION: ORIENTATION: UPPER

## 2024-08-29 ASSESSMENT — PAIN SCALES - GENERAL
PAINLEVEL_OUTOF10: 8
PAINLEVEL_OUTOF10: 8

## 2024-08-29 ASSESSMENT — PAIN - FUNCTIONAL ASSESSMENT: PAIN_FUNCTIONAL_ASSESSMENT: 0-10

## 2024-08-29 ASSESSMENT — PAIN DESCRIPTION - LOCATION: LOCATION: ABDOMEN

## 2024-08-30 LAB
EKG ATRIAL RATE: 90 BPM
EKG P AXIS: 68 DEGREES
EKG P-R INTERVAL: 156 MS
EKG Q-T INTERVAL: 398 MS
EKG QRS DURATION: 98 MS
EKG QTC CALCULATION (BAZETT): 486 MS
EKG R AXIS: 48 DEGREES
EKG T AXIS: 35 DEGREES
EKG VENTRICULAR RATE: 90 BPM

## 2024-08-30 NOTE — ED PROVIDER NOTES
EMERGENCY DEPARTMENT ENCOUNTER    Pt Name: Kari Reddy  MRN: [id number]  Birthdate 1970  Date of evaluation: 8/29/24  CHIEF COMPLAINT       Chief Complaint   Patient presents with    Abdominal Pain    Nausea     HISTORY OF PRESENT ILLNESS   This is a 53-year-old woman she has a history of hypertension, hyperlipidemia, CAD, CHF    She presents with abdominal discomfort    For the last couple weeks she has had this abdominal discomfort.  She describes it as an upper abdomen fullness and sensation that she has to vomit but cannot    No lower pain.  No fevers chills.  No dysuria hematuria frequency    No chest pain or pressure but does state the fullness sometimes makes it a little uncomfortable and like she is a bit short of breath              REVIEW OF SYSTEMS     Review of Systems   Constitutional:  Negative for chills and fever.   HENT:  Negative for rhinorrhea and sore throat.    Eyes:  Negative for discharge and redness.   Respiratory:  Negative for shortness of breath.    Cardiovascular:  Negative for chest pain.   Gastrointestinal:  Positive for abdominal pain and nausea. Negative for diarrhea and vomiting.   Genitourinary:  Negative for dysuria, frequency and urgency.   Musculoskeletal:  Negative for arthralgias and myalgias.   Skin:  Negative for rash.   Neurological:  Negative for weakness and numbness.   Psychiatric/Behavioral:  Negative for suicidal ideas.    All other systems reviewed and are negative.    PASTMEDICAL HISTORY     Past Medical History:   Diagnosis Date    Arthritis     CAD (coronary artery disease)     no stents    Cardiomyopathy (HCC)     CHF (congestive heart failure) ([facility])     Chronic back pain 02/13/2015    COPD (chronic obstructive pulmonary disease) (HCC)     Diabetes mellitus (HCC)     managed by pcp    Heart failure ([facility]) 2020    History of blood transfusion     HTN (hypertension) 04/08/2011    Hyperlipemia 04/08/2011    Iron deficiency     Lumbar disc herniation     w/  [JVD] : no jugular venous distention  [Normal Thyroid] : the thyroid was normal [Normal Breath Sounds] : Normal breath sounds [Right Carotid Bruit] : no bruit heard over the right carotid [Left Carotid Bruit] : no bruit heard over the left carotid ED bedside ultrasound) are read by the radiologist, see reports below, unless otherwise noted in MDM or here.  Reports below are reviewed by myself.  CT ABDOMEN PELVIS W IV CONTRAST Additional Contrast? None   Final Result   1. No acute intra-abdominal or pelvic process.   2. Moderate cardiomegaly with mild pericardial effusion.   3. Stable posterior wall subserosal fibroid measuring 8.5 x 5.6 cm.   4. Fat containing umbilical hernia.   5. Scattered left-sided colonic diverticulosis.             LABS: Lab orders shown below, the results are reviewed by myself, and all abnormals are listed below.  Labs Reviewed   CBC WITH AUTO DIFFERENTIAL - Abnormal; Notable for the following components:       Result Value    RDW 21.0 (*)     Neutrophils % 76 (*)     Lymphocytes % 16 (*)     All other components within normal limits   BASIC METABOLIC PANEL - Abnormal; Notable for the following components:    Potassium 3.1 (*)     Glucose 159 (*)     All other components within normal limits   PROTIME-INR - Abnormal; Notable for the following components:    Protime 14.8 (*)     All other components within normal limits   TROPONIN - Abnormal; Notable for the following components:    Troponin, High Sensitivity 26 (*)     All other components within normal limits   HEPATIC FUNCTION PANEL   LIPASE   MAGNESIUM   LACTIC ACID       Vitals Reviewed:    Vitals:    08/29/24 2014 08/29/24 2125   BP: (!) 180/87    Pulse: (!) 129 90   Resp: 17    Temp: 98.4 °F (36.9 °C)    SpO2: 99%    Weight: 124.7 kg (275 lb)    Height: 1.702 m (5' 7\")      MEDICATIONS GIVEN TO PATIENT THIS ENCOUNTER:  Orders Placed This Encounter   Medications    sodium chloride 0.9 % bolus 1,000 mL    ondansetron (ZOFRAN) injection 4 mg    famotidine (PEPCID) 20 mg in sodium chloride (PF) 0.9 % 10 mL injection    aluminum & magnesium hydroxide-simethicone (MAALOX) 200-200-20 MG/5ML suspension 30 mL    sodium chloride 0.9 % bolus 100 mL    sodium chloride flush 0.9 % injection  [2+] : left 2+ [Right Femoral Bruit] : no bruit heard over the right femoral artery [Left Femoral Bruit] : no bruit heard over the left femoral artery [1+] : left 1+ [Ankle Swelling (On Exam)] : present [Ankle Swelling Bilaterally] : bilaterally  [Ankle Swelling On The Right] : mild [Varicose Veins Of Lower Extremities] : not present [] : bilaterally [Ankle Swelling On The Left] : moderate [Abdomen Masses] : No abdominal masses [Abdomen Tenderness] : ~T ~M No abdominal tenderness [Tender] : was nontender [Enlarged] : not enlarged [Purpura] : purpura [Petechiae] : petechiae [Skin Ulcer] : no ulcer [Skin Induration] : no induration [Alert] : alert [Oriented to Person] : oriented to person [Oriented to Place] : oriented to place [Oriented to Time] : oriented to time [Calm] : calm [de-identified] : Well-built well-nourished [de-identified] : Within normal [de-identified] : Within normal limits

## 2024-09-09 ENCOUNTER — TELEPHONE (OUTPATIENT)
Dept: FAMILY MEDICINE CLINIC | Age: 54
End: 2024-09-09

## 2024-09-16 DIAGNOSIS — R10.84 GENERALIZED ABDOMINAL PAIN: Primary | ICD-10-CM

## 2024-09-17 ENCOUNTER — OFFICE VISIT (OUTPATIENT)
Dept: FAMILY MEDICINE CLINIC | Age: 54
End: 2024-09-17
Payer: COMMERCIAL

## 2024-09-17 VITALS
HEART RATE: 78 BPM | DIASTOLIC BLOOD PRESSURE: 84 MMHG | OXYGEN SATURATION: 96 % | WEIGHT: 275 LBS | TEMPERATURE: 97.2 F | SYSTOLIC BLOOD PRESSURE: 155 MMHG | BODY MASS INDEX: 43.07 KG/M2

## 2024-09-17 DIAGNOSIS — J06.9 UPPER RESPIRATORY INFECTION WITH COUGH AND CONGESTION: Primary | ICD-10-CM

## 2024-09-17 PROCEDURE — 3077F SYST BP >= 140 MM HG: CPT

## 2024-09-17 PROCEDURE — 99213 OFFICE O/P EST LOW 20 MIN: CPT

## 2024-09-17 PROCEDURE — 3079F DIAST BP 80-89 MM HG: CPT

## 2024-09-17 RX ORDER — DOXYCYCLINE HYCLATE 100 MG
100 TABLET ORAL 2 TIMES DAILY
Qty: 20 TABLET | Refills: 0 | Status: SHIPPED | OUTPATIENT
Start: 2024-09-17 | End: 2024-09-27

## 2024-09-17 RX ORDER — BENZONATATE 200 MG/1
200 CAPSULE ORAL 3 TIMES DAILY PRN
Qty: 30 CAPSULE | Refills: 0 | Status: SHIPPED | OUTPATIENT
Start: 2024-09-17 | End: 2024-09-27

## 2024-09-17 RX ORDER — MAGNESIUM CARB/ALUMINUM HYDROX 105-160MG
TABLET,CHEWABLE ORAL
COMMUNITY
Start: 2024-08-21

## 2024-09-17 ASSESSMENT — ENCOUNTER SYMPTOMS
EYE REDNESS: 0
COUGH: 1
SHORTNESS OF BREATH: 0
HEARTBURN: 0
HEMOPTYSIS: 0
EYE PAIN: 0
SINUS PRESSURE: 1
WHEEZING: 0
SINUS PAIN: 1
RHINORRHEA: 1
SORE THROAT: 0
EYE ITCHING: 0

## 2024-09-23 NOTE — TELEPHONE ENCOUNTER
PC from pt following up on GI referral request. Writer let know that referral was placed last week 9/16/24 to Dr. Barrera at [facility], phone number provided. Writer also faxed new copy of referral to Dr. Barrera office just in case original wasn't sent/received properly.

## 2024-09-25 ENCOUNTER — APPOINTMENT (OUTPATIENT)
Dept: GENERAL RADIOLOGY | Age: 54
End: 2024-09-25
Payer: COMMERCIAL

## 2024-09-25 ENCOUNTER — HOSPITAL ENCOUNTER (OUTPATIENT)
Age: 54
Setting detail: OBSERVATION
Discharge: HOME | End: 2024-09-26
Attending: EMERGENCY MEDICINE | Admitting: INTERNAL MEDICINE
Payer: COMMERCIAL

## 2024-09-25 ENCOUNTER — APPOINTMENT (OUTPATIENT)
Dept: CT IMAGING | Age: 54
End: 2024-09-25
Payer: COMMERCIAL

## 2024-09-25 DIAGNOSIS — R10.11 RIGHT UPPER QUADRANT ABDOMINAL PAIN: Primary | ICD-10-CM

## 2024-09-25 PROBLEM — R10.9 ABDOMINAL PAIN: Status: ACTIVE | Noted: 2024-09-25

## 2024-09-25 LAB
ALBUMIN SERPL-MCNC: 3.9 G/DL (ref 3.5–5.2)
ALP SERPL-CCNC: 76 U/L (ref 35–104)
ALT SERPL-CCNC: 30 U/L (ref 5–33)
ANION GAP SERPL CALCULATED.3IONS-SCNC: 15 MMOL/L (ref 9–17)
AST SERPL-CCNC: 39 U/L
BACTERIA URNS QL MICRO: ABNORMAL
BASOPHILS # BLD: 0 K/UL (ref 0–0.2)
BASOPHILS NFR BLD: 1 % (ref 0–2)
BILIRUB SERPL-MCNC: 1 MG/DL (ref 0.3–1.2)
BILIRUB UR QL STRIP: NEGATIVE
BNP SERPL-MCNC: 3347 PG/ML
BUN SERPL-MCNC: 9 MG/DL (ref 6–20)
CALCIUM SERPL-MCNC: 9.2 MG/DL (ref 8.6–10.4)
CASTS #/AREA URNS LPF: ABNORMAL /LPF
CHLORIDE SERPL-SCNC: 102 MMOL/L (ref 98–107)
CLARITY UR: CLEAR
CO2 SERPL-SCNC: 21 MMOL/L (ref 20–31)
COLOR UR: YELLOW
CREAT SERPL-MCNC: 0.8 MG/DL (ref 0.5–0.9)
EOSINOPHIL # BLD: 0.2 K/UL (ref 0–0.4)
EOSINOPHILS RELATIVE PERCENT: 2 % (ref 0–4)
EPI CELLS #/AREA URNS HPF: ABNORMAL /HPF
ERYTHROCYTE [DISTWIDTH] IN BLOOD BY AUTOMATED COUNT: 18.7 % (ref 11.5–14.9)
GFR, ESTIMATED: 88 ML/MIN/1.73M2
GLUCOSE BLD-MCNC: 148 MG/DL (ref 65–105)
GLUCOSE SERPL-MCNC: 147 MG/DL (ref 70–99)
GLUCOSE UR STRIP-MCNC: ABNORMAL MG/DL
HCT VFR BLD AUTO: 44.7 % (ref 36–46)
HGB BLD-MCNC: 13.6 G/DL (ref 12–16)
HGB UR QL STRIP.AUTO: ABNORMAL
INR PPP: 1.2
KETONES UR STRIP-MCNC: NEGATIVE MG/DL
LEUKOCYTE ESTERASE UR QL STRIP: ABNORMAL
LIPASE SERPL-CCNC: 26 U/L (ref 13–60)
LYMPHOCYTES NFR BLD: 1.3 K/UL (ref 1–4.8)
LYMPHOCYTES RELATIVE PERCENT: 15 % (ref 24–44)
MAGNESIUM SERPL-MCNC: 1.8 MG/DL (ref 1.6–2.6)
MCH RBC QN AUTO: 28 PG (ref 26–34)
MCHC RBC AUTO-ENTMCNC: 30.4 G/DL (ref 31–37)
MCV RBC AUTO: 91.9 FL (ref 80–100)
MONOCYTES NFR BLD: 0.5 K/UL (ref 0.1–1.3)
MONOCYTES NFR BLD: 5 % (ref 1–7)
NEUTROPHILS NFR BLD: 77 % (ref 36–66)
NEUTS SEG NFR BLD: 7.3 K/UL (ref 1.3–9.1)
NITRITE UR QL STRIP: NEGATIVE
PH UR STRIP: 6 [PH] (ref 5–8)
PLATELET # BLD AUTO: 221 K/UL (ref 150–450)
PMV BLD AUTO: 8.6 FL (ref 6–12)
POTASSIUM SERPL-SCNC: 3.6 MMOL/L (ref 3.7–5.3)
PROT SERPL-MCNC: 6.7 G/DL (ref 6.4–8.3)
PROT UR STRIP-MCNC: ABNORMAL MG/DL
PROTHROMBIN TIME: 15.7 SEC (ref 11.8–14.6)
RBC # BLD AUTO: 4.87 M/UL (ref 4–5.2)
RBC #/AREA URNS HPF: ABNORMAL /HPF
SODIUM SERPL-SCNC: 138 MMOL/L (ref 135–144)
SP GR UR STRIP: 1.05 (ref 1–1.03)
TROPONIN I SERPL HS-MCNC: 23 NG/L (ref 0–14)
TROPONIN I SERPL HS-MCNC: 24 NG/L (ref 0–14)
UROBILINOGEN UR STRIP-ACNC: NORMAL EU/DL (ref 0–1)
WBC #/AREA URNS HPF: ABNORMAL /HPF
WBC OTHER # BLD: 9.3 K/UL (ref 3.5–11)

## 2024-09-25 PROCEDURE — 6360000002 HC RX W HCPCS

## 2024-09-25 PROCEDURE — 81001 URINALYSIS AUTO W/SCOPE: CPT

## 2024-09-25 PROCEDURE — 96374 THER/PROPH/DIAG INJ IV PUSH: CPT

## 2024-09-25 PROCEDURE — 83690 ASSAY OF LIPASE: CPT

## 2024-09-25 PROCEDURE — 82947 ASSAY GLUCOSE BLOOD QUANT: CPT

## 2024-09-25 PROCEDURE — 83880 ASSAY OF NATRIURETIC PEPTIDE: CPT

## 2024-09-25 PROCEDURE — 84484 ASSAY OF TROPONIN QUANT: CPT

## 2024-09-25 PROCEDURE — 99285 EMERGENCY DEPT VISIT HI MDM: CPT

## 2024-09-25 PROCEDURE — 71045 X-RAY EXAM CHEST 1 VIEW: CPT

## 2024-09-25 PROCEDURE — 6360000002 HC RX W HCPCS: Performed by: STUDENT IN AN ORGANIZED HEALTH CARE EDUCATION/TRAINING PROGRAM

## 2024-09-25 PROCEDURE — G0378 HOSPITAL OBSERVATION PER HR: HCPCS

## 2024-09-25 PROCEDURE — 2580000003 HC RX 258: Performed by: STUDENT IN AN ORGANIZED HEALTH CARE EDUCATION/TRAINING PROGRAM

## 2024-09-25 PROCEDURE — 93005 ELECTROCARDIOGRAM TRACING: CPT | Performed by: EMERGENCY MEDICINE

## 2024-09-25 PROCEDURE — 6370000000 HC RX 637 (ALT 250 FOR IP): Performed by: STUDENT IN AN ORGANIZED HEALTH CARE EDUCATION/TRAINING PROGRAM

## 2024-09-25 PROCEDURE — 85025 COMPLETE CBC W/AUTO DIFF WBC: CPT

## 2024-09-25 PROCEDURE — 6360000002 HC RX W HCPCS: Performed by: EMERGENCY MEDICINE

## 2024-09-25 PROCEDURE — 83735 ASSAY OF MAGNESIUM: CPT

## 2024-09-25 PROCEDURE — 80053 COMPREHEN METABOLIC PANEL: CPT

## 2024-09-25 PROCEDURE — 36415 COLL VENOUS BLD VENIPUNCTURE: CPT

## 2024-09-25 PROCEDURE — 96375 TX/PRO/DX INJ NEW DRUG ADDON: CPT

## 2024-09-25 PROCEDURE — 96372 THER/PROPH/DIAG INJ SC/IM: CPT

## 2024-09-25 PROCEDURE — 85610 PROTHROMBIN TIME: CPT

## 2024-09-25 PROCEDURE — 87086 URINE CULTURE/COLONY COUNT: CPT

## 2024-09-25 PROCEDURE — 96365 THER/PROPH/DIAG IV INF INIT: CPT

## 2024-09-25 PROCEDURE — 6370000000 HC RX 637 (ALT 250 FOR IP)

## 2024-09-25 PROCEDURE — 2580000003 HC RX 258: Performed by: EMERGENCY MEDICINE

## 2024-09-25 PROCEDURE — 74177 CT ABD & PELVIS W/CONTRAST: CPT

## 2024-09-25 PROCEDURE — 6360000004 HC RX CONTRAST MEDICATION: Performed by: EMERGENCY MEDICINE

## 2024-09-25 PROCEDURE — 86403 PARTICLE AGGLUT ANTBDY SCRN: CPT

## 2024-09-25 RX ORDER — CARVEDILOL 12.5 MG/1
12.5 TABLET ORAL ONCE
Status: COMPLETED | OUTPATIENT
Start: 2024-09-25 | End: 2024-09-25

## 2024-09-25 RX ORDER — INSULIN GLARGINE 100 [IU]/ML
30 INJECTION, SOLUTION SUBCUTANEOUS 2 TIMES DAILY
Status: DISCONTINUED | OUTPATIENT
Start: 2024-09-25 | End: 2024-09-26 | Stop reason: HOSPADM

## 2024-09-25 RX ORDER — FAMOTIDINE 20 MG/1
20 TABLET, FILM COATED ORAL 2 TIMES DAILY
Status: DISCONTINUED | OUTPATIENT
Start: 2024-09-25 | End: 2024-09-26 | Stop reason: HOSPADM

## 2024-09-25 RX ORDER — POTASSIUM CHLORIDE 1500 MG/1
40 TABLET, EXTENDED RELEASE ORAL PRN
Status: DISCONTINUED | OUTPATIENT
Start: 2024-09-25 | End: 2024-09-26 | Stop reason: HOSPADM

## 2024-09-25 RX ORDER — FUROSEMIDE 40 MG
40 TABLET ORAL DAILY
Status: DISCONTINUED | OUTPATIENT
Start: 2024-09-26 | End: 2024-09-26 | Stop reason: HOSPADM

## 2024-09-25 RX ORDER — ONDANSETRON 2 MG/ML
4 INJECTION INTRAMUSCULAR; INTRAVENOUS ONCE
Status: COMPLETED | OUTPATIENT
Start: 2024-09-25 | End: 2024-09-25

## 2024-09-25 RX ORDER — BISACODYL 10 MG
10 SUPPOSITORY, RECTAL RECTAL DAILY PRN
Status: DISCONTINUED | OUTPATIENT
Start: 2024-09-25 | End: 2024-09-26 | Stop reason: HOSPADM

## 2024-09-25 RX ORDER — ENOXAPARIN SODIUM 100 MG/ML
30 INJECTION SUBCUTANEOUS 2 TIMES DAILY
Status: DISCONTINUED | OUTPATIENT
Start: 2024-09-25 | End: 2024-09-26 | Stop reason: HOSPADM

## 2024-09-25 RX ORDER — SPIRONOLACTONE 25 MG/1
25 TABLET ORAL DAILY
Status: DISCONTINUED | OUTPATIENT
Start: 2024-09-25 | End: 2024-09-26 | Stop reason: HOSPADM

## 2024-09-25 RX ORDER — POTASSIUM CHLORIDE 7.45 MG/ML
10 INJECTION INTRAVENOUS PRN
Status: DISCONTINUED | OUTPATIENT
Start: 2024-09-25 | End: 2024-09-26 | Stop reason: HOSPADM

## 2024-09-25 RX ORDER — MORPHINE SULFATE 4 MG/ML
4 INJECTION, SOLUTION INTRAMUSCULAR; INTRAVENOUS
Status: COMPLETED | OUTPATIENT
Start: 2024-09-25 | End: 2024-09-25

## 2024-09-25 RX ORDER — ALBUTEROL SULFATE 90 UG/1
2 INHALANT RESPIRATORY (INHALATION) EVERY 6 HOURS PRN
Status: DISCONTINUED | OUTPATIENT
Start: 2024-09-25 | End: 2024-09-26 | Stop reason: HOSPADM

## 2024-09-25 RX ORDER — FLUTICASONE PROPIONATE 50 MCG
1 SPRAY, SUSPENSION (ML) NASAL DAILY
Status: DISCONTINUED | OUTPATIENT
Start: 2024-09-26 | End: 2024-09-26 | Stop reason: HOSPADM

## 2024-09-25 RX ORDER — MAGNESIUM SULFATE HEPTAHYDRATE 40 MG/ML
2000 INJECTION, SOLUTION INTRAVENOUS PRN
Status: DISCONTINUED | OUTPATIENT
Start: 2024-09-25 | End: 2024-09-26 | Stop reason: HOSPADM

## 2024-09-25 RX ORDER — POLYETHYLENE GLYCOL 3350 17 G/17G
17 POWDER, FOR SOLUTION ORAL DAILY PRN
Status: DISCONTINUED | OUTPATIENT
Start: 2024-09-25 | End: 2024-09-26 | Stop reason: HOSPADM

## 2024-09-25 RX ORDER — METOCLOPRAMIDE 10 MG/1
10 TABLET ORAL
Status: DISCONTINUED | OUTPATIENT
Start: 2024-09-26 | End: 2024-09-26 | Stop reason: HOSPADM

## 2024-09-25 RX ORDER — SODIUM CHLORIDE 0.9 % (FLUSH) 0.9 %
10 SYRINGE (ML) INJECTION PRN
Status: COMPLETED | OUTPATIENT
Start: 2024-09-25 | End: 2024-09-25

## 2024-09-25 RX ORDER — IOPAMIDOL 755 MG/ML
75 INJECTION, SOLUTION INTRAVASCULAR
Status: COMPLETED | OUTPATIENT
Start: 2024-09-25 | End: 2024-09-25

## 2024-09-25 RX ORDER — NICOTINE 21 MG/24HR
1 PATCH, TRANSDERMAL 24 HOURS TRANSDERMAL DAILY
Status: DISCONTINUED | OUTPATIENT
Start: 2024-09-26 | End: 2024-09-26 | Stop reason: HOSPADM

## 2024-09-25 RX ORDER — LANOLIN ALCOHOL/MO/W.PET/CERES
3 CREAM (GRAM) TOPICAL NIGHTLY PRN
Status: DISCONTINUED | OUTPATIENT
Start: 2024-09-25 | End: 2024-09-26 | Stop reason: HOSPADM

## 2024-09-25 RX ORDER — SODIUM CHLORIDE 9 MG/ML
INJECTION, SOLUTION INTRAVENOUS PRN
Status: DISCONTINUED | OUTPATIENT
Start: 2024-09-25 | End: 2024-09-26 | Stop reason: HOSPADM

## 2024-09-25 RX ORDER — SODIUM CHLORIDE 9 MG/ML
INJECTION, SOLUTION INTRAVENOUS ONCE
Status: COMPLETED | OUTPATIENT
Start: 2024-09-25 | End: 2024-09-25

## 2024-09-25 RX ORDER — ONDANSETRON 4 MG/1
4 TABLET, ORALLY DISINTEGRATING ORAL EVERY 8 HOURS PRN
Status: DISCONTINUED | OUTPATIENT
Start: 2024-09-25 | End: 2024-09-26 | Stop reason: HOSPADM

## 2024-09-25 RX ORDER — ONDANSETRON 2 MG/ML
4 INJECTION INTRAMUSCULAR; INTRAVENOUS EVERY 6 HOURS PRN
Status: DISCONTINUED | OUTPATIENT
Start: 2024-09-25 | End: 2024-09-26 | Stop reason: HOSPADM

## 2024-09-25 RX ORDER — DOXYCYCLINE HYCLATE 100 MG
100 TABLET ORAL 2 TIMES DAILY
Status: CANCELLED | OUTPATIENT
Start: 2024-09-25

## 2024-09-25 RX ORDER — ACETAMINOPHEN 650 MG/1
650 SUPPOSITORY RECTAL EVERY 6 HOURS PRN
Status: DISCONTINUED | OUTPATIENT
Start: 2024-09-25 | End: 2024-09-26 | Stop reason: HOSPADM

## 2024-09-25 RX ORDER — ASPIRIN 81 MG/1
81 TABLET ORAL DAILY
Status: DISCONTINUED | OUTPATIENT
Start: 2024-09-25 | End: 2024-09-26 | Stop reason: HOSPADM

## 2024-09-25 RX ORDER — SODIUM CHLORIDE 0.9 % (FLUSH) 0.9 %
10 SYRINGE (ML) INJECTION PRN
Status: DISCONTINUED | OUTPATIENT
Start: 2024-09-25 | End: 2024-09-26 | Stop reason: HOSPADM

## 2024-09-25 RX ORDER — ATORVASTATIN CALCIUM 40 MG/1
40 TABLET, FILM COATED ORAL NIGHTLY
Status: DISCONTINUED | OUTPATIENT
Start: 2024-09-25 | End: 2024-09-26 | Stop reason: HOSPADM

## 2024-09-25 RX ORDER — ACETAMINOPHEN 325 MG/1
650 TABLET ORAL EVERY 6 HOURS PRN
Status: DISCONTINUED | OUTPATIENT
Start: 2024-09-25 | End: 2024-09-26 | Stop reason: HOSPADM

## 2024-09-25 RX ORDER — POTASSIUM CHLORIDE 750 MG/1
10 CAPSULE, EXTENDED RELEASE ORAL 2 TIMES DAILY
Status: DISCONTINUED | OUTPATIENT
Start: 2024-09-25 | End: 2024-09-26 | Stop reason: HOSPADM

## 2024-09-25 RX ADMIN — POTASSIUM CHLORIDE 10 MEQ: 750 CAPSULE, EXTENDED RELEASE ORAL at 23:48

## 2024-09-25 RX ADMIN — ASPIRIN 81 MG: 81 TABLET, COATED ORAL at 23:48

## 2024-09-25 RX ADMIN — SACUBITRIL AND VALSARTAN 2 TABLET: 24; 26 TABLET, FILM COATED ORAL at 22:19

## 2024-09-25 RX ADMIN — CARVEDILOL 12.5 MG: 12.5 TABLET, FILM COATED ORAL at 22:19

## 2024-09-25 RX ADMIN — SODIUM CHLORIDE: 9 INJECTION, SOLUTION INTRAVENOUS at 19:42

## 2024-09-25 RX ADMIN — ONDANSETRON 4 MG: 2 INJECTION, SOLUTION INTRAMUSCULAR; INTRAVENOUS at 20:53

## 2024-09-25 RX ADMIN — IOPAMIDOL 75 ML: 755 INJECTION, SOLUTION INTRAVENOUS at 19:42

## 2024-09-25 RX ADMIN — MORPHINE SULFATE 4 MG: 4 INJECTION, SOLUTION INTRAMUSCULAR; INTRAVENOUS at 20:55

## 2024-09-25 RX ADMIN — CEFTRIAXONE SODIUM 1000 MG: 1 INJECTION, POWDER, FOR SOLUTION INTRAMUSCULAR; INTRAVENOUS at 22:24

## 2024-09-25 RX ADMIN — FAMOTIDINE 20 MG: 20 TABLET, FILM COATED ORAL at 23:48

## 2024-09-25 RX ADMIN — SPIRONOLACTONE 25 MG: 25 TABLET ORAL at 23:49

## 2024-09-25 RX ADMIN — ENOXAPARIN SODIUM 30 MG: 100 INJECTION SUBCUTANEOUS at 23:49

## 2024-09-25 RX ADMIN — SODIUM CHLORIDE, PRESERVATIVE FREE 10 ML: 5 INJECTION INTRAVENOUS at 19:42

## 2024-09-25 RX ADMIN — ATORVASTATIN CALCIUM 40 MG: 40 TABLET, FILM COATED ORAL at 23:48

## 2024-09-25 ASSESSMENT — PAIN DESCRIPTION - LOCATION
LOCATION: ABDOMEN

## 2024-09-25 ASSESSMENT — PAIN - FUNCTIONAL ASSESSMENT
PAIN_FUNCTIONAL_ASSESSMENT: 0-10
PAIN_FUNCTIONAL_ASSESSMENT: 0-10

## 2024-09-25 ASSESSMENT — PAIN SCALES - GENERAL
PAINLEVEL_OUTOF10: 8
PAINLEVEL_OUTOF10: 8
PAINLEVEL_OUTOF10: 4
PAINLEVEL_OUTOF10: 4

## 2024-09-25 ASSESSMENT — ENCOUNTER SYMPTOMS
ABDOMINAL PAIN: 1
SHORTNESS OF BREATH: 0
COLOR CHANGE: 0
BACK PAIN: 0
EYE PAIN: 0

## 2024-09-25 ASSESSMENT — PAIN DESCRIPTION - ORIENTATION: ORIENTATION: UPPER;MID

## 2024-09-26 ENCOUNTER — APPOINTMENT (OUTPATIENT)
Dept: ULTRASOUND IMAGING | Age: 54
End: 2024-09-26
Payer: COMMERCIAL

## 2024-09-26 VITALS
TEMPERATURE: 97.8 F | HEART RATE: 92 BPM | OXYGEN SATURATION: 90 % | HEIGHT: 67 IN | RESPIRATION RATE: 19 BRPM | SYSTOLIC BLOOD PRESSURE: 152 MMHG | BODY MASS INDEX: 45.99 KG/M2 | WEIGHT: 293 LBS | DIASTOLIC BLOOD PRESSURE: 85 MMHG

## 2024-09-26 LAB
ALBUMIN SERPL-MCNC: 3.5 G/DL (ref 3.5–5.2)
ALP SERPL-CCNC: 70 U/L (ref 35–104)
ALT SERPL-CCNC: 25 U/L (ref 5–33)
ANION GAP SERPL CALCULATED.3IONS-SCNC: 9 MMOL/L (ref 9–17)
AST SERPL-CCNC: 26 U/L
BASOPHILS # BLD: 0.1 K/UL (ref 0–0.2)
BASOPHILS NFR BLD: 1 % (ref 0–2)
BILIRUB SERPL-MCNC: 0.9 MG/DL (ref 0.3–1.2)
BUN SERPL-MCNC: 8 MG/DL (ref 6–20)
CALCIUM SERPL-MCNC: 8.8 MG/DL (ref 8.6–10.4)
CHLORIDE SERPL-SCNC: 107 MMOL/L (ref 98–107)
CO2 SERPL-SCNC: 21 MMOL/L (ref 20–31)
CREAT SERPL-MCNC: 0.8 MG/DL (ref 0.5–0.9)
EKG ATRIAL RATE: 95 BPM
EKG P AXIS: 68 DEGREES
EKG P-R INTERVAL: 158 MS
EKG Q-T INTERVAL: 390 MS
EKG QRS DURATION: 96 MS
EKG QTC CALCULATION (BAZETT): 490 MS
EKG R AXIS: 2 DEGREES
EKG T AXIS: 46 DEGREES
EKG VENTRICULAR RATE: 95 BPM
EOSINOPHIL # BLD: 0.2 K/UL (ref 0–0.4)
EOSINOPHILS RELATIVE PERCENT: 3 % (ref 0–4)
ERYTHROCYTE [DISTWIDTH] IN BLOOD BY AUTOMATED COUNT: 18.5 % (ref 11.5–14.9)
EST. AVERAGE GLUCOSE BLD GHB EST-MCNC: 171 MG/DL
GFR, ESTIMATED: 88 ML/MIN/1.73M2
GLUCOSE BLD-MCNC: 101 MG/DL (ref 65–105)
GLUCOSE BLD-MCNC: 105 MG/DL (ref 65–105)
GLUCOSE BLD-MCNC: 134 MG/DL (ref 65–105)
GLUCOSE SERPL-MCNC: 106 MG/DL (ref 70–99)
HBA1C MFR BLD: 7.6 % (ref 4–6)
HCT VFR BLD AUTO: 42.3 % (ref 36–46)
HGB BLD-MCNC: 13.2 G/DL (ref 12–16)
LYMPHOCYTES NFR BLD: 1.3 K/UL (ref 1–4.8)
LYMPHOCYTES RELATIVE PERCENT: 17 % (ref 24–44)
MCH RBC QN AUTO: 27.9 PG (ref 26–34)
MCHC RBC AUTO-ENTMCNC: 31.2 G/DL (ref 31–37)
MCV RBC AUTO: 89.5 FL (ref 80–100)
MICROORGANISM SPEC CULT: ABNORMAL
MONOCYTES NFR BLD: 0.5 K/UL (ref 0.1–1.3)
MONOCYTES NFR BLD: 7 % (ref 1–7)
NEUTROPHILS NFR BLD: 72 % (ref 36–66)
NEUTS SEG NFR BLD: 5.4 K/UL (ref 1.3–9.1)
PLATELET # BLD AUTO: 284 K/UL (ref 150–450)
PMV BLD AUTO: 8.4 FL (ref 6–12)
POTASSIUM SERPL-SCNC: 4.1 MMOL/L (ref 3.7–5.3)
PROT SERPL-MCNC: 6.4 G/DL (ref 6.4–8.3)
RBC # BLD AUTO: 4.73 M/UL (ref 4–5.2)
SODIUM SERPL-SCNC: 137 MMOL/L (ref 135–144)
SPECIMEN DESCRIPTION: ABNORMAL
WBC OTHER # BLD: 7.3 K/UL (ref 3.5–11)

## 2024-09-26 PROCEDURE — 96372 THER/PROPH/DIAG INJ SC/IM: CPT

## 2024-09-26 PROCEDURE — 6360000002 HC RX W HCPCS

## 2024-09-26 PROCEDURE — 93010 ELECTROCARDIOGRAM REPORT: CPT | Performed by: INTERNAL MEDICINE

## 2024-09-26 PROCEDURE — 6370000000 HC RX 637 (ALT 250 FOR IP)

## 2024-09-26 PROCEDURE — 80053 COMPREHEN METABOLIC PANEL: CPT

## 2024-09-26 PROCEDURE — 36415 COLL VENOUS BLD VENIPUNCTURE: CPT

## 2024-09-26 PROCEDURE — 76705 ECHO EXAM OF ABDOMEN: CPT

## 2024-09-26 PROCEDURE — G0378 HOSPITAL OBSERVATION PER HR: HCPCS

## 2024-09-26 PROCEDURE — 85025 COMPLETE CBC W/AUTO DIFF WBC: CPT

## 2024-09-26 PROCEDURE — 82947 ASSAY GLUCOSE BLOOD QUANT: CPT

## 2024-09-26 PROCEDURE — 99223 1ST HOSP IP/OBS HIGH 75: CPT | Performed by: INTERNAL MEDICINE

## 2024-09-26 PROCEDURE — 83036 HEMOGLOBIN GLYCOSYLATED A1C: CPT

## 2024-09-26 PROCEDURE — 99223 1ST HOSP IP/OBS HIGH 75: CPT | Performed by: SURGERY

## 2024-09-26 RX ORDER — CEPHALEXIN 500 MG/1
500 CAPSULE ORAL 3 TIMES DAILY
Qty: 15 CAPSULE | Refills: 0 | Status: SHIPPED | OUTPATIENT
Start: 2024-09-26 | End: 2024-10-01

## 2024-09-26 RX ORDER — INSULIN LISPRO 100 [IU]/ML
0-8 INJECTION, SOLUTION INTRAVENOUS; SUBCUTANEOUS
Status: DISCONTINUED | OUTPATIENT
Start: 2024-09-26 | End: 2024-09-26 | Stop reason: HOSPADM

## 2024-09-26 RX ORDER — INSULIN LISPRO 100 [IU]/ML
0-4 INJECTION, SOLUTION INTRAVENOUS; SUBCUTANEOUS NIGHTLY
Status: DISCONTINUED | OUTPATIENT
Start: 2024-09-26 | End: 2024-09-26 | Stop reason: HOSPADM

## 2024-09-26 RX ORDER — DEXTROSE MONOHYDRATE 100 MG/ML
INJECTION, SOLUTION INTRAVENOUS CONTINUOUS PRN
Status: DISCONTINUED | OUTPATIENT
Start: 2024-09-26 | End: 2024-09-26 | Stop reason: HOSPADM

## 2024-09-26 RX ADMIN — ASPIRIN 81 MG: 81 TABLET, COATED ORAL at 08:24

## 2024-09-26 RX ADMIN — FAMOTIDINE 20 MG: 20 TABLET, FILM COATED ORAL at 08:24

## 2024-09-26 RX ADMIN — METOCLOPRAMIDE 10 MG: 10 TABLET ORAL at 06:06

## 2024-09-26 RX ADMIN — METOCLOPRAMIDE 10 MG: 10 TABLET ORAL at 17:19

## 2024-09-26 RX ADMIN — METOCLOPRAMIDE 10 MG: 10 TABLET ORAL at 10:49

## 2024-09-26 RX ADMIN — POTASSIUM CHLORIDE 10 MEQ: 750 CAPSULE, EXTENDED RELEASE ORAL at 08:24

## 2024-09-26 RX ADMIN — SPIRONOLACTONE 25 MG: 25 TABLET ORAL at 08:24

## 2024-09-26 RX ADMIN — SACUBITRIL AND VALSARTAN 1 TABLET: 49; 51 TABLET, FILM COATED ORAL at 01:40

## 2024-09-26 RX ADMIN — FUROSEMIDE 40 MG: 40 TABLET ORAL at 08:24

## 2024-09-26 RX ADMIN — SACUBITRIL AND VALSARTAN 1 TABLET: 49; 51 TABLET, FILM COATED ORAL at 08:26

## 2024-09-26 RX ADMIN — ENOXAPARIN SODIUM 30 MG: 100 INJECTION SUBCUTANEOUS at 08:24

## 2024-09-26 RX ADMIN — INSULIN GLARGINE 30 UNITS: 100 INJECTION, SOLUTION SUBCUTANEOUS at 08:24

## 2024-09-26 ASSESSMENT — ENCOUNTER SYMPTOMS
RHINORRHEA: 0
COUGH: 0
SHORTNESS OF BREATH: 0
SORE THROAT: 0

## 2024-09-27 ENCOUNTER — TELEPHONE (OUTPATIENT)
Dept: FAMILY MEDICINE CLINIC | Age: 54
End: 2024-09-27

## 2024-09-27 ENCOUNTER — CARE COORDINATION (OUTPATIENT)
Dept: CARE COORDINATION | Age: 54
End: 2024-09-27

## 2024-09-29 ENCOUNTER — HOSPITAL ENCOUNTER (EMERGENCY)
Age: 54
Discharge: HOME | End: 2024-09-29
Attending: STUDENT IN AN ORGANIZED HEALTH CARE EDUCATION/TRAINING PROGRAM
Payer: COMMERCIAL

## 2024-09-29 VITALS
SYSTOLIC BLOOD PRESSURE: 155 MMHG | HEART RATE: 86 BPM | RESPIRATION RATE: 17 BRPM | DIASTOLIC BLOOD PRESSURE: 82 MMHG | OXYGEN SATURATION: 99 % | TEMPERATURE: 98.1 F

## 2024-09-29 DIAGNOSIS — R10.84 GENERALIZED ABDOMINAL PAIN: Primary | ICD-10-CM

## 2024-09-29 LAB
ALBUMIN SERPL-MCNC: 4 G/DL (ref 3.5–5.2)
ALP SERPL-CCNC: 69 U/L (ref 35–104)
ALT SERPL-CCNC: 26 U/L (ref 5–33)
ANION GAP SERPL CALCULATED.3IONS-SCNC: 14 MMOL/L (ref 9–17)
AST SERPL-CCNC: 30 U/L
BASOPHILS # BLD: 0.1 K/UL (ref 0–0.2)
BASOPHILS NFR BLD: 1 % (ref 0–2)
BILIRUB SERPL-MCNC: 0.9 MG/DL (ref 0.3–1.2)
BUN SERPL-MCNC: 9 MG/DL (ref 6–20)
CALCIUM SERPL-MCNC: 9.1 MG/DL (ref 8.6–10.4)
CHLORIDE SERPL-SCNC: 102 MMOL/L (ref 98–107)
CO2 SERPL-SCNC: 22 MMOL/L (ref 20–31)
CREAT SERPL-MCNC: 0.7 MG/DL (ref 0.5–0.9)
EOSINOPHIL # BLD: 0.2 K/UL (ref 0–0.4)
EOSINOPHILS RELATIVE PERCENT: 2 % (ref 0–4)
ERYTHROCYTE [DISTWIDTH] IN BLOOD BY AUTOMATED COUNT: 18.5 % (ref 11.5–14.9)
GFR, ESTIMATED: >90 ML/MIN/1.73M2
GLUCOSE SERPL-MCNC: 129 MG/DL (ref 70–99)
HCT VFR BLD AUTO: 45.8 % (ref 36–46)
HGB BLD-MCNC: 14.3 G/DL (ref 12–16)
LIPASE SERPL-CCNC: 21 U/L (ref 13–60)
LYMPHOCYTES NFR BLD: 1.6 K/UL (ref 1–4.8)
LYMPHOCYTES RELATIVE PERCENT: 15 % (ref 24–44)
MAGNESIUM SERPL-MCNC: 1.8 MG/DL (ref 1.6–2.6)
MCH RBC QN AUTO: 27.5 PG (ref 26–34)
MCHC RBC AUTO-ENTMCNC: 31.2 G/DL (ref 31–37)
MCV RBC AUTO: 88.4 FL (ref 80–100)
MONOCYTES NFR BLD: 0.5 K/UL (ref 0.1–1.3)
MONOCYTES NFR BLD: 5 % (ref 1–7)
NEUTROPHILS NFR BLD: 77 % (ref 36–66)
NEUTS SEG NFR BLD: 7.9 K/UL (ref 1.3–9.1)
PLATELET # BLD AUTO: 283 K/UL (ref 150–450)
PMV BLD AUTO: 9.7 FL (ref 6–12)
POTASSIUM SERPL-SCNC: 3.4 MMOL/L (ref 3.7–5.3)
PROT SERPL-MCNC: 6.7 G/DL (ref 6.4–8.3)
RBC # BLD AUTO: 5.18 M/UL (ref 4–5.2)
SODIUM SERPL-SCNC: 138 MMOL/L (ref 135–144)
WBC OTHER # BLD: 10.2 K/UL (ref 3.5–11)

## 2024-09-29 PROCEDURE — 36415 COLL VENOUS BLD VENIPUNCTURE: CPT

## 2024-09-29 PROCEDURE — 83690 ASSAY OF LIPASE: CPT

## 2024-09-29 PROCEDURE — 6370000000 HC RX 637 (ALT 250 FOR IP)

## 2024-09-29 PROCEDURE — 96374 THER/PROPH/DIAG INJ IV PUSH: CPT

## 2024-09-29 PROCEDURE — 83735 ASSAY OF MAGNESIUM: CPT

## 2024-09-29 PROCEDURE — 80053 COMPREHEN METABOLIC PANEL: CPT

## 2024-09-29 PROCEDURE — 85025 COMPLETE CBC W/AUTO DIFF WBC: CPT

## 2024-09-29 PROCEDURE — 6360000002 HC RX W HCPCS

## 2024-09-29 PROCEDURE — 99284 EMERGENCY DEPT VISIT MOD MDM: CPT

## 2024-09-29 PROCEDURE — 96372 THER/PROPH/DIAG INJ SC/IM: CPT

## 2024-09-29 RX ORDER — ONDANSETRON 4 MG/1
4 TABLET, ORALLY DISINTEGRATING ORAL 3 TIMES DAILY PRN
Qty: 9 TABLET | Refills: 0 | Status: SHIPPED | OUTPATIENT
Start: 2024-09-29 | End: 2024-10-02

## 2024-09-29 RX ORDER — DICYCLOMINE HYDROCHLORIDE 10 MG/ML
20 INJECTION INTRAMUSCULAR ONCE
Status: COMPLETED | OUTPATIENT
Start: 2024-09-29 | End: 2024-09-29

## 2024-09-29 RX ORDER — KETOROLAC TROMETHAMINE 30 MG/ML
15 INJECTION, SOLUTION INTRAMUSCULAR; INTRAVENOUS ONCE
Status: DISCONTINUED | OUTPATIENT
Start: 2024-09-29 | End: 2024-09-29

## 2024-09-29 RX ORDER — DICYCLOMINE HYDROCHLORIDE 10 MG/ML
20 INJECTION INTRAMUSCULAR ONCE
Status: DISCONTINUED | OUTPATIENT
Start: 2024-09-29 | End: 2024-09-29

## 2024-09-29 RX ORDER — KETOROLAC TROMETHAMINE 30 MG/ML
15 INJECTION, SOLUTION INTRAMUSCULAR; INTRAVENOUS ONCE
Status: COMPLETED | OUTPATIENT
Start: 2024-09-29 | End: 2024-09-29

## 2024-09-29 RX ADMIN — DICYCLOMINE HYDROCHLORIDE 20 MG: 10 INJECTION, SOLUTION INTRAMUSCULAR at 20:48

## 2024-09-29 RX ADMIN — POTASSIUM BICARBONATE 20 MEQ: 782 TABLET, EFFERVESCENT ORAL at 21:54

## 2024-09-29 RX ADMIN — KETOROLAC TROMETHAMINE 15 MG: 30 INJECTION, SOLUTION INTRAMUSCULAR at 20:52

## 2024-09-29 ASSESSMENT — PAIN DESCRIPTION - DESCRIPTORS
DESCRIPTORS: DISCOMFORT
DESCRIPTORS: DISCOMFORT

## 2024-09-29 ASSESSMENT — PAIN DESCRIPTION - LOCATION
LOCATION: ABDOMEN

## 2024-09-29 ASSESSMENT — LIFESTYLE VARIABLES
HOW MANY STANDARD DRINKS CONTAINING ALCOHOL DO YOU HAVE ON A TYPICAL DAY: 1 OR 2
HOW OFTEN DO YOU HAVE A DRINK CONTAINING ALCOHOL: 2-4 TIMES A MONTH

## 2024-09-29 ASSESSMENT — PAIN SCALES - GENERAL
PAINLEVEL_OUTOF10: 5
PAINLEVEL_OUTOF10: 8
PAINLEVEL_OUTOF10: 8

## 2024-09-30 ENCOUNTER — CARE COORDINATION (OUTPATIENT)
Dept: CARE COORDINATION | Age: 54
End: 2024-09-30

## 2024-09-30 NOTE — ED PROVIDER NOTES
EMERGENCY DEPARTMENT ENCOUNTER   ATTENDING ATTESTATION     Pt Name: Kari Reddy  MRN: [id number]  Birthdate 1970  Date of evaluation: 9/29/24       Kari Reddy is a 53 y.o. female who presents with Abdominal Pain (Pain for the past month. Hx. Of gall bladder issues seen on Wednesday )    Patient presenting with upper abdominal discomfort    Going on for a month    Seen a couple times in our emergency department admitted to observation at 1 point had a essentially unremarkable imaging workup she does have CHF with anasarca so there is some nonspecific thickening of the gallbladder but on ultrasound no evidence of acute cholecystitis or stones    Plan will repeat labs and provide symptomatic therapy    MDM:     Repeat labs are reassuring no leukocytosis LFTs are all completely normal lipase normal    Benign abdominal exam low suspicion for cholecystitis, hepatitis, pancreatitis    Low suspicion for surgical abdominal process    Suspect possible gastritis or gastroparesis    She has a follow-up with GI this week she is feeling better after symptomatic treatment will discharge with outpatient follow    Vitals:   Vitals:    09/29/24 2010 09/29/24 2115   BP: (!) 180/94    Pulse: 86    Resp: 17    Temp:  98.1 °F (36.7 °C)   SpO2: 96%          I personally saw and examined the patient. I have reviewed and agree with the resident's findings, including all diagnostic interpretations and treatment plan as written. I was present for the key portions of any procedures performed and the inclusive time noted for any critical care statement.    Frederic Granda MD  Attending Emergency Physician            Frederic Granda MD  09/29/24 7003    
testing) 12/12/23   Miguel, Lauratulain, DO   atorvastatin (LIPITOR) 40 MG tablet Take 1 tablet by mouth daily 12/12/23   Dhraa Riverain, DO   blood glucose monitor strips 1 strip by Other route three times daily 12/12/23   Miguel, Quratulain, DO   dapagliflozin (FARXIGA) 10 MG tablet Take 1 tablet by mouth every morning 12/12/23   Laura Riveratfredyin, DO   fluticasone (FLONASE) 50 MCG/ACT nasal spray 1 spray by Each Nostril route daily 12/12/23   Dhara Riverain, DO   furosemide (LASIX) 40 MG tablet Take 1 tablet by mouth daily 12/12/23   Louis Rivera, DO   sacubitril-valsartan (ENTRESTO) 49-51 MG per tablet Take 1 tablet by mouth 2 times daily 12/12/23   Louis Rivera, DO   Lancets MISC 1 each by Does not apply route daily 1/18/23   Radha Velasco MD   blood glucose monitor strips Test before all three meals and two hours after meals. Test as needed for symptoms of irregular blood glucose. 12/15/22   Radha Velasco MD   aspirin EC 81 MG EC tablet Take 1 tablet by mouth daily 6/2/22   Radha Velasco MD         REVIEW OF SYSTEMS       Review of Systems  No nausea, no vomiting, early satiety, which the patient has been having chronically, no chest pain, shortness of breath when patient has abdominal pain, no dysuria, otherwise review of systems negative.  PHYSICAL EXAM      INITIAL VITALS:   BP (!) 155/82   Pulse 86   Temp 98.1 °F (36.7 °C)   Resp 17   SpO2 99%     Physical Exam  Cardiovascular:      Rate and Rhythm: Normal rate and regular rhythm.   Pulmonary:      Effort: Pulmonary effort is normal. No respiratory distress.   Abdominal:      General: Abdomen is flat. Bowel sounds are normal.      Palpations: Abdomen is soft.      Tenderness: There is generalized abdominal tenderness. There is no guarding or rebound.           DDX/DIAGNOSTIC RESULTS / EMERGENCY DEPARTMENT COURSE / MDM     Medical Decision Making  50-year-old female with no significant pertinent history

## 2024-09-30 NOTE — CARE COORDINATION
Care Transitions Note    Initial Call - Call within 2 business days of discharge: Yes    Attempted to reach patient for transitions of care follow up. Unable to reach patient.    Outreach Attempts:   Multiple attempts to contact patient at phone numbers on file.     Patient: Kari Reddy    Patient : 1970   MRN: [id number]    Reason for Admission: Generalized abdominal pain   Discharge Date: 24  RURS: Readmission Risk Score: 17.1    Last Discharge Facility       Date Complaint Diagnosis Description Type Department Provider    24 Abdominal Pain Generalized abdominal pain ED (DISCHARGE) [facility] ED Frederic Granda MD            Was this an external facility discharge? No    Follow Up Appointment:   Patient has hospital follow up appointment scheduled within 7 days of discharge.    Future Appointments         Provider Specialty Dept Phone    10/3/2024 10:15 AM Jessica Steinberg MD Family Medicine [phone]    11/15/2024 8:45 AM Ricco Benavides MD Cardiology [phone]            No further follow-up call indicated     Moon Green RN

## 2024-09-30 NOTE — DISCHARGE INSTRUCTIONS
You are seen here for abdominal pain.    We evaluated you, and found that you have no acute issues at this time.  You did have a low potassium, which were replaced.    We recommend that you follow-up with your normally scheduled appointment on Thursday.    Please follow-up with your primary care provider.    You may return to the emergency department with any new or worsening symptoms.  This includes

## 2024-10-03 ENCOUNTER — OFFICE VISIT (OUTPATIENT)
Dept: FAMILY MEDICINE CLINIC | Age: 54
End: 2024-10-03
Payer: COMMERCIAL

## 2024-10-03 ENCOUNTER — HOSPITAL ENCOUNTER (EMERGENCY)
Age: 54
Discharge: HOME | End: 2024-10-03
Attending: EMERGENCY MEDICINE
Payer: COMMERCIAL

## 2024-10-03 ENCOUNTER — APPOINTMENT (OUTPATIENT)
Dept: GENERAL RADIOLOGY | Age: 54
End: 2024-10-03
Payer: COMMERCIAL

## 2024-10-03 VITALS
DIASTOLIC BLOOD PRESSURE: 102 MMHG | HEART RATE: 70 BPM | HEIGHT: 67 IN | BODY MASS INDEX: 43.16 KG/M2 | OXYGEN SATURATION: 97 % | SYSTOLIC BLOOD PRESSURE: 169 MMHG | WEIGHT: 275 LBS

## 2024-10-03 VITALS
BODY MASS INDEX: 44.57 KG/M2 | WEIGHT: 284 LBS | OXYGEN SATURATION: 97 % | RESPIRATION RATE: 19 BRPM | DIASTOLIC BLOOD PRESSURE: 94 MMHG | TEMPERATURE: 97.7 F | HEART RATE: 75 BPM | HEIGHT: 67 IN | SYSTOLIC BLOOD PRESSURE: 151 MMHG

## 2024-10-03 DIAGNOSIS — I50.20 HFREF (HEART FAILURE WITH REDUCED EJECTION FRACTION) (HCC): ICD-10-CM

## 2024-10-03 DIAGNOSIS — Z71.1 FEARED COMPLAINT WITHOUT DIAGNOSIS: Primary | ICD-10-CM

## 2024-10-03 DIAGNOSIS — R10.84 GENERALIZED ABDOMINAL PAIN: ICD-10-CM

## 2024-10-03 DIAGNOSIS — I50.42 CHRONIC COMBINED SYSTOLIC (CONGESTIVE) AND DIASTOLIC (CONGESTIVE) HEART FAILURE (HCC): ICD-10-CM

## 2024-10-03 DIAGNOSIS — R60.0 BILATERAL LOWER EXTREMITY EDEMA: Primary | ICD-10-CM

## 2024-10-03 DIAGNOSIS — G47.30 SLEEP APNEA, UNSPECIFIED TYPE: ICD-10-CM

## 2024-10-03 PROBLEM — I07.9 TRICUSPID VALVE DISORDER: Status: ACTIVE | Noted: 2024-10-03

## 2024-10-03 LAB
ANION GAP SERPL CALCULATED.3IONS-SCNC: 8 MMOL/L (ref 9–17)
BASOPHILS # BLD: 0.1 K/UL (ref 0–0.2)
BASOPHILS NFR BLD: 1 % (ref 0–2)
BNP SERPL-MCNC: 3680 PG/ML
BUN SERPL-MCNC: 13 MG/DL (ref 6–20)
CALCIUM SERPL-MCNC: 9.3 MG/DL (ref 8.6–10.4)
CHLORIDE SERPL-SCNC: 108 MMOL/L (ref 98–107)
CO2 SERPL-SCNC: 27 MMOL/L (ref 20–31)
CREAT SERPL-MCNC: 0.9 MG/DL (ref 0.5–0.9)
EOSINOPHIL # BLD: 0.1 K/UL (ref 0–0.4)
EOSINOPHILS RELATIVE PERCENT: 1 % (ref 0–4)
ERYTHROCYTE [DISTWIDTH] IN BLOOD BY AUTOMATED COUNT: 18.4 % (ref 11.5–14.9)
GFR, ESTIMATED: 76 ML/MIN/1.73M2
GLUCOSE SERPL-MCNC: 123 MG/DL (ref 70–99)
HCT VFR BLD AUTO: 43.7 % (ref 36–46)
HGB BLD-MCNC: 13.8 G/DL (ref 12–16)
LYMPHOCYTES NFR BLD: 1.3 K/UL (ref 1–4.8)
LYMPHOCYTES RELATIVE PERCENT: 12 % (ref 24–44)
MCH RBC QN AUTO: 27.8 PG (ref 26–34)
MCHC RBC AUTO-ENTMCNC: 31.5 G/DL (ref 31–37)
MCV RBC AUTO: 88.3 FL (ref 80–100)
MONOCYTES NFR BLD: 0.5 K/UL (ref 0.1–1.3)
MONOCYTES NFR BLD: 5 % (ref 1–7)
MYOGLOBIN SERPL-MCNC: 39 NG/ML (ref 25–58)
NEUTROPHILS NFR BLD: 81 % (ref 36–66)
NEUTS SEG NFR BLD: 8.7 K/UL (ref 1.3–9.1)
PLATELET # BLD AUTO: 255 K/UL (ref 150–450)
PMV BLD AUTO: 8.9 FL (ref 6–12)
POTASSIUM SERPL-SCNC: 4.1 MMOL/L (ref 3.7–5.3)
RBC # BLD AUTO: 4.95 M/UL (ref 4–5.2)
SODIUM SERPL-SCNC: 143 MMOL/L (ref 135–144)
TROPONIN I SERPL HS-MCNC: 27 NG/L (ref 0–14)
WBC OTHER # BLD: 10.6 K/UL (ref 3.5–11)

## 2024-10-03 PROCEDURE — 83880 ASSAY OF NATRIURETIC PEPTIDE: CPT

## 2024-10-03 PROCEDURE — 71045 X-RAY EXAM CHEST 1 VIEW: CPT

## 2024-10-03 PROCEDURE — 99215 OFFICE O/P EST HI 40 MIN: CPT

## 2024-10-03 PROCEDURE — 84484 ASSAY OF TROPONIN QUANT: CPT

## 2024-10-03 PROCEDURE — 85025 COMPLETE CBC W/AUTO DIFF WBC: CPT

## 2024-10-03 PROCEDURE — 83874 ASSAY OF MYOGLOBIN: CPT

## 2024-10-03 PROCEDURE — 36415 COLL VENOUS BLD VENIPUNCTURE: CPT

## 2024-10-03 PROCEDURE — 99285 EMERGENCY DEPT VISIT HI MDM: CPT

## 2024-10-03 PROCEDURE — 3079F DIAST BP 80-89 MM HG: CPT

## 2024-10-03 PROCEDURE — 93005 ELECTROCARDIOGRAM TRACING: CPT | Performed by: EMERGENCY MEDICINE

## 2024-10-03 PROCEDURE — 3077F SYST BP >= 140 MM HG: CPT

## 2024-10-03 PROCEDURE — 80048 BASIC METABOLIC PNL TOTAL CA: CPT

## 2024-10-03 RX ORDER — FUROSEMIDE 40 MG
40 TABLET ORAL DAILY
Qty: 90 TABLET | Refills: 1 | Status: SHIPPED | OUTPATIENT
Start: 2024-10-03

## 2024-10-03 RX ORDER — SACUBITRIL AND VALSARTAN 49; 51 MG/1; MG/1
1 TABLET, FILM COATED ORAL 2 TIMES DAILY
Qty: 180 TABLET | Refills: 1 | Status: SHIPPED | OUTPATIENT
Start: 2024-10-03

## 2024-10-03 RX ORDER — ONDANSETRON 4 MG/1
4 TABLET, FILM COATED ORAL 3 TIMES DAILY PRN
Qty: 15 TABLET | Refills: 0 | Status: SHIPPED | OUTPATIENT
Start: 2024-10-03 | End: 2024-10-04

## 2024-10-03 ASSESSMENT — ENCOUNTER SYMPTOMS
FACIAL SWELLING: 0
EYE REDNESS: 0
EYE PAIN: 0
ABDOMINAL PAIN: 0
BACK PAIN: 0
WHEEZING: 0
DIARRHEA: 0
COLOR CHANGE: 0
TROUBLE SWALLOWING: 0
BLOOD IN STOOL: 0
SHORTNESS OF BREATH: 0
EYE DISCHARGE: 0
SINUS PRESSURE: 0
NAUSEA: 0
CHEST TIGHTNESS: 0
RHINORRHEA: 0
COUGH: 0
CONSTIPATION: 0
VOMITING: 0
SORE THROAT: 0

## 2024-10-03 NOTE — PROGRESS NOTES
Attending Physician Statement  I have discussed the care of Kari Reddy53 y.o.  female, including pertinent history and exam findings,  with the resident Jessica Lee MD.  History and Exam:   Chief Complaint   Patient presents with    Follow-Up from Hospital     [facility] 9/25 to 9/26 for RUQ Abd Pain, CHF ... [facility] ED 9/29 for Abd Pain       Patient seen and examined with resident team present.  Patient appears comfortable in no acute distress.  Patient does note that she has been having increased cough with sputum lately which is not typical for her.  She does admit to smoking has an extensive smoking history.  Patient does have a documented history of COPD as well.  Patient noted to have JVD up to the angle of the mandible while sitting upright.  Patient also noted to have bilateral lower extremity edema pitting, which is worse than patient baseline.  Patient able to reclined in flat bed without any difficulty in breathing.  Patient also states that she is not sure of all the medication she is taking and is frustrated with the amount of medication she is taking as well as her multiple request for refills in the past.  Patient is not sure if she is taking her furosemide as well and has been taking the medications that she has been prescribed faithfully.  Patient does note that she has been out of of the Entresto since August due to other issues.  Patient of note also has a ejection fraction of 25% done by echo earlier this year.    Past Medical History:   Diagnosis Date    Arthritis     CAD (coronary artery disease)     no stents    Cardiomyopathy (HCC)     CHF (congestive heart failure) (HCC)     Chronic back pain 02/13/2015    COPD (chronic obstructive pulmonary disease) (HCC)     Diabetes mellitus (HCC)     managed by pcp    Heart failure ([facility]) 2020    History of blood transfusion     HTN (hypertension) 04/08/2011    Hyperlipemia 04/08/2011    Iron deficiency     Lumbar disc herniation     w/ radiculopathy     
Visit Information    Have you changed or started any medications since your last visit including any over-the-counter medicines, vitamins, or herbal medicines? no   Have you stopped taking any of your medications? Is so, why? -  no  Are you having any side effects from any of your medications? - no    Have you seen any other physician or provider since your last visit?  yes - Walk In 9/17/24 URI   Have you had any other diagnostic tests since your last visit?  yes - Labs 9/29 and Imaging 9/25   Have you been seen in the emergency room and/or had an admission in a hospital since we last saw you?  yes - [facility] 9/25 to 9/26 for RUQ Abd Pain, CHF ... [facility] ED 9/29 for Abd Pain  Have you had your routine dental cleaning in the past 6 months?  no     Do you have an active MyChart account? If no, what is the barrier?  Yes    Patient Care Team:  Louis Rivera DO as PCP - General (Family Medicine)  Glen Haines MD as PCP - Empaneled Provider    Medical History Review  Past Medical, Family, and Social History reviewed and does contribute to the patient presenting condition    Health Maintenance   Topic Date Due    Hepatitis B vaccine (1 of 3 - 19+ 3-dose series) Never done    Colorectal Cancer Screen  Never done    Pneumococcal 0-64 years Vaccine (2 of 2 - PCV) 01/13/2018    Shingles vaccine (1 of 2) Never done    Diabetic retinal exam  02/20/2024    Flu vaccine (1) Never done    COVID-19 Vaccine (3 - 2023-24 season) 09/01/2024    Diabetic Alb to Cr ratio (uACR) test  12/12/2024    Lipids  12/12/2024    Breast cancer screen  06/21/2025    Diabetic foot exam  08/19/2025    Depression Screen  08/19/2025    A1C test (Diabetic or Prediabetic)  09/26/2025    GFR test (Diabetes, CKD 3-4, OR last GFR 15-59)  09/29/2025    Cervical cancer screen  01/03/2028    DTaP/Tdap/Td vaccine (3 - Td or Tdap) 02/17/2030    Hepatitis C screen  Completed    HIV screen  Completed    Hepatitis A vaccine  Aged Out    Hib vaccine  Aged Out    Polio 
Chronic combined systolic (congestive) and diastolic (congestive) heart failure (HCC)  -Was not taking Entresto or Lasix  -Patient was sent to the ED for concerns of CHF exacerbation  - sacubitril-valsartan (ENTRESTO) 49-51 MG per tablet; Take 1 tablet by mouth 2 times daily  Dispense: 180 tablet; Refill: 1  - furosemide (LASIX) 40 MG tablet; Take 1 tablet by mouth daily  Dispense: 90 tablet; Refill: 1  - [facility]    3. HFrEF (heart failure with reduced ejection fraction) ([facility])  -Concerns for CHF exacerbation  -Refill on home meds of Entresto and Lasix were signed out  - Basic Metabolic Panel; Future  - [facility]  - Echo (TTE) complete (PRN contrast/bubble/strain/3D); Future    4. Generalized abdominal pain  -Attempted to call GI office to reschedule visit as patient does not appear to be in the best shape of heart failure, no answer  -Will attempt another call tomorrow  - ondansetron (ZOFRAN) 4 MG tablet; Take 1 tablet by mouth 3 times daily as needed for Nausea or Vomiting  Dispense: 15 tablet; Refill: 0    5. Sleep apnea, unspecified type  - Baseline Diagnostic Sleep Study; Future        Requested Prescriptions     Signed Prescriptions Disp Refills    ondansetron (ZOFRAN) 4 MG tablet 15 tablet 0     Sig: Take 1 tablet by mouth 3 times daily as needed for Nausea or Vomiting    sacubitril-valsartan (ENTRESTO) 49-51 MG per tablet 180 tablet 1     Sig: Take 1 tablet by mouth 2 times daily    furosemide (LASIX) 40 MG tablet 90 tablet 1     Sig: Take 1 tablet by mouth daily       Medications Discontinued During This Encounter   Medication Reason    blood glucose monitor strips LIST CLEANUP    Lancets MISC LIST CLEANUP    Alcohol Swabs PADS LIST CLEANUP    blood glucose monitor strips LIST CLEANUP    Continuous Blood Gluc  (FREESTYLE JACKSON 2 READER) CARLOS LIST CLEANUP    Blood Pressure KIT LIST CLEANUP    atorvastatin (LIPITOR) 40 MG tablet LIST

## 2024-10-03 NOTE — ED PROVIDER NOTES
MD Randy at [facility] OR    PA OFFICE/OUTPT VISIT,PROCEDURE ONLY Right 04/02/2018    RIGHT MICRODISCECTOMY L3-4, L4-5, GABRIEL TABLE, PRONE, MICROSCOPE, METRX TUBE, C-ARM performed by Sheila Bacon DO at [facility] OR       CURRENT MEDICATIONS       Current Discharge Medication List        CONTINUE these medications which have NOT CHANGED    Details   ondansetron (ZOFRAN) 4 MG tablet Take 1 tablet by mouth 3 times daily as needed for Nausea or Vomiting  Qty: 15 tablet, Refills: 0    Associated Diagnoses: Generalized abdominal pain      sacubitril-valsartan (ENTRESTO) 49-51 MG per tablet Take 1 tablet by mouth 2 times daily  Qty: 180 tablet, Refills: 1    Associated Diagnoses: Chronic combined systolic (congestive) and diastolic (congestive) heart failure ([facility])      furosemide (LASIX) 40 MG tablet Take 1 tablet by mouth daily  Qty: 90 tablet, Refills: 1    Associated Diagnoses: Chronic combined systolic (congestive) and diastolic (congestive) heart failure ([facility])      Magnesium Citrate 1.745 GM/30ML solution       dulaglutide (TRULICITY) 1.5 MG/0.5ML SC injection Inject 0.5 mLs into the skin once a week  Qty: 2 mL, Refills: 5    Associated Diagnoses: Type 2 diabetes mellitus without complication, with long-term current use of insulin ([facility])      spironolactone (ALDACTONE) 25 MG tablet Take 1 tablet by mouth daily  Qty: 30 tablet, Refills: 5    Associated Diagnoses: CHF (congestive heart failure), NYHA class II, chronic, combined ([facility])      famotidine (PEPCID) 20 MG tablet Take 1 tablet by mouth 2 times daily  Qty: 28 tablet, Refills: 0      potassium chloride (KLOR-CON M) 10 MEQ extended release tablet TAKE 1 TABLET BY MOUTH 2 TIMES A DAY  Qty: 30 tablet, Refills: 0      nicotine (NICODERM CQ) 14 MG/24HR Place 1 patch onto the skin daily  Qty: 14 patch, Refills: 0    Associated Diagnoses: Tobacco abuse      insulin glargine (LANTUS) 100 UNIT/ML injection vial Inject 30 Units into the skin 2 times daily  Qty: 10 mL,

## 2024-10-03 NOTE — DISCHARGE INSTRUCTIONS
Thank you for visiting [facility] ED.  You need to call in morning to make appointment as directed with appropriate doctor.  Should you have any questions regarding your care or further treatment, please call [facility] Emergency Department at [phone].  Take any medications as prescribed, if given any.  Return to ED if symptoms worsen, do not improve, and unable to follow up with your physician, or other concerns arise.

## 2024-10-04 ENCOUNTER — TELEPHONE (OUTPATIENT)
Dept: OTHER | Age: 54
End: 2024-10-04

## 2024-10-04 ENCOUNTER — TELEPHONE (OUTPATIENT)
Dept: FAMILY MEDICINE CLINIC | Age: 54
End: 2024-10-04

## 2024-10-04 DIAGNOSIS — R10.84 GENERALIZED ABDOMINAL PAIN: Primary | ICD-10-CM

## 2024-10-04 RX ORDER — ONDANSETRON 4 MG/1
4 TABLET, ORALLY DISINTEGRATING ORAL 3 TIMES DAILY PRN
Qty: 21 TABLET | Refills: 0 | Status: SHIPPED | OUTPATIENT
Start: 2024-10-04

## 2024-10-04 NOTE — TELEPHONE ENCOUNTER
Patient called office stating that the Zofran that was sent to the pharmacy for her yesterday is the wrong one. Patient said when she spoke with the provider, she showed her the bottle of the correct one. Patient is asking for the Zofran - ODT. Writer confirmed with pharmacy that patient had that prior but did  the other medication yesterday.

## 2024-10-05 LAB
EKG ATRIAL RATE: 70 BPM
EKG P AXIS: 51 DEGREES
EKG P-R INTERVAL: 164 MS
EKG Q-T INTERVAL: 394 MS
EKG QRS DURATION: 94 MS
EKG QTC CALCULATION (BAZETT): 425 MS
EKG R AXIS: -8 DEGREES
EKG T AXIS: 31 DEGREES
EKG VENTRICULAR RATE: 70 BPM

## 2024-10-05 PROCEDURE — 93010 ELECTROCARDIOGRAM REPORT: CPT | Performed by: INTERNAL MEDICINE

## 2024-10-10 ENCOUNTER — OFFICE VISIT (OUTPATIENT)
Dept: FAMILY MEDICINE CLINIC | Age: 54
End: 2024-10-10
Payer: COMMERCIAL

## 2024-10-10 VITALS
BODY MASS INDEX: 41.91 KG/M2 | HEART RATE: 78 BPM | DIASTOLIC BLOOD PRESSURE: 84 MMHG | WEIGHT: 267 LBS | SYSTOLIC BLOOD PRESSURE: 134 MMHG | HEIGHT: 67 IN

## 2024-10-10 DIAGNOSIS — I10 HYPERTENSION GOAL BP (BLOOD PRESSURE) < 140/90: ICD-10-CM

## 2024-10-10 DIAGNOSIS — K29.70 GASTRITIS WITHOUT BLEEDING, UNSPECIFIED CHRONICITY, UNSPECIFIED GASTRITIS TYPE: Primary | ICD-10-CM

## 2024-10-10 PROCEDURE — 99213 OFFICE O/P EST LOW 20 MIN: CPT

## 2024-10-10 PROCEDURE — 3079F DIAST BP 80-89 MM HG: CPT

## 2024-10-10 PROCEDURE — 3075F SYST BP GE 130 - 139MM HG: CPT

## 2024-10-10 RX ORDER — PANTOPRAZOLE SODIUM 40 MG/1
40 TABLET, DELAYED RELEASE ORAL
Qty: 112 TABLET | Refills: 0 | Status: SHIPPED | OUTPATIENT
Start: 2024-10-10 | End: 2024-12-05

## 2024-10-10 ASSESSMENT — ENCOUNTER SYMPTOMS
CHEST TIGHTNESS: 0
BACK PAIN: 0
ABDOMINAL PAIN: 0
SHORTNESS OF BREATH: 0

## 2024-10-10 NOTE — PROGRESS NOTES
not nervous/anxious.          The patient has a   Family History   Problem Relation Age of Onset    Other Brother     High Blood Pressure Mother     High Blood Pressure Father     Asthma Father        Objective:    /84 (Site: Right Upper Arm, Position: Sitting, Cuff Size: Medium Adult)   Pulse 78   Ht 1.702 m (5' 7\")   Wt 121.1 kg (267 lb)   BMI 41.82 kg/m²    BP Readings from Last 3 Encounters:   10/10/24 134/84   10/03/24 (!) 151/94   10/03/24 (!) 169/102       Physical Exam  Constitutional:       Appearance: Normal appearance.   Neck:      Vascular: No JVD.   Cardiovascular:      Rate and Rhythm: Normal rate and regular rhythm.      Heart sounds: No murmur heard.     No gallop.   Pulmonary:      Effort: Pulmonary effort is normal.      Breath sounds: Normal breath sounds. No wheezing.   Abdominal:      General: Abdomen is flat. There is no distension.      Palpations: Abdomen is soft.      Tenderness: There is no abdominal tenderness.   Musculoskeletal:      Right lower leg: No edema.      Left lower leg: No edema.   Skin:     General: Skin is warm.      Capillary Refill: Capillary refill takes less than 2 seconds.      Findings: No erythema or rash.   Neurological:      General: No focal deficit present.      Mental Status: She is alert and oriented to person, place, and time.   Psychiatric:         Mood and Affect: Mood normal.         Behavior: Behavior normal.           Assessment and Plan:    1. Gastritis without bleeding, unspecified chronicity, unspecified gastritis type  -Diagnosed on EGD, need to repeat EGD in 3 months  - pantoprazole (PROTONIX) 40 MG tablet; Take 1 tablet by mouth 2 times daily (before meals)  Dispense: 112 tablet; Refill: 0    2. Hypertension goal BP (blood pressure) < 140/90  -Well-controlled currently  -Needs repeat BMP due to starting Lasix last week  - Basic Metabolic Panel; Future      Requested Prescriptions     Signed Prescriptions Disp Refills    pantoprazole

## 2024-10-14 ENCOUNTER — HOSPITAL ENCOUNTER (OUTPATIENT)
Dept: OTHER | Age: 54
Discharge: HOME | End: 2024-10-14
Payer: COMMERCIAL

## 2024-10-14 VITALS
HEART RATE: 89 BPM | DIASTOLIC BLOOD PRESSURE: 98 MMHG | SYSTOLIC BLOOD PRESSURE: 140 MMHG | BODY MASS INDEX: 41.35 KG/M2 | RESPIRATION RATE: 18 BRPM | OXYGEN SATURATION: 100 % | WEIGHT: 264 LBS

## 2024-10-14 PROCEDURE — 99212 OFFICE O/P EST SF 10 MIN: CPT

## 2024-10-14 NOTE — PROGRESS NOTES
Date:  10/14/2024  Time:  2:43 PM    CHF Clinic at [facility]    Office: [phone] Fax: [phone]    Re:  Kari Reddy   Patient : 1970    Vital Signs: BP (!) 140/98   Pulse 89   Resp 18   Wt 119.7 kg (264 lb)   SpO2 100%   BMI 41.35 kg/m²                       O2 Device: None (Room air)                           No results for input(s): \"CBC\", \"HGB\", \"HCT\", \"WBC\", \"PLATELET\", \"NA\", \"K\", \"CL\", \"CO2\", \"BUN\", \"CREATININE\", \"GLUCOSE\", \"BNP\", \"INR\" in the last 72 hours.     Respiratory:    Assessment  Charting Type: Shift assessment    Breath Sounds  Respiratory Pattern: Regular  Right Upper Lobe: Clear  Right Middle Lobe: Clear  Right Lower Lobe: Clear  Left Upper Lobe: Clear  Left Lower Lobe: Diminished    Cough/Sputum  Cough: Non-productive  Frequency: Occasional              Future Appointments   Date Time Provider Department Center   2024 10:00 AM [facility] CHF CLINIC  1 [facility] CHF [facility]   11/15/2024  8:45 AM Ricco Benavides MD [facility] [place] [facility]   12/10/2024 10:30 AM Louis Rivera DO [facility] ECC DEP      Complaints: Pt denies any concerns or complaints at this time    Comment : Pt arrived ambulatory for initial consult appt. Pts wt 264 lb. Pt denies SOB at rest. Lungs clear, diminished LLL. Pt states she smokes approx 6-7 cigarettes per day and is trying to quit. No edema noted. Baseline calf and ankle measurements obtained. Pt was recently discharged from [facility] on 10/3. Pt stated her Lasix had not been refilled by her PCP and she developed edema and SOB. Reviewed pts home meds with her. Pt verbalized compliance with meds since discharge from hospital. Reviewed new consult CHF book and education material with pt. Pt educated on importance of 2000 mg low sodium diet and 64 oz fluid restriction. Pt encouraged to weigh herself daily, record wt and notify clinic, PCP or Cardiologist if any increased wt gain, edema or SOB. Pt

## 2024-10-21 ENCOUNTER — TELEPHONE (OUTPATIENT)
Dept: OTHER | Age: 54
End: 2024-10-21

## 2024-10-21 NOTE — TELEPHONE ENCOUNTER
Attempted to call pt for 1 week follow-up call from initial consult appt. No answer. Message left. Reminded of appt 11/4/24.

## 2024-10-22 DIAGNOSIS — E11.9 TYPE 2 DIABETES MELLITUS WITHOUT COMPLICATION, WITH LONG-TERM CURRENT USE OF INSULIN (HCC): ICD-10-CM

## 2024-10-22 DIAGNOSIS — Z79.4 TYPE 2 DIABETES MELLITUS WITHOUT COMPLICATION, WITH LONG-TERM CURRENT USE OF INSULIN (HCC): ICD-10-CM

## 2024-10-22 RX ORDER — DAPAGLIFLOZIN 10 MG/1
10 TABLET, FILM COATED ORAL EVERY MORNING
Qty: 90 TABLET | Refills: 1 | OUTPATIENT
Start: 2024-10-22

## 2024-10-28 ENCOUNTER — TELEPHONE (OUTPATIENT)
Dept: OTHER | Age: 54
End: 2024-10-28

## 2024-10-28 NOTE — TELEPHONE ENCOUNTER
Attempted to contact patient for 2 week follow up phone call. No answer, message left to remind patient of her next chf clinic visit on 11/04/24 and to monitor for s/s chf, follow low sodium diet and fluid restrictions and weigh herself daily. Encouraged patient to call clinic with any issues related to her heart failure, phone number left on message.

## 2024-11-02 DIAGNOSIS — G47.30 SLEEP APNEA, UNSPECIFIED TYPE: Primary | ICD-10-CM

## 2024-11-13 ENCOUNTER — HOSPITAL ENCOUNTER (OUTPATIENT)
Dept: OTHER | Age: 54
Discharge: HOME | End: 2024-11-13
Payer: COMMERCIAL

## 2024-11-13 VITALS
HEART RATE: 80 BPM | RESPIRATION RATE: 16 BRPM | WEIGHT: 269 LBS | BODY MASS INDEX: 42.13 KG/M2 | SYSTOLIC BLOOD PRESSURE: 130 MMHG | DIASTOLIC BLOOD PRESSURE: 60 MMHG | OXYGEN SATURATION: 97 %

## 2024-11-13 PROCEDURE — 99212 OFFICE O/P EST SF 10 MIN: CPT

## 2024-11-13 NOTE — PROGRESS NOTES
Date:  2024  Time:  1:40 PM    CHF Clinic at [facility]    Office: [phone] Fax: [phone]    Re:  Kari Reddy   Patient : 1970    Vital Signs: /60   Pulse 80   Resp 16   Wt 122 kg (269 lb)   SpO2 97%   BMI 42.13 kg/m²                                                   No results for input(s): \"CBC\", \"HGB\", \"HCT\", \"WBC\", \"PLATELET\", \"NA\", \"K\", \"CL\", \"CO2\", \"BUN\", \"CREATININE\", \"GLUCOSE\", \"BNP\", \"INR\" in the last 72 hours.     Respiratory:    Assessment  Charting Type: Reassessment    Breath Sounds  Right Upper Lobe: Clear  Right Middle Lobe: Clear  Right Lower Lobe: Clear  Left Upper Lobe: Clear  Left Lower Lobe: Clear    Cough/Sputum  Cough: Non-productive              Future Appointments   Date Time Provider Department Center   2024  2:30 PM SCHEDULE, [facility] [place] ECHO [facility]   12/10/2024 10:30 AM Louis Rivera DO [facility] DEP   2024  1:00 PM [facility] CHF CLINIC  1 [facility] CHF [facility]   2025  9:45 AM Jack Kaur MD [facility]      Complaints: No new complaints    Physician Orders No new orders    Comment : Weight is up 5 pounds but not showing any signs of CHF overload. Has no pedal edema,lungs are clear. Discussed in detail low sodium diet,64 ounces of fluid per day. We will see in 5 weeks has rpeat ECHO ordered for next week, saw Andry Mixon yesterday no medication changes. Has appt.  24 discusss ECHO results.     Electronically signed by Racheal Aviles RN on 2024 at 1:40 PM

## 2024-11-14 NOTE — TELEPHONE ENCOUNTER
Tried calling pt several times. Vm is not set up, could not leave message. Detail Level: Simple Indication: Provided medical care services as part of ongoing care related to the patient's single, serious or complex chronic condition. Do Not Use If Visit Has Modifier 25 And Other Service Is Not A Preventive Service, Immunization, Or Annual Wellness Visit: : Visit complexity inherent to evaluation and management associated with medical care services that serve as the continuing focal point for all needed health care services and/or with medical care services that are part of ongoing care related to a patient’s single, serious, or complex chronic condition

## 2024-11-26 ENCOUNTER — TELEPHONE (OUTPATIENT)
Dept: FAMILY MEDICINE CLINIC | Age: 54
End: 2024-11-26

## 2024-11-26 NOTE — TELEPHONE ENCOUNTER
Anum with [employer] called office to update that the home sleep study was approve. Writer offered to provide scheduling number so can speak to who would do the PA. Per Anum does not need that contact number.

## 2024-12-01 DIAGNOSIS — K29.70 GASTRITIS WITHOUT BLEEDING, UNSPECIFIED CHRONICITY, UNSPECIFIED GASTRITIS TYPE: ICD-10-CM

## 2024-12-02 RX ORDER — PANTOPRAZOLE SODIUM 40 MG/1
TABLET, DELAYED RELEASE ORAL
Qty: 112 TABLET | Refills: 0 | Status: SHIPPED | OUTPATIENT
Start: 2024-12-02

## 2024-12-02 NOTE — TELEPHONE ENCOUNTER
Last visit: 10/10/24  Last Med refill: 10/10/24  Does patient have enough medication for 72 hours: no    Next Visit Date:12/10/24  Future Appointments   Date Time Provider Department Center   12/10/2024 10:30 AM Louis Rivera DO [facility] ECC DEP   12/16/2024 12:30 PM [facility] HOME SLEEP STUDY [facility]   12/18/2024  1:00 PM [facility] CHF CLINIC RM [facility]   1/2/2025  9:45 AM Jack Kaur MD [facility]       Health Maintenance   Topic Date Due    Hepatitis B vaccine (1 of 3 - 19+ 3-dose series) Never done    Pneumococcal 0-64 years Vaccine (2 of 2 - PCV) 01/13/2018    Shingles vaccine (1 of 2) Never done    Diabetic retinal exam  02/20/2024    Flu vaccine (1) Never done    COVID-19 Vaccine (3 - 2023-24 season) 09/01/2024    Diabetic Alb to Cr ratio (uACR) test  12/12/2024    Lipids  12/12/2024    Breast cancer screen  06/21/2025    Diabetic foot exam  08/19/2025    Depression Screen  08/19/2025    A1C test (Diabetic or Prediabetic)  09/26/2025    GFR test (Diabetes, CKD 3-4, OR last GFR 15-59)  10/03/2025    Cervical cancer screen  01/03/2028    DTaP/Tdap/Td vaccine (3 - Td or Tdap) 02/17/2030    Colorectal Cancer Screen  10/07/2034    Hepatitis C screen  Completed    HIV screen  Completed    Hepatitis A vaccine  Aged Out    Hib vaccine  Aged Out    Polio vaccine  Aged Out    Meningococcal (ACWY) vaccine  Aged Out       Hemoglobin A1C (%)   Date Value   09/26/2024 7.6 (H)   06/19/2024 10.0 (H)   05/10/2024 9.3 (H)             ( goal A1C is < 7)   No components found for: \"LABMICR\"  No components found for: \"LDLCHOLESTEROL\", \"LDLCALC\"    (goal LDL is <100)   AST (U/L)   Date Value   09/29/2024 30     ALT (U/L)   Date Value   09/29/2024 26     BUN (mg/dL)   Date Value   10/03/2024 13     BP Readings from Last 3 Encounters:   11/19/24 130/60   11/13/24 130/60   11/12/24 130/80          (goal 120/80)    All Future Testing planned in [facility]  Lab Frequency Next

## 2024-12-16 ENCOUNTER — HOSPITAL ENCOUNTER (OUTPATIENT)
Dept: SLEEP CENTER | Age: 54
Discharge: HOME | End: 2024-12-18
Payer: COMMERCIAL

## 2024-12-16 DIAGNOSIS — G47.30 SLEEP APNEA, UNSPECIFIED TYPE: ICD-10-CM

## 2024-12-16 PROCEDURE — G0399 HOME SLEEP TEST/TYPE 3 PORTA: HCPCS

## 2024-12-18 ENCOUNTER — TELEPHONE (OUTPATIENT)
Dept: OTHER | Age: 54
End: 2024-12-18

## 2024-12-18 NOTE — TELEPHONE ENCOUNTER
Patient no showed for CHF clinic today 12/18/24 left her a message to see if she wanted to reschedule.

## 2024-12-20 LAB — STATUS: NORMAL

## 2024-12-22 ENCOUNTER — HOSPITAL ENCOUNTER (EMERGENCY)
Age: 54
Discharge: HOME | End: 2024-12-22
Attending: STUDENT IN AN ORGANIZED HEALTH CARE EDUCATION/TRAINING PROGRAM
Payer: COMMERCIAL

## 2024-12-22 VITALS
SYSTOLIC BLOOD PRESSURE: 137 MMHG | DIASTOLIC BLOOD PRESSURE: 80 MMHG | WEIGHT: 275 LBS | HEART RATE: 85 BPM | OXYGEN SATURATION: 97 % | BODY MASS INDEX: 43.16 KG/M2 | HEIGHT: 67 IN | TEMPERATURE: 98.6 F | RESPIRATION RATE: 18 BRPM

## 2024-12-22 DIAGNOSIS — M54.32 SCIATICA OF LEFT SIDE: Primary | ICD-10-CM

## 2024-12-22 PROCEDURE — 96372 THER/PROPH/DIAG INJ SC/IM: CPT

## 2024-12-22 PROCEDURE — 99284 EMERGENCY DEPT VISIT MOD MDM: CPT

## 2024-12-22 PROCEDURE — 6360000002 HC RX W HCPCS

## 2024-12-22 PROCEDURE — 6370000000 HC RX 637 (ALT 250 FOR IP)

## 2024-12-22 RX ORDER — ORPHENADRINE CITRATE 30 MG/ML
60 INJECTION INTRAMUSCULAR; INTRAVENOUS ONCE
Status: COMPLETED | OUTPATIENT
Start: 2024-12-22 | End: 2024-12-22

## 2024-12-22 RX ORDER — PREDNISONE 50 MG/1
50 TABLET ORAL DAILY
Qty: 5 TABLET | Refills: 0 | Status: SHIPPED | OUTPATIENT
Start: 2024-12-22 | End: 2024-12-27

## 2024-12-22 RX ORDER — PREDNISONE 20 MG/1
60 TABLET ORAL ONCE
Status: COMPLETED | OUTPATIENT
Start: 2024-12-22 | End: 2024-12-22

## 2024-12-22 RX ORDER — LIDOCAINE 4 G/G
1 PATCH TOPICAL ONCE
Status: DISCONTINUED | OUTPATIENT
Start: 2024-12-22 | End: 2024-12-22 | Stop reason: HOSPADM

## 2024-12-22 RX ORDER — LIDOCAINE 50 MG/G
1 PATCH TOPICAL DAILY
Qty: 10 PATCH | Refills: 0 | Status: SHIPPED | OUTPATIENT
Start: 2024-12-22 | End: 2025-01-01

## 2024-12-22 RX ORDER — KETOROLAC TROMETHAMINE 30 MG/ML
30 INJECTION, SOLUTION INTRAMUSCULAR; INTRAVENOUS ONCE
Status: COMPLETED | OUTPATIENT
Start: 2024-12-22 | End: 2024-12-22

## 2024-12-22 RX ORDER — METHOCARBAMOL 750 MG/1
750 TABLET, FILM COATED ORAL 4 TIMES DAILY
Qty: 40 TABLET | Refills: 0 | Status: SHIPPED | OUTPATIENT
Start: 2024-12-22 | End: 2025-01-01

## 2024-12-22 RX ORDER — IBUPROFEN 400 MG/1
800 TABLET, FILM COATED ORAL EVERY 8 HOURS PRN
Qty: 120 TABLET | Refills: 0 | Status: SHIPPED | OUTPATIENT
Start: 2024-12-22

## 2024-12-22 RX ADMIN — KETOROLAC TROMETHAMINE 30 MG: 30 INJECTION, SOLUTION INTRAMUSCULAR at 19:28

## 2024-12-22 RX ADMIN — PREDNISONE 60 MG: 20 TABLET ORAL at 19:28

## 2024-12-22 RX ADMIN — ORPHENADRINE CITRATE 60 MG: 60 INJECTION INTRAMUSCULAR; INTRAVENOUS at 19:28

## 2024-12-22 NOTE — ED TRIAGE NOTES
Mode of arrival (squad #, walk in, police, etc) : walk in        Chief complaint(s): back pain        Arrival Note (brief scenario, treatment PTA, etc).: Patient having lower back pain mostly on he leg radiating down her leg. Patient states she went out of town in October and they had soft mattresses and it has been like this since.         C= \"Have you ever felt that you should Cut down on your drinking?\"  No  A= \"Have people Annoyed you by criticizing your drinking?\"  No  G= \"Have you ever felt bad or Guilty about your drinking?\"  No  E= \"Have you ever had a drink as an Eye-opener first thing in the morning to steady your nerves or to help a hangover?\"  No      Deferred []      Reason for deferring: N/A    *If yes to two or more: probable alcohol abuse.*

## 2024-12-23 NOTE — DISCHARGE INSTRUCTIONS
Thank you for visiting [facility] Emergency Department.    Please check your blood sugar frequently while on the prednisone.  It may cause your blood sugar to go up for a few days.    You need to call Louis Rivera DO to make an appointment as directed for follow up.    Should you have any questions regarding your care or further treatment, please call [facility] Emergency Department at [phone].

## 2024-12-27 ENCOUNTER — TELEPHONE (OUTPATIENT)
Dept: FAMILY MEDICINE CLINIC | Age: 54
End: 2024-12-27

## 2024-12-27 DIAGNOSIS — G47.33 OBSTRUCTIVE SLEEP APNEA SYNDROME: Primary | ICD-10-CM

## 2024-12-27 NOTE — TELEPHONE ENCOUNTER
Called patient to discuss results of sleep study  Patient has sleep apnea and would need a CPAP titration study    Will order CPAP and a titration study  All questions answered    Jessica Steinberg MD  Family medicine resident, PGY3  12/27/2024 at 2:47 PM

## 2025-01-08 ENCOUNTER — HOSPITAL ENCOUNTER (EMERGENCY)
Age: 55
Discharge: HOME | End: 2025-01-08
Attending: EMERGENCY MEDICINE
Payer: COMMERCIAL

## 2025-01-08 VITALS
DIASTOLIC BLOOD PRESSURE: 75 MMHG | SYSTOLIC BLOOD PRESSURE: 147 MMHG | WEIGHT: 270 LBS | OXYGEN SATURATION: 98 % | RESPIRATION RATE: 18 BRPM | TEMPERATURE: 98.9 F | HEIGHT: 67 IN | BODY MASS INDEX: 42.38 KG/M2 | HEART RATE: 77 BPM

## 2025-01-08 DIAGNOSIS — M62.838 SPASM OF MUSCLE: Primary | ICD-10-CM

## 2025-01-08 PROCEDURE — 99284 EMERGENCY DEPT VISIT MOD MDM: CPT

## 2025-01-08 PROCEDURE — 6370000000 HC RX 637 (ALT 250 FOR IP)

## 2025-01-08 PROCEDURE — 96372 THER/PROPH/DIAG INJ SC/IM: CPT

## 2025-01-08 PROCEDURE — 6360000002 HC RX W HCPCS

## 2025-01-08 RX ORDER — KETOROLAC TROMETHAMINE 30 MG/ML
30 INJECTION, SOLUTION INTRAMUSCULAR; INTRAVENOUS ONCE
Status: COMPLETED | OUTPATIENT
Start: 2025-01-08 | End: 2025-01-08

## 2025-01-08 RX ORDER — ORPHENADRINE CITRATE 30 MG/ML
60 INJECTION INTRAMUSCULAR; INTRAVENOUS ONCE
Status: COMPLETED | OUTPATIENT
Start: 2025-01-08 | End: 2025-01-08

## 2025-01-08 RX ORDER — LIDOCAINE 4 G/G
1 PATCH TOPICAL ONCE
Status: DISCONTINUED | OUTPATIENT
Start: 2025-01-08 | End: 2025-01-08 | Stop reason: HOSPADM

## 2025-01-08 RX ORDER — METHOCARBAMOL 750 MG/1
750 TABLET, FILM COATED ORAL 4 TIMES DAILY
Qty: 40 TABLET | Refills: 0 | Status: SHIPPED | OUTPATIENT
Start: 2025-01-08 | End: 2025-01-18

## 2025-01-08 RX ADMIN — KETOROLAC TROMETHAMINE 30 MG: 30 INJECTION, SOLUTION INTRAMUSCULAR at 20:43

## 2025-01-08 RX ADMIN — ORPHENADRINE CITRATE 60 MG: 60 INJECTION INTRAMUSCULAR; INTRAVENOUS at 20:41

## 2025-01-08 ASSESSMENT — ENCOUNTER SYMPTOMS: BACK PAIN: 0

## 2025-01-08 ASSESSMENT — LIFESTYLE VARIABLES
HOW OFTEN DO YOU HAVE A DRINK CONTAINING ALCOHOL: 2-4 TIMES A MONTH
HOW MANY STANDARD DRINKS CONTAINING ALCOHOL DO YOU HAVE ON A TYPICAL DAY: 1 OR 2

## 2025-01-09 NOTE — ED PROVIDER NOTES
Normocephalic and atraumatic.      Right Ear: External ear normal.      Left Ear: External ear normal.      Nose: Nose normal.      Mouth/Throat:      Pharynx: Oropharynx is clear.   Eyes:      Conjunctiva/sclera: Conjunctivae normal.   Pulmonary:      Effort: Pulmonary effort is normal.   Musculoskeletal:         General: Normal range of motion.      Cervical back: Normal range of motion. Spasms present.        Back:       Comments: There is significant muscle spasm noted in the right lower cervical paraspinal region where the right trapezius meets the shoulder.  No overlying skin changes.  No midline cervical, thoracic, lumbar tenderness.  Right upper extremity is neurovascularly intact distally with full range of motion.   Skin:     General: Skin is warm and dry.   Neurological:      General: No focal deficit present.      Mental Status: She is alert. Mental status is at baseline.   Psychiatric:         Mood and Affect: Mood normal.         Behavior: Behavior normal.       DDX/DIAGNOSTIC RESULTS / EMERGENCY DEPARTMENT COURSE / MDM     Medical Decision Making  Patient is a 54-year-old female who presents with right upper back pain.  On physical exam, there is a palpable muscle spasm in the upper trapezius.  Blood pressure 147/75, vital signs are otherwise unremarkable.  As she has no midline tenderness and did not have any trauma, no need to obtain imaging at this time.  Will treat symptomatically and reassess.  Patient reports feeling significantly improved.  Feels comfortable going home at this time.  Strict return precautions are provided.  Discharged home in stable condition.    Risk  OTC drugs.  Prescription drug management.      CONSULTS:  None    CRITICAL CARE:  There was significant risk of life threatening deterioration of patient's condition requiring my direct management. Critical care time 0 minutes, excluding any documented procedures.    FINAL IMPRESSION      1. Spasm of muscle          DISPOSITION /

## 2025-01-09 NOTE — DISCHARGE INSTRUCTIONS
Thank you for visiting [facility] Emergency Department.    Please continue to use the muscle relaxers as needed.  Follow-up with your primary care doctor.    You need to call Louis Rivera DO to make an appointment as directed for follow up.    Should you have any questions regarding your care or further treatment, please call [facility] Emergency Department at [phone].

## 2025-01-09 NOTE — ED TRIAGE NOTES
Mode of arrival (squad #, walk in, police, etc) : walk in        Chief complaint(s): back pain        Arrival Note (brief scenario, treatment PTA, etc).: Patient states she has back pain across her shoulders from the right to the left side when she lays down. Pt states she has had this pain for the last couple of days No known injury        C= \"Have you ever felt that you should Cut down on your drinking?\"  No  A= \"Have people Annoyed you by criticizing your drinking?\"  No  G= \"Have you ever felt bad or Guilty about your drinking?\"  No  E= \"Have you ever had a drink as an Eye-opener first thing in the morning to steady your nerves or to help a hangover?\"  No      Deferred []      Reason for deferring: N/A    *If yes to two or more: probable alcohol abuse.*

## 2025-01-13 DIAGNOSIS — I50.42 CHRONIC COMBINED SYSTOLIC (CONGESTIVE) AND DIASTOLIC (CONGESTIVE) HEART FAILURE (HCC): ICD-10-CM

## 2025-01-14 RX ORDER — FUROSEMIDE 40 MG/1
40 TABLET ORAL DAILY
Qty: 90 TABLET | Refills: 1 | Status: SHIPPED | OUTPATIENT
Start: 2025-01-14

## 2025-01-14 NOTE — TELEPHONE ENCOUNTER
injury) ([facility])     Hidradenitis suppurativa     Type 2 diabetes mellitus without complication, with long-term current use of insulin ([facility])     Toenail deformity     Skin infection     Arthritis     Essential hypertension     Lumbar back pain with radiculopathy affecting left lower extremity     Encounter for smoking cessation counseling     Chronic combined systolic (congestive) and diastolic (congestive) heart failure ([facility])     Elevated laboratory test result     Itching of vagina-labia minora bl     Allergic rhinitis     Type 2 diabetes mellitus with hyperglycemia     Sleep apnea     Hirsutism     Post-nasal drainage     Acute hypoxemic respiratory failure     Respiratory distress     NSTEMI (non-ST elevated myocardial infarction) ([facility])     Chest pain     Oral candidiasis     Atrial flutter ([facility])     Generalized abdominal pain     Abdominal pain     Tricuspid valve disorder     HFrEF (heart failure with reduced ejection fraction) ([facility])     Bilateral lower extremity edema     Body mass index [BMI] 40.0-44.9, adult (Z68.41)      ----MARCUS

## 2025-01-15 ENCOUNTER — APPOINTMENT (OUTPATIENT)
Dept: GENERAL RADIOLOGY | Age: 55
End: 2025-01-15
Payer: COMMERCIAL

## 2025-01-15 ENCOUNTER — HOSPITAL ENCOUNTER (EMERGENCY)
Age: 55
Discharge: HOME | End: 2025-01-15
Attending: EMERGENCY MEDICINE
Payer: COMMERCIAL

## 2025-01-15 VITALS
HEART RATE: 80 BPM | RESPIRATION RATE: 21 BRPM | DIASTOLIC BLOOD PRESSURE: 72 MMHG | SYSTOLIC BLOOD PRESSURE: 126 MMHG | BODY MASS INDEX: 42.38 KG/M2 | TEMPERATURE: 98.3 F | OXYGEN SATURATION: 96 % | HEIGHT: 67 IN | WEIGHT: 270 LBS

## 2025-01-15 DIAGNOSIS — R07.89 ATYPICAL CHEST PAIN: ICD-10-CM

## 2025-01-15 DIAGNOSIS — M54.6 ACUTE LEFT-SIDED THORACIC BACK PAIN: Primary | ICD-10-CM

## 2025-01-15 LAB
ANION GAP SERPL CALCULATED.3IONS-SCNC: 13 MMOL/L (ref 9–16)
BASOPHILS # BLD: 0 K/UL (ref 0–0.2)
BASOPHILS NFR BLD: 0 % (ref 0–2)
BUN SERPL-MCNC: 10 MG/DL (ref 6–20)
CALCIUM SERPL-MCNC: 9.1 MG/DL (ref 8.6–10.4)
CHLORIDE SERPL-SCNC: 103 MMOL/L (ref 98–107)
CO2 SERPL-SCNC: 22 MMOL/L (ref 20–31)
CREAT SERPL-MCNC: 1 MG/DL (ref 0.5–0.9)
EOSINOPHIL # BLD: 0.17 K/UL (ref 0–0.44)
EOSINOPHILS RELATIVE PERCENT: 2 % (ref 1–4)
ERYTHROCYTE [DISTWIDTH] IN BLOOD BY AUTOMATED COUNT: 22.2 % (ref 11.8–14.4)
GFR, ESTIMATED: 70 ML/MIN/1.73M2
GLUCOSE SERPL-MCNC: 219 MG/DL (ref 74–99)
HCT VFR BLD AUTO: 43.3 % (ref 36.3–47.1)
HGB BLD-MCNC: 14.2 G/DL (ref 11.9–15.1)
IMM GRANULOCYTES # BLD AUTO: 0 K/UL (ref 0–0.3)
IMM GRANULOCYTES NFR BLD: 0 %
LYMPHOCYTES NFR BLD: 1.7 K/UL (ref 1.1–3.7)
LYMPHOCYTES RELATIVE PERCENT: 20 % (ref 24–43)
MCH RBC QN AUTO: 28.3 PG (ref 25.2–33.5)
MCHC RBC AUTO-ENTMCNC: 32.8 G/DL (ref 28.4–34.8)
MCV RBC AUTO: 86.4 FL (ref 82.6–102.9)
MONOCYTES NFR BLD: 0.43 K/UL (ref 0.1–1.2)
MONOCYTES NFR BLD: 5 % (ref 3–12)
MORPHOLOGY: ABNORMAL
MYOGLOBIN SERPL-MCNC: 42 NG/ML (ref 25–58)
NEUTROPHILS NFR BLD: 73 % (ref 36–65)
NEUTS SEG NFR BLD: 6.2 K/UL (ref 1.5–8.1)
NRBC BLD-RTO: 0 PER 100 WBC
PLATELET # BLD AUTO: 238 K/UL (ref 138–453)
PMV BLD AUTO: 10.2 FL (ref 8.1–13.5)
POTASSIUM SERPL-SCNC: 3.7 MMOL/L (ref 3.7–5.3)
RBC # BLD AUTO: 5.01 M/UL (ref 3.95–5.11)
SODIUM SERPL-SCNC: 138 MMOL/L (ref 136–145)
TROPONIN I SERPL HS-MCNC: 15 NG/L (ref 0–14)
TROPONIN I SERPL HS-MCNC: 17 NG/L (ref 0–14)
WBC OTHER # BLD: 8.5 K/UL (ref 3.5–11.3)

## 2025-01-15 PROCEDURE — 80048 BASIC METABOLIC PNL TOTAL CA: CPT

## 2025-01-15 PROCEDURE — 83874 ASSAY OF MYOGLOBIN: CPT

## 2025-01-15 PROCEDURE — 71045 X-RAY EXAM CHEST 1 VIEW: CPT

## 2025-01-15 PROCEDURE — 96375 TX/PRO/DX INJ NEW DRUG ADDON: CPT

## 2025-01-15 PROCEDURE — 85025 COMPLETE CBC W/AUTO DIFF WBC: CPT

## 2025-01-15 PROCEDURE — 96374 THER/PROPH/DIAG INJ IV PUSH: CPT

## 2025-01-15 PROCEDURE — 84484 ASSAY OF TROPONIN QUANT: CPT

## 2025-01-15 PROCEDURE — 93005 ELECTROCARDIOGRAM TRACING: CPT | Performed by: EMERGENCY MEDICINE

## 2025-01-15 PROCEDURE — 6360000002 HC RX W HCPCS: Performed by: NURSE PRACTITIONER

## 2025-01-15 PROCEDURE — 99285 EMERGENCY DEPT VISIT HI MDM: CPT

## 2025-01-15 RX ORDER — MORPHINE SULFATE 2 MG/ML
2 INJECTION, SOLUTION INTRAMUSCULAR; INTRAVENOUS ONCE
Status: COMPLETED | OUTPATIENT
Start: 2025-01-15 | End: 2025-01-15

## 2025-01-15 RX ORDER — ORPHENADRINE CITRATE 30 MG/ML
60 INJECTION INTRAMUSCULAR; INTRAVENOUS ONCE
Status: COMPLETED | OUTPATIENT
Start: 2025-01-15 | End: 2025-01-15

## 2025-01-15 RX ORDER — HYDROCODONE BITARTRATE AND ACETAMINOPHEN 5; 325 MG/1; MG/1
1 TABLET ORAL EVERY 8 HOURS PRN
Qty: 9 TABLET | Refills: 0 | Status: SHIPPED | OUTPATIENT
Start: 2025-01-15 | End: 2025-01-18

## 2025-01-15 RX ORDER — ONDANSETRON 2 MG/ML
4 INJECTION INTRAMUSCULAR; INTRAVENOUS ONCE
Status: COMPLETED | OUTPATIENT
Start: 2025-01-15 | End: 2025-01-15

## 2025-01-15 RX ADMIN — ONDANSETRON 4 MG: 2 INJECTION, SOLUTION INTRAMUSCULAR; INTRAVENOUS at 15:25

## 2025-01-15 RX ADMIN — MORPHINE SULFATE 2 MG: 2 INJECTION, SOLUTION INTRAMUSCULAR; INTRAVENOUS at 15:26

## 2025-01-15 RX ADMIN — ORPHENADRINE CITRATE 60 MG: 60 INJECTION INTRAMUSCULAR; INTRAVENOUS at 15:25

## 2025-01-15 ASSESSMENT — ENCOUNTER SYMPTOMS
NAUSEA: 0
ABDOMINAL PAIN: 0
BACK PAIN: 1
VOMITING: 0
SHORTNESS OF BREATH: 0
COLOR CHANGE: 0

## 2025-01-15 ASSESSMENT — PAIN DESCRIPTION - ORIENTATION: ORIENTATION: LEFT

## 2025-01-15 ASSESSMENT — PAIN DESCRIPTION - LOCATION: LOCATION: CHEST

## 2025-01-15 ASSESSMENT — PAIN SCALES - GENERAL: PAINLEVEL_OUTOF10: 8

## 2025-01-15 NOTE — ED PROVIDER NOTES
[facility] EMERGENCY DEPARTMENT  eMERGENCY dEPARTMENT eNCOUnter      Pt Name: Kari Reddy  MRN: [id number]  Birthdate 1970  Date of evaluation: 1/15/2025  Provider: WENDY Moralez CNP    CHIEF COMPLAINT       Chief Complaint   Patient presents with    Chest Pain     Left side, for a few days            HISTORY OF PRESENT ILLNESS  (Location/Symptom, Timing/Onset, Context/Setting, Quality, Duration, Modifying Factors, Severity.)   Kari Reddy is a 54 y.o. female who presents to the emergency department. C/o pain to her left shoulder blade that radiates to her left chest. Onset was a few days ago. Denies fever, chills, injury, SOB, dizziness, weakness. The pain is worse with palpation. Denies recent travel or surgery. Rates her pain 4/10.    Nursing Notes were reviewed.    ALLERGIES     Patient has no known allergies.    CURRENT MEDICATIONS       Previous Medications    ALBUTEROL SULFATE HFA (PROVENTIL HFA) 108 (90 BASE) MCG/ACT INHALER    Inhale 2 puffs into the lungs every 6 hours as needed for Wheezing    ASPIRIN EC 81 MG EC TABLET    Take 1 tablet by mouth daily    CARVEDILOL (COREG) 12.5 MG TABLET    Take 1 tablet by mouth 2 times daily (with meals)    CONTINUOUS BLOOD GLUC SENSOR (FREESTYLE JACKSON 2 SENSOR) MISC    1 each by Does not apply route in the morning and at bedtime    DAPAGLIFLOZIN (FARXIGA) 10 MG TABLET    Take 1 tablet by mouth every morning    DICLOFENAC SODIUM (VOLTAREN) 1 % GEL    Apply 4 g topically 4 times daily as needed for Pain    DULAGLUTIDE (TRULICITY) 1.5 MG/0.5ML SC INJECTION    Inject 0.5 mLs into the skin once a week    FAMOTIDINE (PEPCID) 20 MG TABLET    Take 1 tablet by mouth 2 times daily    FUROSEMIDE (LASIX) 40 MG TABLET    Take 1 tablet by mouth daily    IBUPROFEN (IBU) 400 MG TABLET    Take 2 tablets by mouth every 8 hours as needed for Pain    INSULIN GLARGINE (LANTUS) 100 UNIT/ML INJECTION VIAL    Inject 30 Units into the skin 2 times daily

## 2025-01-15 NOTE — ED NOTES
Pt to ED c/o left sided chest pain that radiates to the left shoulder.  Pt reports that pain has been constant for the past week.  Pt denies anything alleviating or worsening pain.  Pt reports hx of CHF, HTN, and diabetes.  Pt denies taking anything for pain within the past week and denies follow up with established providers.  Pt reports she was last seen by cardiology in November 2024 but unsure of last stress test.

## 2025-01-15 NOTE — ED NOTES
Pt medicated per order and tolerated well.  Pt remains on full telemetry monitoring with alarms set

## 2025-01-15 NOTE — ED NOTES
Pt provided discharge instructions and instructed to follow up with PCP.  Pt instructed to return to ED with any new / worsened symptoms.  Pt verbalizes understanding and denies additional questions or concerns at this time.  Pt ambulatory out of ED with steady gait

## 2025-01-15 NOTE — ED PROVIDER NOTES
eMERGENCY dEPARTMENT eNCOUnter   Independent Attestation     Pt Name: Kari Reddy  MRN: [id number]  Birthdate 1970  Date of evaluation: 1/15/25     Kari Reddy is a 54 y.o. female with CC: Chest Pain (Left side, for a few days /)      Based on the medical record the care appears appropriate.  I was personally available for consultation in the Emergency Department.    Jaguar Bills MD  Attending Emergency Physician          Jaguar Bills MD  01/15/25 [id number]

## 2025-01-16 ENCOUNTER — CARE COORDINATION (OUTPATIENT)
Dept: CARE COORDINATION | Age: 55
End: 2025-01-16

## 2025-01-16 LAB
EKG ATRIAL RATE: 80 BPM
EKG P AXIS: 53 DEGREES
EKG P-R INTERVAL: 166 MS
EKG Q-T INTERVAL: 378 MS
EKG QRS DURATION: 96 MS
EKG QTC CALCULATION (BAZETT): 435 MS
EKG R AXIS: -2 DEGREES
EKG T AXIS: 12 DEGREES
EKG VENTRICULAR RATE: 80 BPM

## 2025-01-16 PROCEDURE — 93010 ELECTROCARDIOGRAM REPORT: CPT | Performed by: INTERNAL MEDICINE

## 2025-01-16 NOTE — CARE COORDINATION
Patient has been identified for ambulatory care management.  [facility] introduction letter sent to Patient's My Chart.  Writer plans to follow up with Patient in 1 - 2 weeks.

## 2025-01-17 ENCOUNTER — HOSPITAL ENCOUNTER (OUTPATIENT)
Dept: SLEEP CENTER | Age: 55
Discharge: HOME | End: 2025-01-19
Payer: COMMERCIAL

## 2025-01-17 VITALS
RESPIRATION RATE: 20 BRPM | HEIGHT: 67 IN | WEIGHT: 270 LBS | HEART RATE: 87 BPM | BODY MASS INDEX: 42.38 KG/M2 | OXYGEN SATURATION: 94 %

## 2025-01-17 DIAGNOSIS — G47.33 OBSTRUCTIVE SLEEP APNEA SYNDROME: ICD-10-CM

## 2025-01-17 PROCEDURE — 95811 POLYSOM 6/>YRS CPAP 4/> PARM: CPT

## 2025-01-29 ENCOUNTER — CARE COORDINATION (OUTPATIENT)
Dept: CARE COORDINATION | Age: 55
End: 2025-01-29

## 2025-02-04 ENCOUNTER — TELEPHONE (OUTPATIENT)
Dept: FAMILY MEDICINE CLINIC | Age: 55
End: 2025-02-04

## 2025-02-04 ENCOUNTER — OFFICE VISIT (OUTPATIENT)
Dept: FAMILY MEDICINE CLINIC | Age: 55
End: 2025-02-04
Payer: COMMERCIAL

## 2025-02-04 VITALS
HEART RATE: 84 BPM | DIASTOLIC BLOOD PRESSURE: 73 MMHG | OXYGEN SATURATION: 97 % | SYSTOLIC BLOOD PRESSURE: 125 MMHG | TEMPERATURE: 98.1 F

## 2025-02-04 DIAGNOSIS — L03.011 PARONYCHIA OF FINGER OF RIGHT HAND: Primary | ICD-10-CM

## 2025-02-04 PROCEDURE — G8427 DOCREV CUR MEDS BY ELIG CLIN: HCPCS | Performed by: NURSE PRACTITIONER

## 2025-02-04 PROCEDURE — 3074F SYST BP LT 130 MM HG: CPT | Performed by: NURSE PRACTITIONER

## 2025-02-04 PROCEDURE — G8417 CALC BMI ABV UP PARAM F/U: HCPCS | Performed by: NURSE PRACTITIONER

## 2025-02-04 PROCEDURE — 99213 OFFICE O/P EST LOW 20 MIN: CPT | Performed by: NURSE PRACTITIONER

## 2025-02-04 PROCEDURE — 3078F DIAST BP <80 MM HG: CPT | Performed by: NURSE PRACTITIONER

## 2025-02-04 PROCEDURE — 3017F COLORECTAL CA SCREEN DOC REV: CPT | Performed by: NURSE PRACTITIONER

## 2025-02-04 PROCEDURE — 4004F PT TOBACCO SCREEN RCVD TLK: CPT | Performed by: NURSE PRACTITIONER

## 2025-02-04 RX ORDER — CEPHALEXIN 500 MG/1
500 CAPSULE ORAL 4 TIMES DAILY
Qty: 28 CAPSULE | Refills: 0 | Status: SHIPPED | OUTPATIENT
Start: 2025-02-04 | End: 2025-02-11
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
## 2025-02-05 ENCOUNTER — CARE COORDINATION (OUTPATIENT)
Dept: CARE COORDINATION | Age: 55
End: 2025-02-05

## 2025-02-05 NOTE — CARE COORDINATION
Ambulatory Care Coordination Note     2/5/2025 12:42 PM     Patient outreach attempt by this ACM today to offer care management services. ACM was unable to reach the patient by telephone today;   left voice message requesting a return phone call to this ACM.     ACM: Jesica Valdez RN     Care Summary Note:     Patient identified for ambulatory care management.  Writer phoned Patient to introduce self and explain ACM.  Writer request return call.  Contact information provided.       PCP/Specialist follow up:   Future Appointments         Provider Specialty Dept Phone    4/24/2025 8:45 AM Jack Kaur MD Cardiology [phone]            Follow Up:   Plan for next ACM outreach in approximately 1 week to complete:  - outreach attempt to offer care management services.

## 2025-02-12 DIAGNOSIS — I50.42 CHF (CONGESTIVE HEART FAILURE), NYHA CLASS II, CHRONIC, COMBINED (HCC): ICD-10-CM

## 2025-02-12 NOTE — TELEPHONE ENCOUNTER
Last visit: 10/10/24  Last Med refill: 08/19/24  Does patient have enough medication for 72 hours: No:     Next Visit Date:  Future Appointments   Date Time Provider Department Center   4/24/2025  8:45 AM Jack Kaur MD [facility]       Health Maintenance   Topic Date Due    Hepatitis B vaccine (1 of 3 - 19+ 3-dose series) Never done    Pneumococcal 50+ years Vaccine (2 of 2 - PCV) 01/13/2018    Shingles vaccine (1 of 2) Never done    Diabetic retinal exam  02/20/2024    Flu vaccine (1) Never done    COVID-19 Vaccine (3 - 2024-25 season) 09/01/2024    Diabetic Alb to Cr ratio (uACR) test  12/12/2024    Lipids  12/12/2024    Breast cancer screen  06/21/2025    Diabetic foot exam  08/19/2025    Depression Screen  08/19/2025    A1C test (Diabetic or Prediabetic)  09/26/2025    GFR test (Diabetes, CKD 3-4, OR last GFR 15-59)  01/15/2026    Cervical cancer screen  01/03/2028    DTaP/Tdap/Td vaccine (3 - Td or Tdap) 02/17/2030    Colorectal Cancer Screen  10/07/2034    Hepatitis C screen  Completed    HIV screen  Completed    Hepatitis A vaccine  Aged Out    Hib vaccine  Aged Out    Polio vaccine  Aged Out    Meningococcal (ACWY) vaccine  Aged Out    Pneumococcal 0-49 years Vaccine  Discontinued       Hemoglobin A1C (%)   Date Value   09/26/2024 7.6 (H)   06/19/2024 10.0 (H)   05/10/2024 9.3 (H)             ( goal A1C is < 7)   No components found for: \"LABMICR\"  No components found for: \"LDLCHOLESTEROL\", \"LDLCALC\"    (goal LDL is <100)   AST (U/L)   Date Value   09/29/2024 30     ALT (U/L)   Date Value   09/29/2024 26     BUN (mg/dL)   Date Value   01/15/2025 10     BP Readings from Last 3 Encounters:   02/04/25 125/73   01/15/25 126/72   01/08/25 (!) 147/75          (goal 120/80)    All Future Testing planned in [facility]  Lab Frequency Next Occurrence   Hemoglobin A1c Once 09/19/2024   Baseline Diagnostic Sleep Study Once 10/03/2024   Echo (TTE) complete (PRN contrast/bubble/strain/3D) Once

## 2025-02-13 ENCOUNTER — CARE COORDINATION (OUTPATIENT)
Dept: CARE COORDINATION | Age: 55
End: 2025-02-13

## 2025-02-13 DIAGNOSIS — Z79.4 TYPE 2 DIABETES MELLITUS WITHOUT COMPLICATION, WITH LONG-TERM CURRENT USE OF INSULIN (HCC): ICD-10-CM

## 2025-02-13 DIAGNOSIS — E11.9 TYPE 2 DIABETES MELLITUS WITHOUT COMPLICATION, WITH LONG-TERM CURRENT USE OF INSULIN (HCC): ICD-10-CM

## 2025-02-13 DIAGNOSIS — I50.42 CHRONIC COMBINED SYSTOLIC (CONGESTIVE) AND DIASTOLIC (CONGESTIVE) HEART FAILURE (HCC): ICD-10-CM

## 2025-02-13 RX ORDER — DAPAGLIFLOZIN 10 MG/1
10 TABLET, FILM COATED ORAL EVERY MORNING
Qty: 90 TABLET | Refills: 1 | Status: SHIPPED | OUTPATIENT
Start: 2025-02-13

## 2025-02-13 RX ORDER — PEN NEEDLE, DIABETIC 32GX 5/32"
NEEDLE, DISPOSABLE MISCELLANEOUS
Qty: 100 EACH | Refills: 1 | Status: SHIPPED | OUTPATIENT
Start: 2025-02-13

## 2025-02-13 RX ORDER — FUROSEMIDE 40 MG/1
40 TABLET ORAL DAILY
Qty: 90 TABLET | Refills: 1 | Status: SHIPPED | OUTPATIENT
Start: 2025-02-13

## 2025-02-13 RX ORDER — SACUBITRIL AND VALSARTAN 49; 51 MG/1; MG/1
1 TABLET, FILM COATED ORAL 2 TIMES DAILY
Qty: 180 TABLET | Refills: 1 | Status: SHIPPED | OUTPATIENT
Start: 2025-02-13

## 2025-02-13 RX ORDER — SPIRONOLACTONE 25 MG/1
25 TABLET ORAL DAILY
Qty: 30 TABLET | Refills: 5 | Status: SHIPPED | OUTPATIENT
Start: 2025-02-13

## 2025-02-13 NOTE — TELEPHONE ENCOUNTER
Last visit: 10/10/24  Last Med refill: 1/14/25  Does patient have enough medication for 72 hours: no    Next Visit Date:  Future Appointments   Date Time Provider Department Center   4/24/2025  8:45 AM Jack Kaur MD [facility]       Health Maintenance   Topic Date Due    Hepatitis B vaccine (1 of 3 - 19+ 3-dose series) Never done    Pneumococcal 50+ years Vaccine (2 of 2 - PCV) 01/13/2018    Shingles vaccine (1 of 2) Never done    Diabetic retinal exam  02/20/2024    Flu vaccine (1) Never done    COVID-19 Vaccine (3 - 2024-25 season) 09/01/2024    Diabetic Alb to Cr ratio (uACR) test  12/12/2024    Lipids  12/12/2024    Breast cancer screen  06/21/2025    Diabetic foot exam  08/19/2025    Depression Screen  08/19/2025    A1C test (Diabetic or Prediabetic)  09/26/2025    GFR test (Diabetes, CKD 3-4, OR last GFR 15-59)  01/15/2026    Cervical cancer screen  01/03/2028    DTaP/Tdap/Td vaccine (3 - Td or Tdap) 02/17/2030    Colorectal Cancer Screen  10/07/2034    Hepatitis C screen  Completed    HIV screen  Completed    Hepatitis A vaccine  Aged Out    Hib vaccine  Aged Out    Polio vaccine  Aged Out    Meningococcal (ACWY) vaccine  Aged Out    Pneumococcal 0-49 years Vaccine  Discontinued       Hemoglobin A1C (%)   Date Value   09/26/2024 7.6 (H)   06/19/2024 10.0 (H)   05/10/2024 9.3 (H)             ( goal A1C is < 7)   No components found for: \"LABMICR\"  No components found for: \"LDLCHOLESTEROL\", \"LDLCALC\"    (goal LDL is <100)   AST (U/L)   Date Value   09/29/2024 30     ALT (U/L)   Date Value   09/29/2024 26     BUN (mg/dL)   Date Value   01/15/2025 10     BP Readings from Last 3 Encounters:   02/04/25 125/73   01/15/25 126/72   01/08/25 (!) 147/75          (goal 120/80)    All Future Testing planned in [facility]  Lab Frequency Next Occurrence   Hemoglobin A1c Once 09/19/2024   Baseline Diagnostic Sleep Study Once 10/03/2024   Echo (TTE) complete (PRN contrast/bubble/strain/3D) Once 10/03/2024

## 2025-02-13 NOTE — CARE COORDINATION
Ambulatory Care Coordination Note     2025 1:51 PM     Patient Current Location:  Home: [address] [place] [id number]     This patient was received as a referral from [facility] .    ACM contacted the patient by telephone. Verified name and  with patient as identifiers. Provided introduction to self, and explanation of the ACM role.   Patient accepted care management services at this time.       ACM: Jesica Valdez RN     Challenges to be reviewed by the provider   Additional needs identified to be addressed with provider Yes  medications-Patient states the Pharmacy won't refill her Trulicity medication.  She request refills on Cholesterol medication, Farxiga, Lasix, and Entresto medications.                Method of communication with provider: staff message.    Utilization: Initial Call - N/A    Care Summary Note:     Writer phoned Patient to introduce self and explain ACM.  Patient is in agreement for ACM enrollment.  ACM enrollment questions completed.  A photo of Writer's business card was sent to Patient by text message.    Patient states she changed insurance to [facility] [place] and [facility] would not refill her Trulicity medication.  Patient request a refill on her cholesterol medication, Farxiga, Lasix, and Entresto.  Writer will call the Pharmacy to check the status of the medications.    Writer phoned Pharmacy to request refills on medications requested by Patient.  The Pharmacist states Trulicity and Entresto medications need a PA completed.  Patient was last given a 90 supply of Entresto 210 days ago or in 2024.  They do not have a cholesterol medication on file.  Pharmacist states he needs an active Farxiga prescription.  Pharmacist does not have a Cholesterol medication on file for Patient.      Patient states she is waiting for her CPAP machine to get delivered.  She states she last spoke with someone on the  of this month.  Writer will request update on CPAP

## 2025-02-14 ENCOUNTER — TELEPHONE (OUTPATIENT)
Dept: FAMILY MEDICINE CLINIC | Age: 55
End: 2025-02-14

## 2025-02-14 NOTE — TELEPHONE ENCOUNTER
Writer is taking care of patient prior authorization  and her INS do not want to cover Trulicity they would like to know who Metformin be okay for her, but back in may of 2024 she was told to stop taking it. What would you like to do for her

## 2025-02-17 ASSESSMENT — PATIENT HEALTH QUESTIONNAIRE - PHQ9
1. LITTLE INTEREST OR PLEASURE IN DOING THINGS: NOT AT ALL
SUM OF ALL RESPONSES TO PHQ9 QUESTIONS 1 & 2: 0
1. LITTLE INTEREST OR PLEASURE IN DOING THINGS: NOT AT ALL
2. FEELING DOWN, DEPRESSED OR HOPELESS: NOT AT ALL
SUM OF ALL RESPONSES TO PHQ9 QUESTIONS 1 & 2: 0
SUM OF ALL RESPONSES TO PHQ QUESTIONS 1-9: 0
2. FEELING DOWN, DEPRESSED OR HOPELESS: NOT AT ALL
SUM OF ALL RESPONSES TO PHQ QUESTIONS 1-9: 0

## 2025-02-18 ENCOUNTER — HOSPITAL ENCOUNTER (OUTPATIENT)
Age: 55
Setting detail: SPECIMEN
Discharge: HOME | End: 2025-02-18

## 2025-02-18 ENCOUNTER — OFFICE VISIT (OUTPATIENT)
Dept: FAMILY MEDICINE CLINIC | Age: 55
End: 2025-02-18
Payer: COMMERCIAL

## 2025-02-18 VITALS
HEIGHT: 67 IN | BODY MASS INDEX: 42.16 KG/M2 | SYSTOLIC BLOOD PRESSURE: 126 MMHG | WEIGHT: 268.6 LBS | DIASTOLIC BLOOD PRESSURE: 70 MMHG | HEART RATE: 76 BPM

## 2025-02-18 DIAGNOSIS — Z79.4 TYPE 2 DIABETES MELLITUS WITHOUT COMPLICATION, WITH LONG-TERM CURRENT USE OF INSULIN (HCC): Primary | ICD-10-CM

## 2025-02-18 DIAGNOSIS — E11.9 TYPE 2 DIABETES MELLITUS WITHOUT COMPLICATION, WITH LONG-TERM CURRENT USE OF INSULIN (HCC): Primary | ICD-10-CM

## 2025-02-18 DIAGNOSIS — Z79.4 TYPE 2 DIABETES MELLITUS WITHOUT COMPLICATION, WITH LONG-TERM CURRENT USE OF INSULIN (HCC): ICD-10-CM

## 2025-02-18 DIAGNOSIS — I50.42 CHRONIC COMBINED SYSTOLIC (CONGESTIVE) AND DIASTOLIC (CONGESTIVE) HEART FAILURE (HCC): ICD-10-CM

## 2025-02-18 DIAGNOSIS — E11.9 TYPE 2 DIABETES MELLITUS WITHOUT COMPLICATION, WITH LONG-TERM CURRENT USE OF INSULIN (HCC): ICD-10-CM

## 2025-02-18 PROBLEM — I50.23 ACUTE ON CHRONIC SYSTOLIC HEART FAILURE (HCC): Status: ACTIVE | Noted: 2025-02-18

## 2025-02-18 PROBLEM — J44.1 COPD EXACERBATION (HCC): Status: ACTIVE | Noted: 2025-02-18

## 2025-02-18 LAB
ANION GAP SERPL CALCULATED.3IONS-SCNC: 15 MMOL/L (ref 9–16)
BUN SERPL-MCNC: 12 MG/DL (ref 6–20)
CALCIUM SERPL-MCNC: 9.4 MG/DL (ref 8.6–10.4)
CHLORIDE SERPL-SCNC: 102 MMOL/L (ref 98–107)
CHOLEST SERPL-MCNC: 265 MG/DL (ref 0–199)
CHOLESTEROL/HDL RATIO: 6.8
CO2 SERPL-SCNC: 24 MMOL/L (ref 20–31)
CREAT SERPL-MCNC: 0.8 MG/DL (ref 0.6–0.9)
CREAT UR-MCNC: 62.6 MG/DL (ref 28–217)
GFR, ESTIMATED: 88 ML/MIN/1.73M2
GLUCOSE SERPL-MCNC: 130 MG/DL (ref 74–99)
HBA1C MFR BLD: 8.5 %
HDLC SERPL-MCNC: 39 MG/DL
LDLC SERPL CALC-MCNC: 194 MG/DL (ref 0–100)
MICROALBUMIN UR-MCNC: <12 MG/L (ref 0–20)
MICROALBUMIN/CREAT UR-RTO: NORMAL MCG/MG CREAT (ref 0–25)
POTASSIUM SERPL-SCNC: 3.5 MMOL/L (ref 3.7–5.3)
SODIUM SERPL-SCNC: 141 MMOL/L (ref 136–145)
TRIGL SERPL-MCNC: 161 MG/DL
VLDLC SERPL CALC-MCNC: 32 MG/DL (ref 1–30)

## 2025-02-18 PROCEDURE — 3074F SYST BP LT 130 MM HG: CPT

## 2025-02-18 PROCEDURE — 3017F COLORECTAL CA SCREEN DOC REV: CPT

## 2025-02-18 PROCEDURE — 3052F HG A1C>EQUAL 8.0%<EQUAL 9.0%: CPT

## 2025-02-18 PROCEDURE — 3078F DIAST BP <80 MM HG: CPT

## 2025-02-18 PROCEDURE — 83036 HEMOGLOBIN GLYCOSYLATED A1C: CPT

## 2025-02-18 PROCEDURE — G8417 CALC BMI ABV UP PARAM F/U: HCPCS

## 2025-02-18 PROCEDURE — 2022F DILAT RTA XM EVC RTNOPTHY: CPT

## 2025-02-18 PROCEDURE — G8427 DOCREV CUR MEDS BY ELIG CLIN: HCPCS

## 2025-02-18 PROCEDURE — 99213 OFFICE O/P EST LOW 20 MIN: CPT

## 2025-02-18 PROCEDURE — 4004F PT TOBACCO SCREEN RCVD TLK: CPT

## 2025-02-18 RX ORDER — METFORMIN HYDROCHLORIDE 500 MG/1
500 TABLET, EXTENDED RELEASE ORAL
Qty: 90 TABLET | Refills: 1 | Status: SHIPPED | OUTPATIENT
Start: 2025-02-18

## 2025-02-18 RX ORDER — CARVEDILOL 12.5 MG/1
12.5 TABLET ORAL 2 TIMES DAILY WITH MEALS
Qty: 60 TABLET | Refills: 5 | Status: SHIPPED | OUTPATIENT
Start: 2025-02-18

## 2025-02-18 RX ORDER — SACUBITRIL AND VALSARTAN 49; 51 MG/1; MG/1
1 TABLET, FILM COATED ORAL 2 TIMES DAILY
Qty: 180 TABLET | Refills: 5 | Status: SHIPPED | OUTPATIENT
Start: 2025-02-18

## 2025-02-18 RX ORDER — POTASSIUM CHLORIDE 750 MG/1
10 TABLET, EXTENDED RELEASE ORAL 2 TIMES DAILY
Qty: 30 TABLET | Refills: 5 | Status: SHIPPED | OUTPATIENT
Start: 2025-02-18

## 2025-02-18 RX ORDER — FUROSEMIDE 40 MG/1
40 TABLET ORAL DAILY
Qty: 90 TABLET | Refills: 5 | Status: SHIPPED | OUTPATIENT
Start: 2025-02-18

## 2025-02-18 RX ORDER — SPIRONOLACTONE 25 MG/1
25 TABLET ORAL DAILY
Qty: 30 TABLET | Refills: 5 | Status: SHIPPED | OUTPATIENT
Start: 2025-02-18

## 2025-02-18 RX ORDER — DAPAGLIFLOZIN 10 MG/1
10 TABLET, FILM COATED ORAL EVERY MORNING
Qty: 90 TABLET | Refills: 1 | Status: SHIPPED | OUTPATIENT
Start: 2025-02-18

## 2025-02-18 ASSESSMENT — ENCOUNTER SYMPTOMS
ABDOMINAL PAIN: 0
DIARRHEA: 0
COUGH: 0
SHORTNESS OF BREATH: 0
NAUSEA: 0
BACK PAIN: 0
SORE THROAT: 0
RHINORRHEA: 0

## 2025-02-18 NOTE — PATIENT INSTRUCTIONS
Thank you for letting us take care of you today. We hope all your questions were addressed. If a question was overlooked or something else comes to mind after you return home, please contact a member of your Care Team listed below.      Your Care Team at [facility] is Team #1  Maryellen Ramos M.D. (Faculty)  Jessica Steinberg M.D. (Resident)  Madison Rivera D.O. (Resident)  Ruslan Au M.D. (Resident)  Rani Blood M.D. (Resident)  Ira Claudio, [facility]  Aimee Monae, [facility]  Vikki Mascorro, [facility]  Monica Contreras, [facility]  Lily Vela, [facility]  Angela Levi, [facility]  Casey Wood (LJ) Philippe,   Thuy Ramsey [facility] (Clinical Pharmacist)     Office phone number: [phone]    If you need to get in right away due to illness, please be advised we have \"Same Day\" appointments available Monday-Friday. Please call us at [phone] option #3 to schedule your \"Same Day\" appointment.
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
## 2025-02-18 NOTE — PROGRESS NOTES
Visit Information    Have you changed or started any medications since your last visit including any over-the-counter medicines, vitamins, or herbal medicines? no   Are you having any side effects from any of your medications? -  no  Have you stopped taking any of your medications? Is so, why? -  no    Have you seen any other physician or provider since your last visit? Yes, cardiologist   Have you had any other diagnostic tests since your last visit? No  Have you been seen in the emergency room and/or had an admission to a hospital since we last saw you? No  Have you had your routine dental cleaning in the past 6 months? no    Have you activated your [employer] account? If not, what are your barriers? Yes     Patient Care Team:  Louis Rivera DO as PCP - General (Family Medicine)  Rishabh Bernstein MD as PCP - Empaneled Provider  Jesica Valdez RN as Ambulatory Care Manager  Moon Allen APRN - CNP as Nurse Practitioner (Certified Nurse Practitioner)  Jack Kaur MD as Consulting Physician (Cardiology)    Medical History Review  Past Medical, Family, and Social History reviewed and does not contribute to the patient presenting condition    Health Maintenance   Topic Date Due    Hepatitis B vaccine (1 of 3 - 19+ 3-dose series) Never done    Pneumococcal 50+ years Vaccine (2 of 2 - PCV) 01/13/2018    Shingles vaccine (1 of 2) Never done    Diabetic retinal exam  02/20/2024    Flu vaccine (1) Never done    COVID-19 Vaccine (3 - 2024-25 season) 09/01/2024    Diabetic Alb to Cr ratio (uACR) test  12/12/2024    Lipids  12/12/2024    Breast cancer screen  06/21/2025    Diabetic foot exam  08/19/2025    A1C test (Diabetic or Prediabetic)  09/26/2025    GFR test (Diabetes, CKD 3-4, OR last GFR 15-59)  01/15/2026    Depression Screen  02/17/2026    Cervical cancer screen  01/03/2028    DTaP/Tdap/Td vaccine (3 - Td or Tdap) 02/17/2030    Colorectal Cancer Screen  10/07/2034    Hepatitis C screen

## 2025-02-28 ENCOUNTER — CARE COORDINATION (OUTPATIENT)
Dept: CARE COORDINATION | Age: 55
End: 2025-02-28

## 2025-02-28 NOTE — CARE COORDINATION
Ambulatory Care Coordination Note     2/28/2025 10:54 AM     Patient outreach attempt by this AC today to perform care management follow up . ACM was unable to reach the patient by telephone today;   left voice message requesting a return phone call to this ACM.     ACM: Jesica Valdez RN     Care Summary Note:     CC Plan:       Text message received from Patient on 2/21/2025 requesting phone number to call regarding CPAP machine.  What is the status of CPAP machine?    2.  Patient kept appointment with Dr. Rivera on 2/18/2025.  Her assessment and plan of care was reviewed.  Patient's HbA1C has increased from 7.6 - 8.3%.  Metformin was ordered.  A PA was sent for Trulicity.  A referral was submitted to the Pharmacy team for diabetes med management and Jorge Luis Mcdermott MD, Ophthalmology.  Labs were ordered.    Writer phoned patient for ACM update and to discuss cc plan of care.  Message left on Patient's voice mail requesting return call.  The phone number for [facility] was sent to Patient via text message.  Writer will follow up with Patient next week.     PCP/Specialist follow up:   Future Appointments         Provider Specialty Dept Phone    4/8/2025 9:00 AM Thuy Ramsey [facility] Family Medicine [phone]    4/24/2025 8:45 AM Jack Kaur MD Cardiology [phone]            Follow Up:   Plan for next AC outreach in approximately 1 week to complete:  - disease specific assessments  - SDOH assessments  - medication review  - advance care planning  - goal progression  - education .

## 2025-03-05 ENCOUNTER — TELEPHONE (OUTPATIENT)
Dept: SURGERY | Age: 55
End: 2025-03-05

## 2025-03-05 ENCOUNTER — TELEPHONE (OUTPATIENT)
Dept: FAMILY MEDICINE CLINIC | Age: 55
End: 2025-03-05

## 2025-03-05 NOTE — TELEPHONE ENCOUNTER
Writer called patient pharmacy to find out if she was able to to  her Trulicity and farxiga. The pharmacy stated to me that patient picked up her Trulicity and cancel her Farxiga due to the bill was 700 $ due to her INS only covering so much,     WRITER also called patient and M for patient give the office a call back,so we can find out if she able to fine out what her INS is able to pay for in the place of Farxiga

## 2025-03-05 NOTE — TELEPHONE ENCOUNTER
Patient insurance will not cover the patient Farxiga. Patient will call her insurance and see what they will cover and call the office back to inform the office which medication.

## 2025-03-06 ENCOUNTER — TELEPHONE (OUTPATIENT)
Dept: FAMILY MEDICINE CLINIC | Age: 55
End: 2025-03-06

## 2025-03-06 DIAGNOSIS — I50.42 CHF (CONGESTIVE HEART FAILURE), NYHA CLASS II, CHRONIC, COMBINED (HCC): Primary | ICD-10-CM

## 2025-03-06 NOTE — TELEPHONE ENCOUNTER
Medication Management Service ([facility])  [facility]  [phone]    03/06/25 3:30 PM    CLINICAL PHARMACY NOTE:    PharmD was consulted to investigate cost and coverage of Farxiga, Trulicity, and Entresto. From previous notes, Farxiga was around $700, Trulicity was affordable, and she was unable to  the Entresto. PharmD called patient's pharmacy to further investigate.     Updates from pharmacy are as follows:    Entresto: looks like a prior authorization was needed. There was a previous denial on file for the Entresto that got denied due to no documentation of the patient seeing a cardiologist being uploaded (which she does see one); appears a new PA may be submitted by clinic staff, but it does not appear that the clinical questions have been answered in the PA yet. Seems it should likely be covered if we update the documentation in the prior authorization that the patient does have history of seeing a cardiologist. May be a good idea to submit a copy of the cardiology note to the PA as well.    Farxiga: appears it went through the insurance, but agent at [facility] explains that it was $700, which is contributing to her deductible. Not 100% sure what her deductible is, but it may be around a $4,000. We were able to add a copay card, but it only brought it to around $300 at that point. It is also possible that the Farxiga could just be at a different tier level for the insurance as well.     Main concern is that the Trulicity could end up being very expensive the next time she fills if she truly does have this deductible to meet since she did not end up filling the Farxiga.     Best next steps would be to have either the patient (or clinic staff) call the patient's insurance again and get more details about what exactly her deductible is, what the copay is after the deductible is met, and perhaps if Jardiance would be any cheaper than the Farxiga possibly. Additionally, appears clinic staff needs to

## 2025-03-06 NOTE — TELEPHONE ENCOUNTER
Patient insurance will cover Glimepride or pioglitazone for the replacement of patient farxiga. Thank you

## 2025-03-07 ENCOUNTER — TELEPHONE (OUTPATIENT)
Dept: FAMILY MEDICINE CLINIC | Age: 55
End: 2025-03-07

## 2025-03-07 NOTE — TELEPHONE ENCOUNTER
Smart Rules : Does the prescriber acknowledge that the benefit of using a higher dose or quantity outweighs the risk and that this risk has been discussed with the patient if quantity exceeds a maximum recommended dosage?

## 2025-03-07 NOTE — TELEPHONE ENCOUNTER
Writer called the patient to inform her to call her Cardiologist to completed her Prior authorization for her Entresto because her insurance company is requesting chart notes from patient cardiologist visit  patient stated she understood, also Patient states she would like to try the jardiance if her insurance cover it. Thank you

## 2025-03-10 ENCOUNTER — TELEPHONE (OUTPATIENT)
Dept: FAMILY MEDICINE CLINIC | Age: 55
End: 2025-03-10

## 2025-03-10 NOTE — TELEPHONE ENCOUNTER
Medication Management Service ([facility])  [facility]  [phone]    03/10/25 11:58 AM    CLINICAL PHARMACY NOTE:  PharmD student called [facility] [place] to investigate cost issues related to Farxiga and Trulicity. Spoke with agent who confirmed that the patient has a high deductible health plan, with a deductible of $4,000 for 2025. The cost of Farxiga and Trulicity will be $413.07 each for a 1 month supply. Of note, patient did get Trulicity previously for $25, but  explains it will be the above price from here on out. Confirmed this is consistent among other GLP-1 Aryan and [id number] (including Jardiance). There are no payment plan options to spread this deductible through the year for the patient with the current plan they are on. Once the deductible is met, the patient will have no cost for medications.     Copay cards are available for Farxiga and Trulicity, but they likely will only cover $100-$150 per month for each medication. PharmD attempted to reach patient to see if the deductible is affordable and determine next options, but she did  not answer. Left voicemail to give us a call back at her earliest convenience.    Of note, previous referral in place to help navigate CGM for the patient. Unclear if she is currently having an issue with CGM or not. Will await phone call back from patient to further navigate.    Lastly, it appears clinic staff will be having patient reach out to cardiology to submit a PA for her Entresto. PharmD will hold off on navigating until hearing about the PA.    Leigha Agrawal, PharmD Candidate 2025  [facility] APPE Student    Riky Durán PharmD (Robbie)  PGY2 Ambulatory Care Pharmacy Resident  [facility] Medication Management Service  [phone]  03/10/25  =======================================  For Pharmacy Admin Tracking Only    Program: Medical Group  CPA in place:  Yes  Intervention Detail: Benefit Assistance  Time Spent (min): 60

## 2025-03-13 ENCOUNTER — CARE COORDINATION (OUTPATIENT)
Dept: CARE COORDINATION | Age: 55
End: 2025-03-13

## 2025-03-13 NOTE — CARE COORDINATION
Ambulatory Care Coordination Note     3/13/2025 2:21 PM     Patient outreach attempt by this AC today to offer care management services. AC was unable to reach the patient by telephone today;   left voice message requesting a return phone call to this ACM.     ACM: Jesica Valdez RN     Care Summary Note:     CC Plan:        Text message received from Patient on 2/21/2025 requesting phone number to call regarding CPAP machine.  What is the status of CPAP machine?     2.  Patient kept appointment with Dr. Rivera on 2/18/2025.  Her assessment and plan of care was reviewed.  Patient's HbA1C has increased from 7.6 - 8.3%.  Metformin was ordered.  A PA was sent for Trulicity.  A referral was submitted to the Pharmacy team for diabetes med management and Jorge Luis Mcdermott MD, Ophthalmology.  Labs were ordered.     Writer phoned Patient for AC update and to discuss cc plan of care.  Message left on Patient's voice mail requesting return call.  Writer's contact information provided.    PCP/Specialist follow up:   Future Appointments         Provider Specialty Dept Phone    4/8/2025 9:00 AM Thuy Ramsey [facility] Family Medicine [phone]    4/24/2025 8:45 AM Jack Kaur MD Cardiology [phone]            Follow Up:   Plan for next AC outreach in approximately 1 week to complete:  - disease specific assessments  - SDOH assessments  - medication review  - advance care planning  - goal progression  - education .

## 2025-03-27 ENCOUNTER — CARE COORDINATION (OUTPATIENT)
Dept: CARE COORDINATION | Age: 55
End: 2025-03-27

## 2025-03-27 NOTE — CARE COORDINATION
Ambulatory Care Coordination Note     3/27/2025 2:37 PM     Patient Current Location:  [place]     Patient contacted the ACM by telephone. Verified name and  with patient as identifiers.         ACM: Jesica Valdez RN     Challenges to be reviewed by the provider   Additional needs identified to be addressed with provider No  none               Method of communication with provider: staff message.    Utilization: Has the patient been seen in the ED since your last call? no    Care Summary Note:     Writer spoke with Patient briefly today because she is at work.  She mentioned Farxiga medication is not approved by her insurance.  She states she needs to call the Cardiology office to request an alternative medication.    Writer planned to review medications with Patient.  She was unable to do so at this time because she is at work.    Writer question if Patient received her CPAP machine.  She states she thinks she missed the appointment to  her CPAP.  Patient states she's off tomorrow.  She would like return call tomorrow.    Offered patient enrollment in the Remote Patient Monitoring (RPM) program for in-home monitoring: Deferred at this time because Patient is at work, conversation short today; will discuss at next outreach.     Assessments Completed:   No changes since last call    Medications Reviewed:   Not completed during this call:      Advance Care Planning:   Not reviewed during this call     Care Planning:   Not completed during this call    PCP/Specialist follow up:   Future Appointments         Provider Specialty Dept Phone    2025 9:00 AM Thuy Ramsey, [facility] Family Medicine [phone]    2025 8:45 AM Jack Kaur MD Cardiology [phone]            Follow Up:   Plan for next [facility] outreach in approximately 1-2 days  to complete:  - disease specific assessments  - SDOH assessments  - medication review   - advance care planning   - goal progression  - education   - outreach

## 2025-03-27 NOTE — CARE COORDINATION
Ambulatory Care Coordination Note     3/27/2025 2:27 PM     Patient outreach attempt by this ACM today to perform care management follow up . ACM was unable to reach the patient by telephone today;   left voice message requesting a return phone call to this ACM.     ACM: Jesica Valdez RN     Care Summary Note:     CC Plan:        Text message received from Patient on 2/21/2025 requesting phone number to call regarding CPAP machine.  What is the status of CPAP machine?     2.  Patient kept appointment with Dr. Rivera on 2/18/2025.  Her assessment and plan of care was reviewed.  Patient's HbA1C has increased from 7.6 - 8.3%.  Metformin was ordered.  A PA was sent for Trulicity.  A referral was submitted to the Pharmacy team for diabetes med management and Jorge Luis Mcdermott MD, Ophthalmology.  Labs were ordered.      Writer phoned Patient for ACM update and to discuss cc plan of care.  Message left on Patient's voiced mail requesting return call.  Writer's contact information provided.    PCP/Specialist follow up:   Future Appointments         Provider Specialty Dept Phone    4/8/2025 9:00 AM Thuy Ramsey [facility] Family Medicine [phone]    4/24/2025 8:45 AM Jack Kaur MD Cardiology [phone]            Follow Up:   Plan for next AC outreach in approximately 1 week to complete:  - disease specific assessments  - SDOH assessments  - medication review  - advance care planning  - goal progression  - education .

## 2025-04-08 ENCOUNTER — PHARMACY VISIT (OUTPATIENT)
Dept: FAMILY MEDICINE CLINIC | Age: 55
End: 2025-04-08

## 2025-04-08 DIAGNOSIS — I50.42 CHF (CONGESTIVE HEART FAILURE), NYHA CLASS II, CHRONIC, COMBINED (HCC): Primary | ICD-10-CM

## 2025-04-08 PROCEDURE — APPNB45 APP NON BILLABLE 31-45 MINUTES

## 2025-04-08 PROCEDURE — 99999 PR OFFICE/OUTPT VISIT,PROCEDURE ONLY: CPT | Performed by: PHARMACIST

## 2025-04-08 NOTE — PATIENT INSTRUCTIONS
Call cardiology to see if they can start the prior authorization for the Entresto  I will see if Dr. Rivera can call in the Jardiance and possibly it is cheaper maybe?  If you have any issues, call [phone]. My name is Chase.

## 2025-04-09 ENCOUNTER — CARE COORDINATION (OUTPATIENT)
Dept: CARE COORDINATION | Age: 55
End: 2025-04-09

## 2025-04-09 NOTE — CARE COORDINATION
Ambulatory Care Coordination Note     2025 1:08 PM     Patient Current Location:  Home: [address] [place] [address]     ACM contacted the patient by telephone. Verified name and  with patient as identifiers.       ACM: Jesica Valdez RN     Challenges to be reviewed by the provider   Additional needs identified to be addressed with provider No  none               Method of communication with provider: staff message.    Utilization: Has the patient been seen in the ED since your last call? no    Care Summary Note:     Writer phoned Patient for ACM update.  Patient just got off from work and is not at home at this time.  She is unable to review medications with Writer at this time.      Patient informed Writer that she takes a short acting and long acting insulin.  She has a sliding scale for her Humalog kwikpen.  She expressed some confusion regarding the long acting vs short acting medication.  Writer clarified insulin doses with Patient. Patient did not test her BG today.  The importance of testing BG, especially on insulin discussed with Patient.  Patient states she didn't eat breakfast today.  She is unable to use her sliding scale if she doesn't know her blood glucose.  Patient does not follow a diet.  Writer discussed referral for a shared medical visit at PCP office with Patient for education. She states that's fine.     Patient informed writer that her medication was changed from Farxiga to Jardiance.  She states she has to pick the medication up from the pharmacy.   She plans to do so today.    Patient states she is unable to get her CPAP because it will cost her $300.00.  She states she is unable to afford it.  Patient has [employer] insurance through her job.  Writer will refer Patient to Social work team for possible financial assistance.  Writer phoned [facility].  The overall cost for the CPAP and supplies is 215.47 after insurance.  They won't know the actual amount until the bill is submitted

## 2025-04-10 NOTE — PROGRESS NOTES
Medication Management Service ([facility])  [facility]  [phone]    04/10/25 9:31 AM    CLINICAL PHARMACY NOTE:    PharmD attempted to call patient to touch base about the Jardiance we were able to get covered along with to see if she was able to call her cardiologist to get the PA started for the Entresto. PharmD would like to educate patient on the Jardiance (MOA, benefits, side effects, etc.) as well. Patient did not answer her phone for today. LVM for her to call us back when she gets the chance. PharmD will not continue to reach out to patient at this time but will remain available if she calls us.    Riky Durán PharmD (Robbie)  PGY2 Ambulatory Care Pharmacy Resident  [facility] Medication Management Service  [phone]  04/10/25  ======================================  For Pharmacy Admin Tracking Only    Program: Medical Group  CPA in place:  Yes  Intervention Detail: Benefit Assistance and Patient Access Assistance/Sample Provided  Time Spent (min): 10    
with updates. If PharmD does not hear anything from patient, will follow up with her 04/16/2025.    ADDENDUM: PharmD called [employer] to check the price of the Jardiance. Technician explained it went through for $35. PharmD activated a copay card and provided them with the information. Technician confirmed it brought the copay down to $10 per month.    PharmD attempted to call patient to keep her updated, but she did not answer her phone for today. LVM for her to call us back when she can.    Copay Card Information:  BIN: [id number]  PCN: [id number]  GRP: [id number]  ID: [id number]    Riky Durán PharmD (Robbie)  PGY2 Ambulatory Care Pharmacy Resident  [facility] Medication Management Service  [phone]  04/08/25  =======================================  For Pharmacy Admin Tracking Only    Program: Medical Group  CPA in place:  Yes  Recommendation Provided To: Patient/Caregiver: 4 via In person  Intervention Detail: Benefit Assistance, Discontinued Rx: 1, reason: Cost/Formulary Change, New Rx: 1, reason: Cost/Formulary Change, and Patient Access Assistance/Sample Provided  Intervention Accepted By: Patient/Caregiver: 4  Time Spent (min): 60

## 2025-04-11 ENCOUNTER — TELEPHONE (OUTPATIENT)
Dept: FAMILY MEDICINE CLINIC | Age: 55
End: 2025-04-11

## 2025-04-11 DIAGNOSIS — E11.65 TYPE 2 DIABETES MELLITUS WITH HYPERGLYCEMIA, WITH LONG-TERM CURRENT USE OF INSULIN (HCC): Primary | ICD-10-CM

## 2025-04-11 DIAGNOSIS — Z79.4 TYPE 2 DIABETES MELLITUS WITH HYPERGLYCEMIA, WITH LONG-TERM CURRENT USE OF INSULIN (HCC): Primary | ICD-10-CM

## 2025-04-11 NOTE — TELEPHONE ENCOUNTER
Medication Management Service ([facility])  [phone]     CLINICAL PHARMACY NOTE:    Patient identified for participation in the Shared DM Medical appointments at [facility] by ANNA Gould. Spoke with Dr Bernstein, who is in agreement with patient's enrollment into the program.  Referral placed to DM Education.     Thuy Ramsey, Pharm.D., [facility]  Clinical Pharmacist  [facility] Medication Management Service  [phone]  4/11/2025  10:59 AM

## 2025-04-14 ENCOUNTER — OFFICE VISIT (OUTPATIENT)
Dept: FAMILY MEDICINE CLINIC | Age: 55
End: 2025-04-14
Payer: COMMERCIAL

## 2025-04-14 VITALS
HEIGHT: 67 IN | HEART RATE: 78 BPM | OXYGEN SATURATION: 95 % | TEMPERATURE: 97 F | WEIGHT: 271.6 LBS | BODY MASS INDEX: 42.63 KG/M2 | SYSTOLIC BLOOD PRESSURE: 116 MMHG | DIASTOLIC BLOOD PRESSURE: 78 MMHG

## 2025-04-14 DIAGNOSIS — M54.42 ACUTE LEFT-SIDED LOW BACK PAIN WITH LEFT-SIDED SCIATICA: Primary | ICD-10-CM

## 2025-04-14 PROCEDURE — 99213 OFFICE O/P EST LOW 20 MIN: CPT

## 2025-04-14 PROCEDURE — 3078F DIAST BP <80 MM HG: CPT

## 2025-04-14 PROCEDURE — 3017F COLORECTAL CA SCREEN DOC REV: CPT

## 2025-04-14 PROCEDURE — G8427 DOCREV CUR MEDS BY ELIG CLIN: HCPCS

## 2025-04-14 PROCEDURE — 4004F PT TOBACCO SCREEN RCVD TLK: CPT

## 2025-04-14 PROCEDURE — 3074F SYST BP LT 130 MM HG: CPT

## 2025-04-14 PROCEDURE — G8417 CALC BMI ABV UP PARAM F/U: HCPCS

## 2025-04-14 RX ORDER — LIDOCAINE 50 MG/G
1 PATCH TOPICAL DAILY
Qty: 30 PATCH | Refills: 0 | Status: SHIPPED | OUTPATIENT
Start: 2025-04-14 | End: 2025-05-14

## 2025-04-14 ASSESSMENT — ENCOUNTER SYMPTOMS
ABDOMINAL PAIN: 0
SHORTNESS OF BREATH: 0
SORE THROAT: 0
COUGH: 0
BACK PAIN: 1
RHINORRHEA: 0
NAUSEA: 0
DIARRHEA: 0

## 2025-04-14 NOTE — PATIENT INSTRUCTIONS
Thank you for letting us take care of you today. We hope all your questions were addressed. If a question was overlooked or something else comes to mind after you return home, please contact a member of your Care Team listed below.      Your Care Team at [facility] is Team #1  Maryellen Ramos M.D. (Faculty)  Jessica Steinberg M.D. (Resident)  Madison Rivera D.O. (Resident)  Ruslan Au M.D. (Resident)  Rani Blood M.D. (Resident)  Ira Claudio, Critical access hospital  Aimee Monae, [facility]  Vikki Mascorro, Critical access hospital  Monica Contreras, [facility]  Lily Vela, Critical access hospital  Angela Levi, [facility]  Casey Wood (LJ) Philippe,   Thuy Ramsey [facility] (Clinical Pharmacist)     Office phone number: [phone]    If you need to get in right away due to illness, please be advised we have \"Same Day\" appointments available Monday-Friday. Please call us at [phone] option #3 to schedule your \"Same Day\" appointment.

## 2025-04-14 NOTE — PROGRESS NOTES
Attending Physician Statement  I  have discussed the care of Kari Reddy including pertinent history and exam findings with the resident. I agree with the assessment, plan and orders as documented by the resident.      /78 (BP Site: Right Upper Arm, Patient Position: Sitting, BP Cuff Size: Medium Adult)   Pulse 78   Temp 97 °F (36.1 °C) (Oral)   Ht 1.702 m (5' 7.01\")   Wt 123.2 kg (271 lb 9.6 oz)   SpO2 95%   BMI 42.53 kg/m²    BP Readings from Last 3 Encounters:   04/14/25 116/78   02/18/25 126/70   02/04/25 125/73     Wt Readings from Last 3 Encounters:   04/14/25 123.2 kg (271 lb 9.6 oz)   02/18/25 121.8 kg (268 lb 9.6 oz)   01/17/25 122.5 kg (270 lb)          Diagnosis Orders   1. Acute left-sided low back pain with left-sided sciatica  lidocaine (LIDODERM) 5 %    XR LUMBAR SPINE (MIN 6 VIEWS)    [facility] - [facility]    diclofenac sodium (VOLTAREN) 1 % GEL              Gavin Sanches DO 4/14/2025 2:07 PM
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
## 2025-04-14 NOTE — PROGRESS NOTES
Visit Information    Have you changed or started any medications since your last visit including any over-the-counter medicines, vitamins, or herbal medicines? no   Are you having any side effects from any of your medications? -  no  Have you stopped taking any of your medications? Is so, why? -  no    Have you seen any other physician or provider since your last visit? No  Have you had any other diagnostic tests since your last visit? No  Have you been seen in the emergency room and/or had an admission to a hospital since we last saw you? No  Have you had your routine dental cleaning in the past 6 months? no    Have you activated your [employer] account? If not, what are your barriers? Yes     Patient Care Team:  Louis Rivera DO as PCP - General (Family Medicine)  Jesica Valdez, RN as Ambulatory Care Manager  Moon Allen APRN - CNP as Nurse Practitioner (Certified Nurse Practitioner)  Jack Kaur MD as Consulting Physician (Cardiology)    Medical History Review  Past Medical, Family, and Social History reviewed and does not contribute to the patient presenting condition    Health Maintenance   Topic Date Due    Shingles vaccine (1 of 2) Never done    Diabetic retinal exam  02/20/2024    COVID-19 Vaccine (3 - 2024-25 season) 09/01/2024    Hepatitis B vaccine (1 of 3 - 19+ 3-dose series) 02/18/2026 (Originally [birth year]/12/1989)    Flu vaccine (Season Ended) 02/18/2026 (Originally 8/1/2025)    Pneumococcal 50+ years Vaccine (2 of 2 - PCV) 02/18/2026 (Originally 1/13/2018)    Breast cancer screen  06/21/2025    Diabetic foot exam  08/19/2025    Depression Screen  02/17/2026    A1C test (Diabetic or Prediabetic)  02/18/2026    Diabetic Alb to Cr ratio (uACR) test  02/18/2026    Lipids  02/18/2026    GFR test (Diabetes, CKD 3-4, OR last GFR 15-59)  02/18/2026    Cervical cancer screen  01/03/2028    DTaP/Tdap/Td vaccine (3 - Td or Tdap) 02/17/2030    Colorectal Cancer Screen  10/07/2034    Hepatitis C

## 2025-04-17 ENCOUNTER — CARE COORDINATION (OUTPATIENT)
Dept: CARE COORDINATION | Age: 55
End: 2025-04-17

## 2025-04-17 NOTE — CARE COORDINATION
Ambulatory Care Coordination Note     4/17/2025 3:24 PM     Patient outreach attempt by this ACM today to perform care management follow up . ACM was unable to reach the patient by telephone today;   left voice message requesting a return phone call to this ACM.     ACM: Sofi Linda RN     Care Summary Note: Calling to check on blood sugars, if heard from [facility] about CPAP cost yet. She did get Jardiance from pharmacy on 4/8.  She had PCP visit for low back pain, was ordered an x ray which she hasn't gotten yet and also physical therapy. Has PT appt next week.    PCP/Specialist follow up:   Future Appointments         Provider Specialty Dept Phone    4/21/2025 3:00 PM Quyen Mayo, PT Physical Therapy [phone]    4/24/2025 8:45 AM Jack Kaur MD Cardiology [phone]            Follow Up:   Plan for next AC outreach in approximately 1 week to complete:  - disease specific assessments  - advance care planning  - goal progression  - education .

## 2025-04-21 ENCOUNTER — HOSPITAL ENCOUNTER (OUTPATIENT)
Age: 55
Setting detail: THERAPIES SERIES
Discharge: HOME | End: 2025-04-21
Payer: COMMERCIAL

## 2025-04-21 PROCEDURE — 97110 THERAPEUTIC EXERCISES: CPT

## 2025-04-21 PROCEDURE — 97161 PT EVAL LOW COMPLEX 20 MIN: CPT
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
## 2025-04-24 ENCOUNTER — HOSPITAL ENCOUNTER (OUTPATIENT)
Age: 55
Setting detail: THERAPIES SERIES
Discharge: HOME | End: 2025-04-24
Payer: COMMERCIAL

## 2025-04-24 PROCEDURE — 97110 THERAPEUTIC EXERCISES: CPT

## 2025-04-24 NOTE — FLOWSHEET NOTE
[x] [facility]  Outpatient Rehabilitation &  Therapy  [address]  [phone]  [phone]     Physical Therapy Daily Treatment Note    Date:  2025  Patient Name:  Kari Reddy    :  1970  MRN: [id number]  Physician: Louis Rivera DO                             Insurance: [employer] - 40 visits per calendar year, hard max.  Medical Diagnosis: M54.42 (ICD-10-CM) - Acute left-sided low back pain with left-sided sciatica             Rehab Codes: M54.59, M25.652, M25.651, M25.551, M25.552  Onset Date: 2025                      Next 's appt.: 2025 - Cardiology    Visit# / total visits:     Cancels/No Shows: 0/0    Subjective:    Pain:  [x] Yes  [] No Location:: L low back Pain Rating: (0-10 scale) 8/10  Pain altered Tx:  [x] No  [] Yes  Action:  Comments: Patient arrives stating back is \"alright\". Reports she has tried exercises at home and they went well for the most part, does feel that they have helped some since evaluation. Notes high pain numeric, but slightly lower than that at evaluation.    Objective:  Precautions Yes [] No[x]:   Exercise     L side low back pain w/sciatica Reps/ Time Weight/ Level Comments   Scifit 5m L3          Gastroc stretch 3x20\"     Palloff press    ADD   Heel raises 2x10     Trunk extension 10x3\"  Cues to breathe out with extension               Seated         Piriformis  5x10\" ea   \"[place]\" stretch bilateral    Nerve sliders  10x3   Slumped position   Nerve glides 10x3       Trunk Rotations 10x3\" ea       SB rollouts 2 min    Flex             Supine         Glut iso  10x3\"       PPT 15x     Tactile cueing for technique                                                                                    Other:          Treatment Charges: Mins Units   []  Modalities       [x]  Ther Exercise 45 3   []  Neuromuscular Re-ed     []  Gait Training     []  Manual Therapy     []  Ther Activities     []  Aquatics     []  Vasocompression

## 2025-04-28 ENCOUNTER — HOSPITAL ENCOUNTER (OUTPATIENT)
Age: 55
Setting detail: THERAPIES SERIES
Discharge: HOME | End: 2025-04-28
Payer: COMMERCIAL

## 2025-04-28 PROCEDURE — 97110 THERAPEUTIC EXERCISES: CPT

## 2025-04-28 NOTE — FLOWSHEET NOTE
[x] [facility]  Outpatient Rehabilitation &  Therapy  [address]  [phone]  [phone]     Physical Therapy Daily Treatment Note    Date:  2025  Patient Name:  Kari Reddy    :  1970  MRN: [id number]  Physician: Louis Rivera DO                             Insurance: [facility] - 40 visits per calendar year, hard max.  Medical Diagnosis: M54.42 (ICD-10-CM) - Acute left-sided low back pain with left-sided sciatica             Rehab Codes: M54.59, M25.652, M25.651, M25.551, M25.552  Onset Date: 2025                      Next 's appt.: 2025 - Cardiology    Visit# / total visits: 3/16    Cancels/No Shows: 0/0    Subjective:    Pain:  [x] Yes  [] No Location: L low back Pain Rating: (0-10 scale) 8/10  Pain altered Tx:  [x] No  [] Yes  Action:  Comments: Patient reports that she has higher pain while at work, but the pain is staying at a constant 8/10 in the low back.     Objective:  Precautions Yes [] No[x]:   Exercise     L side low back pain w/sciatica Reps/ Time Weight/ Level Completed 25   Comments   Scifit 5m L3 x Billed as there-ex for subjective, hx, and relieving factors.           Gastroc stretch 3x20\"  x    Palloff press     ADD   Heel raises 2x10  x    Lumbar extension at wall 10x3\"  x Cues to breathe out with extension   Use of Bloster   L lateral slides 10x  x Alternating with lumbar extensions.   Reduced radicular symptoms - increased stiffness in the low back.           Seated          Piriformis  5x10\" ea    \"[place]\" stretch bilateral    Nerve sliders  10x3   x Slumped position   Nerve glides 10x3   x     Trunk Rotations 10x3\" ea   x     SB rollouts 2 min   x  Flex              Supine          Glut iso  10x3\"        PPT 15x      Tactile cueing for technique                                                                                            Other: Did not complete all tx due to limited time and assessment of symptoms with flexion

## 2025-05-02 ENCOUNTER — TELEPHONE (OUTPATIENT)
Age: 55
End: 2025-05-02

## 2025-05-02 ENCOUNTER — HOSPITAL ENCOUNTER (OUTPATIENT)
Dept: GENERAL RADIOLOGY | Age: 55
Discharge: HOME | End: 2025-05-02
Payer: COMMERCIAL

## 2025-05-02 ENCOUNTER — HOSPITAL ENCOUNTER (OUTPATIENT)
Age: 55
Setting detail: THERAPIES SERIES
Discharge: HOME | End: 2025-05-02
Payer: COMMERCIAL

## 2025-05-02 DIAGNOSIS — M54.42 ACUTE LEFT-SIDED LOW BACK PAIN WITH LEFT-SIDED SCIATICA: ICD-10-CM

## 2025-05-02 PROCEDURE — 72100 X-RAY EXAM L-S SPINE 2/3 VWS: CPT

## 2025-05-02 PROCEDURE — 97110 THERAPEUTIC EXERCISES: CPT

## 2025-05-02 NOTE — FLOWSHEET NOTE
[x] [facility]  Outpatient Rehabilitation &  Therapy  [address]  [phone]  [phone]     Physical Therapy Daily Treatment Note    Date:  2025  Patient Name:  Kari Reddy    :  1970  MRN: [id number]  Physician: Louis Rivera DO                             Insurance: [employer] - 40 visits per calendar year, hard max.  Medical Diagnosis: M54.42 (ICD-10-CM) - Acute left-sided low back pain with left-sided sciatica             Rehab Codes: M54.59, M25.652, M25.651, M25.551, M25.552  Onset Date: 2025                      Next 's appt.: 2025 - Cardiology    Visit# / total visits:     Cancels/No Shows: 0/0    Subjective:    Pain:  [x] Yes  [] No Location: L low back Pain Rating: (0-10 scale) 6/10  Pain altered Tx:  [] No  [x] Yes  Action: Trial HP and Hypervolt to loosen lumbar spine  Comments: Patient reports that she has less pain than the previous visit. States she has not done much moving around in the day yet.     Objective:  Modalities:  Modality  Body part     Left   Right   Time   Comments    [] Cold Pack   [x] Heat Pack [] Shoulder   [] Elbow  [] Hip   [] Knee  [] Ankle   [] Neck    [x] Low Back      [x]  [x]  [x] 10 mins   [] 15 mins   [] 20 mins    Supine after warm-up, BLE elevated, completed hypervolt following completion        Precautions Yes [] No[x]:   Exercise     L side low back pain w/sciatica Reps/ Time Weight/ Level Completed 25   Comments   Scifit 5m L3 x           Gastroc stretch 3x20\"  x    Palloff press     ADD   Heel raises 2x10  x           Seated          Piriformis  5x10\" ea    \"[place]\" stretch bilateral    Nerve sliders  10x3   x Slumped position head down   Nerve glides 10x3   x Slumped position head down   Trunk Rotations 10x5\" ea   x     SB rollouts 2x2 min   x  Flex   Lumbar flexion stretch 10x5\" ea  x Added 5/2   Sidebending stretch with reach 10x5\" ea  x Added 5/2              Supine          Glut iso  10x3\"

## 2025-05-02 NOTE — PROGRESS NOTES
[facility]   Progress Note                   Date:   5/12/24  Patient name: Kari Reddy  Date of admission:  5/15/2024  3:23 PM  MRN:   [id number]  YOB: 1970  PCP: Louis Rivera DO    Reason for Admission:     Subjective:       Clinical Changes / Abnormalities:  Patient seen and examined. Denies chest pain or shortness of breath. Tele/vitals/labs reviewed . Post cath yesterday       Medications:   Scheduled Meds   sodium chloride flush  5-40 mL IntraVENous 2 times per day    ipratropium 0.5 mg-albuterol 2.5 mg  1 Dose Inhalation Q4H WA RT    aspirin EC  81 mg Oral Daily    atorvastatin  40 mg Oral Daily    famotidine  20 mg Oral Daily    sacubitril-valsartan  1 tablet Oral BID    spironolactone  25 mg Oral Daily    nystatin  5 mL Oral 4x Daily    insulin lispro  0-8 Units SubCUTAneous TID WC    insulin lispro  0-4 Units SubCUTAneous Nightly    lidocaine  1 patch TransDERmal Daily    insulin lispro  5 Units SubCUTAneous TID WC    potassium chloride  20 mEq Oral BID with meals    carvedilol  12.5 mg Oral BID WC    furosemide  40 mg Oral Daily    insulin glargine  45 Units SubCUTAneous BID     Continuous Infusions:   sodium chloride      dextrose       CBC:   Recent Labs     05/14/24  0515 05/16/24  0545   WBC 16.7* 13.7*   HGB 15.2 14.5    268       BMP:    Recent Labs     05/14/24  2147 05/15/24  0959 05/16/24  0545   NA  --  138 137   K  --  4.4 4.5   CL  --  101 102   CO2  --  25 26   BUN  --  23* 17   CREATININE  --  0.9 0.7   GLUCOSE 642* 302* 147*       Hepatic:   No results for input(s): \"AST\", \"ALT\", \"BILITOT\", \"ALKPHOS\" in the last 72 hours.    Invalid input(s): \"ALB\"    Troponin: No results for input(s): \"TROPONINI\" in the last 72 hours.  BNP: No results for input(s): \"BNP\" in the last 72 hours.  Lipids: No results for input(s): \"CHOL\", \"HDL\" in the last 72 hours.    Invalid input(s): \"LDLCALCU\"  INR: No results for input(s): \"INR\" in the last 72  Image quality is adequate.           CARDIAC CATHETERIZATION ( 2/10/20)     LMCA: Normal 0% stenosis.     LAD: Normal 0% stenosis.       Lesion on Mid LAD: Proximal subsection.50% stenosis 9 mm length.     LCx: Normal 0% stenosis.     RCA: Normal 0% stenosis.       Lesion on Dist RCA: 50% stenosis 10 mm length.      Coronary Tree      Dominance: Mixed     LV function assessed as:Abnormal.  Ejection Fraction  +----------------------------------------------------------------------+---+  !Method                                                                !EF%!  +----------------------------------------------------------------------+---+  !LV gram                                                               !30 !    Echo 5/13/2024  The left ventricle is severely dilated, normal LV wall thickness, severe global hypokinesis  Severely reduced LV systolic function, ejection fraction 25 to 30%  The right ventricle appears dilated, function appears impaired  Left and the right atrium appears moderately dilated  The aortic valve structure appears normal no stenosis no regurgitation  Mitral valve structure appears normal mild regurgitation no stenosis   Tricuspid valve structure appears normal, trace regurgitation no stenosis  Pulmonary valve structure appears normal no stenosis no regurgitation  Normal aortic root dimensions  IVC not well-visualized  No significant pericardial effusion seen    IMPRESSION:       Acute-on-chronic respiratory failure with suspected COPD and acute bronchitis  Episode of aflutter RVR   Acute on chronic systolic CHF exacerbation with significant drop in EF  H/o ACE and cor pulmonale  H/o non-ischemic cardiomyopathy with improved LVEF at 45% by most recent echo from 12/21/23  Elevated troponin likely due to supply /demand mismatch  Non-occlusive CAD on cardiac catheterization in 2020  Essential HTN  Type II DM  DNRCCA      [id number] Score for Atrial Fibrillation Stroke Risk   Risk   Factors   No

## 2025-05-02 NOTE — TELEPHONE ENCOUNTER
[facility] called and stated the order for XR 6 views needed to be changes to 2 or 3 views. Writer spoke with Dr. Sanches and he put in a new order.

## 2025-05-05 ENCOUNTER — HOSPITAL ENCOUNTER (OUTPATIENT)
Age: 55
Setting detail: THERAPIES SERIES
Discharge: HOME | End: 2025-05-05
Payer: COMMERCIAL

## 2025-05-05 PROCEDURE — 97110 THERAPEUTIC EXERCISES: CPT

## 2025-05-05 NOTE — FLOWSHEET NOTE
lumbar erector spinae, buttock, and piriformis    Other:     Specific Instructions for subsequent treatments: NO BIKE WARM UP continue with HP and hypervolt to help decrease tightness in the muscles of the lumbar spine prior to completing mobility and stretching exercises.      Treatment Charges: Mins Units   [x]  Modalities  8  0   [x]  Ther Exercise  38 3   []  Neuromuscular Re-ed     []  Gait Training     []  Manual Therapy     []  Ther Activities     []  Aquatics     []  Vasocompression     []  Cervical Traction     []  Other     Total Billable time  38 3         Assessment: [x] Progressing toward goals. Trial no warm up an use HP in place. Pt reports back feeling more loose after HP application. Patient only able to keep the HP donned for 8 minutes reporting it was getting too hot. Completed supine exercises to increase mobility and ROM f the lumbar spine. Pt reports the low back felt better at the end of treatment rating 5/10 but some pain in the LLE after completion of wedge calf stretch.     [] No change.      [] Other:   [x] Patient would continue to benefit from skilled physical therapy services in order to: inc strength in BLE, inc ROM in lumbar spine and LLE, inc activity tolerance, dec pain in BLE for ease of Ambulation, climbing stairs, prolonged standing       Goals  MET NOT MET ON-  GOING  Details   Date Addressed:            STG: To be met in 8 treatments            1. ? Pain: Decrease pain levels to 6/10 in low back and LLE with Ambulation, climbing stairs, prolonged standing. []  []  []      2. ? ROM: Increase flexibility and AROM limitations throughout lumbar spine to less than 50% impairment in all planes in order to reduce difficulty with Ambulation, climbing stairs, prolonged standing. []  []  []      3. ? Strength: Increase MMT to 4+/5 throughout LLE for ease of Ambulation, climbing stairs, prolonged standing []  []  []      4. ? Function: Improve score on assessment tool Oswestry from 66%

## 2025-05-07 ENCOUNTER — CARE COORDINATION (OUTPATIENT)
Dept: CARE COORDINATION | Age: 55
End: 2025-05-07

## 2025-05-07 NOTE — CARE COORDINATION
Ambulatory Care Coordination Note     5/7/2025 4:08 PM     Patient outreach attempt by this ACM today to perform care management follow up . ACM was unable to reach the patient by telephone today;   left voice message requesting a return phone call to this ACM.     ACM: Jesica Valdez RN     Care Summary Note:     CC Plan:       Review medications with Patient.    2.  Review:  CHF Tuck me In for Summer.    3.  What is the status of Patient's CPAP machine?    4.  Patient kept appointment with PCP on 4/14/2026.  Her assessment and plan of care was reviewed.  Patient was given Lidoderm patch, x-ray order for Lumbar spine, and Rx for Voltaren gel.    Writer phoned Patient for ACM update.  Message left on patient's voice mail with request for return call.  Writer's contact information provided.         PCP/Specialist follow up:   Future Appointments         Provider Specialty Dept Phone    5/9/2025 9:00 AM Quyen Mayo PT Physical Therapy [phone]    5/12/2025 9:00 AM Quyen Mayo PT Physical Therapy [phone]    5/15/2025 12:30 PM SCHEDULE, [facility] NUCLEAR [facility] Cardiology [phone]    5/16/2025 9:00 AM Quyen Mayo PT Physical Therapy [phone]    6/5/2025 8:45 AM Jack Kaur MD Cardiology [phone]            Follow Up:   Plan for next AC outreach in approximately 1 week to complete:  - disease specific assessments  - SDOH assessments  - medication review  - advance care planning  - goal progression  - education .

## 2025-05-12 ENCOUNTER — HOSPITAL ENCOUNTER (OUTPATIENT)
Age: 55
Setting detail: THERAPIES SERIES
Discharge: HOME | End: 2025-05-12
Payer: COMMERCIAL

## 2025-05-12 PROCEDURE — 97110 THERAPEUTIC EXERCISES: CPT

## 2025-05-12 NOTE — FLOWSHEET NOTE
[x] [facility]  Outpatient Rehabilitation &  Therapy  [address]  [phone]  [phone]     Physical Therapy Daily Treatment Note    Date:  2025  Patient Name:  Kari Reddy    :  1970  MRN: [id number]  Physician: Louis Rivera DO                             Insurance: [employer] - 40 visits per calendar year, hard max.  Medical Diagnosis: M54.42 (ICD-10-CM) - Acute left-sided low back pain with left-sided sciatica             Rehab Codes: M54.59, M25.652, M25.651, M25.551, M25.552  Onset Date: 2025                      Next 's appt.: 2025 - Cardiology    Visit# / total visits:     Cancels/No Shows: 0/0    Subjective:    Pain:  [x] Yes  [] No Location: L low back Pain Rating: (0-10 scale) 6/10  Pain altered Tx:  [] No  [x] Yes  Action: HP to lumbar spine at the beginning of the session.  Comments: Patient with late arrival this date. Patient reports minimal overall change in her pain.     Objective:  Modalities:  Modality  Body part     Left   Right   Time   Comments    [] Cold Pack   [x] Heat Pack [] Shoulder   [] Elbow  [] Hip   [] Knee  [] Ankle   [] Neck    [x] Low Back      [x]  [x]  [x] 10 mins   [] 15 mins   [] 20 mins    Other   [] 8 mins Supine, BLE elevated, pillow case and towel used to reduce heat as it was too hot yesterday       Precautions Yes [] No[x]:   Exercise     L side low back pain w/sciatica Reps/ Time Weight/ Level Completed 25   Comments   Scifit 5m L3            Gastroc stretch 3x20\"      Palloff press     ADD   Heel raises 2x10             Seated          Nerve sliders  10x3   x Slumped position head down   Nerve glides 10x3   x Slumped position head up   Trunk Rotations 10x5\" ea   x     SB rollouts 2x2 min   x  Flex   Lumbar flexion stretch 10x5\" ea  x    Sidebending stretch with reach 10x5\" ea  x               Supine          Glut iso  10x3\"   x    PPT 10x3\"   x    PPT with Marches 10x  x Added    LTR 5x5\"  x

## 2025-05-12 NOTE — CARE COORDINATION
[x] [facility]  Outpatient Rehabilitation &  Therapy  [address]  [phone]  [phone]     THERAPY RESPONSIBILITY OF CARE TRANSFER FORM       PATIENT NAME: Kari Reddy   MRN: [id number]    : 1970       TRANSFERRING FACILITY:    [] [place]   [] [facility]   [] [facility]   [] [facility]   [] [facility] [] [place]   [x] [facility]  [] [facility]  [] [facility]   [] [place]  [] [place]   [] Other:          ACCEPTING FACILITY  [] [place]   [] [facility]   [] [facility]   [] [facility]   [] [facility] [] [place]   [x] [facility]  [] [facility]  [] [facility]   [] [place]  [] [place]   [] Other:         REASON FOR TRANSFER: Primary therapist going on Maternity leave.       TRANSFER OF CARE:    I am transferring the care of the above patient to: Jeffery Hernandez, [facility]  2025      ACCEPTANCE OF CARE:     I am accepting the care of the above patient. Jeffery Hernandez, PT

## 2025-05-16 ENCOUNTER — APPOINTMENT (OUTPATIENT)
Age: 55
End: 2025-05-16
Payer: COMMERCIAL

## 2025-05-19 ENCOUNTER — HOSPITAL ENCOUNTER (OUTPATIENT)
Age: 55
Setting detail: THERAPIES SERIES
Discharge: HOME | End: 2025-05-19
Payer: COMMERCIAL

## 2025-05-19 PROCEDURE — 97110 THERAPEUTIC EXERCISES: CPT

## 2025-05-19 NOTE — FLOWSHEET NOTE
[x] [facility]  Outpatient Rehabilitation &  Therapy  [address]  [phone]  [phone]     Physical Therapy Daily Treatment Note    Date:  2025  Patient Name:  Kari Reddy    :  1970  MRN: [id number]  Physician: Louis Rivera DO                             Insurance: [facility] - 40 visits per calendar year, hard max.  Medical Diagnosis: M54.42 (ICD-10-CM) - Acute left-sided low back pain with left-sided sciatica             Rehab Codes: M54.59, M25.652, M25.651, M25.551, M25.552  Onset Date: 2025                      Next 's appt.: 2025 - Cardiology    Visit# / total visits:     Cancels/No Shows: 0/0    Subjective:    Pain:  [x] Yes  [] No Location: L low back and into the L calf Pain Rating: (0-10 scale) 8/10  Pain altered Tx:  [] No  [x] Yes  Action: HP to lumbar spine at the beginning of the session.  Comments: Pt continues to have L sided low back pain with more L lower leg pain, but she has been more stressed at work, which may be contributing.    Objective:  Modalities:  Modality  Body part     Left   Right   Time   Comments    [] Cold Pack   [x] Heat Pack [] Shoulder   [] Elbow  [] Hip   [] Knee  [] Ankle   [] Neck    [x] Low Back      [x]  [x]  [x] 10 mins   [] 15 mins   [] 20 mins    Other   [] 8 mins Supine, BLE elevated, pillow case and towel used to reduce heat as it was too hot yesterday       Precautions Yes [] No[x]:   Exercise     L side low back pain w/sciatica Reps/ Time Weight/ Level Completed 25   Comments   Scifit 3m L3 x           Gastroc stretch 3x20\"  x    Palloff press  10 x  green x ADD   Shld Ext 15 x  blue x    Heel raises 2x10  x           Seated          Nerve sliders  10x3   x Slumped position head down   Nerve glides 10x3   x Slumped position head up   Trunk Rotations 10x5\" ea   x     SB rollouts 2x2 min   x  Flex   Lumbar flexion stretch 10x5\" ea      Sidebending stretch with reach 10x5\" ea

## 2025-05-23 ENCOUNTER — HOSPITAL ENCOUNTER (OUTPATIENT)
Age: 55
Setting detail: THERAPIES SERIES
Discharge: HOME | End: 2025-05-23
Payer: COMMERCIAL

## 2025-05-23 PROCEDURE — 97140 MANUAL THERAPY 1/> REGIONS: CPT

## 2025-05-23 PROCEDURE — 97110 THERAPEUTIC EXERCISES: CPT

## 2025-05-23 NOTE — FLOWSHEET NOTE
[x] [facility]  Outpatient Rehabilitation &  Therapy  [address]  [phone]  [phone]     Physical Therapy Daily Treatment Note    Date:  2025  Patient Name:  Kari Reddy    :  1970  MRN: [id number]  Physician: Louis Rivera DO                             Insurance: [facility] - 40 visits per calendar year, hard max.  Medical Diagnosis: M54.42 (ICD-10-CM) - Acute left-sided low back pain with left-sided sciatica             Rehab Codes: M54.59, M25.652, M25.651, M25.551, M25.552  Onset Date: 2025                      Next 's appt.: 2025 - Cardiology    Visit# / total visits:     Cancels/No Shows: 0/0    Subjective:    Pain:  [x] Yes  [] No Location: L low back and into the L calf Pain Rating: (0-10 scale) 7/10  Pain altered Tx:  [] No  [x] Yes  Action: HP to lumbar spine at the beginning of the session.  Comments: Pt arrives with some bilateral hamstring discomfort. She reports doing well with therapy overall last session with some increase in discomfort after, but it was short lived.  She has not noticed a significant amount of improvement since starting therapy, butshe contributes that to waiting a long time before she got help.    Objective:  Modalities:  Reviewed pt medical history and revealed no contraindications to lumbar traction. Pt to trial traction next session and add traction to [facility] - Jeffery Hernandez PT  Modality  Body part     Left   Right   Time   Comments    [] Cold Pack   [x] Heat Pack [] Shoulder   [] Elbow  [] Hip   [] Knee  [] Ankle   [] Neck    [x] Low Back      [x]  [x]  [x] 10 mins   [] 15 mins   [] 20 mins    Other   [] 8 mins Supine, BLE elevated, pillow case and towel used to reduce heat as it was too hot yesterday       Precautions Yes [] No[x]:   Exercise     L side low back pain w/sciatica Reps/ Time Weight/ Level Completed 25   Comments   Scifit 3m L3 x           Gastroc stretch 3x20\"  x    Palloff press  10 x

## 2025-05-27 ENCOUNTER — TELEPHONE (OUTPATIENT)
Age: 55
End: 2025-05-27

## 2025-05-27 DIAGNOSIS — K08.89 TOOTHACHE: Primary | ICD-10-CM

## 2025-05-27 RX ORDER — ZINC CHLORIDE/BENZYL ALCOHOL 0.15-0.27%
MOUTHWASH MUCOUS MEMBRANE
Qty: 7 G | Refills: 0 | Status: SHIPPED | OUTPATIENT
Start: 2025-05-27

## 2025-05-27 RX ORDER — AMOXICILLIN 500 MG/1
500 CAPSULE ORAL 2 TIMES DAILY
Qty: 14 CAPSULE | Refills: 0 | Status: SHIPPED | OUTPATIENT
Start: 2025-05-27 | End: 2025-06-03

## 2025-05-27 RX ORDER — CHLORHEXIDINE GLUCONATE ORAL RINSE 1.2 MG/ML
15 SOLUTION DENTAL 2 TIMES DAILY
Qty: 420 ML | Refills: 0 | Status: SHIPPED | OUTPATIENT
Start: 2025-05-27 | End: 2025-06-10

## 2025-05-27 NOTE — TELEPHONE ENCOUNTER
Patient called in this afternoon wanting to know if you will send an antibiotic to the pharmacy for her tooth ache. Patient states she can't get in to the dentists for 2 weeks. Please Advise

## 2025-05-28 NOTE — TELEPHONE ENCOUNTER
Writer called patient to inform her that med's were sent to pharmacy, writer had to [facility].

## 2025-05-30 ENCOUNTER — HOSPITAL ENCOUNTER (OUTPATIENT)
Age: 55
Setting detail: THERAPIES SERIES
Discharge: HOME | End: 2025-05-30
Payer: COMMERCIAL

## 2025-05-30 ENCOUNTER — CARE COORDINATION (OUTPATIENT)
Dept: CARE COORDINATION | Age: 55
End: 2025-05-30

## 2025-05-30 PROCEDURE — 97110 THERAPEUTIC EXERCISES: CPT

## 2025-05-30 PROCEDURE — 97012 MECHANICAL TRACTION THERAPY: CPT

## 2025-05-30 NOTE — FLOWSHEET NOTE
[x] [facility]  Outpatient Rehabilitation &  Therapy  [address]  [phone]  [phone]     Physical Therapy Daily Treatment Note    Date:  2025  Patient Name:  Kari Reddy      :  1970    MRN: [id number]  Physician: Louis Rivera DO                             Insurance: [facility] - 40 visits per calendar year, hard max.  Medical Diagnosis: M54.42 (ICD-10-CM) - Acute left-sided low back pain with left-sided sciatica             Rehab Codes: M54.59, M25.652, M25.651, M25.551, M25.552  Onset Date: 2025                    Next 's appt.: 2025 - Cardiology    Visit# / total visits:   Cancels/No Shows: 0/0    Subjective:    Pain:  [x] Yes  [] No Location: L low back and into the L calf   Pain Rating: (0-10 scale) 8/10  Pain altered Tx:  [] No  [x] Yes  Action: Added mechanical lumbar traction   Comments: patient reports she had a rather busy /stressful day at work yesterday. Increased pain following this and into today.     Objective:  Modalities:  Reviewed pt medical history and revealed no contraindications to lumbar traction. Pt to trial traction next session and add traction to [facility] Hernandez PT  Modality  Body part     Left   Right   Time   Comments    [] Cold Pack   [] Heat Pack [] Shoulder   [] Elbow  [] Hip   [] Knee  [] Ankle   [] Neck    [x] Low Back      [x]  [x]  [] 10 mins   [] 15 mins   [] 20 mins    Other   [] 8 mins Supine, BLE elevated, pillow case and towel used to reduce heat as it was too hot yesterday   Mechanical Traction Lumbar - Supine   20 minutes 80 lb max, 20 lb min - 60 sec on - 15 sec off, 4 steps, 50% speed       Precautions Yes [] No[x]:   Exercise     L side low back pain w/sciatica Reps/ Time Weight/ Level Completed 25   Comments   Scifit 5 min L3 X           Gastroc stretch 3x20\"  X    Palloff press  10 x  green X ADD   Shld Ext 15 x  blue X    Heel raises 2x10  X    3 - Way Hip 10x ea  X Bilaterally   Added

## 2025-05-30 NOTE — CARE COORDINATION
prior to meals.  Patient states she knows when to test her BG.  She states she feels weird and then she test her blood glucose    Educate Patient on When to Call for Symptoms, taught by Jesica Valdez RN at 5/30/2025  8:15 PM.  Learner: Patient  Readiness: Nonacceptance  Method: Explanation  Response: Verbalizes Understanding, Needs Reinforcement  Comment: Patient declined smoking cessation classes.  Patient encouraged to test her blood glucose fasting in morning and prior to meals.  Patient states she knows when to test her BG.  She states she feels weird and then she test her blood glucose    [employer] Equipment, taught by Jesica Valdez RN at 5/30/2025  8:15 PM.  Learner: Patient  Readiness: Nonacceptance  Method: Explanation  Response: Verbalizes Understanding, Needs Reinforcement  Comment: Patient declined smoking cessation classes.  Patient encouraged to test her blood glucose fasting in morning and prior to meals.  Patient states she knows when to test her BG.  She states she feels weird and then she test her blood glucose    Education Comments  No comments found.     ,    Goals Addressed                   This Visit's Progress     Conditions and Symptoms   No change     I will schedule office visits, as directed by my provider.  I will keep my appointment or reschedule if I have to cancel.  I will notify my provider of any barriers to my plan of care.  I will follow my Zone Management tool to seek urgent or emergent care.  I will notify my provider of any symptoms that indicate a worsening of my condition.    Barriers: fear of failure, lack of motivation, financial, lack of support, overwhelmed by complexity of regimen, stress, time constraints, medication side effects, and lack of education  Plan for overcoming my barriers: Enrolled in [facility]  Confidence: 8/10  Anticipated Goal Completion Date: 6/15/2025    5/30/2025:   Patient is currently attending Physical Therapy for low back discomfort to the left

## 2025-06-02 ENCOUNTER — TELEPHONE (OUTPATIENT)
Dept: PHARMACY | Facility: CLINIC | Age: 55
End: 2025-06-02

## 2025-06-02 ENCOUNTER — HOSPITAL ENCOUNTER (OUTPATIENT)
Age: 55
Setting detail: THERAPIES SERIES
Discharge: HOME | End: 2025-06-02
Payer: COMMERCIAL

## 2025-06-02 PROCEDURE — 97110 THERAPEUTIC EXERCISES: CPT

## 2025-06-02 PROCEDURE — 97012 MECHANICAL TRACTION THERAPY: CPT

## 2025-06-02 NOTE — FLOWSHEET NOTE
[x] [facility]  Outpatient Rehabilitation &  Therapy  [address]  [phone]  [phone]     Physical Therapy Daily Treatment Note    Date:  2025  Patient Name:  Kari Reddy      :  1970    MRN: [id number]  Physician: Louis Rivera DO                             Insurance: [employer] - 40 visits per calendar year, hard max.  Medical Diagnosis: M54.42 (ICD-10-CM) - Acute left-sided low back pain with left-sided sciatica             Rehab Codes: M54.59, M25.652, M25.651, M25.551, M25.552  Onset Date: 2025                    Next 's appt.: ?    Visit# / total visits: 10/16  Cancels/No Shows: 0/0    Subjective:    Pain:  [x] Yes  [] No Location: L low back and into the L calf   Pain Rating: (0-10 scale) 8/10  Pain altered Tx:  [] No  [x] Yes  Action: Added mechanical lumbar traction   Comments: Patient states pain is 8/10 this upon arrival this date.    Objective:  Modalities:  Modality  Body part     Left   Right   Time   Comments    [] Cold Pack   [] Heat Pack [] Shoulder   [] Elbow  [] Hip   [] Knee  [] Ankle   [] Neck    [x] Low Back      [x]  [x]  [] 10 mins   [] 15 mins   [] 20 mins    Other   [] 8 mins Supine, BLE elevated, pillow case and towel used to reduce heat as it was too hot yesterday   Mechanical Traction Lumbar - Supine   20 minutes 90 lb max, 30 lb min - 60 sec on - 15 sec off, 4 steps, 50% speed       Precautions Yes [] No[x]:   Exercise     L side low back pain w/sciatica Reps/ Time Weight/ Level Completed 25   Comments   Scifit 5 min L3 X           Gastroc stretch   3x20\"  X    Palloff press  10 x  green X    Shld Ext 20x  blue X Increased pain   Heel raises   2x10  X Pain    3 - Way Hip  10x ea  X Bilaterally   Added 25                 Seated          Nerve sliders  10x3    Slumped position head down   Nerve glides 10x3    Slumped position head up   Trunk Rotations 10x5\" ea        SB rollouts 2x2 min     Flex, side bend   Lumbar

## 2025-06-02 NOTE — TELEPHONE ENCOUNTER
----- Message from ANNA BURROWS RN sent at 5/30/2025  8:05 PM EDT -----  Regarding: Referral for diabetes management  Good evening:    Patient states she is not wearing her Sensor.  She states no one has shown her how to put it on.  Patient has not tested her blood glucose today.  Patient was encouraged to test her blood sugar fasting in the morning.  She states she knows when to test her blood sugar.  She states \"I feel weird\".  Importance of knowing her blood sugar levels discussed.  She was informed Writer will refer her to the Pharmacy team to request assistance setting up her sensor and diabetes management.    Your assistance with Patient would be greatly appreciated.    Thank you,    Jesica

## 2025-06-02 NOTE — TELEPHONE ENCOUNTER
CLINICAL PHARMACY NOTE - [facility] Pharmacy Referral    Patient can be scheduled with:  Team Schedule- General Referrals, etc.    Received a referral from: Care Coordinator to discuss patient’s medications. Called patient to schedule a time to speak with a pharmacist over the telephone.     No answer left VM: Please contact us at  [phone] option 1 to schedule this appointment.    Jessica Gonzalez CPhT.   [facility] Clinical   [facility] Clinical Pharm[facility]  Toll free: [phone] Option 1

## 2025-06-04 NOTE — TELEPHONE ENCOUNTER
2nd attempt to contact this patient regarding the previous message  CLINICAL PHARMACY: REFERRAL  Patient unavailable at the time of call. Left following message on home TAD: please call back at toll-free [phone] to retrieve previous message.    [employer] message sent to patient.  If unread, letter will be mailed to home.     For Pharmacy Admin Tracking Only    Program: [employer]  CPA in place:  No  Gap Closed?: No   Time Spent (min): 10

## 2025-06-06 ENCOUNTER — HOSPITAL ENCOUNTER (OUTPATIENT)
Age: 55
Setting detail: THERAPIES SERIES
Discharge: HOME | End: 2025-06-06
Payer: COMMERCIAL

## 2025-06-06 NOTE — FLOWSHEET NOTE
[x] [facility]  Outpatient Rehabilitation &  Therapy  [address]  [phone]  [phone] [] [facility]  Outpatient Rehabilitation &  Therapy  [address]  P: [phone]  F: [phone] [] [facility]  Outpatient Rehabilitation &  Therapy  [address]  P: [phone]  F: [phone] [] [facility]  Outpatient Rehabilitation &  Therapy  [address]  [phone]  [phone] [] [facility]  Outpatient Rehabilitation &  Therapy  [address]   P: [phone]  F: [phone]  [] [facility]  Outpatient Rehabilitation &  Therapy  [address]  P: [phone]  F: [phone] [] [facility]  Outpatient Rehabilitation &  Therapy  [address]  P: [phone]  F: [phone] [] [facility]  Outpatient Rehabilitation &  Therapy  [address]  P: [phone]  F: [phone] [] [facility]  Outpatient Rehabilitation &  Therapy  [address]  P: [phone]  F: [phone]  [] [facility] Outpatient Rehabilitation &  Therapy  [address]  P: [phone]  F: [phone] [] [facility] Outpatient Rehabilitation &  Therapy  [address]  P: [phone]   F: [phone]      Therapy Cancel/No Show note    Date: 2025  Patient: Kari Reddy  : 1970  MRN: [id number]    Cancels/No Shows to date:     For today's appointment patient:    [x]  Cancelled    [] Rescheduled appointment    [] No-show     Reason given by patient:    []  Patient ill    []  Conflicting appointment    [] No transportation      [] Conflict with work    [x] No reason given    [] Weather related    [] COVID-19    [] Other:      Comments:        [x] Next appointment was confirmed when she

## 2025-06-09 ENCOUNTER — HOSPITAL ENCOUNTER (OUTPATIENT)
Age: 55
Setting detail: THERAPIES SERIES
Discharge: HOME | End: 2025-06-09
Payer: COMMERCIAL

## 2025-06-09 PROCEDURE — 97110 THERAPEUTIC EXERCISES: CPT

## 2025-06-09 NOTE — FLOWSHEET NOTE
[x] [facility]  Outpatient Rehabilitation &  Therapy  [address]  [phone]  [phone]     Physical Therapy Daily Treatment Note    Date:  2025  Patient Name:  Kari Reddy      :  1970    MRN: [id number]  Physician: Louis Rivera DO                             Insurance: [employer] - 40 visits per calendar year, hard max.  Medical Diagnosis: M54.42 (ICD-10-CM) - Acute left-sided low back pain with left-sided sciatica             Rehab Codes: M54.59, M25.652, M25.651, M25.551, M25.552  Onset Date: 2025                    Next 's appt.: ?    Visit# / total visits:   Cancels/No Shows:     Subjective:    Pain:  [x] Yes  [] No Location: L low back and into the L calf   Pain Rating: (0-10 scale) 10/10  Pain altered Tx:  [] No  [] Yes  Action:   Comments: States her pain is high today secondary to rainy weather.     Objective:  Modalities:  Modality  Body part     Left   Right   Time   Comments    [] Cold Pack   [x] Heat Pack [] Shoulder   [] Elbow  [] Hip   [] Knee  [] Ankle   [] Neck    [x] Low Back      [x]  [x]  [] 10 mins   [x] 15 mins   [] 20 mins    Other   [] 8 mins Supine, BLE elevated, pillow case and towel used to reduce heat as it was too hot yesterday   Mechanical Traction Lumbar - Supine   20 minutes 90 lb max, 30 lb min - 60 sec on - 15 sec off, 4 steps, 50% speed       Precautions Yes [] No[x]:   Exercise     L side low back pain w/sciatica Reps/ Time Weight/ Level Completed 25   Comments   Scifit 5 min L3 X           Gastroc stretch   3x20\"      Palloff press  10 x  green     Shld Ext 20x  blue X Increased pain   Heel raises     2x10  X Pain    3 - Way Hip  10x ea  X Bilaterally   Added 25                 Seated          Nerve sliders  10x3    Slumped position head down   Nerve glides 10x3    Slumped position head up   Trunk Rotations 10x5\" ea   x     SB rollouts 2x2 min   x  Flex, side bend   Lumbar flexion stretch 10x5\" ea

## 2025-06-12 NOTE — PROGRESS NOTES
I have reviewed and discussed key elements of [address] [place] [address] with the resident including plan of care and follow up and agree with the care bart plan. Onset: monday    Location / description: pt saw Dr Jerry on 6/10 after noticing an abdominal wall bulge on Monday while she was golfing. Pt states that it is more prominent with activity, and comes and goes. Pt would like to speak to Dr Jerry's office about next steps.   Patient requested: referral for general surgery for hernia repair.     Attempted to triage the patient's symptoms.  Patient/caller refused triage. Advised that without triage, the symptoms could be more serious than they appear, and gathering additional information about the symptoms will help to determine if they should be seen sooner, possibly in the ER or Urgent Care.    Patient/caller understands that if triage is refused, it could result in delayed care and serious health consequences.    Patient continued to refuse triage. RN followed standard workflows.       Precipitating Factors: twin pregnancy 30 years ago.     Pain Scale (1 - 10), 10 highest: 2/10    What improves/worsens symptoms: activity makes it worse, rest helps with discomfort.     Symptom specific medications: none     Temperature (route and time): none    Recent visits (last 3-4 weeks) for same reason or recent surgery:  6/10/25      As applicable:   LMP: n/a    Contraception: n/a    Pregnant or breastfeeding: n/a    PLAN:  Provider's Office See provider within 24 hours    Warm transferred pt to CDL line and spoke with BRENTON Sarah to obtain a referral for hernia repair surgery.      Patient/Caller agrees to follow recommendations.  _______________________________________       Reason for Disposition   [1] Caller requests to speak ONLY to PCP AND [2] NON-URGENT question     Pt requesting referral for hernai repair surgery and declining triage process.    Protocols used: [facility]

## 2025-06-13 ENCOUNTER — HOSPITAL ENCOUNTER (OUTPATIENT)
Age: 55
Setting detail: THERAPIES SERIES
Discharge: HOME | End: 2025-06-13
Payer: COMMERCIAL

## 2025-06-13 PROCEDURE — 97110 THERAPEUTIC EXERCISES: CPT

## 2025-06-13 NOTE — DISCHARGE SUMMARY
[] No   Location: L low back and into the L calf           Pain Rating: (0-10 scale) 9/10  Pain altered Tx:  [] No  [] Yes  Action:   Comments: Pt not noticing any improvements with therapy and is ready to discharge today. Pain continues to be aggravated with prolonged standing, which makes work very challenging.     Objective:  Function:  KEILA initial 66% discharge 66%    Assessment:       Goals  MET NOT MET ON-  GOING  Details   Date Addressed:            STG: To be met in 8 treatments            1. ? Pain: Decrease pain levels to 6/10 in low back and LLE with Ambulation, climbing stairs, prolonged standing. []  []  [x]  Her pain continues to be elevated to a 8-9/10.    2. ? ROM: Increase flexibility and AROM limitations throughout lumbar spine to less than 50% impairment in all planes in order to reduce difficulty with Ambulation, climbing stairs, prolonged standing. [x]  []  []  Improved rotation improved to 50% deficit side bend bilaterally improved to 50% bilaterally/    3. ? Strength: Increase MMT to 4+/5 throughout LLE for ease of Ambulation, climbing stairs, prolonged standing []  []  [x]  Hip flexion strength improved to hip flexion, ankle DF and knee ext to 4/5 bilaterally.    4. ? Function: Improve score on assessment tool Oswestry from 66% impairment to less than 35% impairment. []  [x]  []  No change, still 66% impairment    5. Independent with Home Exercise Programs. [x]  []  []  Consistent HEP at home    6. Pt reporting being able to perform ADLs for 10 minutes at home with less than 6/10 level of pain in the low back and LLE.       Pt reports continued limitation in duration she can stand to perform ADLs at home to around 5-6 minutes.    Date Addressed: 6/13/2025 by Jeffery Hernandez PT           LTG: To be met in 16 treatments           1. Improve score on assessment tool Oswestry from 35% impairment to less than 20% impairment.  []  [x]  []  Continues to have a 66% disability.    2. Reduce pain levels to

## 2025-06-13 NOTE — FLOWSHEET NOTE
review.  [x] Demonstrates/verbalizes HEP/Ed previously given.     Access Code: [email or web address]  URL: [email or web address]  Date: 04/21/2025  Prepared by: Quyen Mayo     Exercises  - Seated Sciatic Nerve Slider  - 1 x daily - 7 x weekly - 3 sets - 10 reps  - Seated Sciatic Nerve Glide  - 1 x daily - 7 x weekly - 3 sets - 10 reps  - Seated Table Piriformis Stretch  - 1 x daily - 7 x weekly - 3 sets - 30 (sec) hold  - Seated Trunk Rotation - Arms Crossed  - 1 x daily - 7 x weekly - 3 sets - 10 reps - 5 (sec) hold    Plan: [x] Continue current frequency toward long and short term goals.    [x] Specific Instructions for subsequent treatments: assess goals for discharge  Frequency:  2 x/week for 16 visits         Time In: 1000          Time Out: 1040      Electronically signed by:  Ronald Hernandez PT

## 2025-06-14 DIAGNOSIS — Z79.4 TYPE 2 DIABETES MELLITUS WITHOUT COMPLICATION, WITH LONG-TERM CURRENT USE OF INSULIN (HCC): ICD-10-CM

## 2025-06-14 DIAGNOSIS — E11.9 TYPE 2 DIABETES MELLITUS WITHOUT COMPLICATION, WITH LONG-TERM CURRENT USE OF INSULIN (HCC): ICD-10-CM

## 2025-06-16 RX ORDER — POTASSIUM CHLORIDE 750 MG/1
10 TABLET, EXTENDED RELEASE ORAL 2 TIMES DAILY
Qty: 30 TABLET | Refills: 5 | Status: SHIPPED | OUTPATIENT
Start: 2025-06-16

## 2025-06-16 NOTE — TELEPHONE ENCOUNTER
Last visit: 4.14.25  Last Med refill: 5.13.25  Does patient have enough medication for 72 hours: Yes    Next Visit Date:  Future Appointments   Date Time Provider Department Center   6/20/2025 11:30 AM ST [facility] Radiolog       Health Maintenance   Topic Date Due    Shingles vaccine (1 of 2) Never done    Diabetic retinal exam  02/20/2024    COVID-19 Vaccine (3 - 2024-25 season) 09/01/2024    Breast cancer screen  06/21/2025    Hepatitis B vaccine (1 of 3 - 19+ 3-dose series) 02/18/2026 (Originally 12/12/1989)    Flu vaccine (Season Ended) 02/18/2026 (Originally 8/1/2025)    Pneumococcal 50+ years Vaccine (2 of 2 - PCV) 02/18/2026 (Originally 1/13/2018)    Diabetic foot exam  08/19/2025    Depression Screen  02/17/2026    A1C test (Diabetic or Prediabetic)  02/18/2026    Diabetic Alb to Cr ratio (uACR) test  02/18/2026    Lipids  02/18/2026    GFR test (Diabetes, CKD 3-4, OR last GFR 15-59)  02/18/2026    Cervical cancer screen  01/03/2028    DTaP/Tdap/Td vaccine (3 - Td or Tdap) 02/17/2030    Colorectal Cancer Screen  10/07/2034    Hepatitis C screen  Completed    HIV screen  Completed    Hepatitis A vaccine  Aged Out    Hib vaccine  Aged Out    Polio vaccine  Aged Out    Meningococcal (ACWY) vaccine  Aged Out    Meningococcal B vaccine  Aged Out    Pneumococcal 0-49 years Vaccine  Discontinued       Hemoglobin A1C (%)   Date Value   02/18/2025 8.5   09/26/2024 7.6 (H)   06/19/2024 10.0 (H)             ( goal A1C is < 7)   No components found for: \"LABMICR\"  No components found for: \"LDLCHOLESTEROL\", \"LDLCALC\"    (goal LDL is <100)   AST (U/L)   Date Value   09/29/2024 30     ALT (U/L)   Date Value   09/29/2024 26     BUN (mg/dL)   Date Value   02/18/2025 12     BP Readings from Last 3 Encounters:   04/24/25 138/80   04/14/25 116/78   02/18/25 126/70          (goal 120/80)    All Future Testing planned in [facility]  Lab Frequency Next Occurrence   Hemoglobin A1c Once 09/19/2024

## 2025-06-18 ENCOUNTER — CARE COORDINATION (OUTPATIENT)
Dept: CARE COORDINATION | Age: 55
End: 2025-06-18

## 2025-06-18 NOTE — CARE COORDINATION
Ambulatory Care Coordination Note     6/18/2025 10:16 AM     Patient outreach attempt by this AC today to perform care management follow up . [facility] was unable to reach the patient by telephone today;   left voice message requesting a return phone call to this AC.     ACM: Jesica Valdez RN     Care Summary Note:     CC Plan:       Patient was referred to Pharmacy team for diabetes and med management.  Patient has a blood glucose sensor but does not wear it.  She stated she wasn't shown how to put it on.  The Pharmacy team has been unable to contact Patient.    2.  Review appointment below with Patient:  PCP/Specialist follow up:   Future Appointments         Provider Specialty Dept Phone    6/20/2025 11:30 AM (Arrive by 11:15 AM) [facility]  Radiology [phone]          3.  Review medications with Patient.    4.  Plan to graduate Patient from AC if no questions or concerns.    Writer phoned Patient for AC update and to discuss cc plan of care.  Message left on Patient's voice mail requesting a return call.  Writer's contact information provided.    Follow Up:   Plan for next AC outreach in approximately 2 weeks and 3 weeks to complete:  - disease specific assessments  - SDOH assessments  - medication review  - advance care planning  - goal progression  - education .

## 2025-06-20 ENCOUNTER — HOSPITAL ENCOUNTER (OUTPATIENT)
Dept: NUCLEAR MEDICINE | Age: 55
Discharge: HOME | End: 2025-06-22
Attending: INTERNAL MEDICINE
Payer: COMMERCIAL

## 2025-06-20 DIAGNOSIS — I50.42 CHRONIC COMBINED SYSTOLIC (CONGESTIVE) AND DIASTOLIC (CONGESTIVE) HEART FAILURE (HCC): ICD-10-CM

## 2025-06-20 PROCEDURE — A9560 TC99M LABELED RBC: HCPCS | Performed by: INTERNAL MEDICINE

## 2025-06-20 PROCEDURE — 3430000000 HC RX DIAGNOSTIC RADIOPHARMACEUTICAL: Performed by: INTERNAL MEDICINE

## 2025-06-20 PROCEDURE — 78472 GATED HEART PLANAR SINGLE: CPT

## 2025-06-20 RX ORDER — SODIUM CHLORIDE 0.9 % (FLUSH) 0.9 %
10 SYRINGE (ML) INJECTION PRN
Status: DISCONTINUED | OUTPATIENT
Start: 2025-06-20 | End: 2025-06-23 | Stop reason: HOSPADM

## 2025-06-20 RX ADMIN — Medication 19.65 MILLICURIE: at 12:22

## 2025-06-22 LAB — STRESS TARGET HR: 166 BPM

## 2025-07-15 ENCOUNTER — CARE COORDINATION (OUTPATIENT)
Dept: CARE COORDINATION | Age: 55
End: 2025-07-15

## 2025-07-15 NOTE — CARE COORDINATION
Ambulatory Care Coordination Note     7/15/2025 10:32 AM     Patient outreach attempt by this AC today to perform care management follow up . [facility] was unable to reach the patient by telephone today;   left voice message requesting a return phone call to this AC.     ACM: Jesica Valdez RN     Care Summary Note:     CC Plan:        Patient was referred to Pharmacy team for diabetes and med management.  Patient has a blood glucose sensor but does not wear it.  She stated she wasn't shown how to put it on.  The Pharmacy team has been unable to contact Patient.    2.  Review medications with Patient    3.  PCP reviewed Patient's XR of Lumbar spine.  He routed message to Writer stating Patient should continue Physical Therapy.  Patient's last PT appointment was on 6/13/2025.  She graduated from PT    4.  Patient completed MUGA scan on 6/20/2025.    5.  Plan to graduate Patient from [facility] if no questions or concerns.    Writer phoned Patient for AC update and to discuss cc plan of care.  Message left on Patient's voice mail requesting a return call.  Writer's contact information provided.    PCP/Specialist follow up:       Follow Up:   Plan for next AC outreach in approximately 1 week and 2 weeks to complete:  - disease specific assessments  - SDOH assessments  - medication review  - advance care planning  - goal progression  - education .

## 2025-07-16 NOTE — TELEPHONE ENCOUNTER
I have attempted to contact this patient by phone with the following results: left message to return my call on answering machine. Letter sent         \PA request received for Ozempic     PA processed and submitted to pt insurance, waiting for response in regards to medication coverage

## 2025-08-01 ENCOUNTER — CARE COORDINATION (OUTPATIENT)
Dept: CARE COORDINATION | Age: 55
End: 2025-08-01

## 2025-08-01 NOTE — CARE COORDINATION
Ambulatory Care Coordination Note     2025 1:37 PM     Patient Current Location:  Home: [address] [place] [address]     ACM contacted the patient by telephone. Verified name and  with patient as identifiers.       ACM: Jesica Valdez RN     Challenges to be reviewed by the provider   Additional needs identified to be addressed with provider No                 Method of communication with provider: staff message.    Utilization: Has the patient been seen in the ED since your last call? no    Care Summary Note:     CC Plan:        Patient was referred to Pharmacy team for diabetes and med management.  Patient has a blood glucose sensor but does not wear it.  She stated she wasn't shown how to put it on.  The Pharmacy team has been unable to contact Patient.     2.  Review medications with Patient     3.  PCP reviewed Patient's XR of Lumbar spine.  He routed message to Writer stating Patient should continue Physical Therapy.  Patient's last PT appointment was on 2025.  She graduated from PT     4.  Patient completed MUGA scan on 2025.     5.  Plan to graduate Patient from [facility] if no questions or concerns.    Offered patient enrollment in the Remote Patient Monitoring (RPM) program for in-home monitoring: Yes, but did not enroll at this time: controlled chronic disease management.    Writer phoned Patient for ACM update and to review cc plan of care.  Patient states she is feeling well.  She denies concerns today.      Patient was informed Jessica, with Pharmacy team, attempted to contact her regarding diabetes and med management. She was given Jessica's phone number.  Patient did not test her blood glucose today.  Writer informed Patient she is unable to use her Humalog meal scale if she doesn't test her blood glucose.      Patient states she may change offices.  She is aware Dr. Rivera completed her Residency.  She does not want to continue with a new Resident.  She declined information on [facility]

## 2025-08-08 ENCOUNTER — HOSPITAL ENCOUNTER (EMERGENCY)
Age: 55
Discharge: HOME | End: 2025-08-08
Attending: EMERGENCY MEDICINE
Payer: COMMERCIAL

## 2025-08-08 VITALS
TEMPERATURE: 97.8 F | HEIGHT: 67 IN | OXYGEN SATURATION: 95 % | SYSTOLIC BLOOD PRESSURE: 127 MMHG | DIASTOLIC BLOOD PRESSURE: 64 MMHG | WEIGHT: 270 LBS | HEART RATE: 64 BPM | BODY MASS INDEX: 42.38 KG/M2 | RESPIRATION RATE: 18 BRPM

## 2025-08-08 DIAGNOSIS — M54.32 SCIATICA, LEFT SIDE: Primary | ICD-10-CM

## 2025-08-08 DIAGNOSIS — R21 RASH AND OTHER NONSPECIFIC SKIN ERUPTION: ICD-10-CM

## 2025-08-08 PROCEDURE — 99284 EMERGENCY DEPT VISIT MOD MDM: CPT

## 2025-08-08 PROCEDURE — 96372 THER/PROPH/DIAG INJ SC/IM: CPT

## 2025-08-08 PROCEDURE — 6360000002 HC RX W HCPCS: Performed by: EMERGENCY MEDICINE

## 2025-08-08 PROCEDURE — 6370000000 HC RX 637 (ALT 250 FOR IP): Performed by: EMERGENCY MEDICINE

## 2025-08-08 RX ORDER — BENZOCAINE/MENTHOL 6 MG-10 MG
LOZENGE MUCOUS MEMBRANE 2 TIMES DAILY
Qty: 28 G | Refills: 0 | Status: SHIPPED | OUTPATIENT
Start: 2025-08-08 | End: 2025-08-15

## 2025-08-08 RX ORDER — LIDOCAINE 50 MG/G
1 PATCH TOPICAL DAILY
Qty: 10 PATCH | Refills: 0 | Status: SHIPPED | OUTPATIENT
Start: 2025-08-08

## 2025-08-08 RX ORDER — KETOROLAC TROMETHAMINE 30 MG/ML
30 INJECTION, SOLUTION INTRAMUSCULAR; INTRAVENOUS ONCE
Status: COMPLETED | OUTPATIENT
Start: 2025-08-08 | End: 2025-08-08

## 2025-08-08 RX ORDER — ORPHENADRINE CITRATE 30 MG/ML
60 INJECTION INTRAMUSCULAR; INTRAVENOUS ONCE
Status: COMPLETED | OUTPATIENT
Start: 2025-08-08 | End: 2025-08-08

## 2025-08-08 RX ORDER — LIDOCAINE 4 G/G
1 PATCH TOPICAL ONCE
Status: DISCONTINUED | OUTPATIENT
Start: 2025-08-08 | End: 2025-08-08 | Stop reason: HOSPADM

## 2025-08-08 RX ORDER — DEXAMETHASONE SODIUM PHOSPHATE 10 MG/ML
10 INJECTION, SOLUTION INTRAMUSCULAR; INTRAVENOUS ONCE
Status: COMPLETED | OUTPATIENT
Start: 2025-08-08 | End: 2025-08-08

## 2025-08-08 RX ORDER — IBUPROFEN 600 MG/1
600 TABLET, FILM COATED ORAL EVERY 8 HOURS PRN
Qty: 15 TABLET | Refills: 0 | Status: SHIPPED | OUTPATIENT
Start: 2025-08-08

## 2025-08-08 RX ORDER — METHOCARBAMOL 750 MG/1
750 TABLET, FILM COATED ORAL 3 TIMES DAILY PRN
Qty: 15 TABLET | Refills: 0 | Status: SHIPPED | OUTPATIENT
Start: 2025-08-08

## 2025-08-08 RX ADMIN — ORPHENADRINE CITRATE 60 MG: 60 INJECTION INTRAMUSCULAR; INTRAVENOUS at 02:49

## 2025-08-08 RX ADMIN — DEXAMETHASONE SODIUM PHOSPHATE 10 MG: 10 INJECTION INTRAMUSCULAR; INTRAVENOUS at 02:49

## 2025-08-08 RX ADMIN — KETOROLAC TROMETHAMINE 30 MG: 30 INJECTION, SOLUTION INTRAMUSCULAR at 02:49

## 2025-08-08 ASSESSMENT — ENCOUNTER SYMPTOMS
SHORTNESS OF BREATH: 0
VOMITING: 0
ABDOMINAL PAIN: 0
BACK PAIN: 1
NAUSEA: 0

## 2025-08-08 ASSESSMENT — PAIN - FUNCTIONAL ASSESSMENT: PAIN_FUNCTIONAL_ASSESSMENT: NONE - DENIES PAIN

## 2025-08-08 ASSESSMENT — PAIN SCALES - GENERAL
PAINLEVEL_OUTOF10: 5
PAINLEVEL_OUTOF10: 10

## 2025-08-08 ASSESSMENT — LIFESTYLE VARIABLES
HOW MANY STANDARD DRINKS CONTAINING ALCOHOL DO YOU HAVE ON A TYPICAL DAY: 1 OR 2
HOW OFTEN DO YOU HAVE A DRINK CONTAINING ALCOHOL: MONTHLY OR LESS

## 2025-08-08 ASSESSMENT — PAIN DESCRIPTION - LOCATION
LOCATION: BACK
LOCATION: BACK

## 2025-08-11 ENCOUNTER — CARE COORDINATION (OUTPATIENT)
Dept: CARE COORDINATION | Age: 55
End: 2025-08-11

## 2025-08-14 ENCOUNTER — OFFICE VISIT (OUTPATIENT)
Dept: ORTHOPEDIC SURGERY | Age: 55
End: 2025-08-14
Payer: COMMERCIAL

## 2025-08-14 VITALS — BODY MASS INDEX: 42.69 KG/M2 | WEIGHT: 272 LBS | HEIGHT: 67 IN

## 2025-08-14 DIAGNOSIS — M54.42 CHRONIC LOW BACK PAIN WITH LEFT-SIDED SCIATICA, UNSPECIFIED BACK PAIN LATERALITY: Primary | ICD-10-CM

## 2025-08-14 DIAGNOSIS — G89.29 CHRONIC LOW BACK PAIN WITH LEFT-SIDED SCIATICA, UNSPECIFIED BACK PAIN LATERALITY: Primary | ICD-10-CM

## 2025-08-14 PROCEDURE — 3017F COLORECTAL CA SCREEN DOC REV: CPT | Performed by: PHYSICIAN ASSISTANT

## 2025-08-14 PROCEDURE — 4004F PT TOBACCO SCREEN RCVD TLK: CPT | Performed by: PHYSICIAN ASSISTANT

## 2025-08-14 PROCEDURE — G8417 CALC BMI ABV UP PARAM F/U: HCPCS | Performed by: PHYSICIAN ASSISTANT

## 2025-08-14 PROCEDURE — G8427 DOCREV CUR MEDS BY ELIG CLIN: HCPCS | Performed by: PHYSICIAN ASSISTANT

## 2025-08-14 PROCEDURE — 99203 OFFICE O/P NEW LOW 30 MIN: CPT | Performed by: PHYSICIAN ASSISTANT

## 2025-08-24 ENCOUNTER — OFFICE VISIT (OUTPATIENT)
Dept: FAMILY MEDICINE CLINIC | Age: 55
End: 2025-08-24
Payer: COMMERCIAL

## 2025-08-24 VITALS
SYSTOLIC BLOOD PRESSURE: 159 MMHG | TEMPERATURE: 97.9 F | DIASTOLIC BLOOD PRESSURE: 82 MMHG | OXYGEN SATURATION: 98 % | HEART RATE: 87 BPM

## 2025-08-24 DIAGNOSIS — L73.2 HIDRADENITIS SUPPURATIVA: Primary | ICD-10-CM

## 2025-08-24 PROCEDURE — G8417 CALC BMI ABV UP PARAM F/U: HCPCS | Performed by: FAMILY MEDICINE

## 2025-08-24 PROCEDURE — 3079F DIAST BP 80-89 MM HG: CPT | Performed by: FAMILY MEDICINE

## 2025-08-24 PROCEDURE — 3077F SYST BP >= 140 MM HG: CPT | Performed by: FAMILY MEDICINE

## 2025-08-24 PROCEDURE — 4004F PT TOBACCO SCREEN RCVD TLK: CPT | Performed by: FAMILY MEDICINE

## 2025-08-24 PROCEDURE — 3017F COLORECTAL CA SCREEN DOC REV: CPT | Performed by: FAMILY MEDICINE

## 2025-08-24 PROCEDURE — 99213 OFFICE O/P EST LOW 20 MIN: CPT | Performed by: FAMILY MEDICINE

## 2025-08-24 PROCEDURE — G8427 DOCREV CUR MEDS BY ELIG CLIN: HCPCS | Performed by: FAMILY MEDICINE

## 2025-08-24 RX ORDER — CHLORHEXIDINE GLUCONATE 40 MG/ML
SOLUTION TOPICAL
Qty: 236 ML | Refills: 0 | Status: SHIPPED | OUTPATIENT
Start: 2025-08-24 | End: 2025-08-31

## 2025-08-24 RX ORDER — DOXYCYCLINE HYCLATE 100 MG
100 TABLET ORAL 2 TIMES DAILY
Qty: 14 TABLET | Refills: 0 | Status: SHIPPED | OUTPATIENT
Start: 2025-08-24 | End: 2025-08-31

## 2025-08-29 ENCOUNTER — HOSPITAL ENCOUNTER (OUTPATIENT)
Dept: MRI IMAGING | Age: 55
Discharge: HOME | End: 2025-08-31
Payer: COMMERCIAL

## 2025-08-29 DIAGNOSIS — G89.29 CHRONIC LOW BACK PAIN WITH LEFT-SIDED SCIATICA, UNSPECIFIED BACK PAIN LATERALITY: ICD-10-CM

## 2025-08-29 DIAGNOSIS — M54.42 CHRONIC LOW BACK PAIN WITH LEFT-SIDED SCIATICA, UNSPECIFIED BACK PAIN LATERALITY: ICD-10-CM

## 2025-08-29 PROCEDURE — 72148 MRI LUMBAR SPINE W/O DYE: CPT

## 2025-09-04 ENCOUNTER — CARE COORDINATION (OUTPATIENT)
Dept: CARE COORDINATION | Age: 55
End: 2025-09-04

## (undated) DEVICE — ELECTRODE PT RET AD L9FT HI MOIST COND ADH HYDRGEL CORDED

## (undated) DEVICE — PAD N ADH W3XL4IN POLY COT SFT PERF FLM EASILY CUT ABSRB

## (undated) DEVICE — SET EXTN SM 0.5ML L13IN BOR W/O INJ SITE

## (undated) DEVICE — GLOVE ORANGE PI 7   MSG9070

## (undated) DEVICE — INSTRUMENT 9560702 RADIANCE ILLUMIN SYS: Brand: METRX® SYSTEM

## (undated) DEVICE — ADHESIVE SKIN CLSR 0.7ML TOP DERMBND ADV

## (undated) DEVICE — CONTROL SYRINGE LUER-LOCK TIP: Brand: MONOJECT

## (undated) DEVICE — NEEDLE SPINAL 22GA L3.5IN SPINOCAN

## (undated) DEVICE — SYRINGE IRRIG 60ML SFT PLIABLE BLB EZ TO GRP 1 HND USE W/

## (undated) DEVICE — APPLICATOR MEDICATED 10.5 CC SOLUTION HI LT ORNG CHLORAPREP

## (undated) DEVICE — DRAPE C ARM UNIV W41XL74IN CLR PLAS XR VELC CLSR POLY STRP

## (undated) DEVICE — SHEET, T, LAPAROTOMY, STERILE: Brand: MEDLINE

## (undated) DEVICE — TRAY NRV BLK SUPP CUST

## (undated) DEVICE — GLOVE,EXAM,NITRILE,RESTORE,OAT SENSE,L: Brand: MEDLINE

## (undated) DEVICE — MARKER,SKIN,WI/RULER AND LABELS: Brand: MEDLINE

## (undated) DEVICE — TUBING, SUCTION, 1/4" X 12', STRAIGHT: Brand: MEDLINE

## (undated) DEVICE — CODMAN® SURGICAL PATTIES 1/2" X 1/2" (1.27CM X 1.27CM): Brand: CODMAN®

## (undated) DEVICE — PROTECTOR ULN NRV PUR FOAM HK LOOP STRP ANATOMICALLY

## (undated) DEVICE — SUTURE PDS II SZ 2-0 L27IN ABSRB VLT SH L26MM 1/2 CIR Z317H

## (undated) DEVICE — 3.0MM PRECISION NEURO (MATCH HEAD)

## (undated) DEVICE — DRAPE,REIN 53X77,STERILE: Brand: MEDLINE

## (undated) DEVICE — 3M™ IOBAN™ 2 ANTIMICROBIAL INCISE DRAPE 6650EZ: Brand: IOBAN™ 2

## (undated) DEVICE — DRAIN SURG PENROSE 0.25X18 IN CLOSED WND DRAINAGE PREM SIL

## (undated) DEVICE — 4-PORT MANIFOLD: Brand: NEPTUNE 2

## (undated) DEVICE — GLOVE SURG SZ 65 THK91MIL LTX FREE SYN POLYISOPRENE

## (undated) DEVICE — PACK PROCEDURE SURG LUMBAR SPINE SVMMC

## (undated) DEVICE — PRECISION MATCH HEAD

## (undated) DEVICE — TUBING, SUCTION, 9/32" X 20', STRAIGHT: Brand: MEDLINE INDUSTRIES, INC.

## (undated) DEVICE — GLOVE SURG SZ 7 CRM LTX FREE POLYISOPRENE POLYMER BEAD ANTI

## (undated) DEVICE — TOWEL,OR,DSP,ST,NATURAL,DLX,4/PK,20PK/CS: Brand: MEDLINE

## (undated) DEVICE — GAUZE,SPONGE,4"X4",16PLY,XRAY,STRL,LF: Brand: MEDLINE

## (undated) DEVICE — TRAY CATHETER 16FR F INCLUDE BARDX IC COMPLT CARE DRNGE BG

## (undated) DEVICE — ANGIOGRAPHIC CATHETER: Brand: EXPO™

## (undated) DEVICE — SUTURE ABSORBABLE MONOFILAMENT 4-0 PS2 18 IN UD PDS + PDP496G

## (undated) DEVICE — SYRINGE MED 10ML TRNSLUC BRL PLUNG BLK MRK POLYPR CTRL

## (undated) DEVICE — GOWN,AURORA,NONRNF,XL,30/CS: Brand: MEDLINE

## (undated) DEVICE — BLADE ES ELASTOMERIC COAT INSUL DURABLE BEND UPTO 90DEG

## (undated) DEVICE — SUTURE MCRYL SZ 5-0 L18IN ABSRB UD L13MM P-3 3/8 CIR PRIM Y493G

## (undated) DEVICE — BLADE ES L4IN INSUL EDGE

## (undated) DEVICE — GARMENT COMPR STD FOR 17IN CALF UNIF THER FLOTRN

## (undated) DEVICE — TOWEL,OR,DSP,ST,BLUE,DLX,XR,4/PK,20PK/CS: Brand: MEDLINE

## (undated) DEVICE — GOWN,AURORA,NONREINFORCED,LARGE: Brand: MEDLINE

## (undated) DEVICE — 1000 S-DRAPE TOWEL DRAPE 10/BX: Brand: STERI-DRAPE™

## (undated) DEVICE — SPONGE LAP W18XL18IN WHT COT 4 PLY FLD STRUNG RADPQ DISP ST

## (undated) DEVICE — GLOVE ORANGE PI 7 1/2   MSG9075

## (undated) DEVICE — STRAP ARMBRD W1.5XL32IN FOAM STR YET SFT W/ HK AND LOOP

## (undated) DEVICE — NEEDLE FLTR 18GA L1.5IN MEM THK5UM BLNT DISP

## (undated) DEVICE — GAUZE,SPONGE,FLUFF,6"X6.75",STRL,5/TRAY: Brand: MEDLINE

## (undated) DEVICE — NEEDLE: Brand: SPROTTE®

## (undated) DEVICE — SYRINGE MED 10ML LUERLOCK TIP W/O SFTY DISP

## (undated) DEVICE — BLADE ES L6IN ELASTOMERIC COAT INSUL DURABLE BEND UPTO

## (undated) DEVICE — DRESSING TRNSPAR W8XL12IN FLM SURESITE 123

## (undated) DEVICE — MEDICINE CUP, GRADUATED, STER: Brand: MEDLINE

## (undated) DEVICE — BANDAGE ADH W0.75XL3IN NAT PLAS CURAD

## (undated) DEVICE — BANDAGE GZ W2XL75IN ST RAYON POLY CNFRM STRTCH LTWT

## (undated) DEVICE — SURGICAL PROCEDURE TRAY CRD CATH SVMMC

## (undated) DEVICE — CHLORAPREP 26ML ORANGE

## (undated) DEVICE — COVER,MAYO STAND,STERILE: Brand: MEDLINE

## (undated) DEVICE — APPLICATOR MEDICATED 26 CC SOLUTION HI LT ORNG CHLORAPREP

## (undated) DEVICE — SUTURE VCRL SZ 2-0 L27IN ABSRB UD L36MM CT-1 1/2 CIR JJ42G

## (undated) DEVICE — CODMAN® SURGICAL PATTIES 1/2" X 3" (1.27CM X 7.62CM): Brand: CODMAN®

## (undated) DEVICE — BANDAGE,GAUZE,BULKEE II,4.5"X4.1YD,STRL: Brand: MEDLINE

## (undated) DEVICE — BAND COMPR L24CM REG CLR PLAS HEMSTAT EXT HK AND LOOP RETEN

## (undated) DEVICE — GLOVE ORANGE PI 8   MSG9080

## (undated) DEVICE — Z INACTIVE USE 2735373 APPLICATOR FBR LAIN COT WOOD TIP ECONOMICAL

## (undated) DEVICE — PEN: MARKING STD 100/CS: Brand: MEDICAL ACTION INDUSTRIES

## (undated) DEVICE — GLIDESHEATH SLENDER STAINLESS STEEL KIT: Brand: GLIDESHEATH SLENDER

## (undated) DEVICE — STANDARD HYPODERMIC NEEDLE,POLYPROPYLENE HUB: Brand: MONOJECT

## (undated) DEVICE — DRAPE MICSCP W117XL305CM DIA65MM LENS W VARI LENS2 FOR LEICA

## (undated) DEVICE — OIL CARTRIDGE: Brand: CORE, MAESTRO

## (undated) DEVICE — GUIDEWIRE 35/260/FC/PTFE/3J: Brand: GUIDEWIRE

## (undated) DEVICE — CONNECTOR TBNG WHT PLAS SUCT STR 5IN1 LTWT W/ M CONN

## (undated) DEVICE — SPONGE,NEURO,0.5"X0.5",XR,STRL,10/PK: Brand: MEDLINE

## (undated) DEVICE — Device

## (undated) DEVICE — GOWN,SIRUS,NON REINFRCD,LARGE,SET IN SL: Brand: MEDLINE

## (undated) DEVICE — NEEDLE FLTR 18GA L1.5IN WALL THK5UM PNK POLYPR HUB S STL

## (undated) DEVICE — SUTURE MCRYL SZ 3-0 L18IN ABSRB UD L19MM PS-2 3/8 CIR PRIM Y497G

## (undated) DEVICE — DIFFUSER: Brand: CORE, MAESTRO